# Patient Record
Sex: MALE | Race: WHITE | Employment: OTHER | URBAN - METROPOLITAN AREA
[De-identification: names, ages, dates, MRNs, and addresses within clinical notes are randomized per-mention and may not be internally consistent; named-entity substitution may affect disease eponyms.]

---

## 2019-10-13 ENCOUNTER — APPOINTMENT (OUTPATIENT)
Dept: CT IMAGING | Age: 79
DRG: 330 | End: 2019-10-13
Payer: MEDICARE

## 2019-10-13 ENCOUNTER — HOSPITAL ENCOUNTER (INPATIENT)
Age: 79
LOS: 10 days | Discharge: HOME HEALTH | DRG: 330 | End: 2019-10-23
Attending: EMERGENCY MEDICINE | Admitting: SURGERY
Payer: MEDICARE

## 2019-10-13 ENCOUNTER — ANESTHESIA EVENT (OUTPATIENT)
Dept: OPERATING ROOM | Age: 79
DRG: 330 | End: 2019-10-13
Payer: MEDICARE

## 2019-10-13 ENCOUNTER — ANESTHESIA (OUTPATIENT)
Dept: OPERATING ROOM | Age: 79
DRG: 330 | End: 2019-10-13
Payer: MEDICARE

## 2019-10-13 VITALS — DIASTOLIC BLOOD PRESSURE: 79 MMHG | TEMPERATURE: 97 F | OXYGEN SATURATION: 87 % | SYSTOLIC BLOOD PRESSURE: 158 MMHG

## 2019-10-13 DIAGNOSIS — R10.84 GENERALIZED ABDOMINAL PAIN: ICD-10-CM

## 2019-10-13 DIAGNOSIS — K56.609 INTESTINAL OBSTRUCTION, UNSPECIFIED CAUSE, UNSPECIFIED WHETHER PARTIAL OR COMPLETE (HCC): Primary | ICD-10-CM

## 2019-10-13 LAB
ALBUMIN SERPL-MCNC: 4.1 G/DL (ref 3.5–5.2)
ALP BLD-CCNC: 84 U/L (ref 40–129)
ALT SERPL-CCNC: 11 U/L (ref 0–40)
ANION GAP SERPL CALCULATED.3IONS-SCNC: 15 MMOL/L (ref 7–16)
AST SERPL-CCNC: 11 U/L (ref 0–39)
BASOPHILS ABSOLUTE: 0.05 E9/L (ref 0–0.2)
BASOPHILS RELATIVE PERCENT: 0.4 % (ref 0–2)
BILIRUB SERPL-MCNC: 0.9 MG/DL (ref 0–1.2)
BUN BLDV-MCNC: 12 MG/DL (ref 8–23)
CALCIUM SERPL-MCNC: 9.9 MG/DL (ref 8.6–10.2)
CHLORIDE BLD-SCNC: 102 MMOL/L (ref 98–107)
CO2: 25 MMOL/L (ref 22–29)
CREAT SERPL-MCNC: 0.8 MG/DL (ref 0.7–1.2)
EOSINOPHILS ABSOLUTE: 0.08 E9/L (ref 0.05–0.5)
EOSINOPHILS RELATIVE PERCENT: 0.6 % (ref 0–6)
GFR AFRICAN AMERICAN: >60
GFR AFRICAN AMERICAN: >60
GFR NON-AFRICAN AMERICAN: >60 ML/MIN/1.73
GFR NON-AFRICAN AMERICAN: >60 ML/MIN/1.73
GLUCOSE BLD-MCNC: 106 MG/DL (ref 74–99)
GLUCOSE BLD-MCNC: 107 MG/DL (ref 74–99)
HCT VFR BLD CALC: 45.8 % (ref 37–54)
HEMOGLOBIN: 15.8 G/DL (ref 12.5–16.5)
IMMATURE GRANULOCYTES #: 0.06 E9/L
IMMATURE GRANULOCYTES %: 0.4 % (ref 0–5)
LACTIC ACID: 1.6 MMOL/L (ref 0.5–2.2)
LIPASE: 33 U/L (ref 13–60)
LYMPHOCYTES ABSOLUTE: 2.65 E9/L (ref 1.5–4)
LYMPHOCYTES RELATIVE PERCENT: 18.6 % (ref 20–42)
MCH RBC QN AUTO: 29.3 PG (ref 26–35)
MCHC RBC AUTO-ENTMCNC: 34.5 % (ref 32–34.5)
MCV RBC AUTO: 84.8 FL (ref 80–99.9)
MONOCYTES ABSOLUTE: 1.1 E9/L (ref 0.1–0.95)
MONOCYTES RELATIVE PERCENT: 7.7 % (ref 2–12)
NEUTROPHILS ABSOLUTE: 10.28 E9/L (ref 1.8–7.3)
NEUTROPHILS RELATIVE PERCENT: 72.3 % (ref 43–80)
PDW BLD-RTO: 12 FL (ref 11.5–15)
PERFORMED ON: ABNORMAL
PLATELET # BLD: 368 E9/L (ref 130–450)
PMV BLD AUTO: 8.6 FL (ref 7–12)
POC CHLORIDE: 105 MMOL/L (ref 100–108)
POC CREATININE: 0.8 MG/DL (ref 0.7–1.2)
POC POTASSIUM: 3.5 MMOL/L (ref 3.5–5)
POC SODIUM: 138 MMOL/L (ref 132–146)
POTASSIUM REFLEX MAGNESIUM: 3.6 MMOL/L (ref 3.5–5)
RBC # BLD: 5.4 E12/L (ref 3.8–5.8)
SODIUM BLD-SCNC: 142 MMOL/L (ref 132–146)
TOTAL PROTEIN: 7.7 G/DL (ref 6.4–8.3)
WBC # BLD: 14.2 E9/L (ref 4.5–11.5)

## 2019-10-13 PROCEDURE — 3600000004 HC SURGERY LEVEL 4 BASE: Performed by: SURGERY

## 2019-10-13 PROCEDURE — 6360000002 HC RX W HCPCS: Performed by: EMERGENCY MEDICINE

## 2019-10-13 PROCEDURE — 6360000002 HC RX W HCPCS: Performed by: ANESTHESIOLOGY

## 2019-10-13 PROCEDURE — 84295 ASSAY OF SERUM SODIUM: CPT

## 2019-10-13 PROCEDURE — 82947 ASSAY GLUCOSE BLOOD QUANT: CPT

## 2019-10-13 PROCEDURE — 2709999900 HC NON-CHARGEABLE SUPPLY: Performed by: SURGERY

## 2019-10-13 PROCEDURE — 80053 COMPREHEN METABOLIC PANEL: CPT

## 2019-10-13 PROCEDURE — 84132 ASSAY OF SERUM POTASSIUM: CPT

## 2019-10-13 PROCEDURE — 6360000002 HC RX W HCPCS

## 2019-10-13 PROCEDURE — 88309 TISSUE EXAM BY PATHOLOGIST: CPT

## 2019-10-13 PROCEDURE — 99285 EMERGENCY DEPT VISIT HI MDM: CPT

## 2019-10-13 PROCEDURE — 83690 ASSAY OF LIPASE: CPT

## 2019-10-13 PROCEDURE — 87040 BLOOD CULTURE FOR BACTERIA: CPT

## 2019-10-13 PROCEDURE — 44141 PARTIAL REMOVAL OF COLON: CPT | Performed by: SURGERY

## 2019-10-13 PROCEDURE — 2580000003 HC RX 258: Performed by: RADIOLOGY

## 2019-10-13 PROCEDURE — 83605 ASSAY OF LACTIC ACID: CPT

## 2019-10-13 PROCEDURE — 93005 ELECTROCARDIOGRAM TRACING: CPT | Performed by: EMERGENCY MEDICINE

## 2019-10-13 PROCEDURE — 94761 N-INVAS EAR/PLS OXIMETRY MLT: CPT

## 2019-10-13 PROCEDURE — 82435 ASSAY OF BLOOD CHLORIDE: CPT

## 2019-10-13 PROCEDURE — 7100000000 HC PACU RECOVERY - FIRST 15 MIN: Performed by: SURGERY

## 2019-10-13 PROCEDURE — 85025 COMPLETE CBC W/AUTO DIFF WBC: CPT

## 2019-10-13 PROCEDURE — 0DTG0ZZ RESECTION OF LEFT LARGE INTESTINE, OPEN APPROACH: ICD-10-PCS | Performed by: SURGERY

## 2019-10-13 PROCEDURE — 3700000001 HC ADD 15 MINUTES (ANESTHESIA): Performed by: SURGERY

## 2019-10-13 PROCEDURE — 6360000004 HC RX CONTRAST MEDICATION: Performed by: RADIOLOGY

## 2019-10-13 PROCEDURE — 74177 CT ABD & PELVIS W/CONTRAST: CPT

## 2019-10-13 PROCEDURE — 3600000014 HC SURGERY LEVEL 4 ADDTL 15MIN: Performed by: SURGERY

## 2019-10-13 PROCEDURE — 2140000000 HC CCU INTERMEDIATE R&B

## 2019-10-13 PROCEDURE — 2580000003 HC RX 258

## 2019-10-13 PROCEDURE — 2720000010 HC SURG SUPPLY STERILE: Performed by: SURGERY

## 2019-10-13 PROCEDURE — 7100000001 HC PACU RECOVERY - ADDTL 15 MIN: Performed by: SURGERY

## 2019-10-13 PROCEDURE — 96374 THER/PROPH/DIAG INJ IV PUSH: CPT

## 2019-10-13 PROCEDURE — 36415 COLL VENOUS BLD VENIPUNCTURE: CPT

## 2019-10-13 PROCEDURE — 82565 ASSAY OF CREATININE: CPT

## 2019-10-13 PROCEDURE — 2580000003 HC RX 258: Performed by: EMERGENCY MEDICINE

## 2019-10-13 PROCEDURE — 3700000000 HC ANESTHESIA ATTENDED CARE: Performed by: SURGERY

## 2019-10-13 PROCEDURE — 2500000003 HC RX 250 WO HCPCS

## 2019-10-13 PROCEDURE — 44604 SUTURE LARGE INTESTINE: CPT | Performed by: SURGERY

## 2019-10-13 PROCEDURE — 0D1L0Z4 BYPASS TRANSVERSE COLON TO CUTANEOUS, OPEN APPROACH: ICD-10-PCS | Performed by: SURGERY

## 2019-10-13 PROCEDURE — 44139 MOBILIZATION OF COLON: CPT | Performed by: SURGERY

## 2019-10-13 PROCEDURE — 6360000002 HC RX W HCPCS: Performed by: NURSE ANESTHETIST, CERTIFIED REGISTERED

## 2019-10-13 PROCEDURE — 2580000003 HC RX 258: Performed by: STUDENT IN AN ORGANIZED HEALTH CARE EDUCATION/TRAINING PROGRAM

## 2019-10-13 PROCEDURE — 99223 1ST HOSP IP/OBS HIGH 75: CPT | Performed by: SURGERY

## 2019-10-13 PROCEDURE — 96375 TX/PRO/DX INJ NEW DRUG ADDON: CPT

## 2019-10-13 RX ORDER — SODIUM CHLORIDE 0.9 % (FLUSH) 0.9 %
10 SYRINGE (ML) INJECTION EVERY 12 HOURS SCHEDULED
Status: DISCONTINUED | OUTPATIENT
Start: 2019-10-13 | End: 2019-10-23 | Stop reason: HOSPADM

## 2019-10-13 RX ORDER — ACETAMINOPHEN 325 MG/1
650 TABLET ORAL EVERY 4 HOURS PRN
Status: DISCONTINUED | OUTPATIENT
Start: 2019-10-13 | End: 2019-10-23 | Stop reason: HOSPADM

## 2019-10-13 RX ORDER — FENTANYL CITRATE 50 UG/ML
25 INJECTION, SOLUTION INTRAMUSCULAR; INTRAVENOUS EVERY 5 MIN PRN
Status: DISCONTINUED | OUTPATIENT
Start: 2019-10-13 | End: 2019-10-13 | Stop reason: HOSPADM

## 2019-10-13 RX ORDER — LEVOTHYROXINE SODIUM 137 UG/1
137 TABLET ORAL DAILY
Status: DISCONTINUED | OUTPATIENT
Start: 2019-10-14 | End: 2019-10-23 | Stop reason: HOSPADM

## 2019-10-13 RX ORDER — OXYCODONE HYDROCHLORIDE AND ACETAMINOPHEN 5; 325 MG/1; MG/1
1 TABLET ORAL
Status: DISCONTINUED | OUTPATIENT
Start: 2019-10-13 | End: 2019-10-13 | Stop reason: HOSPADM

## 2019-10-13 RX ORDER — MORPHINE SULFATE 4 MG/ML
4 INJECTION, SOLUTION INTRAMUSCULAR; INTRAVENOUS ONCE
Status: COMPLETED | OUTPATIENT
Start: 2019-10-13 | End: 2019-10-13

## 2019-10-13 RX ORDER — SODIUM CHLORIDE, SODIUM LACTATE, POTASSIUM CHLORIDE, CALCIUM CHLORIDE 600; 310; 30; 20 MG/100ML; MG/100ML; MG/100ML; MG/100ML
INJECTION, SOLUTION INTRAVENOUS CONTINUOUS
Status: DISCONTINUED | OUTPATIENT
Start: 2019-10-13 | End: 2019-10-15

## 2019-10-13 RX ORDER — ONDANSETRON 2 MG/ML
INJECTION INTRAMUSCULAR; INTRAVENOUS PRN
Status: DISCONTINUED | OUTPATIENT
Start: 2019-10-13 | End: 2019-10-13 | Stop reason: SDUPTHER

## 2019-10-13 RX ORDER — NALOXONE HYDROCHLORIDE 0.4 MG/ML
0.4 INJECTION, SOLUTION INTRAMUSCULAR; INTRAVENOUS; SUBCUTANEOUS PRN
Status: DISCONTINUED | OUTPATIENT
Start: 2019-10-13 | End: 2019-10-16

## 2019-10-13 RX ORDER — FENTANYL CITRATE 50 UG/ML
50 INJECTION, SOLUTION INTRAMUSCULAR; INTRAVENOUS EVERY 5 MIN PRN
Status: DISCONTINUED | OUTPATIENT
Start: 2019-10-13 | End: 2019-10-13 | Stop reason: HOSPADM

## 2019-10-13 RX ORDER — SODIUM CHLORIDE 0.9 % (FLUSH) 0.9 %
10 SYRINGE (ML) INJECTION PRN
Status: DISCONTINUED | OUTPATIENT
Start: 2019-10-13 | End: 2019-10-23 | Stop reason: HOSPADM

## 2019-10-13 RX ORDER — MORPHINE SULFATE 2 MG/ML
2 INJECTION, SOLUTION INTRAMUSCULAR; INTRAVENOUS
Status: CANCELLED | OUTPATIENT
Start: 2019-10-13

## 2019-10-13 RX ORDER — LEVOTHYROXINE SODIUM 0.15 MG/1
150 TABLET ORAL DAILY
Status: ON HOLD | COMMUNITY
End: 2020-11-07 | Stop reason: HOSPADM

## 2019-10-13 RX ORDER — OXYCODONE HYDROCHLORIDE AND ACETAMINOPHEN 5; 325 MG/1; MG/1
1 TABLET ORAL EVERY 4 HOURS PRN
Status: DISCONTINUED | OUTPATIENT
Start: 2019-10-13 | End: 2019-10-23 | Stop reason: HOSPADM

## 2019-10-13 RX ORDER — SODIUM CHLORIDE, SODIUM LACTATE, POTASSIUM CHLORIDE, CALCIUM CHLORIDE 600; 310; 30; 20 MG/100ML; MG/100ML; MG/100ML; MG/100ML
INJECTION, SOLUTION INTRAVENOUS CONTINUOUS PRN
Status: DISCONTINUED | OUTPATIENT
Start: 2019-10-13 | End: 2019-10-13 | Stop reason: SDUPTHER

## 2019-10-13 RX ORDER — ROCURONIUM BROMIDE 10 MG/ML
INJECTION, SOLUTION INTRAVENOUS PRN
Status: DISCONTINUED | OUTPATIENT
Start: 2019-10-13 | End: 2019-10-13 | Stop reason: SDUPTHER

## 2019-10-13 RX ORDER — PHENYLEPHRINE HYDROCHLORIDE 10 MG/ML
INJECTION INTRAVENOUS PRN
Status: DISCONTINUED | OUTPATIENT
Start: 2019-10-13 | End: 2019-10-13 | Stop reason: SDUPTHER

## 2019-10-13 RX ORDER — GLYCOPYRROLATE 1 MG/5 ML
SYRINGE (ML) INTRAVENOUS PRN
Status: DISCONTINUED | OUTPATIENT
Start: 2019-10-13 | End: 2019-10-13 | Stop reason: SDUPTHER

## 2019-10-13 RX ORDER — DIPHENHYDRAMINE HYDROCHLORIDE 50 MG/ML
12.5 INJECTION INTRAMUSCULAR; INTRAVENOUS
Status: DISCONTINUED | OUTPATIENT
Start: 2019-10-13 | End: 2019-10-13 | Stop reason: HOSPADM

## 2019-10-13 RX ORDER — MEPERIDINE HYDROCHLORIDE 50 MG/ML
12.5 INJECTION INTRAMUSCULAR; INTRAVENOUS; SUBCUTANEOUS EVERY 5 MIN PRN
Status: DISCONTINUED | OUTPATIENT
Start: 2019-10-13 | End: 2019-10-13 | Stop reason: HOSPADM

## 2019-10-13 RX ORDER — PROPOFOL 10 MG/ML
INJECTION, EMULSION INTRAVENOUS PRN
Status: DISCONTINUED | OUTPATIENT
Start: 2019-10-13 | End: 2019-10-13 | Stop reason: SDUPTHER

## 2019-10-13 RX ORDER — ONDANSETRON 2 MG/ML
4 INJECTION INTRAMUSCULAR; INTRAVENOUS ONCE
Status: COMPLETED | OUTPATIENT
Start: 2019-10-13 | End: 2019-10-13

## 2019-10-13 RX ORDER — LIDOCAINE HYDROCHLORIDE 20 MG/ML
INJECTION, SOLUTION INTRAVENOUS PRN
Status: DISCONTINUED | OUTPATIENT
Start: 2019-10-13 | End: 2019-10-13 | Stop reason: SDUPTHER

## 2019-10-13 RX ORDER — ATORVASTATIN CALCIUM 40 MG/1
40 TABLET, FILM COATED ORAL NIGHTLY
COMMUNITY

## 2019-10-13 RX ORDER — SUCCINYLCHOLINE/SOD CL,ISO/PF 200MG/10ML
SYRINGE (ML) INTRAVENOUS PRN
Status: DISCONTINUED | OUTPATIENT
Start: 2019-10-13 | End: 2019-10-13 | Stop reason: SDUPTHER

## 2019-10-13 RX ORDER — M-VIT,TX,IRON,MINS/CALC/FOLIC 27MG-0.4MG
1 TABLET ORAL DAILY
Status: ON HOLD | COMMUNITY
End: 2020-11-07 | Stop reason: HOSPADM

## 2019-10-13 RX ORDER — LABETALOL HYDROCHLORIDE 5 MG/ML
INJECTION, SOLUTION INTRAVENOUS PRN
Status: DISCONTINUED | OUTPATIENT
Start: 2019-10-13 | End: 2019-10-13 | Stop reason: SDUPTHER

## 2019-10-13 RX ORDER — ONDANSETRON 2 MG/ML
4 INJECTION INTRAMUSCULAR; INTRAVENOUS EVERY 6 HOURS PRN
Status: DISCONTINUED | OUTPATIENT
Start: 2019-10-13 | End: 2019-10-23 | Stop reason: HOSPADM

## 2019-10-13 RX ORDER — MORPHINE SULFATE/0.9% NACL/PF 1 MG/ML
SYRINGE (ML) INJECTION
Status: COMPLETED
Start: 2019-10-13 | End: 2019-10-13

## 2019-10-13 RX ORDER — ACETAMINOPHEN 500 MG
500 TABLET ORAL EVERY 6 HOURS PRN
COMMUNITY

## 2019-10-13 RX ORDER — MIDAZOLAM HYDROCHLORIDE 1 MG/ML
INJECTION INTRAMUSCULAR; INTRAVENOUS PRN
Status: DISCONTINUED | OUTPATIENT
Start: 2019-10-13 | End: 2019-10-13 | Stop reason: SDUPTHER

## 2019-10-13 RX ORDER — NEOSTIGMINE METHYLSULFATE 1 MG/ML
INJECTION, SOLUTION INTRAVENOUS PRN
Status: DISCONTINUED | OUTPATIENT
Start: 2019-10-13 | End: 2019-10-13 | Stop reason: SDUPTHER

## 2019-10-13 RX ORDER — SODIUM CHLORIDE 0.9 % (FLUSH) 0.9 %
10 SYRINGE (ML) INJECTION
Status: COMPLETED | OUTPATIENT
Start: 2019-10-13 | End: 2019-10-13

## 2019-10-13 RX ORDER — FENTANYL CITRATE 50 UG/ML
INJECTION, SOLUTION INTRAMUSCULAR; INTRAVENOUS PRN
Status: DISCONTINUED | OUTPATIENT
Start: 2019-10-13 | End: 2019-10-13 | Stop reason: SDUPTHER

## 2019-10-13 RX ORDER — MORPHINE SULFATE/0.9% NACL/PF 1 MG/ML
SYRINGE (ML) INJECTION CONTINUOUS
Status: DISCONTINUED | OUTPATIENT
Start: 2019-10-13 | End: 2019-10-16

## 2019-10-13 RX ORDER — OXYCODONE HYDROCHLORIDE AND ACETAMINOPHEN 5; 325 MG/1; MG/1
2 TABLET ORAL EVERY 4 HOURS PRN
Status: DISCONTINUED | OUTPATIENT
Start: 2019-10-13 | End: 2019-10-23 | Stop reason: HOSPADM

## 2019-10-13 RX ORDER — PROMETHAZINE HYDROCHLORIDE 25 MG/ML
6.25 INJECTION, SOLUTION INTRAMUSCULAR; INTRAVENOUS
Status: DISCONTINUED | OUTPATIENT
Start: 2019-10-13 | End: 2019-10-13 | Stop reason: HOSPADM

## 2019-10-13 RX ORDER — ATORVASTATIN CALCIUM 40 MG/1
40 TABLET, FILM COATED ORAL DAILY
Status: DISCONTINUED | OUTPATIENT
Start: 2019-10-14 | End: 2019-10-23 | Stop reason: HOSPADM

## 2019-10-13 RX ORDER — DEXAMETHASONE SODIUM PHOSPHATE 10 MG/ML
INJECTION INTRAMUSCULAR; INTRAVENOUS PRN
Status: DISCONTINUED | OUTPATIENT
Start: 2019-10-13 | End: 2019-10-13 | Stop reason: SDUPTHER

## 2019-10-13 RX ADMIN — MORPHINE SULFATE 30 MG: 1 INJECTION INTRAVENOUS at 19:50

## 2019-10-13 RX ADMIN — SODIUM CHLORIDE, POTASSIUM CHLORIDE, SODIUM LACTATE AND CALCIUM CHLORIDE: 600; 310; 30; 20 INJECTION, SOLUTION INTRAVENOUS at 16:36

## 2019-10-13 RX ADMIN — PHENYLEPHRINE HYDROCHLORIDE 100 MCG: 10 INJECTION INTRAVENOUS at 16:53

## 2019-10-13 RX ADMIN — Medication 10 ML: at 12:40

## 2019-10-13 RX ADMIN — Medication 0.6 MG: at 18:39

## 2019-10-13 RX ADMIN — FENTANYL CITRATE 50 MCG: 0.05 INJECTION, SOLUTION INTRAMUSCULAR; INTRAVENOUS at 20:40

## 2019-10-13 RX ADMIN — Medication 140 MG: at 16:46

## 2019-10-13 RX ADMIN — PHENYLEPHRINE HYDROCHLORIDE 200 MCG: 10 INJECTION INTRAVENOUS at 17:02

## 2019-10-13 RX ADMIN — SODIUM CHLORIDE, POTASSIUM CHLORIDE, SODIUM LACTATE AND CALCIUM CHLORIDE: 600; 310; 30; 20 INJECTION, SOLUTION INTRAVENOUS at 17:30

## 2019-10-13 RX ADMIN — ONDANSETRON 4 MG: 2 INJECTION INTRAMUSCULAR; INTRAVENOUS at 11:51

## 2019-10-13 RX ADMIN — ONDANSETRON HYDROCHLORIDE 4 MG: 2 INJECTION, SOLUTION INTRAMUSCULAR; INTRAVENOUS at 17:13

## 2019-10-13 RX ADMIN — SODIUM CHLORIDE, POTASSIUM CHLORIDE, SODIUM LACTATE AND CALCIUM CHLORIDE: 600; 310; 30; 20 INJECTION, SOLUTION INTRAVENOUS at 19:58

## 2019-10-13 RX ADMIN — FENTANYL CITRATE 100 MCG: 50 INJECTION, SOLUTION INTRAMUSCULAR; INTRAVENOUS at 16:46

## 2019-10-13 RX ADMIN — Medication 3 MG: at 18:39

## 2019-10-13 RX ADMIN — ROCURONIUM BROMIDE 20 MG: 10 INJECTION, SOLUTION INTRAVENOUS at 17:16

## 2019-10-13 RX ADMIN — ROCURONIUM BROMIDE 30 MG: 10 INJECTION, SOLUTION INTRAVENOUS at 16:52

## 2019-10-13 RX ADMIN — MORPHINE SULFATE 4 MG: 4 INJECTION, SOLUTION INTRAMUSCULAR; INTRAVENOUS at 16:06

## 2019-10-13 RX ADMIN — FENTANYL CITRATE 50 MCG: 50 INJECTION, SOLUTION INTRAMUSCULAR; INTRAVENOUS at 17:39

## 2019-10-13 RX ADMIN — MIDAZOLAM HYDROCHLORIDE 2 MG: 1 INJECTION, SOLUTION INTRAMUSCULAR; INTRAVENOUS at 16:36

## 2019-10-13 RX ADMIN — LABETALOL HYDROCHLORIDE 5 MG: 5 INJECTION INTRAVENOUS at 17:19

## 2019-10-13 RX ADMIN — PROPOFOL 160 MG: 10 INJECTION, EMULSION INTRAVENOUS at 16:46

## 2019-10-13 RX ADMIN — DEXAMETHASONE SODIUM PHOSPHATE 10 MG: 10 INJECTION INTRAMUSCULAR; INTRAVENOUS at 16:46

## 2019-10-13 RX ADMIN — FENTANYL CITRATE 50 MCG: 0.05 INJECTION, SOLUTION INTRAMUSCULAR; INTRAVENOUS at 20:35

## 2019-10-13 RX ADMIN — PHENYLEPHRINE HYDROCHLORIDE 100 MCG: 10 INJECTION INTRAVENOUS at 16:56

## 2019-10-13 RX ADMIN — IOPAMIDOL 110 ML: 755 INJECTION, SOLUTION INTRAVENOUS at 12:40

## 2019-10-13 RX ADMIN — PHENYLEPHRINE HYDROCHLORIDE 200 MCG: 10 INJECTION INTRAVENOUS at 17:05

## 2019-10-13 RX ADMIN — MORPHINE SULFATE 4 MG: 4 INJECTION, SOLUTION INTRAMUSCULAR; INTRAVENOUS at 11:52

## 2019-10-13 RX ADMIN — FENTANYL CITRATE 50 MCG: 50 INJECTION, SOLUTION INTRAMUSCULAR; INTRAVENOUS at 17:16

## 2019-10-13 RX ADMIN — FENTANYL CITRATE 50 MCG: 50 INJECTION, SOLUTION INTRAMUSCULAR; INTRAVENOUS at 17:21

## 2019-10-13 RX ADMIN — LIDOCAINE HYDROCHLORIDE 40 MG: 20 INJECTION, SOLUTION INTRAVENOUS at 16:46

## 2019-10-13 RX ADMIN — PHENYLEPHRINE HYDROCHLORIDE 100 MCG: 10 INJECTION INTRAVENOUS at 16:58

## 2019-10-13 RX ADMIN — PIPERACILLIN AND TAZOBACTAM 3.38 G: 3; .375 INJECTION, POWDER, LYOPHILIZED, FOR SOLUTION INTRAVENOUS; PARENTERAL at 15:43

## 2019-10-13 ASSESSMENT — PULMONARY FUNCTION TESTS
PIF_VALUE: 18
PIF_VALUE: 16
PIF_VALUE: 0
PIF_VALUE: 0
PIF_VALUE: 16
PIF_VALUE: 16
PIF_VALUE: 2
PIF_VALUE: 16
PIF_VALUE: 16
PIF_VALUE: 18
PIF_VALUE: 14
PIF_VALUE: 18
PIF_VALUE: 1
PIF_VALUE: 16
PIF_VALUE: 19
PIF_VALUE: 16
PIF_VALUE: 14
PIF_VALUE: 3
PIF_VALUE: 18
PIF_VALUE: 15
PIF_VALUE: 16
PIF_VALUE: 18
PIF_VALUE: 16
PIF_VALUE: 16
PIF_VALUE: 2
PIF_VALUE: 16
PIF_VALUE: 17
PIF_VALUE: 15
PIF_VALUE: 0
PIF_VALUE: 2
PIF_VALUE: 2
PIF_VALUE: 3
PIF_VALUE: 16
PIF_VALUE: 17
PIF_VALUE: 18
PIF_VALUE: 11
PIF_VALUE: 2
PIF_VALUE: 21
PIF_VALUE: 17
PIF_VALUE: 18
PIF_VALUE: 16
PIF_VALUE: 3
PIF_VALUE: 17
PIF_VALUE: 17
PIF_VALUE: 16
PIF_VALUE: 3
PIF_VALUE: 2
PIF_VALUE: 4
PIF_VALUE: 18
PIF_VALUE: 0
PIF_VALUE: 19
PIF_VALUE: 19
PIF_VALUE: 14
PIF_VALUE: 19
PIF_VALUE: 18
PIF_VALUE: 2
PIF_VALUE: 18
PIF_VALUE: 18
PIF_VALUE: 19
PIF_VALUE: 16
PIF_VALUE: 16
PIF_VALUE: 2
PIF_VALUE: 2
PIF_VALUE: 16
PIF_VALUE: 18
PIF_VALUE: 17
PIF_VALUE: 18
PIF_VALUE: 16
PIF_VALUE: 0
PIF_VALUE: 16
PIF_VALUE: 16
PIF_VALUE: 20
PIF_VALUE: 15
PIF_VALUE: 2
PIF_VALUE: 19
PIF_VALUE: 19
PIF_VALUE: 15
PIF_VALUE: 14
PIF_VALUE: 17
PIF_VALUE: 15
PIF_VALUE: 16
PIF_VALUE: 15
PIF_VALUE: 2
PIF_VALUE: 16
PIF_VALUE: 18
PIF_VALUE: 16
PIF_VALUE: 15
PIF_VALUE: 16
PIF_VALUE: 15
PIF_VALUE: 16
PIF_VALUE: 19
PIF_VALUE: 0
PIF_VALUE: 16
PIF_VALUE: 16
PIF_VALUE: 15
PIF_VALUE: 15
PIF_VALUE: 17
PIF_VALUE: 19
PIF_VALUE: 18
PIF_VALUE: 17
PIF_VALUE: 1
PIF_VALUE: 1
PIF_VALUE: 16
PIF_VALUE: 0
PIF_VALUE: 20
PIF_VALUE: 2
PIF_VALUE: 18
PIF_VALUE: 16
PIF_VALUE: 15
PIF_VALUE: 2
PIF_VALUE: 16
PIF_VALUE: 2
PIF_VALUE: 20
PIF_VALUE: 5
PIF_VALUE: 17
PIF_VALUE: 19
PIF_VALUE: 16
PIF_VALUE: 7
PIF_VALUE: 2
PIF_VALUE: 3
PIF_VALUE: 16
PIF_VALUE: 17
PIF_VALUE: 18
PIF_VALUE: 19
PIF_VALUE: 11
PIF_VALUE: 16
PIF_VALUE: 19
PIF_VALUE: 2
PIF_VALUE: 20
PIF_VALUE: 2
PIF_VALUE: 16
PIF_VALUE: 11
PIF_VALUE: 11
PIF_VALUE: 19
PIF_VALUE: 17
PIF_VALUE: 0
PIF_VALUE: 16
PIF_VALUE: 15
PIF_VALUE: 19
PIF_VALUE: 16
PIF_VALUE: 26
PIF_VALUE: 2
PIF_VALUE: 16
PIF_VALUE: 3
PIF_VALUE: 19
PIF_VALUE: 15
PIF_VALUE: 19

## 2019-10-13 ASSESSMENT — PAIN DESCRIPTION - LOCATION
LOCATION: ABDOMEN

## 2019-10-13 ASSESSMENT — PAIN SCALES - GENERAL
PAINLEVEL_OUTOF10: 7
PAINLEVEL_OUTOF10: 8
PAINLEVEL_OUTOF10: 10
PAINLEVEL_OUTOF10: 8
PAINLEVEL_OUTOF10: 0
PAINLEVEL_OUTOF10: 8
PAINLEVEL_OUTOF10: 8
PAINLEVEL_OUTOF10: 9
PAINLEVEL_OUTOF10: 3
PAINLEVEL_OUTOF10: 8
PAINLEVEL_OUTOF10: 8
PAINLEVEL_OUTOF10: 5

## 2019-10-13 ASSESSMENT — PAIN DESCRIPTION - PROGRESSION
CLINICAL_PROGRESSION: GRADUALLY IMPROVING
CLINICAL_PROGRESSION: GRADUALLY WORSENING

## 2019-10-13 ASSESSMENT — PAIN DESCRIPTION - PAIN TYPE
TYPE: SURGICAL PAIN
TYPE: ACUTE PAIN

## 2019-10-13 ASSESSMENT — PAIN DESCRIPTION - ORIENTATION: ORIENTATION: MID

## 2019-10-13 ASSESSMENT — PAIN DESCRIPTION - DESCRIPTORS
DESCRIPTORS: ACHING

## 2019-10-13 ASSESSMENT — PAIN DESCRIPTION - FREQUENCY: FREQUENCY: CONTINUOUS

## 2019-10-14 LAB
ALBUMIN SERPL-MCNC: 3.4 G/DL (ref 3.5–5.2)
ALP BLD-CCNC: 60 U/L (ref 40–129)
ALT SERPL-CCNC: 10 U/L (ref 0–40)
ANION GAP SERPL CALCULATED.3IONS-SCNC: 18 MMOL/L (ref 7–16)
AST SERPL-CCNC: 12 U/L (ref 0–39)
BASOPHILS ABSOLUTE: 0.03 E9/L (ref 0–0.2)
BASOPHILS RELATIVE PERCENT: 0.1 % (ref 0–2)
BILIRUB SERPL-MCNC: 1 MG/DL (ref 0–1.2)
BUN BLDV-MCNC: 13 MG/DL (ref 8–23)
CALCIUM SERPL-MCNC: 8.9 MG/DL (ref 8.6–10.2)
CEA: 2.1 NG/ML (ref 0–5.2)
CHLORIDE BLD-SCNC: 102 MMOL/L (ref 98–107)
CO2: 20 MMOL/L (ref 22–29)
CREAT SERPL-MCNC: 0.9 MG/DL (ref 0.7–1.2)
EKG ATRIAL RATE: 73 BPM
EKG P AXIS: 41 DEGREES
EKG P-R INTERVAL: 154 MS
EKG Q-T INTERVAL: 390 MS
EKG QRS DURATION: 96 MS
EKG QTC CALCULATION (BAZETT): 429 MS
EKG R AXIS: -3 DEGREES
EKG T AXIS: -3 DEGREES
EKG VENTRICULAR RATE: 73 BPM
EOSINOPHILS ABSOLUTE: 0 E9/L (ref 0.05–0.5)
EOSINOPHILS RELATIVE PERCENT: 0 % (ref 0–6)
GFR AFRICAN AMERICAN: >60
GFR NON-AFRICAN AMERICAN: >60 ML/MIN/1.73
GLUCOSE BLD-MCNC: 121 MG/DL (ref 74–99)
HCT VFR BLD CALC: 41.8 % (ref 37–54)
HEMOGLOBIN: 14.5 G/DL (ref 12.5–16.5)
IMMATURE GRANULOCYTES #: 0.13 E9/L
IMMATURE GRANULOCYTES %: 0.6 % (ref 0–5)
LYMPHOCYTES ABSOLUTE: 1.47 E9/L (ref 1.5–4)
LYMPHOCYTES RELATIVE PERCENT: 6.8 % (ref 20–42)
MCH RBC QN AUTO: 29.9 PG (ref 26–35)
MCHC RBC AUTO-ENTMCNC: 34.7 % (ref 32–34.5)
MCV RBC AUTO: 86.2 FL (ref 80–99.9)
MONOCYTES ABSOLUTE: 1.46 E9/L (ref 0.1–0.95)
MONOCYTES RELATIVE PERCENT: 6.7 % (ref 2–12)
NEUTROPHILS ABSOLUTE: 18.62 E9/L (ref 1.8–7.3)
NEUTROPHILS RELATIVE PERCENT: 85.8 % (ref 43–80)
PDW BLD-RTO: 12.2 FL (ref 11.5–15)
PLATELET # BLD: 364 E9/L (ref 130–450)
PMV BLD AUTO: 9.2 FL (ref 7–12)
POTASSIUM REFLEX MAGNESIUM: 3.6 MMOL/L (ref 3.5–5)
RBC # BLD: 4.85 E12/L (ref 3.8–5.8)
SODIUM BLD-SCNC: 140 MMOL/L (ref 132–146)
TOTAL PROTEIN: 6.1 G/DL (ref 6.4–8.3)
WBC # BLD: 21.7 E9/L (ref 4.5–11.5)

## 2019-10-14 PROCEDURE — 93010 ELECTROCARDIOGRAM REPORT: CPT | Performed by: INTERNAL MEDICINE

## 2019-10-14 PROCEDURE — 2580000003 HC RX 258: Performed by: SURGERY

## 2019-10-14 PROCEDURE — 2580000003 HC RX 258: Performed by: STUDENT IN AN ORGANIZED HEALTH CARE EDUCATION/TRAINING PROGRAM

## 2019-10-14 PROCEDURE — 80053 COMPREHEN METABOLIC PANEL: CPT

## 2019-10-14 PROCEDURE — 99231 SBSQ HOSP IP/OBS SF/LOW 25: CPT | Performed by: SURGERY

## 2019-10-14 PROCEDURE — 97530 THERAPEUTIC ACTIVITIES: CPT

## 2019-10-14 PROCEDURE — 6370000000 HC RX 637 (ALT 250 FOR IP): Performed by: STUDENT IN AN ORGANIZED HEALTH CARE EDUCATION/TRAINING PROGRAM

## 2019-10-14 PROCEDURE — 97166 OT EVAL MOD COMPLEX 45 MIN: CPT

## 2019-10-14 PROCEDURE — 36415 COLL VENOUS BLD VENIPUNCTURE: CPT

## 2019-10-14 PROCEDURE — 6360000002 HC RX W HCPCS: Performed by: STUDENT IN AN ORGANIZED HEALTH CARE EDUCATION/TRAINING PROGRAM

## 2019-10-14 PROCEDURE — 82378 CARCINOEMBRYONIC ANTIGEN: CPT

## 2019-10-14 PROCEDURE — 85025 COMPLETE CBC W/AUTO DIFF WBC: CPT

## 2019-10-14 PROCEDURE — 1200000000 HC SEMI PRIVATE

## 2019-10-14 PROCEDURE — 97535 SELF CARE MNGMENT TRAINING: CPT

## 2019-10-14 RX ORDER — SODIUM CHLORIDE, SODIUM LACTATE, POTASSIUM CHLORIDE, AND CALCIUM CHLORIDE .6; .31; .03; .02 G/100ML; G/100ML; G/100ML; G/100ML
1000 INJECTION, SOLUTION INTRAVENOUS ONCE
Status: COMPLETED | OUTPATIENT
Start: 2019-10-14 | End: 2019-10-14

## 2019-10-14 RX ADMIN — Medication 10 ML: at 09:09

## 2019-10-14 RX ADMIN — OXYCODONE HYDROCHLORIDE AND ACETAMINOPHEN 1 TABLET: 5; 325 TABLET ORAL at 11:50

## 2019-10-14 RX ADMIN — LEVOTHYROXINE SODIUM 137 MCG: 137 TABLET ORAL at 06:54

## 2019-10-14 RX ADMIN — ATORVASTATIN CALCIUM 40 MG: 40 TABLET, FILM COATED ORAL at 09:08

## 2019-10-14 RX ADMIN — OXYCODONE HYDROCHLORIDE AND ACETAMINOPHEN 1 TABLET: 5; 325 TABLET ORAL at 19:03

## 2019-10-14 RX ADMIN — Medication 10 ML: at 00:43

## 2019-10-14 RX ADMIN — PIPERACILLIN SODIUM AND TAZOBACTAM SODIUM 3.38 G: 3; .375 INJECTION, POWDER, LYOPHILIZED, FOR SOLUTION INTRAVENOUS at 00:43

## 2019-10-14 RX ADMIN — SODIUM CHLORIDE, POTASSIUM CHLORIDE, SODIUM LACTATE AND CALCIUM CHLORIDE: 600; 310; 30; 20 INJECTION, SOLUTION INTRAVENOUS at 17:49

## 2019-10-14 RX ADMIN — MORPHINE SULFATE: 1 INJECTION INTRAVENOUS at 11:29

## 2019-10-14 RX ADMIN — Medication 10 ML: at 20:24

## 2019-10-14 RX ADMIN — SODIUM CHLORIDE, POTASSIUM CHLORIDE, SODIUM LACTATE AND CALCIUM CHLORIDE 1000 ML: 600; 310; 30; 20 INJECTION, SOLUTION INTRAVENOUS at 05:22

## 2019-10-14 RX ADMIN — PIPERACILLIN SODIUM AND TAZOBACTAM SODIUM 3.38 G: 3; .375 INJECTION, POWDER, LYOPHILIZED, FOR SOLUTION INTRAVENOUS at 09:09

## 2019-10-14 RX ADMIN — ENOXAPARIN SODIUM 40 MG: 40 INJECTION SUBCUTANEOUS at 09:09

## 2019-10-14 ASSESSMENT — PAIN DESCRIPTION - PAIN TYPE
TYPE: SURGICAL PAIN
TYPE: SURGICAL PAIN;ACUTE PAIN
TYPE: SURGICAL PAIN;ACUTE PAIN
TYPE: SURGICAL PAIN
TYPE: SURGICAL PAIN

## 2019-10-14 ASSESSMENT — PAIN DESCRIPTION - LOCATION
LOCATION: ABDOMEN

## 2019-10-14 ASSESSMENT — PAIN DESCRIPTION - FREQUENCY
FREQUENCY: CONTINUOUS

## 2019-10-14 ASSESSMENT — PAIN SCALES - GENERAL
PAINLEVEL_OUTOF10: 5
PAINLEVEL_OUTOF10: 6
PAINLEVEL_OUTOF10: 7
PAINLEVEL_OUTOF10: 5
PAINLEVEL_OUTOF10: 6
PAINLEVEL_OUTOF10: 6
PAINLEVEL_OUTOF10: 3
PAINLEVEL_OUTOF10: 6
PAINLEVEL_OUTOF10: 0
PAINLEVEL_OUTOF10: 5
PAINLEVEL_OUTOF10: 5

## 2019-10-14 ASSESSMENT — PAIN DESCRIPTION - ORIENTATION
ORIENTATION: MID;RIGHT
ORIENTATION: LOWER;RIGHT
ORIENTATION: RIGHT;LOWER
ORIENTATION: RIGHT;MID
ORIENTATION: RIGHT;LOWER

## 2019-10-14 ASSESSMENT — PAIN DESCRIPTION - ONSET
ONSET: ON-GOING

## 2019-10-14 ASSESSMENT — PAIN DESCRIPTION - DESCRIPTORS
DESCRIPTORS: ACHING;DULL;DISCOMFORT
DESCRIPTORS: ACHING;DISCOMFORT;DULL
DESCRIPTORS: DISCOMFORT;DULL;CONSTANT
DESCRIPTORS: DULL;ACHING;DISCOMFORT
DESCRIPTORS: DULL;DISCOMFORT;ACHING

## 2019-10-14 ASSESSMENT — PAIN - FUNCTIONAL ASSESSMENT
PAIN_FUNCTIONAL_ASSESSMENT: PREVENTS OR INTERFERES WITH MANY ACTIVE NOT PASSIVE ACTIVITIES

## 2019-10-15 LAB
ALBUMIN SERPL-MCNC: 2.8 G/DL (ref 3.5–5.2)
ALP BLD-CCNC: 58 U/L (ref 40–129)
ALT SERPL-CCNC: 8 U/L (ref 0–40)
ANION GAP SERPL CALCULATED.3IONS-SCNC: 11 MMOL/L (ref 7–16)
AST SERPL-CCNC: 12 U/L (ref 0–39)
BASOPHILS ABSOLUTE: 0.05 E9/L (ref 0–0.2)
BASOPHILS RELATIVE PERCENT: 0.3 % (ref 0–2)
BILIRUB SERPL-MCNC: 1.1 MG/DL (ref 0–1.2)
BUN BLDV-MCNC: 13 MG/DL (ref 8–23)
CALCIUM SERPL-MCNC: 8.6 MG/DL (ref 8.6–10.2)
CHLORIDE BLD-SCNC: 102 MMOL/L (ref 98–107)
CO2: 25 MMOL/L (ref 22–29)
CREAT SERPL-MCNC: 0.8 MG/DL (ref 0.7–1.2)
EOSINOPHILS ABSOLUTE: 0.16 E9/L (ref 0.05–0.5)
EOSINOPHILS RELATIVE PERCENT: 1.1 % (ref 0–6)
GFR AFRICAN AMERICAN: >60
GFR NON-AFRICAN AMERICAN: >60 ML/MIN/1.73
GLUCOSE BLD-MCNC: 89 MG/DL (ref 74–99)
HCT VFR BLD CALC: 35.6 % (ref 37–54)
HEMOGLOBIN: 12.1 G/DL (ref 12.5–16.5)
IMMATURE GRANULOCYTES #: 0.1 E9/L
IMMATURE GRANULOCYTES %: 0.7 % (ref 0–5)
LYMPHOCYTES ABSOLUTE: 2.2 E9/L (ref 1.5–4)
LYMPHOCYTES RELATIVE PERCENT: 14.9 % (ref 20–42)
MCH RBC QN AUTO: 30.1 PG (ref 26–35)
MCHC RBC AUTO-ENTMCNC: 34 % (ref 32–34.5)
MCV RBC AUTO: 88.6 FL (ref 80–99.9)
MONOCYTES ABSOLUTE: 1.07 E9/L (ref 0.1–0.95)
MONOCYTES RELATIVE PERCENT: 7.2 % (ref 2–12)
NEUTROPHILS ABSOLUTE: 11.21 E9/L (ref 1.8–7.3)
NEUTROPHILS RELATIVE PERCENT: 75.8 % (ref 43–80)
PDW BLD-RTO: 12.4 FL (ref 11.5–15)
PLATELET # BLD: 269 E9/L (ref 130–450)
PMV BLD AUTO: 9.3 FL (ref 7–12)
POTASSIUM REFLEX MAGNESIUM: 3.6 MMOL/L (ref 3.5–5)
RBC # BLD: 4.02 E12/L (ref 3.8–5.8)
SODIUM BLD-SCNC: 138 MMOL/L (ref 132–146)
TOTAL PROTEIN: 6 G/DL (ref 6.4–8.3)
WBC # BLD: 14.8 E9/L (ref 4.5–11.5)

## 2019-10-15 PROCEDURE — 6370000000 HC RX 637 (ALT 250 FOR IP): Performed by: STUDENT IN AN ORGANIZED HEALTH CARE EDUCATION/TRAINING PROGRAM

## 2019-10-15 PROCEDURE — 94770 HC ETCO2 MONITOR DAILY: CPT

## 2019-10-15 PROCEDURE — 1200000000 HC SEMI PRIVATE

## 2019-10-15 PROCEDURE — 85025 COMPLETE CBC W/AUTO DIFF WBC: CPT

## 2019-10-15 PROCEDURE — 97162 PT EVAL MOD COMPLEX 30 MIN: CPT

## 2019-10-15 PROCEDURE — 2580000003 HC RX 258: Performed by: STUDENT IN AN ORGANIZED HEALTH CARE EDUCATION/TRAINING PROGRAM

## 2019-10-15 PROCEDURE — 36415 COLL VENOUS BLD VENIPUNCTURE: CPT

## 2019-10-15 PROCEDURE — 2500000003 HC RX 250 WO HCPCS: Performed by: STUDENT IN AN ORGANIZED HEALTH CARE EDUCATION/TRAINING PROGRAM

## 2019-10-15 PROCEDURE — 80053 COMPREHEN METABOLIC PANEL: CPT

## 2019-10-15 PROCEDURE — 6360000002 HC RX W HCPCS: Performed by: STUDENT IN AN ORGANIZED HEALTH CARE EDUCATION/TRAINING PROGRAM

## 2019-10-15 PROCEDURE — 99231 SBSQ HOSP IP/OBS SF/LOW 25: CPT | Performed by: SURGERY

## 2019-10-15 PROCEDURE — 97530 THERAPEUTIC ACTIVITIES: CPT

## 2019-10-15 RX ORDER — DEXTROSE, SODIUM CHLORIDE, AND POTASSIUM CHLORIDE 5; .45; .15 G/100ML; G/100ML; G/100ML
INJECTION INTRAVENOUS CONTINUOUS
Status: DISCONTINUED | OUTPATIENT
Start: 2019-10-15 | End: 2019-10-21

## 2019-10-15 RX ADMIN — ENOXAPARIN SODIUM 40 MG: 40 INJECTION SUBCUTANEOUS at 11:10

## 2019-10-15 RX ADMIN — SODIUM CHLORIDE, POTASSIUM CHLORIDE, SODIUM LACTATE AND CALCIUM CHLORIDE: 600; 310; 30; 20 INJECTION, SOLUTION INTRAVENOUS at 04:32

## 2019-10-15 RX ADMIN — Medication 10 ML: at 19:56

## 2019-10-15 RX ADMIN — MORPHINE SULFATE 30 MG: 1 INJECTION INTRAVENOUS at 23:16

## 2019-10-15 RX ADMIN — ATORVASTATIN CALCIUM 40 MG: 40 TABLET, FILM COATED ORAL at 11:09

## 2019-10-15 RX ADMIN — POTASSIUM CHLORIDE, DEXTROSE MONOHYDRATE AND SODIUM CHLORIDE: 150; 5; 450 INJECTION, SOLUTION INTRAVENOUS at 21:32

## 2019-10-15 RX ADMIN — MORPHINE SULFATE: 1 INJECTION INTRAVENOUS at 06:20

## 2019-10-15 RX ADMIN — POTASSIUM CHLORIDE, DEXTROSE MONOHYDRATE AND SODIUM CHLORIDE: 150; 5; 450 INJECTION, SOLUTION INTRAVENOUS at 11:11

## 2019-10-15 RX ADMIN — LEVOTHYROXINE SODIUM 137 MCG: 137 TABLET ORAL at 06:55

## 2019-10-15 RX ADMIN — Medication 10 ML: at 11:10

## 2019-10-15 ASSESSMENT — PAIN DESCRIPTION - ONSET
ONSET: ON-GOING

## 2019-10-15 ASSESSMENT — PAIN DESCRIPTION - PAIN TYPE
TYPE: SURGICAL PAIN
TYPE: ACUTE PAIN
TYPE: SURGICAL PAIN
TYPE: SURGICAL PAIN
TYPE: ACUTE PAIN
TYPE: ACUTE PAIN;SURGICAL PAIN

## 2019-10-15 ASSESSMENT — PAIN SCALES - GENERAL
PAINLEVEL_OUTOF10: 4
PAINLEVEL_OUTOF10: 5
PAINLEVEL_OUTOF10: 4
PAINLEVEL_OUTOF10: 4
PAINLEVEL_OUTOF10: 5
PAINLEVEL_OUTOF10: 4
PAINLEVEL_OUTOF10: 4

## 2019-10-15 ASSESSMENT — PAIN DESCRIPTION - ORIENTATION
ORIENTATION: MID;LOWER
ORIENTATION: MID

## 2019-10-15 ASSESSMENT — PAIN DESCRIPTION - FREQUENCY
FREQUENCY: INTERMITTENT
FREQUENCY: CONTINUOUS
FREQUENCY: INTERMITTENT

## 2019-10-15 ASSESSMENT — PAIN - FUNCTIONAL ASSESSMENT: PAIN_FUNCTIONAL_ASSESSMENT: PREVENTS OR INTERFERES SOME ACTIVE ACTIVITIES AND ADLS

## 2019-10-15 ASSESSMENT — PAIN DESCRIPTION - LOCATION
LOCATION: ABDOMEN

## 2019-10-15 ASSESSMENT — PAIN DESCRIPTION - DESCRIPTORS
DESCRIPTORS: ACHING;DISCOMFORT;SORE
DESCRIPTORS: ACHING
DESCRIPTORS: ACHING;CONSTANT;DISCOMFORT

## 2019-10-15 ASSESSMENT — PAIN DESCRIPTION - PROGRESSION
CLINICAL_PROGRESSION: NOT CHANGED
CLINICAL_PROGRESSION: GRADUALLY IMPROVING
CLINICAL_PROGRESSION: GRADUALLY IMPROVING

## 2019-10-16 ENCOUNTER — APPOINTMENT (OUTPATIENT)
Dept: CT IMAGING | Age: 79
DRG: 330 | End: 2019-10-16
Payer: MEDICARE

## 2019-10-16 LAB
ALBUMIN SERPL-MCNC: 2.7 G/DL (ref 3.5–5.2)
ALP BLD-CCNC: 58 U/L (ref 40–129)
ALT SERPL-CCNC: 10 U/L (ref 0–40)
ANION GAP SERPL CALCULATED.3IONS-SCNC: 14 MMOL/L (ref 7–16)
AST SERPL-CCNC: 13 U/L (ref 0–39)
BASOPHILS ABSOLUTE: 0.03 E9/L (ref 0–0.2)
BASOPHILS RELATIVE PERCENT: 0.3 % (ref 0–2)
BILIRUB SERPL-MCNC: 0.8 MG/DL (ref 0–1.2)
BUN BLDV-MCNC: 6 MG/DL (ref 8–23)
CALCIUM SERPL-MCNC: 8.5 MG/DL (ref 8.6–10.2)
CHLORIDE BLD-SCNC: 97 MMOL/L (ref 98–107)
CO2: 23 MMOL/L (ref 22–29)
CREAT SERPL-MCNC: 0.7 MG/DL (ref 0.7–1.2)
EOSINOPHILS ABSOLUTE: 0.23 E9/L (ref 0.05–0.5)
EOSINOPHILS RELATIVE PERCENT: 2 % (ref 0–6)
GFR AFRICAN AMERICAN: >60
GFR NON-AFRICAN AMERICAN: >60 ML/MIN/1.73
GLUCOSE BLD-MCNC: 114 MG/DL (ref 74–99)
HCT VFR BLD CALC: 34 % (ref 37–54)
HEMOGLOBIN: 11.2 G/DL (ref 12.5–16.5)
IMMATURE GRANULOCYTES #: 0.09 E9/L
IMMATURE GRANULOCYTES %: 0.8 % (ref 0–5)
LYMPHOCYTES ABSOLUTE: 1.72 E9/L (ref 1.5–4)
LYMPHOCYTES RELATIVE PERCENT: 14.8 % (ref 20–42)
MAGNESIUM: 2 MG/DL (ref 1.6–2.6)
MCH RBC QN AUTO: 29.2 PG (ref 26–35)
MCHC RBC AUTO-ENTMCNC: 32.9 % (ref 32–34.5)
MCV RBC AUTO: 88.8 FL (ref 80–99.9)
MONOCYTES ABSOLUTE: 0.8 E9/L (ref 0.1–0.95)
MONOCYTES RELATIVE PERCENT: 6.9 % (ref 2–12)
NEUTROPHILS ABSOLUTE: 8.78 E9/L (ref 1.8–7.3)
NEUTROPHILS RELATIVE PERCENT: 75.2 % (ref 43–80)
PDW BLD-RTO: 12.1 FL (ref 11.5–15)
PLATELET # BLD: 259 E9/L (ref 130–450)
PMV BLD AUTO: 9.2 FL (ref 7–12)
POTASSIUM REFLEX MAGNESIUM: 3.5 MMOL/L (ref 3.5–5)
RBC # BLD: 3.83 E12/L (ref 3.8–5.8)
SODIUM BLD-SCNC: 134 MMOL/L (ref 132–146)
TOTAL PROTEIN: 5.8 G/DL (ref 6.4–8.3)
WBC # BLD: 11.7 E9/L (ref 4.5–11.5)

## 2019-10-16 PROCEDURE — 6370000000 HC RX 637 (ALT 250 FOR IP): Performed by: STUDENT IN AN ORGANIZED HEALTH CARE EDUCATION/TRAINING PROGRAM

## 2019-10-16 PROCEDURE — 71260 CT THORAX DX C+: CPT

## 2019-10-16 PROCEDURE — 6360000004 HC RX CONTRAST MEDICATION: Performed by: RADIOLOGY

## 2019-10-16 PROCEDURE — 80053 COMPREHEN METABOLIC PANEL: CPT

## 2019-10-16 PROCEDURE — 85025 COMPLETE CBC W/AUTO DIFF WBC: CPT

## 2019-10-16 PROCEDURE — 6360000002 HC RX W HCPCS: Performed by: STUDENT IN AN ORGANIZED HEALTH CARE EDUCATION/TRAINING PROGRAM

## 2019-10-16 PROCEDURE — 2500000003 HC RX 250 WO HCPCS: Performed by: STUDENT IN AN ORGANIZED HEALTH CARE EDUCATION/TRAINING PROGRAM

## 2019-10-16 PROCEDURE — 2580000003 HC RX 258: Performed by: RADIOLOGY

## 2019-10-16 PROCEDURE — 1200000000 HC SEMI PRIVATE

## 2019-10-16 PROCEDURE — 36415 COLL VENOUS BLD VENIPUNCTURE: CPT

## 2019-10-16 PROCEDURE — 99024 POSTOP FOLLOW-UP VISIT: CPT | Performed by: SURGERY

## 2019-10-16 PROCEDURE — 83735 ASSAY OF MAGNESIUM: CPT

## 2019-10-16 RX ORDER — SODIUM CHLORIDE 0.9 % (FLUSH) 0.9 %
10 SYRINGE (ML) INJECTION
Status: COMPLETED | OUTPATIENT
Start: 2019-10-16 | End: 2019-10-16

## 2019-10-16 RX ORDER — MORPHINE SULFATE 2 MG/ML
2 INJECTION, SOLUTION INTRAMUSCULAR; INTRAVENOUS
Status: DISCONTINUED | OUTPATIENT
Start: 2019-10-16 | End: 2019-10-18

## 2019-10-16 RX ORDER — MORPHINE SULFATE 4 MG/ML
4 INJECTION, SOLUTION INTRAMUSCULAR; INTRAVENOUS
Status: DISCONTINUED | OUTPATIENT
Start: 2019-10-16 | End: 2019-10-18

## 2019-10-16 RX ADMIN — OXYCODONE HYDROCHLORIDE AND ACETAMINOPHEN 1 TABLET: 5; 325 TABLET ORAL at 18:22

## 2019-10-16 RX ADMIN — LEVOTHYROXINE SODIUM 137 MCG: 137 TABLET ORAL at 05:30

## 2019-10-16 RX ADMIN — MORPHINE SULFATE 2 MG: 2 INJECTION, SOLUTION INTRAMUSCULAR; INTRAVENOUS at 10:08

## 2019-10-16 RX ADMIN — Medication 10 ML: at 18:59

## 2019-10-16 RX ADMIN — POTASSIUM CHLORIDE, DEXTROSE MONOHYDRATE AND SODIUM CHLORIDE: 150; 5; 450 INJECTION, SOLUTION INTRAVENOUS at 16:57

## 2019-10-16 RX ADMIN — IOPAMIDOL 90 ML: 755 INJECTION, SOLUTION INTRAVENOUS at 18:59

## 2019-10-16 RX ADMIN — ENOXAPARIN SODIUM 40 MG: 40 INJECTION SUBCUTANEOUS at 07:47

## 2019-10-16 RX ADMIN — MORPHINE SULFATE 2 MG: 2 INJECTION, SOLUTION INTRAMUSCULAR; INTRAVENOUS at 13:49

## 2019-10-16 RX ADMIN — ATORVASTATIN CALCIUM 40 MG: 40 TABLET, FILM COATED ORAL at 07:47

## 2019-10-16 RX ADMIN — OXYCODONE HYDROCHLORIDE AND ACETAMINOPHEN 1 TABLET: 5; 325 TABLET ORAL at 07:47

## 2019-10-16 ASSESSMENT — PAIN - FUNCTIONAL ASSESSMENT
PAIN_FUNCTIONAL_ASSESSMENT: PREVENTS OR INTERFERES SOME ACTIVE ACTIVITIES AND ADLS

## 2019-10-16 ASSESSMENT — PAIN SCALES - GENERAL
PAINLEVEL_OUTOF10: 6
PAINLEVEL_OUTOF10: 4
PAINLEVEL_OUTOF10: 3
PAINLEVEL_OUTOF10: 2
PAINLEVEL_OUTOF10: 4
PAINLEVEL_OUTOF10: 5
PAINLEVEL_OUTOF10: 1
PAINLEVEL_OUTOF10: 5
PAINLEVEL_OUTOF10: 0

## 2019-10-16 ASSESSMENT — PAIN DESCRIPTION - ONSET
ONSET: ON-GOING

## 2019-10-16 ASSESSMENT — PAIN DESCRIPTION - DESCRIPTORS
DESCRIPTORS: ACHING;DULL;DISCOMFORT
DESCRIPTORS: ACHING;DULL;DISCOMFORT
DESCRIPTORS: ACHING;CONSTANT;DISCOMFORT;SORE
DESCRIPTORS: ACHING;DULL;DISCOMFORT

## 2019-10-16 ASSESSMENT — PAIN DESCRIPTION - LOCATION
LOCATION: ABDOMEN

## 2019-10-16 ASSESSMENT — PAIN DESCRIPTION - ORIENTATION
ORIENTATION: RIGHT
ORIENTATION: MID;RIGHT
ORIENTATION: RIGHT;MID
ORIENTATION: RIGHT;MID

## 2019-10-16 ASSESSMENT — PAIN DESCRIPTION - FREQUENCY
FREQUENCY: INTERMITTENT

## 2019-10-16 ASSESSMENT — PAIN DESCRIPTION - PROGRESSION
CLINICAL_PROGRESSION: NOT CHANGED

## 2019-10-16 ASSESSMENT — PAIN DESCRIPTION - PAIN TYPE
TYPE: SURGICAL PAIN

## 2019-10-17 LAB
ALBUMIN SERPL-MCNC: 2.9 G/DL (ref 3.5–5.2)
ALP BLD-CCNC: 62 U/L (ref 40–129)
ALT SERPL-CCNC: 11 U/L (ref 0–40)
ANION GAP SERPL CALCULATED.3IONS-SCNC: 13 MMOL/L (ref 7–16)
AST SERPL-CCNC: 15 U/L (ref 0–39)
BASOPHILS ABSOLUTE: 0.03 E9/L (ref 0–0.2)
BASOPHILS RELATIVE PERCENT: 0.3 % (ref 0–2)
BILIRUB SERPL-MCNC: 0.7 MG/DL (ref 0–1.2)
BUN BLDV-MCNC: 4 MG/DL (ref 8–23)
CALCIUM SERPL-MCNC: 8.8 MG/DL (ref 8.6–10.2)
CHLORIDE BLD-SCNC: 97 MMOL/L (ref 98–107)
CO2: 23 MMOL/L (ref 22–29)
CREAT SERPL-MCNC: 0.7 MG/DL (ref 0.7–1.2)
EOSINOPHILS ABSOLUTE: 0.3 E9/L (ref 0.05–0.5)
EOSINOPHILS RELATIVE PERCENT: 3 % (ref 0–6)
GFR AFRICAN AMERICAN: >60
GFR NON-AFRICAN AMERICAN: >60 ML/MIN/1.73
GLUCOSE BLD-MCNC: 132 MG/DL (ref 74–99)
HCT VFR BLD CALC: 37.1 % (ref 37–54)
HEMOGLOBIN: 12.4 G/DL (ref 12.5–16.5)
IMMATURE GRANULOCYTES #: 0.07 E9/L
IMMATURE GRANULOCYTES %: 0.7 % (ref 0–5)
LYMPHOCYTES ABSOLUTE: 1.48 E9/L (ref 1.5–4)
LYMPHOCYTES RELATIVE PERCENT: 14.8 % (ref 20–42)
MCH RBC QN AUTO: 29.1 PG (ref 26–35)
MCHC RBC AUTO-ENTMCNC: 33.4 % (ref 32–34.5)
MCV RBC AUTO: 87.1 FL (ref 80–99.9)
MONOCYTES ABSOLUTE: 0.8 E9/L (ref 0.1–0.95)
MONOCYTES RELATIVE PERCENT: 8 % (ref 2–12)
NEUTROPHILS ABSOLUTE: 7.3 E9/L (ref 1.8–7.3)
NEUTROPHILS RELATIVE PERCENT: 73.2 % (ref 43–80)
PDW BLD-RTO: 11.9 FL (ref 11.5–15)
PLATELET # BLD: 340 E9/L (ref 130–450)
PMV BLD AUTO: 9.1 FL (ref 7–12)
POTASSIUM REFLEX MAGNESIUM: 3.7 MMOL/L (ref 3.5–5)
RBC # BLD: 4.26 E12/L (ref 3.8–5.8)
SODIUM BLD-SCNC: 133 MMOL/L (ref 132–146)
TOTAL PROTEIN: 6.3 G/DL (ref 6.4–8.3)
WBC # BLD: 10 E9/L (ref 4.5–11.5)

## 2019-10-17 PROCEDURE — 99024 POSTOP FOLLOW-UP VISIT: CPT | Performed by: SURGERY

## 2019-10-17 PROCEDURE — 6370000000 HC RX 637 (ALT 250 FOR IP): Performed by: SURGERY

## 2019-10-17 PROCEDURE — 6360000002 HC RX W HCPCS: Performed by: SURGERY

## 2019-10-17 PROCEDURE — 2500000003 HC RX 250 WO HCPCS: Performed by: STUDENT IN AN ORGANIZED HEALTH CARE EDUCATION/TRAINING PROGRAM

## 2019-10-17 PROCEDURE — 1200000000 HC SEMI PRIVATE

## 2019-10-17 PROCEDURE — 85025 COMPLETE CBC W/AUTO DIFF WBC: CPT

## 2019-10-17 PROCEDURE — 6370000000 HC RX 637 (ALT 250 FOR IP): Performed by: STUDENT IN AN ORGANIZED HEALTH CARE EDUCATION/TRAINING PROGRAM

## 2019-10-17 PROCEDURE — 2580000003 HC RX 258: Performed by: STUDENT IN AN ORGANIZED HEALTH CARE EDUCATION/TRAINING PROGRAM

## 2019-10-17 PROCEDURE — 36415 COLL VENOUS BLD VENIPUNCTURE: CPT

## 2019-10-17 PROCEDURE — 6360000002 HC RX W HCPCS: Performed by: STUDENT IN AN ORGANIZED HEALTH CARE EDUCATION/TRAINING PROGRAM

## 2019-10-17 PROCEDURE — 80053 COMPREHEN METABOLIC PANEL: CPT

## 2019-10-17 RX ORDER — LIDOCAINE 4 G/G
1 PATCH TOPICAL DAILY
Status: DISCONTINUED | OUTPATIENT
Start: 2019-10-17 | End: 2019-10-23 | Stop reason: HOSPADM

## 2019-10-17 RX ORDER — KETOROLAC TROMETHAMINE 30 MG/ML
15 INJECTION, SOLUTION INTRAMUSCULAR; INTRAVENOUS EVERY 6 HOURS
Status: DISCONTINUED | OUTPATIENT
Start: 2019-10-17 | End: 2019-10-22

## 2019-10-17 RX ADMIN — Medication 10 ML: at 20:02

## 2019-10-17 RX ADMIN — KETOROLAC TROMETHAMINE 15 MG: 30 INJECTION, SOLUTION INTRAMUSCULAR at 19:58

## 2019-10-17 RX ADMIN — ENOXAPARIN SODIUM 40 MG: 40 INJECTION SUBCUTANEOUS at 08:21

## 2019-10-17 RX ADMIN — ATORVASTATIN CALCIUM 40 MG: 40 TABLET, FILM COATED ORAL at 08:21

## 2019-10-17 RX ADMIN — OXYCODONE HYDROCHLORIDE AND ACETAMINOPHEN 1 TABLET: 5; 325 TABLET ORAL at 08:21

## 2019-10-17 RX ADMIN — POTASSIUM CHLORIDE, DEXTROSE MONOHYDRATE AND SODIUM CHLORIDE: 150; 5; 450 INJECTION, SOLUTION INTRAVENOUS at 13:02

## 2019-10-17 RX ADMIN — OXYCODONE HYDROCHLORIDE AND ACETAMINOPHEN 1 TABLET: 5; 325 TABLET ORAL at 12:58

## 2019-10-17 RX ADMIN — LEVOTHYROXINE SODIUM 137 MCG: 137 TABLET ORAL at 06:04

## 2019-10-17 ASSESSMENT — PAIN SCALES - GENERAL
PAINLEVEL_OUTOF10: 0
PAINLEVEL_OUTOF10: 4

## 2019-10-17 ASSESSMENT — PAIN DESCRIPTION - PROGRESSION: CLINICAL_PROGRESSION: NOT CHANGED

## 2019-10-17 ASSESSMENT — PAIN DESCRIPTION - LOCATION: LOCATION: BACK

## 2019-10-17 ASSESSMENT — PAIN DESCRIPTION - PAIN TYPE: TYPE: ACUTE PAIN

## 2019-10-17 ASSESSMENT — PAIN DESCRIPTION - DESCRIPTORS: DESCRIPTORS: BURNING;DISCOMFORT;NAGGING

## 2019-10-17 ASSESSMENT — PAIN DESCRIPTION - ONSET: ONSET: GRADUAL

## 2019-10-17 ASSESSMENT — PAIN - FUNCTIONAL ASSESSMENT: PAIN_FUNCTIONAL_ASSESSMENT: ACTIVITIES ARE NOT PREVENTED

## 2019-10-17 ASSESSMENT — PAIN DESCRIPTION - FREQUENCY: FREQUENCY: INTERMITTENT

## 2019-10-17 ASSESSMENT — PAIN DESCRIPTION - ORIENTATION: ORIENTATION: RIGHT

## 2019-10-18 PROBLEM — E44.0 MODERATE PROTEIN-CALORIE MALNUTRITION (HCC): Status: ACTIVE | Noted: 2019-10-18

## 2019-10-18 LAB
ALBUMIN SERPL-MCNC: 2.9 G/DL (ref 3.5–5.2)
ALP BLD-CCNC: 69 U/L (ref 40–129)
ALT SERPL-CCNC: 16 U/L (ref 0–40)
ANION GAP SERPL CALCULATED.3IONS-SCNC: 15 MMOL/L (ref 7–16)
AST SERPL-CCNC: 22 U/L (ref 0–39)
BASOPHILS ABSOLUTE: 0.04 E9/L (ref 0–0.2)
BASOPHILS RELATIVE PERCENT: 0.5 % (ref 0–2)
BILIRUB SERPL-MCNC: 0.5 MG/DL (ref 0–1.2)
BLOOD CULTURE, ROUTINE: NORMAL
BUN BLDV-MCNC: 5 MG/DL (ref 8–23)
CALCIUM SERPL-MCNC: 8.9 MG/DL (ref 8.6–10.2)
CHLORIDE BLD-SCNC: 101 MMOL/L (ref 98–107)
CO2: 22 MMOL/L (ref 22–29)
CREAT SERPL-MCNC: 0.7 MG/DL (ref 0.7–1.2)
EOSINOPHILS ABSOLUTE: 0.36 E9/L (ref 0.05–0.5)
EOSINOPHILS RELATIVE PERCENT: 4.7 % (ref 0–6)
GFR AFRICAN AMERICAN: >60
GFR NON-AFRICAN AMERICAN: >60 ML/MIN/1.73
GLUCOSE BLD-MCNC: 119 MG/DL (ref 74–99)
HCT VFR BLD CALC: 38.6 % (ref 37–54)
HEMOGLOBIN: 12.8 G/DL (ref 12.5–16.5)
IMMATURE GRANULOCYTES #: 0.05 E9/L
IMMATURE GRANULOCYTES %: 0.7 % (ref 0–5)
LYMPHOCYTES ABSOLUTE: 1.6 E9/L (ref 1.5–4)
LYMPHOCYTES RELATIVE PERCENT: 20.9 % (ref 20–42)
MCH RBC QN AUTO: 29.3 PG (ref 26–35)
MCHC RBC AUTO-ENTMCNC: 33.2 % (ref 32–34.5)
MCV RBC AUTO: 88.3 FL (ref 80–99.9)
MONOCYTES ABSOLUTE: 0.73 E9/L (ref 0.1–0.95)
MONOCYTES RELATIVE PERCENT: 9.5 % (ref 2–12)
NEUTROPHILS ABSOLUTE: 4.88 E9/L (ref 1.8–7.3)
NEUTROPHILS RELATIVE PERCENT: 63.7 % (ref 43–80)
PDW BLD-RTO: 11.8 FL (ref 11.5–15)
PLATELET # BLD: 356 E9/L (ref 130–450)
PMV BLD AUTO: 9.1 FL (ref 7–12)
POTASSIUM REFLEX MAGNESIUM: 4.2 MMOL/L (ref 3.5–5)
RBC # BLD: 4.37 E12/L (ref 3.8–5.8)
SODIUM BLD-SCNC: 138 MMOL/L (ref 132–146)
TOTAL PROTEIN: 6.5 G/DL (ref 6.4–8.3)
WBC # BLD: 7.7 E9/L (ref 4.5–11.5)

## 2019-10-18 PROCEDURE — 2580000003 HC RX 258: Performed by: STUDENT IN AN ORGANIZED HEALTH CARE EDUCATION/TRAINING PROGRAM

## 2019-10-18 PROCEDURE — 6370000000 HC RX 637 (ALT 250 FOR IP): Performed by: STUDENT IN AN ORGANIZED HEALTH CARE EDUCATION/TRAINING PROGRAM

## 2019-10-18 PROCEDURE — 1200000000 HC SEMI PRIVATE

## 2019-10-18 PROCEDURE — 6360000002 HC RX W HCPCS: Performed by: STUDENT IN AN ORGANIZED HEALTH CARE EDUCATION/TRAINING PROGRAM

## 2019-10-18 PROCEDURE — 99024 POSTOP FOLLOW-UP VISIT: CPT | Performed by: SURGERY

## 2019-10-18 PROCEDURE — 97535 SELF CARE MNGMENT TRAINING: CPT

## 2019-10-18 PROCEDURE — 6360000002 HC RX W HCPCS: Performed by: SURGERY

## 2019-10-18 PROCEDURE — 97530 THERAPEUTIC ACTIVITIES: CPT

## 2019-10-18 PROCEDURE — 85025 COMPLETE CBC W/AUTO DIFF WBC: CPT

## 2019-10-18 PROCEDURE — 6370000000 HC RX 637 (ALT 250 FOR IP): Performed by: SURGERY

## 2019-10-18 PROCEDURE — 2500000003 HC RX 250 WO HCPCS: Performed by: STUDENT IN AN ORGANIZED HEALTH CARE EDUCATION/TRAINING PROGRAM

## 2019-10-18 PROCEDURE — 80053 COMPREHEN METABOLIC PANEL: CPT

## 2019-10-18 PROCEDURE — 36415 COLL VENOUS BLD VENIPUNCTURE: CPT

## 2019-10-18 RX ORDER — MORPHINE SULFATE 2 MG/ML
2 INJECTION, SOLUTION INTRAMUSCULAR; INTRAVENOUS
Status: DISCONTINUED | OUTPATIENT
Start: 2019-10-18 | End: 2019-10-21

## 2019-10-18 RX ADMIN — LEVOTHYROXINE SODIUM 137 MCG: 137 TABLET ORAL at 05:46

## 2019-10-18 RX ADMIN — POTASSIUM CHLORIDE, DEXTROSE MONOHYDRATE AND SODIUM CHLORIDE: 150; 5; 450 INJECTION, SOLUTION INTRAVENOUS at 19:43

## 2019-10-18 RX ADMIN — ENOXAPARIN SODIUM 40 MG: 40 INJECTION SUBCUTANEOUS at 09:05

## 2019-10-18 RX ADMIN — KETOROLAC TROMETHAMINE 15 MG: 30 INJECTION, SOLUTION INTRAMUSCULAR at 09:01

## 2019-10-18 RX ADMIN — ATORVASTATIN CALCIUM 40 MG: 40 TABLET, FILM COATED ORAL at 09:04

## 2019-10-18 RX ADMIN — POTASSIUM CHLORIDE, DEXTROSE MONOHYDRATE AND SODIUM CHLORIDE: 150; 5; 450 INJECTION, SOLUTION INTRAVENOUS at 09:01

## 2019-10-18 RX ADMIN — Medication 10 ML: at 09:03

## 2019-10-18 RX ADMIN — KETOROLAC TROMETHAMINE 15 MG: 30 INJECTION, SOLUTION INTRAMUSCULAR at 19:53

## 2019-10-18 RX ADMIN — KETOROLAC TROMETHAMINE 15 MG: 30 INJECTION, SOLUTION INTRAMUSCULAR at 01:37

## 2019-10-18 ASSESSMENT — PAIN DESCRIPTION - ORIENTATION
ORIENTATION: RIGHT
ORIENTATION: RIGHT

## 2019-10-18 ASSESSMENT — PAIN DESCRIPTION - PAIN TYPE: TYPE: ACUTE PAIN

## 2019-10-18 ASSESSMENT — PAIN DESCRIPTION - ONSET: ONSET: ON-GOING

## 2019-10-18 ASSESSMENT — PAIN DESCRIPTION - FREQUENCY: FREQUENCY: INTERMITTENT

## 2019-10-18 ASSESSMENT — PAIN DESCRIPTION - DESCRIPTORS: DESCRIPTORS: ACHING;BURNING;DISCOMFORT

## 2019-10-18 ASSESSMENT — PAIN SCALES - GENERAL
PAINLEVEL_OUTOF10: 3
PAINLEVEL_OUTOF10: 1
PAINLEVEL_OUTOF10: 2

## 2019-10-18 ASSESSMENT — PAIN DESCRIPTION - LOCATION
LOCATION: ARM;FLANK
LOCATION: BACK

## 2019-10-19 LAB
ALBUMIN SERPL-MCNC: 2.7 G/DL (ref 3.5–5.2)
ALP BLD-CCNC: 77 U/L (ref 40–129)
ALT SERPL-CCNC: 23 U/L (ref 0–40)
ANION GAP SERPL CALCULATED.3IONS-SCNC: 12 MMOL/L (ref 7–16)
AST SERPL-CCNC: 35 U/L (ref 0–39)
BASOPHILS ABSOLUTE: 0.08 E9/L (ref 0–0.2)
BASOPHILS RELATIVE PERCENT: 1 % (ref 0–2)
BILIRUB SERPL-MCNC: 0.6 MG/DL (ref 0–1.2)
BUN BLDV-MCNC: 5 MG/DL (ref 8–23)
CALCIUM SERPL-MCNC: 8.8 MG/DL (ref 8.6–10.2)
CHLORIDE BLD-SCNC: 103 MMOL/L (ref 98–107)
CO2: 20 MMOL/L (ref 22–29)
CREAT SERPL-MCNC: 0.7 MG/DL (ref 0.7–1.2)
CULTURE, BLOOD 2: ABNORMAL
EOSINOPHILS ABSOLUTE: 0.48 E9/L (ref 0.05–0.5)
EOSINOPHILS RELATIVE PERCENT: 6 % (ref 0–6)
GFR AFRICAN AMERICAN: >60
GFR NON-AFRICAN AMERICAN: >60 ML/MIN/1.73
GLUCOSE BLD-MCNC: 128 MG/DL (ref 74–99)
HCT VFR BLD CALC: 40.2 % (ref 37–54)
HEMOGLOBIN: 13.6 G/DL (ref 12.5–16.5)
IMMATURE GRANULOCYTES #: 0.07 E9/L
IMMATURE GRANULOCYTES %: 0.9 % (ref 0–5)
LYMPHOCYTES ABSOLUTE: 1.73 E9/L (ref 1.5–4)
LYMPHOCYTES RELATIVE PERCENT: 21.5 % (ref 20–42)
MCH RBC QN AUTO: 30.8 PG (ref 26–35)
MCHC RBC AUTO-ENTMCNC: 33.8 % (ref 32–34.5)
MCV RBC AUTO: 91 FL (ref 80–99.9)
MONOCYTES ABSOLUTE: 0.82 E9/L (ref 0.1–0.95)
MONOCYTES RELATIVE PERCENT: 10.2 % (ref 2–12)
NEUTROPHILS ABSOLUTE: 4.86 E9/L (ref 1.8–7.3)
NEUTROPHILS RELATIVE PERCENT: 60.4 % (ref 43–80)
ORGANISM: ABNORMAL
PDW BLD-RTO: 11.9 FL (ref 11.5–15)
PLATELET # BLD: 179 E9/L (ref 130–450)
PMV BLD AUTO: 10.2 FL (ref 7–12)
POTASSIUM REFLEX MAGNESIUM: 4.4 MMOL/L (ref 3.5–5)
RBC # BLD: 4.42 E12/L (ref 3.8–5.8)
SODIUM BLD-SCNC: 135 MMOL/L (ref 132–146)
TOTAL PROTEIN: 6.6 G/DL (ref 6.4–8.3)
WBC # BLD: 8 E9/L (ref 4.5–11.5)

## 2019-10-19 PROCEDURE — 2580000003 HC RX 258: Performed by: STUDENT IN AN ORGANIZED HEALTH CARE EDUCATION/TRAINING PROGRAM

## 2019-10-19 PROCEDURE — 6370000000 HC RX 637 (ALT 250 FOR IP): Performed by: SURGERY

## 2019-10-19 PROCEDURE — 6360000002 HC RX W HCPCS: Performed by: STUDENT IN AN ORGANIZED HEALTH CARE EDUCATION/TRAINING PROGRAM

## 2019-10-19 PROCEDURE — 2500000003 HC RX 250 WO HCPCS: Performed by: STUDENT IN AN ORGANIZED HEALTH CARE EDUCATION/TRAINING PROGRAM

## 2019-10-19 PROCEDURE — 6360000002 HC RX W HCPCS: Performed by: SURGERY

## 2019-10-19 PROCEDURE — 36415 COLL VENOUS BLD VENIPUNCTURE: CPT

## 2019-10-19 PROCEDURE — 1200000000 HC SEMI PRIVATE

## 2019-10-19 PROCEDURE — 6370000000 HC RX 637 (ALT 250 FOR IP): Performed by: STUDENT IN AN ORGANIZED HEALTH CARE EDUCATION/TRAINING PROGRAM

## 2019-10-19 PROCEDURE — 85025 COMPLETE CBC W/AUTO DIFF WBC: CPT

## 2019-10-19 PROCEDURE — 99024 POSTOP FOLLOW-UP VISIT: CPT | Performed by: SURGERY

## 2019-10-19 PROCEDURE — 80053 COMPREHEN METABOLIC PANEL: CPT

## 2019-10-19 RX ADMIN — LEVOTHYROXINE SODIUM 137 MCG: 137 TABLET ORAL at 06:19

## 2019-10-19 RX ADMIN — ENOXAPARIN SODIUM 40 MG: 40 INJECTION SUBCUTANEOUS at 08:24

## 2019-10-19 RX ADMIN — Medication 10 ML: at 22:00

## 2019-10-19 RX ADMIN — KETOROLAC TROMETHAMINE 15 MG: 30 INJECTION, SOLUTION INTRAMUSCULAR at 13:34

## 2019-10-19 RX ADMIN — POTASSIUM CHLORIDE, DEXTROSE MONOHYDRATE AND SODIUM CHLORIDE: 150; 5; 450 INJECTION, SOLUTION INTRAVENOUS at 06:19

## 2019-10-19 RX ADMIN — ATORVASTATIN CALCIUM 40 MG: 40 TABLET, FILM COATED ORAL at 08:24

## 2019-10-19 RX ADMIN — Medication 10 ML: at 08:24

## 2019-10-19 RX ADMIN — KETOROLAC TROMETHAMINE 15 MG: 30 INJECTION, SOLUTION INTRAMUSCULAR at 08:25

## 2019-10-19 RX ADMIN — KETOROLAC TROMETHAMINE 15 MG: 30 INJECTION, SOLUTION INTRAMUSCULAR at 22:00

## 2019-10-19 RX ADMIN — POTASSIUM CHLORIDE, DEXTROSE MONOHYDRATE AND SODIUM CHLORIDE: 150; 5; 450 INJECTION, SOLUTION INTRAVENOUS at 23:56

## 2019-10-19 ASSESSMENT — PAIN - FUNCTIONAL ASSESSMENT: PAIN_FUNCTIONAL_ASSESSMENT: PREVENTS OR INTERFERES SOME ACTIVE ACTIVITIES AND ADLS

## 2019-10-19 ASSESSMENT — PAIN SCALES - GENERAL
PAINLEVEL_OUTOF10: 1
PAINLEVEL_OUTOF10: 2
PAINLEVEL_OUTOF10: 0
PAINLEVEL_OUTOF10: 5
PAINLEVEL_OUTOF10: 1
PAINLEVEL_OUTOF10: 0

## 2019-10-19 ASSESSMENT — PAIN DESCRIPTION - DESCRIPTORS: DESCRIPTORS: ACHING;DULL

## 2019-10-19 ASSESSMENT — PAIN DESCRIPTION - PROGRESSION: CLINICAL_PROGRESSION: GRADUALLY WORSENING

## 2019-10-19 ASSESSMENT — PAIN DESCRIPTION - PAIN TYPE: TYPE: ACUTE PAIN;SURGICAL PAIN

## 2019-10-20 LAB
ALBUMIN SERPL-MCNC: 3.1 G/DL (ref 3.5–5.2)
ALP BLD-CCNC: 82 U/L (ref 40–129)
ALT SERPL-CCNC: 34 U/L (ref 0–40)
ANION GAP SERPL CALCULATED.3IONS-SCNC: 13 MMOL/L (ref 7–16)
AST SERPL-CCNC: 44 U/L (ref 0–39)
BASOPHILS ABSOLUTE: 0.05 E9/L (ref 0–0.2)
BASOPHILS RELATIVE PERCENT: 0.5 % (ref 0–2)
BILIRUB SERPL-MCNC: 0.6 MG/DL (ref 0–1.2)
BUN BLDV-MCNC: 7 MG/DL (ref 8–23)
CALCIUM SERPL-MCNC: 8.8 MG/DL (ref 8.6–10.2)
CHLORIDE BLD-SCNC: 105 MMOL/L (ref 98–107)
CO2: 20 MMOL/L (ref 22–29)
CREAT SERPL-MCNC: 0.8 MG/DL (ref 0.7–1.2)
EOSINOPHILS ABSOLUTE: 0.57 E9/L (ref 0.05–0.5)
EOSINOPHILS RELATIVE PERCENT: 6 % (ref 0–6)
GFR AFRICAN AMERICAN: >60
GFR NON-AFRICAN AMERICAN: >60 ML/MIN/1.73
GLUCOSE BLD-MCNC: 117 MG/DL (ref 74–99)
HCT VFR BLD CALC: 38 % (ref 37–54)
HEMOGLOBIN: 12.7 G/DL (ref 12.5–16.5)
IMMATURE GRANULOCYTES #: 0.06 E9/L
IMMATURE GRANULOCYTES %: 0.6 % (ref 0–5)
LYMPHOCYTES ABSOLUTE: 2.1 E9/L (ref 1.5–4)
LYMPHOCYTES RELATIVE PERCENT: 22.2 % (ref 20–42)
MCH RBC QN AUTO: 29.4 PG (ref 26–35)
MCHC RBC AUTO-ENTMCNC: 33.4 % (ref 32–34.5)
MCV RBC AUTO: 88 FL (ref 80–99.9)
MONOCYTES ABSOLUTE: 0.83 E9/L (ref 0.1–0.95)
MONOCYTES RELATIVE PERCENT: 8.8 % (ref 2–12)
NEUTROPHILS ABSOLUTE: 5.84 E9/L (ref 1.8–7.3)
NEUTROPHILS RELATIVE PERCENT: 61.9 % (ref 43–80)
PDW BLD-RTO: 11.9 FL (ref 11.5–15)
PLATELET # BLD: 420 E9/L (ref 130–450)
PMV BLD AUTO: 8.9 FL (ref 7–12)
POTASSIUM REFLEX MAGNESIUM: 4.1 MMOL/L (ref 3.5–5)
RBC # BLD: 4.32 E12/L (ref 3.8–5.8)
SODIUM BLD-SCNC: 138 MMOL/L (ref 132–146)
TOTAL PROTEIN: 6.5 G/DL (ref 6.4–8.3)
WBC # BLD: 9.5 E9/L (ref 4.5–11.5)

## 2019-10-20 PROCEDURE — 6370000000 HC RX 637 (ALT 250 FOR IP): Performed by: STUDENT IN AN ORGANIZED HEALTH CARE EDUCATION/TRAINING PROGRAM

## 2019-10-20 PROCEDURE — 6370000000 HC RX 637 (ALT 250 FOR IP): Performed by: SURGERY

## 2019-10-20 PROCEDURE — 85025 COMPLETE CBC W/AUTO DIFF WBC: CPT

## 2019-10-20 PROCEDURE — 2500000003 HC RX 250 WO HCPCS: Performed by: STUDENT IN AN ORGANIZED HEALTH CARE EDUCATION/TRAINING PROGRAM

## 2019-10-20 PROCEDURE — 1200000000 HC SEMI PRIVATE

## 2019-10-20 PROCEDURE — 6360000002 HC RX W HCPCS: Performed by: SURGERY

## 2019-10-20 PROCEDURE — 80053 COMPREHEN METABOLIC PANEL: CPT

## 2019-10-20 PROCEDURE — 36415 COLL VENOUS BLD VENIPUNCTURE: CPT

## 2019-10-20 PROCEDURE — 6360000002 HC RX W HCPCS: Performed by: STUDENT IN AN ORGANIZED HEALTH CARE EDUCATION/TRAINING PROGRAM

## 2019-10-20 RX ADMIN — OXYCODONE HYDROCHLORIDE AND ACETAMINOPHEN 1 TABLET: 5; 325 TABLET ORAL at 16:42

## 2019-10-20 RX ADMIN — POTASSIUM CHLORIDE, DEXTROSE MONOHYDRATE AND SODIUM CHLORIDE: 150; 5; 450 INJECTION, SOLUTION INTRAVENOUS at 21:02

## 2019-10-20 RX ADMIN — ATORVASTATIN CALCIUM 40 MG: 40 TABLET, FILM COATED ORAL at 10:10

## 2019-10-20 RX ADMIN — ENOXAPARIN SODIUM 40 MG: 40 INJECTION SUBCUTANEOUS at 10:10

## 2019-10-20 RX ADMIN — LEVOTHYROXINE SODIUM 137 MCG: 137 TABLET ORAL at 06:04

## 2019-10-20 RX ADMIN — KETOROLAC TROMETHAMINE 15 MG: 30 INJECTION, SOLUTION INTRAMUSCULAR at 04:16

## 2019-10-20 RX ADMIN — POTASSIUM CHLORIDE, DEXTROSE MONOHYDRATE AND SODIUM CHLORIDE: 150; 5; 450 INJECTION, SOLUTION INTRAVENOUS at 10:10

## 2019-10-20 ASSESSMENT — PAIN SCALES - GENERAL
PAINLEVEL_OUTOF10: 1
PAINLEVEL_OUTOF10: 5
PAINLEVEL_OUTOF10: 0
PAINLEVEL_OUTOF10: 4

## 2019-10-21 PROBLEM — C18.5 MALIGNANT NEOPLASM OF SPLENIC FLEXURE (HCC): Status: ACTIVE | Noted: 2019-10-21

## 2019-10-21 LAB
ALBUMIN SERPL-MCNC: 3.1 G/DL (ref 3.5–5.2)
ALP BLD-CCNC: 85 U/L (ref 40–129)
ALT SERPL-CCNC: 35 U/L (ref 0–40)
ANION GAP SERPL CALCULATED.3IONS-SCNC: 11 MMOL/L (ref 7–16)
AST SERPL-CCNC: 34 U/L (ref 0–39)
BASOPHILS ABSOLUTE: 0.05 E9/L (ref 0–0.2)
BASOPHILS RELATIVE PERCENT: 0.5 % (ref 0–2)
BILIRUB SERPL-MCNC: 0.4 MG/DL (ref 0–1.2)
BUN BLDV-MCNC: 4 MG/DL (ref 8–23)
CALCIUM SERPL-MCNC: 8.6 MG/DL (ref 8.6–10.2)
CHLORIDE BLD-SCNC: 102 MMOL/L (ref 98–107)
CO2: 23 MMOL/L (ref 22–29)
CREAT SERPL-MCNC: 0.8 MG/DL (ref 0.7–1.2)
EOSINOPHILS ABSOLUTE: 0.52 E9/L (ref 0.05–0.5)
EOSINOPHILS RELATIVE PERCENT: 4.8 % (ref 0–6)
GFR AFRICAN AMERICAN: >60
GFR NON-AFRICAN AMERICAN: >60 ML/MIN/1.73
GLUCOSE BLD-MCNC: 104 MG/DL (ref 74–99)
HCT VFR BLD CALC: 36.6 % (ref 37–54)
HEMOGLOBIN: 12 G/DL (ref 12.5–16.5)
IMMATURE GRANULOCYTES #: 0.07 E9/L
IMMATURE GRANULOCYTES %: 0.6 % (ref 0–5)
LYMPHOCYTES ABSOLUTE: 1.88 E9/L (ref 1.5–4)
LYMPHOCYTES RELATIVE PERCENT: 17.4 % (ref 20–42)
MCH RBC QN AUTO: 29.1 PG (ref 26–35)
MCHC RBC AUTO-ENTMCNC: 32.8 % (ref 32–34.5)
MCV RBC AUTO: 88.8 FL (ref 80–99.9)
MONOCYTES ABSOLUTE: 0.92 E9/L (ref 0.1–0.95)
MONOCYTES RELATIVE PERCENT: 8.5 % (ref 2–12)
NEUTROPHILS ABSOLUTE: 7.34 E9/L (ref 1.8–7.3)
NEUTROPHILS RELATIVE PERCENT: 68.2 % (ref 43–80)
PDW BLD-RTO: 12 FL (ref 11.5–15)
PLATELET # BLD: 458 E9/L (ref 130–450)
PMV BLD AUTO: 8.9 FL (ref 7–12)
POTASSIUM REFLEX MAGNESIUM: 3.8 MMOL/L (ref 3.5–5)
RBC # BLD: 4.12 E12/L (ref 3.8–5.8)
SODIUM BLD-SCNC: 136 MMOL/L (ref 132–146)
TOTAL PROTEIN: 6.5 G/DL (ref 6.4–8.3)
WBC # BLD: 10.8 E9/L (ref 4.5–11.5)

## 2019-10-21 PROCEDURE — 36415 COLL VENOUS BLD VENIPUNCTURE: CPT

## 2019-10-21 PROCEDURE — 97535 SELF CARE MNGMENT TRAINING: CPT

## 2019-10-21 PROCEDURE — 1200000000 HC SEMI PRIVATE

## 2019-10-21 PROCEDURE — 80053 COMPREHEN METABOLIC PANEL: CPT

## 2019-10-21 PROCEDURE — 85025 COMPLETE CBC W/AUTO DIFF WBC: CPT

## 2019-10-21 PROCEDURE — 6360000002 HC RX W HCPCS: Performed by: STUDENT IN AN ORGANIZED HEALTH CARE EDUCATION/TRAINING PROGRAM

## 2019-10-21 PROCEDURE — 99024 POSTOP FOLLOW-UP VISIT: CPT | Performed by: SURGERY

## 2019-10-21 PROCEDURE — 97112 NEUROMUSCULAR REEDUCATION: CPT

## 2019-10-21 PROCEDURE — 6370000000 HC RX 637 (ALT 250 FOR IP): Performed by: STUDENT IN AN ORGANIZED HEALTH CARE EDUCATION/TRAINING PROGRAM

## 2019-10-21 RX ORDER — SODIUM CHLORIDE 9 MG/ML
INJECTION, SOLUTION INTRAVENOUS CONTINUOUS
Status: DISCONTINUED | OUTPATIENT
Start: 2019-10-22 | End: 2019-10-22

## 2019-10-21 RX ORDER — CEFAZOLIN SODIUM 2 G/50ML
2 SOLUTION INTRAVENOUS SEE ADMIN INSTRUCTIONS
Status: COMPLETED | OUTPATIENT
Start: 2019-10-21 | End: 2019-10-22

## 2019-10-21 RX ADMIN — LEVOTHYROXINE SODIUM 137 MCG: 137 TABLET ORAL at 05:52

## 2019-10-21 RX ADMIN — OXYCODONE HYDROCHLORIDE AND ACETAMINOPHEN 1 TABLET: 5; 325 TABLET ORAL at 19:48

## 2019-10-21 RX ADMIN — ATORVASTATIN CALCIUM 40 MG: 40 TABLET, FILM COATED ORAL at 07:54

## 2019-10-21 RX ADMIN — ENOXAPARIN SODIUM 40 MG: 40 INJECTION SUBCUTANEOUS at 07:54

## 2019-10-21 RX ADMIN — OXYCODONE HYDROCHLORIDE AND ACETAMINOPHEN 1 TABLET: 5; 325 TABLET ORAL at 07:53

## 2019-10-21 ASSESSMENT — PAIN SCALES - GENERAL
PAINLEVEL_OUTOF10: 4
PAINLEVEL_OUTOF10: 1
PAINLEVEL_OUTOF10: 0
PAINLEVEL_OUTOF10: 1

## 2019-10-22 ENCOUNTER — TELEPHONE (OUTPATIENT)
Dept: SURGERY | Age: 79
End: 2019-10-22

## 2019-10-22 ENCOUNTER — ANESTHESIA EVENT (OUTPATIENT)
Dept: OPERATING ROOM | Age: 79
DRG: 330 | End: 2019-10-22
Payer: MEDICARE

## 2019-10-22 ENCOUNTER — ANESTHESIA (OUTPATIENT)
Dept: OPERATING ROOM | Age: 79
DRG: 330 | End: 2019-10-22
Payer: MEDICARE

## 2019-10-22 ENCOUNTER — APPOINTMENT (OUTPATIENT)
Dept: GENERAL RADIOLOGY | Age: 79
DRG: 330 | End: 2019-10-22
Payer: MEDICARE

## 2019-10-22 VITALS — OXYGEN SATURATION: 100 % | DIASTOLIC BLOOD PRESSURE: 68 MMHG | SYSTOLIC BLOOD PRESSURE: 139 MMHG

## 2019-10-22 PROCEDURE — 2580000003 HC RX 258: Performed by: STUDENT IN AN ORGANIZED HEALTH CARE EDUCATION/TRAINING PROGRAM

## 2019-10-22 PROCEDURE — 6370000000 HC RX 637 (ALT 250 FOR IP): Performed by: STUDENT IN AN ORGANIZED HEALTH CARE EDUCATION/TRAINING PROGRAM

## 2019-10-22 PROCEDURE — 2500000003 HC RX 250 WO HCPCS: Performed by: SURGERY

## 2019-10-22 PROCEDURE — 3600000003 HC SURGERY LEVEL 3 BASE: Performed by: SURGERY

## 2019-10-22 PROCEDURE — 71045 X-RAY EXAM CHEST 1 VIEW: CPT

## 2019-10-22 PROCEDURE — 7100000000 HC PACU RECOVERY - FIRST 15 MIN: Performed by: SURGERY

## 2019-10-22 PROCEDURE — 3600000013 HC SURGERY LEVEL 3 ADDTL 15MIN: Performed by: SURGERY

## 2019-10-22 PROCEDURE — 6360000002 HC RX W HCPCS: Performed by: NURSE PRACTITIONER

## 2019-10-22 PROCEDURE — 7100000001 HC PACU RECOVERY - ADDTL 15 MIN: Performed by: SURGERY

## 2019-10-22 PROCEDURE — 77001 FLUOROGUIDE FOR VEIN DEVICE: CPT | Performed by: SURGERY

## 2019-10-22 PROCEDURE — 02HV33Z INSERTION OF INFUSION DEVICE INTO SUPERIOR VENA CAVA, PERCUTANEOUS APPROACH: ICD-10-PCS | Performed by: SURGERY

## 2019-10-22 PROCEDURE — 6370000000 HC RX 637 (ALT 250 FOR IP): Performed by: SURGERY

## 2019-10-22 PROCEDURE — 6360000002 HC RX W HCPCS

## 2019-10-22 PROCEDURE — 3700000001 HC ADD 15 MINUTES (ANESTHESIA): Performed by: SURGERY

## 2019-10-22 PROCEDURE — 3700000000 HC ANESTHESIA ATTENDED CARE: Performed by: SURGERY

## 2019-10-22 PROCEDURE — 6360000002 HC RX W HCPCS: Performed by: SURGERY

## 2019-10-22 PROCEDURE — 2580000003 HC RX 258: Performed by: SURGERY

## 2019-10-22 PROCEDURE — 3209999900 FLUORO FOR SURGICAL PROCEDURES

## 2019-10-22 PROCEDURE — 36561 INSERT TUNNELED CV CATH: CPT | Performed by: SURGERY

## 2019-10-22 PROCEDURE — 1200000000 HC SEMI PRIVATE

## 2019-10-22 DEVICE — PORT INFUS 8FR PWR INJ CT FOR VASC ACCS CATH: Type: IMPLANTABLE DEVICE | Site: SUBCLAVIAN | Status: FUNCTIONAL

## 2019-10-22 RX ORDER — LIDOCAINE HYDROCHLORIDE AND EPINEPHRINE 10; 10 MG/ML; UG/ML
INJECTION, SOLUTION INFILTRATION; PERINEURAL PRN
Status: DISCONTINUED | OUTPATIENT
Start: 2019-10-22 | End: 2019-10-22 | Stop reason: ALTCHOICE

## 2019-10-22 RX ORDER — SIMETHICONE 80 MG
80 TABLET,CHEWABLE ORAL EVERY 6 HOURS PRN
Status: DISCONTINUED | OUTPATIENT
Start: 2019-10-22 | End: 2019-10-23 | Stop reason: HOSPADM

## 2019-10-22 RX ORDER — CEFAZOLIN SODIUM 2 G/50ML
SOLUTION INTRAVENOUS
Status: COMPLETED
Start: 2019-10-22 | End: 2019-10-22

## 2019-10-22 RX ORDER — PROPOFOL 10 MG/ML
INJECTION, EMULSION INTRAVENOUS CONTINUOUS PRN
Status: DISCONTINUED | OUTPATIENT
Start: 2019-10-22 | End: 2019-10-22 | Stop reason: SDUPTHER

## 2019-10-22 RX ORDER — FENTANYL CITRATE 50 UG/ML
INJECTION, SOLUTION INTRAMUSCULAR; INTRAVENOUS PRN
Status: DISCONTINUED | OUTPATIENT
Start: 2019-10-22 | End: 2019-10-22 | Stop reason: SDUPTHER

## 2019-10-22 RX ADMIN — FENTANYL CITRATE 25 MCG: 50 INJECTION, SOLUTION INTRAMUSCULAR; INTRAVENOUS at 18:29

## 2019-10-22 RX ADMIN — FENTANYL CITRATE 25 MCG: 50 INJECTION, SOLUTION INTRAMUSCULAR; INTRAVENOUS at 18:17

## 2019-10-22 RX ADMIN — ATORVASTATIN CALCIUM 40 MG: 40 TABLET, FILM COATED ORAL at 07:52

## 2019-10-22 RX ADMIN — OXYCODONE HYDROCHLORIDE AND ACETAMINOPHEN 1 TABLET: 5; 325 TABLET ORAL at 07:52

## 2019-10-22 RX ADMIN — LEVOTHYROXINE SODIUM 137 MCG: 137 TABLET ORAL at 06:04

## 2019-10-22 RX ADMIN — SODIUM CHLORIDE: 9 INJECTION, SOLUTION INTRAVENOUS at 00:02

## 2019-10-22 RX ADMIN — PROPOFOL 75 MCG/KG/MIN: 10 INJECTION, EMULSION INTRAVENOUS at 18:11

## 2019-10-22 RX ADMIN — FENTANYL CITRATE 50 MCG: 50 INJECTION, SOLUTION INTRAMUSCULAR; INTRAVENOUS at 18:11

## 2019-10-22 RX ADMIN — Medication 10 ML: at 00:03

## 2019-10-22 RX ADMIN — CEFAZOLIN SODIUM 2 G: 2 SOLUTION INTRAVENOUS at 18:08

## 2019-10-22 RX ADMIN — OXYCODONE HYDROCHLORIDE AND ACETAMINOPHEN 1 TABLET: 5; 325 TABLET ORAL at 01:57

## 2019-10-22 RX ADMIN — Medication 10 ML: at 21:10

## 2019-10-22 RX ADMIN — OXYCODONE HYDROCHLORIDE AND ACETAMINOPHEN 1 TABLET: 5; 325 TABLET ORAL at 13:17

## 2019-10-22 RX ADMIN — SIMETHICONE CHEW TAB 80 MG 80 MG: 80 TABLET ORAL at 21:09

## 2019-10-22 ASSESSMENT — PULMONARY FUNCTION TESTS
PIF_VALUE: 0
PIF_VALUE: 1
PIF_VALUE: 0

## 2019-10-22 ASSESSMENT — PAIN DESCRIPTION - PAIN TYPE: TYPE: ACUTE PAIN

## 2019-10-22 ASSESSMENT — PAIN SCALES - GENERAL
PAINLEVEL_OUTOF10: 0
PAINLEVEL_OUTOF10: 0
PAINLEVEL_OUTOF10: 5
PAINLEVEL_OUTOF10: 0
PAINLEVEL_OUTOF10: 4
PAINLEVEL_OUTOF10: 0
PAINLEVEL_OUTOF10: 5
PAINLEVEL_OUTOF10: 0
PAINLEVEL_OUTOF10: 6
PAINLEVEL_OUTOF10: 0

## 2019-10-22 ASSESSMENT — PAIN DESCRIPTION - LOCATION: LOCATION: ABDOMEN

## 2019-10-23 VITALS
HEIGHT: 72 IN | TEMPERATURE: 99.6 F | WEIGHT: 199.3 LBS | RESPIRATION RATE: 16 BRPM | SYSTOLIC BLOOD PRESSURE: 134 MMHG | HEART RATE: 104 BPM | BODY MASS INDEX: 26.99 KG/M2 | OXYGEN SATURATION: 99 % | DIASTOLIC BLOOD PRESSURE: 64 MMHG

## 2019-10-23 PROCEDURE — 99024 POSTOP FOLLOW-UP VISIT: CPT | Performed by: SURGERY

## 2019-10-23 PROCEDURE — 2580000003 HC RX 258: Performed by: STUDENT IN AN ORGANIZED HEALTH CARE EDUCATION/TRAINING PROGRAM

## 2019-10-23 PROCEDURE — 6360000002 HC RX W HCPCS: Performed by: STUDENT IN AN ORGANIZED HEALTH CARE EDUCATION/TRAINING PROGRAM

## 2019-10-23 PROCEDURE — 6370000000 HC RX 637 (ALT 250 FOR IP): Performed by: STUDENT IN AN ORGANIZED HEALTH CARE EDUCATION/TRAINING PROGRAM

## 2019-10-23 PROCEDURE — 6370000000 HC RX 637 (ALT 250 FOR IP): Performed by: SURGERY

## 2019-10-23 RX ORDER — OXYCODONE HYDROCHLORIDE AND ACETAMINOPHEN 5; 325 MG/1; MG/1
1 TABLET ORAL EVERY 6 HOURS PRN
Qty: 12 TABLET | Refills: 0 | Status: SHIPPED | OUTPATIENT
Start: 2019-10-23 | End: 2019-10-23

## 2019-10-23 RX ORDER — OXYCODONE HYDROCHLORIDE AND ACETAMINOPHEN 5; 325 MG/1; MG/1
1 TABLET ORAL EVERY 6 HOURS PRN
Qty: 12 TABLET | Refills: 0 | Status: SHIPPED | OUTPATIENT
Start: 2019-10-23 | End: 2019-10-26

## 2019-10-23 RX ORDER — LIDOCAINE 4 G/G
1 PATCH TOPICAL DAILY
Qty: 12 PATCH | Refills: 0 | Status: SHIPPED | OUTPATIENT
Start: 2019-10-23 | End: 2020-08-26

## 2019-10-23 RX ADMIN — OXYCODONE HYDROCHLORIDE AND ACETAMINOPHEN 1 TABLET: 5; 325 TABLET ORAL at 07:16

## 2019-10-23 RX ADMIN — MAGNESIUM CITRATE 296 ML: 1.75 LIQUID ORAL at 11:50

## 2019-10-23 RX ADMIN — ATORVASTATIN CALCIUM 40 MG: 40 TABLET, FILM COATED ORAL at 09:18

## 2019-10-23 RX ADMIN — OXYCODONE HYDROCHLORIDE AND ACETAMINOPHEN 1 TABLET: 5; 325 TABLET ORAL at 11:53

## 2019-10-23 RX ADMIN — ENOXAPARIN SODIUM 40 MG: 40 INJECTION SUBCUTANEOUS at 09:18

## 2019-10-23 RX ADMIN — LEVOTHYROXINE SODIUM 137 MCG: 137 TABLET ORAL at 06:37

## 2019-10-23 RX ADMIN — Medication 10 ML: at 09:19

## 2019-10-23 ASSESSMENT — PAIN SCALES - GENERAL
PAINLEVEL_OUTOF10: 3
PAINLEVEL_OUTOF10: 4
PAINLEVEL_OUTOF10: 0

## 2019-10-24 ENCOUNTER — TELEPHONE (OUTPATIENT)
Dept: SURGERY | Age: 79
End: 2019-10-24

## 2019-11-06 ENCOUNTER — OFFICE VISIT (OUTPATIENT)
Dept: SURGERY | Age: 79
End: 2019-11-06
Payer: MEDICARE

## 2019-11-06 VITALS
HEART RATE: 85 BPM | SYSTOLIC BLOOD PRESSURE: 152 MMHG | OXYGEN SATURATION: 99 % | DIASTOLIC BLOOD PRESSURE: 80 MMHG | BODY MASS INDEX: 25.33 KG/M2 | RESPIRATION RATE: 14 BRPM | HEIGHT: 72 IN | WEIGHT: 187 LBS

## 2019-11-06 DIAGNOSIS — C18.5 MALIGNANT NEOPLASM OF SPLENIC FLEXURE (HCC): Primary | ICD-10-CM

## 2019-11-06 PROCEDURE — 99211 OFF/OP EST MAY X REQ PHY/QHP: CPT | Performed by: SURGERY

## 2019-11-06 PROCEDURE — 99024 POSTOP FOLLOW-UP VISIT: CPT | Performed by: SURGERY

## 2019-11-06 RX ORDER — SODIUM PHOSPHATE, DIBASIC AND SODIUM PHOSPHATE, MONOBASIC 7; 19 G/133ML; G/133ML
1 ENEMA RECTAL ONCE
Qty: 1 BOTTLE | Refills: 0 | COMMUNITY
Start: 2019-11-06 | End: 2019-11-06

## 2019-11-06 RX ORDER — POLYETHYLENE GLYCOL 3350, SODIUM CHLORIDE, POTASSIUM CHLORIDE, SODIUM BICARBONATE, AND SODIUM SULFATE 240; 5.84; 2.98; 6.72; 22.72 G/4L; G/4L; G/4L; G/4L; G/4L
4000 POWDER, FOR SOLUTION ORAL ONCE
Qty: 1 BOTTLE | Refills: 0 | Status: SHIPPED | OUTPATIENT
Start: 2019-11-06 | End: 2019-11-06

## 2019-12-12 ENCOUNTER — TELEPHONE (OUTPATIENT)
Dept: SURGERY | Age: 79
End: 2019-12-12

## 2020-01-20 ENCOUNTER — TELEPHONE (OUTPATIENT)
Dept: SURGERY | Age: 80
End: 2020-01-20

## 2020-05-22 ENCOUNTER — TELEPHONE (OUTPATIENT)
Dept: SURGERY | Age: 80
End: 2020-05-22

## 2020-07-08 ENCOUNTER — TELEPHONE (OUTPATIENT)
Dept: SURGERY | Age: 80
End: 2020-07-08

## 2020-07-08 NOTE — TELEPHONE ENCOUNTER
JOSE JUAN Alamo contacted patient to schedule an appointment to come and discuss colonoscopy. MA scheduled pt for 07/22/2020 @ 1:15pm with  in ' [facility]. Patient verbalized appointment date, time and location. MA verified number that was good to do reminder call was the one listed in chart. MA advised patient where to park and enter building for appointment.          Electronically signed by Ritika Sylvester MA on 7/8/20 at 8:59 AM EDT

## 2020-07-08 NOTE — TELEPHONE ENCOUNTER
----- Message from Gay Muniz MA sent at 5/22/2020  2:28 PM EDT -----  -Patient needs contacted to see if ready to schedule and office visit to come in and discuss colonoscopy. Pt had one scheduled but it was canceled.         Electronically signed by Gay Muniz MA on 5/22/20 at 2:28 PM EDT

## 2020-07-29 ENCOUNTER — OFFICE VISIT (OUTPATIENT)
Dept: SURGERY | Age: 80
End: 2020-07-29
Payer: MEDICARE

## 2020-07-29 VITALS
HEIGHT: 72 IN | HEART RATE: 93 BPM | BODY MASS INDEX: 26.28 KG/M2 | OXYGEN SATURATION: 98 % | SYSTOLIC BLOOD PRESSURE: 156 MMHG | WEIGHT: 194 LBS | DIASTOLIC BLOOD PRESSURE: 89 MMHG | TEMPERATURE: 98.7 F

## 2020-07-29 PROCEDURE — G8427 DOCREV CUR MEDS BY ELIG CLIN: HCPCS | Performed by: SURGERY

## 2020-07-29 PROCEDURE — 99212 OFFICE O/P EST SF 10 MIN: CPT | Performed by: SURGERY

## 2020-07-29 PROCEDURE — 4040F PNEUMOC VAC/ADMIN/RCVD: CPT | Performed by: SURGERY

## 2020-07-29 PROCEDURE — 1036F TOBACCO NON-USER: CPT | Performed by: SURGERY

## 2020-07-29 PROCEDURE — 99213 OFFICE O/P EST LOW 20 MIN: CPT | Performed by: SURGERY

## 2020-07-29 PROCEDURE — 1123F ACP DISCUSS/DSCN MKR DOCD: CPT | Performed by: SURGERY

## 2020-07-29 PROCEDURE — G8417 CALC BMI ABV UP PARAM F/U: HCPCS | Performed by: SURGERY

## 2020-07-29 NOTE — PROGRESS NOTES
Progress Note    Subjective:  Patient doing well. No nausea or vomiting. No fever or chills. Tolerating a diet, + Bowel function     Objective:   Physical Exam  HENT:      Head: Normocephalic. Eyes:      Extraocular Movements: Extraocular movements intact. Pupils: Pupils are equal, round, and reactive to light. Neck:      Musculoskeletal: Normal range of motion. Cardiovascular:      Rate and Rhythm: Normal rate. Pulmonary:      Effort: Pulmonary effort is normal. No respiratory distress. Abdominal:      General: Abdomen is flat. There is no distension. Comments: Incision well healed, Ostomy pink viable with stool output but with large prolapse    Musculoskeletal: Normal range of motion. Skin:     General: Skin is warm. Neurological:      General: No focal deficit present. Mental Status: He is alert. 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 He finished his chemotherapy 2 months ago     Plan:   Plan Colonosocpy through ostomy and proctoscopy     I discussed the risks, benefits, and alternatives to colonoscopy with possible biopsy/cauterization/polylpectomy with deep sedation with the patient including the risks of deep sedation (hypotension, hypoxia), bleeding, and perforation (<1%). The patient understands the above and agrees to proceed.     Physician Signature: Electronically signed by Harjit Lugo MD

## 2020-07-29 NOTE — PROGRESS NOTES
Agueda Gonsalez is scheduled for a colonoscopy and proctoscopy with Dr Tena Webb on 8/28/2020 @ 11:30am, arrival time 10:30am. Patient has been notified of the appointment and a letter has been  given to the patient in clinic. Patient has been told to make sure they have a ride and to park in the [facility] and report through the outpatient entrance of the hospital facing [place] for same day surgery.     Electronically signed by Vivian Vaughan on 7/29/20 at 9:59 AM EDT

## 2020-08-03 ENCOUNTER — TELEPHONE (OUTPATIENT)
Dept: SURGERY | Age: 80
End: 2020-08-03

## 2020-08-03 NOTE — TELEPHONE ENCOUNTER
MA received phone call from patient's wife, Natanael Womack, stating that he had been seen by his oncologist and informed he no longer needs his mediport and can have it removed whenever. Toniaanabelaabilio Shanta wondering if the  mediport removal can take place the same day as the colonoscopy, or if it needs to be scheduled separately. MA routing message to Dr Kevin Radford for advisement.      Electronically signed by Maruks Shah on 8/3/20 at 11:28 AM EDT

## 2020-08-04 NOTE — TELEPHONE ENCOUNTER
We should first make sure there are no additional findings in his colon/rectum. As long as there isnt we can take it out at a different encounter. I do not want to risk getting the wound infected and taking it out the same day as a dirty procedure.      Dr. Marline Fitzpatrick

## 2020-08-10 NOTE — TELEPHONE ENCOUNTER
MA contacted Vale Foster to inform her of Dr Ede Rae' advisements on Mediport removal at separate occasion than colonoscopy. Vale Foster verbalized understanding.      Electronically signed by Ketan Alejo on 8/10/20 at 1:32 PM EDT

## 2020-08-24 ENCOUNTER — HOSPITAL ENCOUNTER (OUTPATIENT)
Age: 80
Discharge: HOME | End: 2020-08-26
Payer: MEDICARE

## 2020-08-24 PROCEDURE — U0003 INFECTIOUS AGENT DETECTION BY NUCLEIC ACID (DNA OR RNA); SEVERE ACUTE RESPIRATORY SYNDROME CORONAVIRUS 2 (SARS-COV-2) (CORONAVIRUS DISEASE [COVID-19]), AMPLIFIED PROBE TECHNIQUE, MAKING USE OF HIGH THROUGHPUT TECHNOLOGIES AS DESCRIBED BY CMS-2020-01-R: HCPCS

## 2020-08-26 LAB
SARS-COV-2: NOT DETECTED
SOURCE: NORMAL

## 2020-08-26 NOTE — PROGRESS NOTES
[address] PRE-ADMISSION TESTING ENDOSCOPY INSTRUCTIONS- formerly [facility]-phone number:[phone]    ENDOSCOPY INSTRUCTIONS:   [x] Bowel prep instructions reviewed. [x] Nothing by mouth after midnight, including gum, candy, mints, or water. Please follow your surgeons instructions if you are required to complete a bowel prep. Colonoscopy- no solid food-only clear liquids the day prior). [x] You may brush your teeth, gargle, but do NOT swallow water. [x] Do not wear makeup, lotions, powders, deodorant. Nail polish as directed by the nurse. [x] Arrange transportation with a responsible adult  to and from the hospital. If you do not have a responsible adult  to transport you, you will need to make arrangements with a medical transportation company (i.e. [employer]. A Uber/taxi/bus is not appropriate unless you are accompanied by a responsible adult ). Arrange for someone to be with you for the remainder of the day and for 24 hours after your procedure due to having had anesthesia. Who will be your  for transportation? Son Tisha Ireland  Who will be staying with you for 24 hrs after your procedure? wife    PARKING INSTRUCTIONS:   · [x] Arrival Time: [address] main entrance, [address], wear mask  · [] Parking lot  \"I\" OR 1 is located on [facility] (the corner of [facility]). To enter, press the button and the gate will lift. A free token will be provided to exit the lot. One car per patient is allowed to park in this lot. All other cars are to park on [address] [place] [address] either in the parking garage or the handicap lot. [] To reach the [place] lobby from [address] [place] [address], upon entering the hospital, take elevator B to the 3rd floor.     EDUCATION INSTRUCTIONS:  [x] Bring a complete list of your medications, please write the last time you took the medicine, give this list to the nurse.  [] Take the following medications the morning of surgery with 1-2 ounces Rapid Mohs Report (Note: If The Tumor Is Complex, Or If Any Stage Is Divided Into More Than 5 Pieces Or If You Want A More Detailed Report, Select No And Proceed To The Individual Stages Below): no Location Indication Override (Is Already Calculated Based On Selected Body Location): Area M of water:   [x] Stop herbal supplements and vitamins 5 days before your surgery. [] DO NOT take any diabetic medicine the morning of surgery. Follow instructions for insulin the day before surgery. [] If you are diabetic and your blood sugar is low or you feel symptomatic, you may drink 1-2 ounces of apple juice or take a glucose tablet. The morning of your procedure, you may call the pre-op area if you have concerns about your blood sugar [phone]. [] Use your inhalers the morning of surgery. Bring your emergency inhaler with you day of surgery. [] Follow physician instructions regarding any blood thinners you may be taking. WHAT TO EXPECT:  [x] The day of your procedure you will be greeted and checked in by the [facility].  In addition, you will be registered in the [facility] by a Patient Access Representative. Please bring your photo ID and insurance card. A nurse will greet you in accordance to the time you are needed in the pre-op area to prepare you for surgery. Please do not be discouraged if you are not greeted in the order you arrive as there are many variables that are involved in patient preparation. Your patience is greatly appreciated as you wait for your nurse. Please bring in items such as: books, magazines, newspapers, electronics, or any other items  to occupy your time in the waiting area. [x]  Delays may occur. Staff will make a sincere effort to keep you informed of delays. If any delays occur with your procedure, we apologize ahead of time for your inconvenience as we recognize the value of your time. Consent (Near Eyelid Margin)/Introductory Paragraph: The rationale for Mohs was explained to the patient and consent was obtained. The risks, benefits and alternatives to therapy were discussed in detail. Specifically, the risks of ectropion or eyelid deformity, infection, scarring, bleeding, prolonged wound healing, incomplete removal, allergy to anesthesia, nerve injury and recurrence were addressed. Prior to the procedure, the treatment site was clearly identified and confirmed by the patient. All components of Universal Protocol/PAUSE Rule completed. Stage 14: Number Of Blocks?: 0 Closure 4 Information: This tab is for additional flaps and grafts above and beyond our usual structured repairs.  Please note if you enter information here it will not currently bill and you will need to add the billing information manually. Surgeon/Pathologist Verbiage (Will Incorporate Name Of Surgeon From Intro If Not Blank): operated in two distinct and integrated capacities as the surgeon and pathologist. Incorporate Mauc Into Note After Indications: Yes Wound Care (No Sutures): Petrolatum Bi-Rhombic Flap Text: The defect edges were debeveled with a #15 scalpel blade.  Given the location of the defect and the proximity to free margins a bi-rhombic flap was deemed most appropriate.  Using a sterile surgical marker, an appropriate rhombic flap was drawn incorporating the defect. The area thus outlined was incised deep to adipose tissue with a #15 scalpel blade.  The skin margins were undermined to an appropriate distance in all directions utilizing iris scissors. Stage 10: Additional Anesthesia Type: 1% lidocaine with epinephrine No Residual Tumor Seen Histology Text: There were no malignant cells seen in the sections examined. Graft Donor Site Bandage (Optional-Leave Blank If You Don't Want In Note): Steri-strips and a pressure bandage were applied to the donor site. Full Thickness Lip Wedge Repair (Flap) Text: Given the location of the defect and the proximity to free margins a full thickness wedge repair was deemed most appropriate.  Using a sterile surgical marker, the appropriate repair was drawn incorporating the defect and placing the expected incisions perpendicular to the vermillion border.  The vermillion border was also meticulously outlined to ensure appropriate reapproximation during the repair.  The area thus outlined was incised through and through with a #15 scalpel blade.  The muscularis and dermis were reaproximated with deep sutures following hemostasis. Care was taken to realign the vermillion border before proceeding with the superficial closure.  Once the vermillion was realigned the superfical and mucosal closure was finished. Bilobed Transposition Flap Text: The defect edges were debeveled with a #15 scalpel blade.  Given the location of the defect and the proximity to free margins a bilobed transposition flap was deemed most appropriate.  Using a sterile surgical marker, an appropriate bilobe flap drawn around the defect.    The area thus outlined was incised deep to adipose tissue with a #15 scalpel blade.  The skin margins were undermined to an appropriate distance in all directions utilizing iris scissors. Hatchet Flap Text: The defect edges were debeveled with a #15 scalpel blade.  Given the location of the defect, shape of the defect and the proximity to free margins a hatchet flap was deemed most appropriate.  Using a sterile surgical marker, an appropriate hatchet flap was drawn incorporating the defect and placing the expected incisions within the relaxed skin tension lines where possible.    The area thus outlined was incised deep to adipose tissue with a #15 scalpel blade.  The skin margins were undermined to an appropriate distance in all directions utilizing iris scissors. Complex Repair And Flap Additional Text (Will Appearing After The Standard Complex Repair Text): The complex repair was not sufficient to completely close the primary defect. The remaining additional defect was repaired with the flap mentioned below. Anesthesia Volume In Cc: 4 Unna Boot Text: An Unna boot was placed to help immobilize the limb and facilitate more rapid healing. Detail Level: Detailed Dressing (No Sutures): dry sterile dressing Anesthesia Type: 0.5% lidocaine, 0.25% Bupivacaine in a 1:1 solution with 1:200,000 epinephrine and a 1:20 solution of 8.4% sodium bicarbonate Repair Performed By Another Provider Text (Leave Blank If You Do Not Want): After obtaining clear surgical margins the defect was repaired by another provider. Epidermal Closure Graft Donor Site (Optional): simple interrupted Secondary Intention Text (Leave Blank If You Do Not Want): The defect will heal with secondary intention. Subsequent Stages Histo Method Verbiage: Using a similar technique to that described above, a thin layer of tissue was removed from all areas where tumor was visible on the previous stage.  The tissue was again oriented, mapped, dyed, and processed as above. Wound Care: Vaseline Consent Type: Consent 1 (Standard) Alternatives Discussed Intro (Do Not Add Period): I discussed alternative treatments to Mohs surgery and specifically discussed the risks and benefits of Consent 3/Introductory Paragraph: I gave the patient a chance to ask questions they had about the procedure.  Following this I explained the Mohs procedure and consent was obtained. The risks, benefits and alternatives to therapy were discussed in detail. Specifically, the risks of infection, scarring, bleeding, prolonged wound healing, incomplete removal, allergy to anesthesia, nerve injury and recurrence were addressed. Prior to the procedure, the treatment site was clearly identified and confirmed by the patient. All components of Universal Protocol/PAUSE Rule completed. Consent (Scalp)/Introductory Paragraph: The rationale for Mohs was explained to the patient and consent was obtained. The risks, benefits and alternatives to therapy were discussed in detail. Specifically, the risks of changes in hair growth pattern secondary to repair, infection, scarring, bleeding, prolonged wound healing, incomplete removal, allergy to anesthesia, nerve injury and recurrence were addressed. Prior to the procedure, the treatment site was clearly identified and confirmed by the patient. All components of Universal Protocol/PAUSE Rule completed. Area L Indication Text: Tumors in this location are included in Area L (trunk and extremities).  Mohs surgery is indicated for larger tumors, 2 cm or larger, in these anatomic locations. Manual Repair Warning Statement: We plan on removing the manually selected variable below in favor of our much easier automatic structured text blocks found in the previous tab. We decided to do this to help make the flow better and give you the full power of structured data. Manual selection is never going to be ideal in our platform and I would encourage you to avoid using manual selection from this point on, especially since I will be sunsetting this feature. It is important that you do one of two things with the customized text below. First, you can save all of the text in a word file so you can have it for future reference. Second, transfer the text to the appropriate area in the Library tab. Lastly, if there is a flap or graft type which we do not have you need to let us know right away so I can add it in before the variable is hidden. No need to panic, we plan to give you roughly 6 months to make the change. Partial Purse String (Simple) Text: Given the location of the defect and the characteristics of the surrounding skin a simple purse string closure was deemed most appropriate.  Undermining was performed circumfirentially around the surgical defect.  A purse string suture was then placed and tightened. Wound tension only allowed a partial closure of the circular defect. Spiral Flap Text: The defect edges were debeveled with a #15 scalpel blade.  Given the location of the defect, shape of the defect and the proximity to free margins a spiral flap was deemed most appropriate.  Using a sterile surgical marker, an appropriate rotation flap was drawn incorporating the defect and placing the expected incisions within the relaxed skin tension lines where possible. The area thus outlined was incised deep to adipose tissue with a #15 scalpel blade.  The skin margins were undermined to an appropriate distance in all directions utilizing iris scissors. H Plasty Text: Given the location of the defect, shape of the defect and the proximity to free margins a H-plasty was deemed most appropriate for repair.  Using a sterile surgical marker, the appropriate advancement arms of the H-plasty were drawn incorporating the defect and placing the expected incisions within the relaxed skin tension lines where possible. The area thus outlined was incised deep to adipose tissue with a #15 scalpel blade. The skin margins were undermined to an appropriate distance in all directions utilizing iris scissors.  The opposing advancement arms were then advanced into place in opposite direction and anchored with interrupted buried subcutaneous sutures. Consent (Lip)/Introductory Paragraph: The rationale for Mohs was explained to the patient and consent was obtained. The risks, benefits and alternatives to therapy were discussed in detail. Specifically, the risks of lip deformity, changes in the oral aperture, infection, scarring, bleeding, prolonged wound healing, incomplete removal, allergy to anesthesia, nerve injury and recurrence were addressed. Prior to the procedure, the treatment site was clearly identified and confirmed by the patient. All components of Universal Protocol/PAUSE Rule completed. Bcc Infiltrative Histology Text: There were numerous aggregates of basaloid cells demonstrating an infiltrative pattern. Stage 3: Additional Anesthesia Type: 0.5% lidocaine, 0.25% Marcaine, 1:200,000 epinephrine and a 1:10 solution of sodium bicarbonate X Size Of Lesion In Cm (Optional): 1.2 Home Suture Removal Text: Patient was provided instructions on removing sutures and will remove their sutures at home.  If they have any questions or difficulties they will call the office. Alar Island Pedicle Flap Text: The defect edges were debeveled with a #15 scalpel blade.  Given the location of the defect, shape of the defect and the proximity to the alar rim an island pedicle advancement flap was deemed most appropriate.  Using a sterile surgical marker, an appropriate advancement flap was drawn incorporating the defect, outlining the appropriate donor tissue and placing the expected incisions within the nasal ala running parallel to the alar rim. The area thus outlined was incised with a #15 scalpel blade.  The skin margins were undermined minimally to an appropriate distance in all directions around the primary defect and laterally outward around the island pedicle utilizing iris scissors.  There was minimal undermining beneath the pedicle flap. Area H Indication Text: Tumors in this location are included in Area H (eyelids, eyebrows, nose, lips, chin, ear, pre-auricular, post-auricular, temple, genitalia, hands, feet, ankles and areola).  Tissue conservation is critical in these anatomic locations. Repair Anesthesia Method: local infiltration Primary Defect Width In Cm (Final Defect Size - Required For Flaps/Grafts): 1.5 Consent (Marginal Mandibular)/Introductory Paragraph: The rationale for Mohs was explained to the patient and consent was obtained. The risks, benefits and alternatives to therapy were discussed in detail. Specifically, the risks of damage to the marginal mandibular branch of the facial nerve, infection, scarring, bleeding, prolonged wound healing, incomplete removal, allergy to anesthesia, and recurrence were addressed. Prior to the procedure, the treatment site was clearly identified and confirmed by the patient. All components of Universal Protocol/PAUSE Rule completed. Consent 2/Introductory Paragraph: Mohs surgery was explained to the patient and consent was obtained. The risks, benefits and alternatives to therapy were discussed in detail. Specifically, the risks of infection, scarring, bleeding, prolonged wound healing, incomplete removal, allergy to anesthesia, nerve injury and recurrence were addressed. Prior to the procedure, the treatment site was clearly identified and confirmed by the patient. All components of Universal Protocol/PAUSE Rule completed. Bcc Histology Text: There were numerous aggregates of basaloid cells. Crescentic Advancement Flap Text: The defect edges were debeveled with a #15 scalpel blade.  Given the location of the defect and the proximity to free margins a crescentic advancement flap was deemed most appropriate.  Using a sterile surgical marker, the appropriate advancement flap was drawn incorporating the defect and placing the expected incisions within the relaxed skin tension lines where possible.    The area thus outlined was incised deep to adipose tissue with a #15 scalpel blade.  The skin margins were undermined to an appropriate distance in all directions utilizing iris scissors. Wound Check: 28 days Consent (Ear)/Introductory Paragraph: The rationale for Mohs was explained to the patient and consent was obtained. The risks, benefits and alternatives to therapy were discussed in detail. Specifically, the risks of ear deformity, infection, scarring, bleeding, prolonged wound healing, incomplete removal, allergy to anesthesia, nerve injury and recurrence were addressed. Prior to the procedure, the treatment site was clearly identified and confirmed by the patient. All components of Universal Protocol/PAUSE Rule completed. Consent (Spinal Accessory)/Introductory Paragraph: The rationale for Mohs was explained to the patient and consent was obtained. The risks, benefits and alternatives to therapy were discussed in detail. Specifically, the risks of damage to the spinal accessory nerve, infection, scarring, bleeding, prolonged wound healing, incomplete removal, allergy to anesthesia, and recurrence were addressed. Prior to the procedure, the treatment site was clearly identified and confirmed by the patient. All components of Universal Protocol/PAUSE Rule completed. Postop Diagnosis: same Suture Removal: 7 days Tarsorrhaphy Text: A tarsorrhaphy was performed using Frost sutures. Hemostasis: Drysol and Portable Heat Cautery Complex Repair And Graft Additional Text (Will Appearing After The Standard Complex Repair Text): The complex repair was not sufficient to completely close the primary defect. The remaining additional defect was repaired with the graft mentioned below. Deep Sutures: 5-0 Monocryl Plus Repair Type: Intermediate Layered Repair Mohs Case Number: [id number] Skin Substitute Text: The defect edges were debeveled with a #15 scalpel blade.  Given the location of the defect, shape of the defect and the proximity to free margins a skin substitute graft was deemed most appropriate.  The graft material was trimmed to fit the size of the defect. The graft was then placed in the primary defect and oriented appropriately. Mauc Instructions: By selecting yes to the question below the MAUC number will be added into the note.  This will be calculated automatically based on the diagnosis chosen, the size entered, the body zone selected (H,M,L) and the specific indications you chose. You will also have the option to override the Mohs AUC if you disagree with the automatically calculated number and this option is found in the Case Summary tab. Donor Site Anesthesia Type: same as repair anesthesia Inflammation Suggestive Of Cancer Camouflage Histology Text: There was a dense lymphocytic infiltrate which prevented adequate histologic evaluation of adjacent structures. Post-Care Instructions: I reviewed with the patient in detail post-care instructions. Patient is not to engage in any heavy lifting, exercise, or swimming for the next 14 days. Should the patient develop any fevers, chills, bleeding, severe pain patient will contact the office immediately. Referred To Mid-Level For Closure Text (Leave Blank If You Do Not Want): After obtaining clear surgical margins the patient was sent to a mid-level provider for surgical repair.  The patient understands they will receive post-surgical care and follow-up from the mid-level provider. Estimated Blood Loss (Cc): minimal Anesthesia Volume In Cc: 7 Medical Necessity Statement: Based on my medical judgement, Mohs surgery is the most appropriate treatment for this cancer compared to other treatments. Mucosal Advancement Flap Text: Given the location of the defect, shape of the defect and the proximity to free margins a mucosal advancement flap was deemed most appropriate. Incisions were made with a 15 blade scalpel in the appropriate fashion along the cutaneous vermillion border and the mucosal lip. The remaining actinically damaged mucosal tissue was excised.  The mucosal advancement flap was then elevated to the gingival sulcus with care taken to preserve the neurovascular structures and advanced into the primary defect. Care was taken to ensure that precise realignment of the vermillion border was achieved. Mohs Method Verbiage: An incision at a 45 degree angle following the standard Mohs approach was done and the specimen was harvested as a microscopic controlled layer. Area M Indication Text: Tumors in this location are included in Area M (cheek, forehead, scalp, neck, jawline and pretibial skin).  Mohs surgery is indicated for tumors 1 cm or larger in these anatomic locations. Double Island Pedicle Flap Text: The defect edges were debeveled with a #15 scalpel blade.  Given the location of the defect, shape of the defect and the proximity to free margins a double island pedicle advancement flap was deemed most appropriate.  Using a sterile surgical marker, an appropriate advancement flap was drawn incorporating the defect, outlining the appropriate donor tissue and placing the expected incisions within the relaxed skin tension lines where possible.    The area thus outlined was incised deep to adipose tissue with a #15 scalpel blade.  The skin margins were undermined to an appropriate distance in all directions around the primary defect and laterally outward around the island pedicle utilizing iris scissors.  There was minimal undermining beneath the pedicle flap. Partial Purse String (Intermediate) Text: Given the location of the defect and the characteristics of the surrounding skin an intermediate purse string closure was deemed most appropriate.  Undermining was performed circumfirentially around the surgical defect.  A purse string suture was then placed and tightened. Wound tension only allowed a partial closure of the circular defect. Island Pedicle Flap With Canthal Suspension Text: The defect edges were debeveled with a #15 scalpel blade.  Given the location of the defect, shape of the defect and the proximity to free margins an island pedicle advancement flap was deemed most appropriate.  Using a sterile surgical marker, an appropriate advancement flap was drawn incorporating the defect, outlining the appropriate donor tissue and placing the expected incisions within the relaxed skin tension lines where possible. The area thus outlined was incised deep to adipose tissue with a #15 scalpel blade.  The skin margins were undermined to an appropriate distance in all directions around the primary defect and laterally outward around the island pedicle utilizing iris scissors.  There was minimal undermining beneath the pedicle flap. A suspension suture was placed in the canthal tendon to prevent tension and prevent ectropion. Initial Size Of Lesion: 0.7 Burow's Advancement Flap Text: The defect edges were debeveled with a #15 scalpel blade.  Given the location of the defect and the proximity to free margins a Burow's advancement flap was deemed most appropriate.  Using a sterile surgical marker, the appropriate advancement flap was drawn incorporating the defect and placing the expected incisions within the relaxed skin tension lines where possible.    The area thus outlined was incised deep to adipose tissue with a #15 scalpel blade.  The skin margins were undermined to an appropriate distance in all directions utilizing iris scissors. Muscle Hinge Flap Text: The defect edges were debeveled with a #15 scalpel blade.  Given the size, depth and location of the defect and the proximity to free margins a muscle hinge flap was deemed most appropriate.  Using a sterile surgical marker, an appropriate hinge flap was drawn incorporating the defect. The area thus outlined was incised with a #15 scalpel blade.  The skin margins were undermined to an appropriate distance in all directions utilizing iris scissors. Simple / Intermediate / Complex Repair - Final Wound Length In Cm: 3.5 Eye Protection Verbiage: Before proceeding with the stage, a plastic scleral shield was inserted. The globe was anesthetized with a few drops of 1% lidocaine with 1:100,000 epinephrine. Then, an appropriate sized scleral shield was chosen and coated with lacrilube ointment. The shield was gently inserted and left in place for the duration of each stage. After the stage was completed, the shield was gently removed. Consent 1/Introductory Paragraph: The rationale for Mohs was explained to the patient and consent was obtained. The risks, benefits and alternatives to therapy were discussed in detail. Specifically, the risks of infection, scarring, bleeding, prolonged wound healing, incomplete removal, allergy to anesthesia, nerve injury and recurrence were addressed. Prior to the procedure, the treatment site was clearly identified and confirmed by the patient. All components of Universal Protocol/PAUSE Rule completed. Epidermal Closure: running locked Tumor Debulked?: curette Primary Defect Length In Cm (Final Defect Size - Required For Flaps/Grafts): 1 Mohs Rapid Report Verbiage: The area of clinically evident tumor was marked with skin marking ink and appropriately hatched.  The initial incision was made following the Mohs approach through the skin.  The specimen was taken to the lab, divided into the necessary number of pieces, chromacoded and processed according to the Mohs protocol.  This was repeated in successive stages until a tumor free defect was achieved. Same Histology In Subsequent Stages Text: The pattern and morphology of the tumor is as described in the first stage. Closure 2 Information: This tab is for additional flaps and grafts, including complex repair and grafts and complex repair and flaps. You can also specify a different location for the additional defect, if the location is the same you do not need to select a new one. We will insert the automated text for the repair you select below just as we do for solitary flaps and grafts. Please note that at this time if you select a location with a different insurance zone you will need to override the ICD10 and CPT if appropriate. Consent (Temporal Branch)/Introductory Paragraph: The rationale for Mohs was explained to the patient and consent was obtained. The risks, benefits and alternatives to therapy were discussed in detail. Specifically, the risks of damage to the temporal branch of the facial nerve, infection, scarring, bleeding, prolonged wound healing, incomplete removal, allergy to anesthesia, and recurrence were addressed. Prior to the procedure, the treatment site was clearly identified and confirmed by the patient. All components of Universal Protocol/PAUSE Rule completed. Mohs Histo Method Verbiage: Each section was then scored, mapped, marked and processed in the Mohs lab using the Mohs protocol and submitted for frozen section. Star Wedge Flap Text: The defect edges were debeveled with a #15 scalpel blade.  Given the location of the defect, shape of the defect and the proximity to free margins a star wedge flap was deemed most appropriate.  Using a sterile surgical marker, an appropriate rotation flap was drawn incorporating the defect and placing the expected incisions within the relaxed skin tension lines where possible. The area thus outlined was incised deep to adipose tissue with a #15 scalpel blade.  The skin margins were undermined to an appropriate distance in all directions utilizing iris scissors. Bilateral Helical Rim Advancement Flap Text: The defect edges were debeveled with a #15 blade scalpel.  Given the location of the defect and the proximity to free margins (helical rim) a bilateral helical rim advancement flap was deemed most appropriate.  Using a sterile surgical marker, the appropriate advancement flaps were drawn incorporating the defect and placing the expected incisions between the helical rim and antihelix where possible.  The area thus outlined was incised through and through with a #15 scalpel blade.  With a skin hook and iris scissors, the flaps were gently and sharply undermined and freed up. Consent (Nose)/Introductory Paragraph: The rationale for Mohs was explained to the patient and consent was obtained. The risks, benefits and alternatives to therapy were discussed in detail. Specifically, the risks of nasal deformity, changes in the flow of air through the nose, infection, scarring, bleeding, prolonged wound healing, incomplete removal, allergy to anesthesia, nerve injury and recurrence were addressed. Prior to the procedure, the treatment site was clearly identified and confirmed by the patient. All components of Universal Protocol/PAUSE Rule completed. Dressing: pressure dressing Keystone Flap Text: The defect edges were debeveled with a #15 scalpel blade.  Given the location of the defect, shape of the defect a keystone flap was deemed most appropriate.  Using a sterile surgical marker, an appropriate keystone flap was drawn incorporating the defect, outlining the appropriate donor tissue and placing the expected incisions within the relaxed skin tension lines where possible. The area thus outlined was incised deep to adipose tissue with a #15 scalpel blade.  The skin margins were undermined to an appropriate distance in all directions around the primary defect and laterally outward around the flap utilizing iris scissors.

## 2020-08-27 RX ORDER — SODIUM CHLORIDE 0.9 % (FLUSH) 0.9 %
10 SYRINGE (ML) INJECTION EVERY 12 HOURS SCHEDULED
Status: CANCELLED | OUTPATIENT
Start: 2020-08-27

## 2020-08-27 RX ORDER — SODIUM CHLORIDE 0.9 % (FLUSH) 0.9 %
10 SYRINGE (ML) INJECTION PRN
Status: CANCELLED | OUTPATIENT
Start: 2020-08-27

## 2020-08-28 ENCOUNTER — ANESTHESIA (OUTPATIENT)
Dept: ENDOSCOPY | Age: 80
End: 2020-08-28
Payer: MEDICARE

## 2020-08-28 ENCOUNTER — ANESTHESIA EVENT (OUTPATIENT)
Dept: ENDOSCOPY | Age: 80
End: 2020-08-28
Payer: MEDICARE

## 2020-08-28 ENCOUNTER — HOSPITAL ENCOUNTER (OUTPATIENT)
Age: 80
Setting detail: OUTPATIENT SURGERY
Discharge: HOME | End: 2020-08-28
Attending: SURGERY | Admitting: SURGERY
Payer: MEDICARE

## 2020-08-28 VITALS
HEART RATE: 55 BPM | DIASTOLIC BLOOD PRESSURE: 89 MMHG | SYSTOLIC BLOOD PRESSURE: 162 MMHG | BODY MASS INDEX: 25.73 KG/M2 | OXYGEN SATURATION: 99 % | TEMPERATURE: 97.7 F | HEIGHT: 72 IN | RESPIRATION RATE: 16 BRPM | WEIGHT: 190 LBS

## 2020-08-28 VITALS
OXYGEN SATURATION: 99 % | RESPIRATION RATE: 18 BRPM | DIASTOLIC BLOOD PRESSURE: 59 MMHG | SYSTOLIC BLOOD PRESSURE: 105 MMHG

## 2020-08-28 PROCEDURE — 3700000000 HC ANESTHESIA ATTENDED CARE: Performed by: SURGERY

## 2020-08-28 PROCEDURE — 3609027000 HC COLONOSCOPY: Performed by: SURGERY

## 2020-08-28 PROCEDURE — 3609010700 HC COLONOSCOPY POLYPECTOMY REMOVAL SNARE/STOMA: Performed by: SURGERY

## 2020-08-28 PROCEDURE — 2580000003 HC RX 258: Performed by: SURGERY

## 2020-08-28 PROCEDURE — 88305 TISSUE EXAM BY PATHOLOGIST: CPT

## 2020-08-28 PROCEDURE — 7100000011 HC PHASE II RECOVERY - ADDTL 15 MIN: Performed by: SURGERY

## 2020-08-28 PROCEDURE — 6360000002 HC RX W HCPCS: Performed by: NURSE ANESTHETIST, CERTIFIED REGISTERED

## 2020-08-28 PROCEDURE — 2709999900 HC NON-CHARGEABLE SUPPLY: Performed by: SURGERY

## 2020-08-28 PROCEDURE — 45384 COLONOSCOPY W/LESION REMOVAL: CPT | Performed by: SURGERY

## 2020-08-28 PROCEDURE — 3700000001 HC ADD 15 MINUTES (ANESTHESIA): Performed by: SURGERY

## 2020-08-28 PROCEDURE — 7100000010 HC PHASE II RECOVERY - FIRST 15 MIN: Performed by: SURGERY

## 2020-08-28 PROCEDURE — 3609010500 HC COLONOSCOPY POLYPECTOMY REMOVAL HOT BIOPSY/STOMA: Performed by: SURGERY

## 2020-08-28 RX ORDER — SODIUM CHLORIDE 9 MG/ML
INJECTION, SOLUTION INTRAVENOUS CONTINUOUS
Status: DISCONTINUED | OUTPATIENT
Start: 2020-08-28 | End: 2020-08-28 | Stop reason: HOSPADM

## 2020-08-28 RX ORDER — PROPOFOL 10 MG/ML
INJECTION, EMULSION INTRAVENOUS PRN
Status: DISCONTINUED | OUTPATIENT
Start: 2020-08-28 | End: 2020-08-28 | Stop reason: SDUPTHER

## 2020-08-28 RX ADMIN — SODIUM CHLORIDE: 9 INJECTION, SOLUTION INTRAVENOUS at 08:15

## 2020-08-28 RX ADMIN — PROPOFOL 360 MG: 10 INJECTION, EMULSION INTRAVENOUS at 09:12

## 2020-08-28 RX ADMIN — SODIUM CHLORIDE: 9 INJECTION, SOLUTION INTRAVENOUS at 09:11

## 2020-08-28 ASSESSMENT — PAIN SCALES - GENERAL
PAINLEVEL_OUTOF10: 0

## 2020-08-28 ASSESSMENT — PAIN DESCRIPTION - PAIN TYPE: TYPE: SURGICAL PAIN

## 2020-08-28 ASSESSMENT — ENCOUNTER SYMPTOMS
GASTROINTESTINAL NEGATIVE: 1
RESPIRATORY NEGATIVE: 1

## 2020-08-28 ASSESSMENT — PAIN DESCRIPTION - LOCATION: LOCATION: ABDOMEN

## 2020-08-28 ASSESSMENT — PAIN - FUNCTIONAL ASSESSMENT: PAIN_FUNCTIONAL_ASSESSMENT: 0-10

## 2020-08-28 NOTE — H&P
possible biopsy/cauterization/polylpectomy with deep sedation with the patient including the risks of deep sedation (hypotension, hypoxia), bleeding, and perforation (<1%).   The patient understands the above and agrees to proceed.  Allie Garcia MD

## 2020-08-28 NOTE — PROGRESS NOTES
COVID testing completed on: 8/24/2020  Results of the test: negative  The patient verbally confirms that they did adhere to the self-quarantine guidelines. No signs or symptoms expressed or observed.

## 2020-08-28 NOTE — OP NOTE
Rectum:abnormal: moderate diversion proctitis      The scope was not retroflexed at the anal verge as concern to cause trauma. No abnormalities were seen at the anal verge with slowly withdrawing the scope. The scope was then straightened and removed.      THE PATIENT TOLERATED THE PROCEDURE WELL    PLAN:  Recommend follow up pending polypectomy pathology   Will Call with results  Of biopsies     Vladimir Toscano MD  Attending   General Surgery, Trauma, Critical Care  8/28/2020  12:13 PM

## 2020-08-28 NOTE — ANESTHESIA POSTPROCEDURE EVALUATION
Department of Anesthesiology  Postprocedure Note    Patient: Karen Bethea  MRN: [id number]  YOB: 1940  Date of evaluation: 8/28/2020  Time:  11:20 AM     Procedure Summary     Date:  08/28/20 Room / Location:  [facility] 01 / [facility]    Anesthesia Start:  9247 Anesthesia Stop:  7212    Procedure:  COLONOSCOPY POLYPECTOMY REMOVAL HOT BIOPSY/STOMA (N/A ) Diagnosis:  (CURRENT OSTOMY)    Surgeon:  Kevin Fernando MD Responsible Provider:  Ifrah Car MD    Anesthesia Type:  MAC ASA Status:  3          Anesthesia Type: MAC    Eloina Phase I: Eloina Score: 10    Eloina Phase II: Eloina Score: 10    Last vitals: Reviewed and per EMR flowsheets.        Anesthesia Post Evaluation    Patient location during evaluation: PACU  Patient participation: complete - patient participated  Level of consciousness: awake and alert  Airway patency: patent  Nausea & Vomiting: no nausea and no vomiting  Complications: no  Cardiovascular status: hemodynamically stable  Respiratory status: acceptable  Hydration status: euvolemic

## 2020-08-28 NOTE — ANESTHESIA PRE PROCEDURE
Department of Anesthesiology  Preprocedure Note       Name:  Jai Zaldivar   Age:  67 y.o.  :  1940                                          MRN:  [id number]         Date:  2020      Surgeon: Joselyn Camara):  Marina Worthy MD    Procedure: COLONOSCOPY DIAGNOSTIC, PROCTOSCOPY (N/A )    Medications prior to admission:   Prior to Admission medications    Medication Sig Start Date End Date Taking? Authorizing Provider   levothyroxine (SYNTHROID) 137 MCG tablet Take 137 mcg by mouth Daily    Historical Provider, MD   atorvastatin (LIPITOR) 40 MG tablet Take 40 mg by mouth daily    Historical Provider, MD   acetaminophen (TYLENOL) 500 MG tablet Take 500 mg by mouth every 6 hours as needed for Pain    Historical Provider, MD   Multiple Vitamins-Minerals (THERAPEUTIC MULTIVITAMIN-MINERALS) tablet Take 1 tablet by mouth daily    Historical Provider, MD       Current medications:    No current facility-administered medications for this visit. No current outpatient medications on file.      Facility-Administered Medications Ordered in Other Visits   Medication Dose Route Frequency Provider Last Rate Last Dose    0.9 % sodium chloride infusion   Intravenous Continuous Marina Worthy MD           Allergies:  No Known Allergies    Problem List:    Patient Active Problem List   Diagnosis Code    Intestinal obstruction ([address] K56.609    Moderate protein-calorie malnutrition ([address] E44.0    Malignant neoplasm of splenic flexure ([address] C18.5       Past Medical History:        Diagnosis Date    Cancer [facility])     prostate    Hyperlipidemia     Malignant neoplasm of splenic flexure ([facility] [address] 10/21/2019    Thyroid disease        Past Surgical History:        Procedure Laterality Date    COLOSTOMY      LAPAROTOMY EXPLORATORY N/A 10/13/2019    LAPAROTOMY EXPLORATORY, LARGE BOWEL RESECTION, CREATION OF COLOSTOMY performed by Marina Worthy MD at [facility] PORT SURGERY N/A 10/22/2019    PORT INSERTION performed by Ronald Wiggins MD at [address] [place] [address]         Social History:    Social History     Tobacco Use    Smoking status: Former Smoker    Smokeless tobacco: Never Used    Tobacco comment: 40 + years    Substance Use Topics    Alcohol use: Not Currently     Alcohol/week: 4.0 standard drinks     Types: 4 Cans of beer per week     Comment: occ                                Counseling given: Not Answered  Comment: 40 + years       Vital Signs (Current): There were no vitals filed for this visit. BP Readings from Last 3 Encounters:   08/28/20 (!) 166/88   07/29/20 (!) 156/89   11/06/19 (!) 152/80       NPO Status:  see below                                                                               BMI:   Wt Readings from Last 3 Encounters:   08/28/20 190 lb (86.2 kg)   07/29/20 194 lb (88 kg)   11/06/19 187 lb (84.8 kg)     There is no height or weight on file to calculate BMI.    CBC:   Lab Results   Component Value Date    WBC 10.8 10/21/2019    RBC 4.12 10/21/2019    HGB 12.0 10/21/2019    HCT 36.6 10/21/2019    MCV 88.8 10/21/2019    RDW 12.0 10/21/2019     10/21/2019       CMP:   Lab Results   Component Value Date     10/21/2019    K 3.8 10/21/2019     10/21/2019    CO2 23 10/21/2019    BUN 4 10/21/2019    CREATININE 0.8 10/21/2019    GFRAA >60 10/21/2019    LABGLOM >60 10/21/2019    GLUCOSE 104 10/21/2019    PROT 6.5 10/21/2019    CALCIUM 8.6 10/21/2019    BILITOT 0.4 10/21/2019    ALKPHOS 85 10/21/2019    AST 34 10/21/2019    ALT 35 10/21/2019       POC Tests:   No results for input(s): POCGLU, POCNA, POCK, POCCL, POCBUN, POCHEMO, POCHCT in the last 72 hours. Coags: No results found for: PROTIME, INR, APTT    HCG (If Applicable): No results found for: PREGTESTUR, PREGSERUM, HCG, HCGQUANT     ABGs: No results found for: PHART, PO2ART, WTV7NTH, KMI1FIO, BEART, H1GFDQIS     Type & Screen (If Applicable):   No results found for: [facility]    Anesthesia Evaluation  Patient summary reviewed and Nursing notes reviewed no history of anesthetic complications:   Airway: Mallampati: II  TM distance: >3 FB   Neck ROM: full  Mouth opening: > = 3 FB Dental:          Pulmonary: breath sounds clear to auscultation                            ROS comment: Former Smoker   Cardiovascular:  Exercise tolerance: good (>4 METS),   (+) hyperlipidemia      ECG reviewed  Rhythm: regular  Rate: normal           Beta Blocker:  Not on Beta Blocker      ROS comment: Narrative   Performed by: [facility]   Normal sinus rhythm  Normal ECG  No previous ECGs available  Confirmed by Gume Membreno ([id number]) on 10/14/2019 1:01:01 PM         Neuro/Psych:   Negative Neuro/Psych ROS              GI/Hepatic/Renal:            ROS comment: ostomy. Endo/Other:    (+) hypothyroidism::., malignancy/cancer (colon. prostate). Abdominal:           Vascular: negative vascular ROS. NPO x 48 hrs       Anesthesia Plan      MAC     ASA 3       Induction: intravenous. Anesthetic plan and risks discussed with patient. Plan discussed with CRNA.                   Harvie Hammans, MD   8/28/2020

## 2020-09-01 ENCOUNTER — TELEPHONE (OUTPATIENT)
Dept: SURGERY | Age: 80
End: 2020-09-01

## 2020-09-01 NOTE — TELEPHONE ENCOUNTER
MA attempted to contact patient to inform of results and discuss scheduling procedure. Patient did not answer so MA left message informing of benign results and that we would like to schedule a colostomy reversal with Dr Sendy Torres. MA left number to return call .     Electronically signed by Kerry Rivas on 9/1/20 at 3:37 PM EDT

## 2020-09-04 ENCOUNTER — TELEPHONE (OUTPATIENT)
Dept: SURGERY | Age: 80
End: 2020-09-04

## 2020-09-04 NOTE — TELEPHONE ENCOUNTER
----- Message from Taj Jackson MD sent at 9/1/2020  3:02 PM EDT -----  Regarding: Schedule  Patients pathology came back as tubular adenoma ( benign)  therefore we can schedule him for a laparoscopic possible open colectomy reversal     Thank you,   Taj Jackson MD  ----- Message -----  From: Kelly Ann Incoming Results From [facility]  Sent: 9/1/2020  11:46 AM EDT  To: Taj Jackson MD

## 2020-09-04 NOTE — TELEPHONE ENCOUNTER
MA contacted patient and informed of results and recommendation to proceed with surgery. Patient voiced relief and excitement to proceed. MA scheduled patient for laparoscopic colostomy reversal with Dr Bruce Leon for 10/2/2020 @ 9:00am, arrival time 7:00am in [facility]. Patient informed of date time and location. Patient's wife, Maribel Hawthorne, questioned if patient could also have his Mediport removed at the same time? MA routing message to Dr Bruce Loen for advisement on Mediport removal, as well as advisement if prep is needed prior to procedure.      Electronically signed by Dieter Hooper on 9/4/20 at 2:41 PM EDT

## 2020-09-08 ENCOUNTER — TELEPHONE (OUTPATIENT)
Dept: SURGERY | Age: 80
End: 2020-09-08

## 2020-09-10 NOTE — TELEPHONE ENCOUNTER
MA contacted patient to inform him that his port removal has been scheduled for the same day as his colostomy reversal. That he will also be prescribed a prep closer to his procedure date, and once that is ordered I will contact him with instructions. Patient verbalized understanding.     Electronically signed by Lui Hollingsworth on 9/10/20 at 9:11 AM EDT

## 2020-09-18 RX ORDER — POLYETHYLENE GLYCOL 3350, SODIUM CHLORIDE, POTASSIUM CHLORIDE, SODIUM BICARBONATE, AND SODIUM SULFATE 240; 5.84; 2.98; 6.72; 22.72 G/4L; G/4L; G/4L; G/4L; G/4L
4000 POWDER, FOR SOLUTION ORAL ONCE
Qty: 1 BOTTLE | Refills: 0 | Status: SHIPPED | OUTPATIENT
Start: 2020-09-18 | End: 2020-09-18

## 2020-09-18 RX ORDER — NEOMYCIN SULFATE 500 MG/1
TABLET ORAL
Qty: 6 TABLET | Refills: 0 | Status: ON HOLD | OUTPATIENT
Start: 2020-09-18 | End: 2020-09-25 | Stop reason: HOSPADM

## 2020-09-18 RX ORDER — METRONIDAZOLE 500 MG/1
500 TABLET ORAL 3 TIMES DAILY
Qty: 3 TABLET | Refills: 0 | Status: ON HOLD | OUTPATIENT
Start: 2020-09-18 | End: 2020-09-25 | Stop reason: HOSPADM

## 2020-09-19 ENCOUNTER — APPOINTMENT (OUTPATIENT)
Dept: CT IMAGING | Age: 80
End: 2020-09-19
Payer: MEDICARE

## 2020-09-19 ENCOUNTER — APPOINTMENT (OUTPATIENT)
Dept: GENERAL RADIOLOGY | Age: 80
End: 2020-09-19
Payer: MEDICARE

## 2020-09-19 ENCOUNTER — HOSPITAL ENCOUNTER (EMERGENCY)
Age: 80
Discharge: ANOTHER HOSPITAL | End: 2020-09-19
Attending: EMERGENCY MEDICINE
Payer: MEDICARE

## 2020-09-19 ENCOUNTER — HOSPITAL ENCOUNTER (INPATIENT)
Age: 80
LOS: 7 days | Discharge: HOME HEALTH | DRG: 247 | End: 2020-09-26
Attending: INTERNAL MEDICINE | Admitting: INTERNAL MEDICINE
Payer: MEDICARE

## 2020-09-19 VITALS
HEART RATE: 92 BPM | DIASTOLIC BLOOD PRESSURE: 79 MMHG | BODY MASS INDEX: 26.41 KG/M2 | SYSTOLIC BLOOD PRESSURE: 157 MMHG | TEMPERATURE: 99.3 F | WEIGHT: 195 LBS | RESPIRATION RATE: 16 BRPM | HEIGHT: 72 IN | OXYGEN SATURATION: 97 %

## 2020-09-19 PROBLEM — K81.0 ACUTE CHOLECYSTITIS: Status: ACTIVE | Noted: 2020-09-19

## 2020-09-19 LAB
ALBUMIN SERPL-MCNC: 4.3 G/DL (ref 3.5–5.2)
ALP BLD-CCNC: 120 U/L (ref 40–129)
ALT SERPL-CCNC: 21 U/L (ref 0–40)
ANION GAP SERPL CALCULATED.3IONS-SCNC: 12 MMOL/L (ref 7–16)
APTT: 164.6 SEC (ref 24.5–35.1)
AST SERPL-CCNC: 24 U/L (ref 0–39)
BACTERIA: ABNORMAL /HPF
BASOPHILS ABSOLUTE: 0.02 E9/L (ref 0–0.2)
BASOPHILS RELATIVE PERCENT: 0.2 % (ref 0–2)
BILIRUB SERPL-MCNC: 1.5 MG/DL (ref 0–1.2)
BILIRUBIN URINE: NEGATIVE
BLOOD, URINE: ABNORMAL
BUN BLDV-MCNC: 10 MG/DL (ref 8–23)
CALCIUM SERPL-MCNC: 9.6 MG/DL (ref 8.6–10.2)
CHLORIDE BLD-SCNC: 96 MMOL/L (ref 98–107)
CLARITY: CLEAR
CO2: 24 MMOL/L (ref 22–29)
COLOR: YELLOW
CREAT SERPL-MCNC: 0.8 MG/DL (ref 0.7–1.2)
EOSINOPHILS ABSOLUTE: 0.03 E9/L (ref 0.05–0.5)
EOSINOPHILS RELATIVE PERCENT: 0.2 % (ref 0–6)
GFR AFRICAN AMERICAN: >60
GFR NON-AFRICAN AMERICAN: >60 ML/MIN/1.73
GLUCOSE BLD-MCNC: 117 MG/DL (ref 74–99)
GLUCOSE URINE: NEGATIVE MG/DL
HCT VFR BLD CALC: 38.9 % (ref 37–54)
HCT VFR BLD CALC: 41.4 % (ref 37–54)
HEMOGLOBIN: 13.7 G/DL (ref 12.5–16.5)
HEMOGLOBIN: 14.7 G/DL (ref 12.5–16.5)
IMMATURE GRANULOCYTES #: 0.03 E9/L
IMMATURE GRANULOCYTES %: 0.2 % (ref 0–5)
INR BLD: 1
KETONES, URINE: ABNORMAL MG/DL
LACTIC ACID: 1.4 MMOL/L (ref 0.5–2.2)
LEUKOCYTE ESTERASE, URINE: NEGATIVE
LIPASE: 32 U/L (ref 13–60)
LYMPHOCYTES ABSOLUTE: 1.6 E9/L (ref 1.5–4)
LYMPHOCYTES RELATIVE PERCENT: 12.9 % (ref 20–42)
MCH RBC QN AUTO: 30.3 PG (ref 26–35)
MCH RBC QN AUTO: 30.4 PG (ref 26–35)
MCHC RBC AUTO-ENTMCNC: 35.2 % (ref 32–34.5)
MCHC RBC AUTO-ENTMCNC: 35.5 % (ref 32–34.5)
MCV RBC AUTO: 85.7 FL (ref 80–99.9)
MCV RBC AUTO: 86.1 FL (ref 80–99.9)
MONOCYTES ABSOLUTE: 0.96 E9/L (ref 0.1–0.95)
MONOCYTES RELATIVE PERCENT: 7.7 % (ref 2–12)
NEUTROPHILS ABSOLUTE: 9.75 E9/L (ref 1.8–7.3)
NEUTROPHILS RELATIVE PERCENT: 78.8 % (ref 43–80)
NITRITE, URINE: NEGATIVE
PDW BLD-RTO: 12.7 FL (ref 11.5–15)
PDW BLD-RTO: 12.8 FL (ref 11.5–15)
PH UA: 6.5 (ref 5–9)
PLATELET # BLD: 194 E9/L (ref 130–450)
PLATELET # BLD: 196 E9/L (ref 130–450)
PMV BLD AUTO: 8.6 FL (ref 7–12)
PMV BLD AUTO: 8.9 FL (ref 7–12)
POTASSIUM REFLEX MAGNESIUM: 4 MMOL/L (ref 3.5–5)
PROTEIN UA: ABNORMAL MG/DL
PROTHROMBIN TIME: 12.3 SEC (ref 9.3–12.4)
RBC # BLD: 4.52 E12/L (ref 3.8–5.8)
RBC # BLD: 4.83 E12/L (ref 3.8–5.8)
RBC UA: ABNORMAL /HPF (ref 0–2)
SODIUM BLD-SCNC: 132 MMOL/L (ref 132–146)
SPECIFIC GRAVITY UA: 1.02 (ref 1–1.03)
TOTAL PROTEIN: 8.2 G/DL (ref 6.4–8.3)
TROPONIN: <0.01 NG/ML (ref 0–0.03)
UROBILINOGEN, URINE: 0.2 E.U./DL
WBC # BLD: 12.4 E9/L (ref 4.5–11.5)
WBC # BLD: 13.2 E9/L (ref 4.5–11.5)
WBC UA: ABNORMAL /HPF (ref 0–5)

## 2020-09-19 PROCEDURE — 6370000000 HC RX 637 (ALT 250 FOR IP): Performed by: PHYSICIAN ASSISTANT

## 2020-09-19 PROCEDURE — 96365 THER/PROPH/DIAG IV INF INIT: CPT

## 2020-09-19 PROCEDURE — 2060000000 HC ICU INTERMEDIATE R&B

## 2020-09-19 PROCEDURE — 99285 EMERGENCY DEPT VISIT HI MDM: CPT

## 2020-09-19 PROCEDURE — 80053 COMPREHEN METABOLIC PANEL: CPT

## 2020-09-19 PROCEDURE — 96376 TX/PRO/DX INJ SAME DRUG ADON: CPT

## 2020-09-19 PROCEDURE — 6360000002 HC RX W HCPCS: Performed by: EMERGENCY MEDICINE

## 2020-09-19 PROCEDURE — 83605 ASSAY OF LACTIC ACID: CPT

## 2020-09-19 PROCEDURE — 85730 THROMBOPLASTIN TIME PARTIAL: CPT

## 2020-09-19 PROCEDURE — 84484 ASSAY OF TROPONIN QUANT: CPT

## 2020-09-19 PROCEDURE — 85610 PROTHROMBIN TIME: CPT

## 2020-09-19 PROCEDURE — 85025 COMPLETE CBC W/AUTO DIFF WBC: CPT

## 2020-09-19 PROCEDURE — 2580000003 HC RX 258: Performed by: EMERGENCY MEDICINE

## 2020-09-19 PROCEDURE — 85027 COMPLETE CBC AUTOMATED: CPT

## 2020-09-19 PROCEDURE — 6360000002 HC RX W HCPCS: Performed by: PHYSICIAN ASSISTANT

## 2020-09-19 PROCEDURE — 96375 TX/PRO/DX INJ NEW DRUG ADDON: CPT

## 2020-09-19 PROCEDURE — 6360000004 HC RX CONTRAST MEDICATION: Performed by: RADIOLOGY

## 2020-09-19 PROCEDURE — 83690 ASSAY OF LIPASE: CPT

## 2020-09-19 PROCEDURE — 71045 X-RAY EXAM CHEST 1 VIEW: CPT

## 2020-09-19 PROCEDURE — 2500000003 HC RX 250 WO HCPCS: Performed by: EMERGENCY MEDICINE

## 2020-09-19 PROCEDURE — 93005 ELECTROCARDIOGRAM TRACING: CPT | Performed by: EMERGENCY MEDICINE

## 2020-09-19 PROCEDURE — 81001 URINALYSIS AUTO W/SCOPE: CPT

## 2020-09-19 PROCEDURE — 74177 CT ABD & PELVIS W/CONTRAST: CPT

## 2020-09-19 PROCEDURE — 6370000000 HC RX 637 (ALT 250 FOR IP): Performed by: EMERGENCY MEDICINE

## 2020-09-19 PROCEDURE — 2500000003 HC RX 250 WO HCPCS: Performed by: PHYSICIAN ASSISTANT

## 2020-09-19 RX ORDER — HEPARIN SODIUM 1000 [USP'U]/ML
4000 INJECTION, SOLUTION INTRAVENOUS; SUBCUTANEOUS PRN
Status: DISCONTINUED | OUTPATIENT
Start: 2020-09-19 | End: 2020-09-19 | Stop reason: HOSPADM

## 2020-09-19 RX ORDER — MORPHINE SULFATE 4 MG/ML
4 INJECTION, SOLUTION INTRAMUSCULAR; INTRAVENOUS ONCE
Status: COMPLETED | OUTPATIENT
Start: 2020-09-19 | End: 2020-09-19

## 2020-09-19 RX ORDER — ASPIRIN 81 MG/1
324 TABLET, CHEWABLE ORAL ONCE
Status: COMPLETED | OUTPATIENT
Start: 2020-09-19 | End: 2020-09-19

## 2020-09-19 RX ORDER — FENTANYL CITRATE 50 UG/ML
75 INJECTION, SOLUTION INTRAMUSCULAR; INTRAVENOUS ONCE
Status: COMPLETED | OUTPATIENT
Start: 2020-09-19 | End: 2020-09-19

## 2020-09-19 RX ORDER — 0.9 % SODIUM CHLORIDE 0.9 %
1000 INTRAVENOUS SOLUTION INTRAVENOUS ONCE
Status: COMPLETED | OUTPATIENT
Start: 2020-09-19 | End: 2020-09-19

## 2020-09-19 RX ORDER — METOPROLOL TARTRATE 5 MG/5ML
5 INJECTION INTRAVENOUS ONCE
Status: COMPLETED | OUTPATIENT
Start: 2020-09-19 | End: 2020-09-19

## 2020-09-19 RX ORDER — HEPARIN SODIUM 10000 [USP'U]/100ML
1000 INJECTION, SOLUTION INTRAVENOUS CONTINUOUS
Status: DISCONTINUED | OUTPATIENT
Start: 2020-09-19 | End: 2020-09-19 | Stop reason: HOSPADM

## 2020-09-19 RX ORDER — HEPARIN SODIUM 1000 [USP'U]/ML
2000 INJECTION, SOLUTION INTRAVENOUS; SUBCUTANEOUS PRN
Status: DISCONTINUED | OUTPATIENT
Start: 2020-09-19 | End: 2020-09-19 | Stop reason: HOSPADM

## 2020-09-19 RX ORDER — ONDANSETRON 2 MG/ML
4 INJECTION INTRAMUSCULAR; INTRAVENOUS ONCE
Status: COMPLETED | OUTPATIENT
Start: 2020-09-19 | End: 2020-09-19

## 2020-09-19 RX ORDER — HEPARIN SODIUM 1000 [USP'U]/ML
4000 INJECTION, SOLUTION INTRAVENOUS; SUBCUTANEOUS ONCE
Status: COMPLETED | OUTPATIENT
Start: 2020-09-19 | End: 2020-09-19

## 2020-09-19 RX ADMIN — ONDANSETRON 4 MG: 2 INJECTION INTRAMUSCULAR; INTRAVENOUS at 20:11

## 2020-09-19 RX ADMIN — MORPHINE SULFATE 4 MG: 4 INJECTION, SOLUTION INTRAMUSCULAR; INTRAVENOUS at 21:45

## 2020-09-19 RX ADMIN — HEPARIN SODIUM 4000 UNITS: 1000 INJECTION INTRAVENOUS; SUBCUTANEOUS at 21:58

## 2020-09-19 RX ADMIN — HEPARIN SODIUM 1000 UNITS/HR: 10000 INJECTION, SOLUTION INTRAVENOUS at 22:21

## 2020-09-19 RX ADMIN — NITROGLYCERIN 0.5 INCH: 20 OINTMENT TOPICAL at 21:49

## 2020-09-19 RX ADMIN — SODIUM CHLORIDE 1000 ML: 9 INJECTION, SOLUTION INTRAVENOUS at 20:11

## 2020-09-19 RX ADMIN — METRONIDAZOLE 500 MG: 500 INJECTION, SOLUTION INTRAVENOUS at 22:00

## 2020-09-19 RX ADMIN — METOROPROLOL TARTRATE 5 MG: 5 INJECTION, SOLUTION INTRAVENOUS at 21:48

## 2020-09-19 RX ADMIN — FENTANYL CITRATE 75 MCG: 50 INJECTION, SOLUTION INTRAMUSCULAR; INTRAVENOUS at 20:12

## 2020-09-19 RX ADMIN — ASPIRIN 324 MG: 81 TABLET, CHEWABLE ORAL at 22:03

## 2020-09-19 RX ADMIN — CEFTRIAXONE 1 G: 1 INJECTION, POWDER, FOR SOLUTION INTRAMUSCULAR; INTRAVENOUS at 21:50

## 2020-09-19 RX ADMIN — IOPAMIDOL 110 ML: 755 INJECTION, SOLUTION INTRAVENOUS at 20:53

## 2020-09-19 ASSESSMENT — PAIN DESCRIPTION - LOCATION
LOCATION: ABDOMEN
LOCATION: ABDOMEN

## 2020-09-19 ASSESSMENT — PAIN SCALES - GENERAL
PAINLEVEL_OUTOF10: 10
PAINLEVEL_OUTOF10: 9
PAINLEVEL_OUTOF10: 4
PAINLEVEL_OUTOF10: 10
PAINLEVEL_OUTOF10: 4

## 2020-09-19 ASSESSMENT — PAIN DESCRIPTION - DESCRIPTORS: DESCRIPTORS: ACHING

## 2020-09-19 ASSESSMENT — PAIN DESCRIPTION - FREQUENCY: FREQUENCY: CONTINUOUS

## 2020-09-19 ASSESSMENT — PAIN DESCRIPTION - PAIN TYPE
TYPE: ACUTE PAIN
TYPE: ACUTE PAIN

## 2020-09-19 ASSESSMENT — PAIN DESCRIPTION - ORIENTATION: ORIENTATION: UPPER

## 2020-09-19 ASSESSMENT — PAIN DESCRIPTION - PROGRESSION: CLINICAL_PROGRESSION: GRADUALLY IMPROVING

## 2020-09-20 ENCOUNTER — APPOINTMENT (OUTPATIENT)
Dept: ULTRASOUND IMAGING | Age: 80
DRG: 247 | End: 2020-09-20
Attending: INTERNAL MEDICINE
Payer: MEDICARE

## 2020-09-20 PROBLEM — Z85.46 HISTORY OF PROSTATE CANCER: Status: ACTIVE | Noted: 2020-09-20

## 2020-09-20 PROBLEM — I21.4 NSTEMI (NON-ST ELEVATED MYOCARDIAL INFARCTION) (HCC): Status: ACTIVE | Noted: 2020-09-20

## 2020-09-20 PROBLEM — E03.9 HYPOTHYROIDISM: Status: ACTIVE | Noted: 2020-09-20

## 2020-09-20 PROBLEM — Z87.891 FORMER SMOKER: Status: ACTIVE | Noted: 2020-09-20

## 2020-09-20 PROBLEM — E78.5 HLD (HYPERLIPIDEMIA): Status: ACTIVE | Noted: 2020-09-20

## 2020-09-20 PROBLEM — D72.829 LEUKOCYTOSIS: Status: ACTIVE | Noted: 2020-09-20

## 2020-09-20 PROBLEM — I10 HTN (HYPERTENSION): Status: ACTIVE | Noted: 2020-09-20

## 2020-09-20 LAB
ALBUMIN SERPL-MCNC: 4 G/DL (ref 3.5–5.2)
ALP BLD-CCNC: 99 U/L (ref 40–129)
ALT SERPL-CCNC: 16 U/L (ref 0–40)
ANION GAP SERPL CALCULATED.3IONS-SCNC: 14 MMOL/L (ref 7–16)
APTT: 36 SEC (ref 24.5–35.1)
APTT: 52.6 SEC (ref 24.5–35.1)
AST SERPL-CCNC: 20 U/L (ref 0–39)
BILIRUB SERPL-MCNC: 1.4 MG/DL (ref 0–1.2)
BILIRUBIN DIRECT: 0.3 MG/DL (ref 0–0.3)
BILIRUBIN, INDIRECT: 1.1 MG/DL (ref 0–1)
BUN BLDV-MCNC: 8 MG/DL (ref 8–23)
CALCIUM SERPL-MCNC: 9.2 MG/DL (ref 8.6–10.2)
CHLORIDE BLD-SCNC: 97 MMOL/L (ref 98–107)
CHOLESTEROL, TOTAL: 170 MG/DL (ref 0–199)
CK MB: 1.1 NG/ML (ref 0–7.7)
CK MB: 1.8 NG/ML (ref 0–7.7)
CK MB: 2.2 NG/ML (ref 0–7.7)
CK MB: 2.5 NG/ML (ref 0–7.7)
CO2: 25 MMOL/L (ref 22–29)
CREAT SERPL-MCNC: 0.8 MG/DL (ref 0.7–1.2)
EKG ATRIAL RATE: 106 BPM
EKG P AXIS: 62 DEGREES
EKG P-R INTERVAL: 150 MS
EKG Q-T INTERVAL: 348 MS
EKG QRS DURATION: 84 MS
EKG QTC CALCULATION (BAZETT): 462 MS
EKG R AXIS: 28 DEGREES
EKG T AXIS: 57 DEGREES
EKG VENTRICULAR RATE: 106 BPM
GFR AFRICAN AMERICAN: >60
GFR NON-AFRICAN AMERICAN: >60 ML/MIN/1.73
GLUCOSE BLD-MCNC: 128 MG/DL (ref 74–99)
HBA1C MFR BLD: 5.4 % (ref 4–5.6)
HCT VFR BLD CALC: 39.5 % (ref 37–54)
HDLC SERPL-MCNC: 77 MG/DL
HEMOGLOBIN: 13.8 G/DL (ref 12.5–16.5)
INR BLD: 1
LDL CHOLESTEROL CALCULATED: 75 MG/DL (ref 0–99)
MCH RBC QN AUTO: 29.9 PG (ref 26–35)
MCHC RBC AUTO-ENTMCNC: 34.9 % (ref 32–34.5)
MCV RBC AUTO: 85.7 FL (ref 80–99.9)
PDW BLD-RTO: 12.7 FL (ref 11.5–15)
PLATELET # BLD: 185 E9/L (ref 130–450)
PMV BLD AUTO: 8.8 FL (ref 7–12)
POTASSIUM REFLEX MAGNESIUM: 3.8 MMOL/L (ref 3.5–5)
PROTHROMBIN TIME: 11.8 SEC (ref 9.3–12.4)
RBC # BLD: 4.61 E12/L (ref 3.8–5.8)
SODIUM BLD-SCNC: 136 MMOL/L (ref 132–146)
TOTAL CK: 66 U/L (ref 20–200)
TOTAL CK: 70 U/L (ref 20–200)
TOTAL PROTEIN: 7.2 G/DL (ref 6.4–8.3)
TRIGL SERPL-MCNC: 92 MG/DL (ref 0–149)
TROPONIN: 0.01 NG/ML (ref 0–0.03)
TROPONIN: <0.01 NG/ML (ref 0–0.03)
VLDLC SERPL CALC-MCNC: 18 MG/DL
WBC # BLD: 10.3 E9/L (ref 4.5–11.5)

## 2020-09-20 PROCEDURE — 6370000000 HC RX 637 (ALT 250 FOR IP): Performed by: NURSE PRACTITIONER

## 2020-09-20 PROCEDURE — 36415 COLL VENOUS BLD VENIPUNCTURE: CPT

## 2020-09-20 PROCEDURE — APPSS60 APP SPLIT SHARED TIME 46-60 MINUTES: Performed by: PHYSICIAN ASSISTANT

## 2020-09-20 PROCEDURE — 76705 ECHO EXAM OF ABDOMEN: CPT

## 2020-09-20 PROCEDURE — 80076 HEPATIC FUNCTION PANEL: CPT

## 2020-09-20 PROCEDURE — 80061 LIPID PANEL: CPT

## 2020-09-20 PROCEDURE — 99222 1ST HOSP IP/OBS MODERATE 55: CPT | Performed by: SURGERY

## 2020-09-20 PROCEDURE — 85730 THROMBOPLASTIN TIME PARTIAL: CPT

## 2020-09-20 PROCEDURE — 93010 ELECTROCARDIOGRAM REPORT: CPT | Performed by: INTERNAL MEDICINE

## 2020-09-20 PROCEDURE — 82553 CREATINE MB FRACTION: CPT

## 2020-09-20 PROCEDURE — 85610 PROTHROMBIN TIME: CPT

## 2020-09-20 PROCEDURE — 2060000000 HC ICU INTERMEDIATE R&B

## 2020-09-20 PROCEDURE — 85027 COMPLETE CBC AUTOMATED: CPT

## 2020-09-20 PROCEDURE — 80048 BASIC METABOLIC PNL TOTAL CA: CPT

## 2020-09-20 PROCEDURE — 84484 ASSAY OF TROPONIN QUANT: CPT

## 2020-09-20 PROCEDURE — 6360000002 HC RX W HCPCS: Performed by: NURSE PRACTITIONER

## 2020-09-20 PROCEDURE — 83036 HEMOGLOBIN GLYCOSYLATED A1C: CPT

## 2020-09-20 PROCEDURE — 99223 1ST HOSP IP/OBS HIGH 75: CPT | Performed by: INTERNAL MEDICINE

## 2020-09-20 PROCEDURE — 82550 ASSAY OF CK (CPK): CPT

## 2020-09-20 PROCEDURE — 6360000002 HC RX W HCPCS: Performed by: INTERNAL MEDICINE

## 2020-09-20 PROCEDURE — 2580000003 HC RX 258: Performed by: NURSE PRACTITIONER

## 2020-09-20 PROCEDURE — 6370000000 HC RX 637 (ALT 250 FOR IP): Performed by: INTERNAL MEDICINE

## 2020-09-20 PROCEDURE — 2500000003 HC RX 250 WO HCPCS: Performed by: NURSE PRACTITIONER

## 2020-09-20 RX ORDER — ACETAMINOPHEN 325 MG/1
650 TABLET ORAL EVERY 6 HOURS PRN
Status: DISCONTINUED | OUTPATIENT
Start: 2020-09-20 | End: 2020-09-22 | Stop reason: SDUPTHER

## 2020-09-20 RX ORDER — AMLODIPINE BESYLATE 5 MG/1
5 TABLET ORAL DAILY
Status: DISCONTINUED | OUTPATIENT
Start: 2020-09-20 | End: 2020-09-26 | Stop reason: HOSPADM

## 2020-09-20 RX ORDER — M-VIT,TX,IRON,MINS/CALC/FOLIC 27MG-0.4MG
1 TABLET ORAL DAILY
Status: DISCONTINUED | OUTPATIENT
Start: 2020-09-20 | End: 2020-09-26 | Stop reason: HOSPADM

## 2020-09-20 RX ORDER — HEPARIN SODIUM 1000 [USP'U]/ML
60 INJECTION, SOLUTION INTRAVENOUS; SUBCUTANEOUS PRN
Status: DISCONTINUED | OUTPATIENT
Start: 2020-09-20 | End: 2020-09-20 | Stop reason: ALTCHOICE

## 2020-09-20 RX ORDER — HYDRALAZINE HYDROCHLORIDE 20 MG/ML
10 INJECTION INTRAMUSCULAR; INTRAVENOUS EVERY 6 HOURS PRN
Status: DISCONTINUED | OUTPATIENT
Start: 2020-09-20 | End: 2020-09-26 | Stop reason: HOSPADM

## 2020-09-20 RX ORDER — HEPARIN SODIUM 10000 [USP'U]/ML
5000 INJECTION, SOLUTION INTRAVENOUS; SUBCUTANEOUS EVERY 8 HOURS SCHEDULED
Status: DISCONTINUED | OUTPATIENT
Start: 2020-09-20 | End: 2020-09-26 | Stop reason: HOSPADM

## 2020-09-20 RX ORDER — LEVOTHYROXINE SODIUM 137 UG/1
137 TABLET ORAL DAILY
Status: DISCONTINUED | OUTPATIENT
Start: 2020-09-20 | End: 2020-09-26 | Stop reason: HOSPADM

## 2020-09-20 RX ORDER — SODIUM CHLORIDE 0.9 % (FLUSH) 0.9 %
10 SYRINGE (ML) INJECTION PRN
Status: DISCONTINUED | OUTPATIENT
Start: 2020-09-20 | End: 2020-09-22 | Stop reason: SDUPTHER

## 2020-09-20 RX ORDER — ASPIRIN 81 MG/1
81 TABLET, CHEWABLE ORAL DAILY
Status: DISCONTINUED | OUTPATIENT
Start: 2020-09-20 | End: 2020-09-26 | Stop reason: HOSPADM

## 2020-09-20 RX ORDER — SODIUM CHLORIDE 0.9 % (FLUSH) 0.9 %
10 SYRINGE (ML) INJECTION EVERY 12 HOURS SCHEDULED
Status: DISCONTINUED | OUTPATIENT
Start: 2020-09-20 | End: 2020-09-22 | Stop reason: SDUPTHER

## 2020-09-20 RX ORDER — ACETAMINOPHEN 650 MG/1
650 SUPPOSITORY RECTAL EVERY 6 HOURS PRN
Status: DISCONTINUED | OUTPATIENT
Start: 2020-09-20 | End: 2020-09-26 | Stop reason: HOSPADM

## 2020-09-20 RX ORDER — POLYETHYLENE GLYCOL 3350 17 G/17G
17 POWDER, FOR SOLUTION ORAL DAILY PRN
Status: DISCONTINUED | OUTPATIENT
Start: 2020-09-20 | End: 2020-09-26 | Stop reason: HOSPADM

## 2020-09-20 RX ORDER — HEPARIN SODIUM 1000 [USP'U]/ML
30 INJECTION, SOLUTION INTRAVENOUS; SUBCUTANEOUS PRN
Status: DISCONTINUED | OUTPATIENT
Start: 2020-09-20 | End: 2020-09-20 | Stop reason: ALTCHOICE

## 2020-09-20 RX ORDER — METOPROLOL SUCCINATE 50 MG/1
50 TABLET, EXTENDED RELEASE ORAL DAILY
Status: DISCONTINUED | OUTPATIENT
Start: 2020-09-20 | End: 2020-09-26 | Stop reason: HOSPADM

## 2020-09-20 RX ORDER — METOPROLOL TARTRATE 5 MG/5ML
5 INJECTION INTRAVENOUS EVERY 6 HOURS
Status: DISCONTINUED | OUTPATIENT
Start: 2020-09-20 | End: 2020-09-20

## 2020-09-20 RX ORDER — ATORVASTATIN CALCIUM 40 MG/1
40 TABLET, FILM COATED ORAL NIGHTLY
Status: DISCONTINUED | OUTPATIENT
Start: 2020-09-20 | End: 2020-09-26 | Stop reason: HOSPADM

## 2020-09-20 RX ORDER — HEPARIN SODIUM 10000 [USP'U]/100ML
12 INJECTION, SOLUTION INTRAVENOUS CONTINUOUS
Status: DISCONTINUED | OUTPATIENT
Start: 2020-09-20 | End: 2020-09-20

## 2020-09-20 RX ADMIN — METOPROLOL TARTRATE 5 MG: 5 INJECTION INTRAVENOUS at 04:10

## 2020-09-20 RX ADMIN — ACETAMINOPHEN 650 MG: 325 TABLET, FILM COATED ORAL at 10:58

## 2020-09-20 RX ADMIN — NITROGLYCERIN 0.5 INCH: 20 OINTMENT TOPICAL at 01:34

## 2020-09-20 RX ADMIN — METRONIDAZOLE 500 MG: 500 INJECTION, SOLUTION INTRAVENOUS at 14:08

## 2020-09-20 RX ADMIN — SODIUM CHLORIDE, PRESERVATIVE FREE 10 ML: 5 INJECTION INTRAVENOUS at 20:14

## 2020-09-20 RX ADMIN — NITROGLYCERIN 1 INCH: 20 OINTMENT TOPICAL at 12:17

## 2020-09-20 RX ADMIN — ATORVASTATIN CALCIUM 40 MG: 40 TABLET, FILM COATED ORAL at 20:13

## 2020-09-20 RX ADMIN — METOPROLOL SUCCINATE 50 MG: 50 TABLET, EXTENDED RELEASE ORAL at 10:44

## 2020-09-20 RX ADMIN — HEPARIN SODIUM 5000 UNITS: 10000 INJECTION INTRAVENOUS; SUBCUTANEOUS at 20:14

## 2020-09-20 RX ADMIN — NITROGLYCERIN 0.5 INCH: 20 OINTMENT TOPICAL at 05:43

## 2020-09-20 RX ADMIN — ACETAMINOPHEN 650 MG: 325 TABLET, FILM COATED ORAL at 20:13

## 2020-09-20 RX ADMIN — METRONIDAZOLE 500 MG: 500 INJECTION, SOLUTION INTRAVENOUS at 05:43

## 2020-09-20 RX ADMIN — METRONIDAZOLE 500 MG: 500 INJECTION, SOLUTION INTRAVENOUS at 20:20

## 2020-09-20 RX ADMIN — NITROGLYCERIN 1 INCH: 20 OINTMENT TOPICAL at 18:17

## 2020-09-20 RX ADMIN — SODIUM CHLORIDE, PRESERVATIVE FREE 10 ML: 5 INJECTION INTRAVENOUS at 09:07

## 2020-09-20 RX ADMIN — LEVOTHYROXINE SODIUM 137 MCG: 0.14 TABLET ORAL at 05:43

## 2020-09-20 RX ADMIN — CEFTRIAXONE SODIUM 1 G: 1 INJECTION, POWDER, FOR SOLUTION INTRAMUSCULAR; INTRAVENOUS at 20:14

## 2020-09-20 RX ADMIN — ASPIRIN 81 MG CHEWABLE TABLET 81 MG: 81 TABLET CHEWABLE at 10:36

## 2020-09-20 RX ADMIN — AMLODIPINE BESYLATE 5 MG: 5 TABLET ORAL at 10:44

## 2020-09-20 RX ADMIN — ACETAMINOPHEN 650 MG: 325 TABLET, FILM COATED ORAL at 00:39

## 2020-09-20 RX ADMIN — HEPARIN SODIUM 5000 UNITS: 10000 INJECTION INTRAVENOUS; SUBCUTANEOUS at 14:08

## 2020-09-20 RX ADMIN — HEPARIN SODIUM 10 UNITS/KG/HR: 10000 INJECTION, SOLUTION INTRAVENOUS at 00:51

## 2020-09-20 RX ADMIN — MULTIPLE VITAMINS W/ MINERALS TAB 1 TABLET: TAB at 10:36

## 2020-09-20 ASSESSMENT — PAIN SCALES - GENERAL
PAINLEVEL_OUTOF10: 7
PAINLEVEL_OUTOF10: 0
PAINLEVEL_OUTOF10: 5
PAINLEVEL_OUTOF10: 8
PAINLEVEL_OUTOF10: 8
PAINLEVEL_OUTOF10: 0
PAINLEVEL_OUTOF10: 5
PAINLEVEL_OUTOF10: 4

## 2020-09-20 ASSESSMENT — PAIN DESCRIPTION - PROGRESSION
CLINICAL_PROGRESSION: GRADUALLY IMPROVING

## 2020-09-20 ASSESSMENT — PAIN DESCRIPTION - LOCATION: LOCATION: ABDOMEN

## 2020-09-20 ASSESSMENT — PAIN DESCRIPTION - FREQUENCY: FREQUENCY: CONTINUOUS

## 2020-09-20 ASSESSMENT — PAIN DESCRIPTION - DESCRIPTORS: DESCRIPTORS: ACHING

## 2020-09-20 ASSESSMENT — PAIN DESCRIPTION - PAIN TYPE: TYPE: ACUTE PAIN

## 2020-09-20 ASSESSMENT — PAIN DESCRIPTION - ORIENTATION: ORIENTATION: UPPER

## 2020-09-20 NOTE — H&P
2020    COLONOSCOPY DIAGNOSTIC performed by Miranda Hensley MD at [address] N/A 10/13/2019    LAPAROTOMY EXPLORATORY, LARGE BOWEL RESECTION, CREATION OF COLOSTOMY performed by Miranda Hensley MD at [facility] N/A 10/22/2019    PORT INSERTION performed by Isabella Ramon MD at [address] [place] [address]           Medications Prior to Admission:    Medications Prior to Admission: neomycin (MYCIFRADIN) 500 MG tablet, Take 2 tablets orally at 1 pm, 2 pm, and 10 pm the day before surgery  [] metroNIDAZOLE (FLAGYL) 500 MG tablet, Take 1 tablet by mouth 3 times daily for 1 day Take at 1pm 2pm and 10pm the day before surgery  levothyroxine (SYNTHROID) 137 MCG tablet, Take 137 mcg by mouth Daily  atorvastatin (LIPITOR) 40 MG tablet, Take 40 mg by mouth daily  acetaminophen (TYLENOL) 500 MG tablet, Take 500 mg by mouth every 6 hours as needed for Pain  Multiple Vitamins-Minerals (THERAPEUTIC MULTIVITAMIN-MINERALS) tablet, Take 1 tablet by mouth daily    Allergies:  Patient has no known allergies. Social History:   TOBACCO:   reports that he has quit smoking. He has never used smokeless tobacco.  ETOH:   reports previous alcohol use of about 4.0 standard drinks of alcohol per week.   MARITAL STATUS:    OCCUPATION:      Family History:       Problem Relation Age of Onset    Cancer Mother     Breast Cancer Mother     Diabetes Father     Macular Degen Father        REVIEW OF SYSTEMS:    General ROS: Abdominal pain, nausea  Hematological and Lymphatic ROS: negative  Endocrine ROS: negative  Respiratory ROS: no cough, shortness of breath, or wheezing  Cardiovascular ROS: no chest pain or dyspnea on exertion  Gastrointestinal ROS: no abdominal pain, change in bowel habits, or black or bloody stools  Genito-Urinary ROS: no dysuria, trouble voiding, or hematuria  Neurological ROS: no TIA or stroke symptoms  negative    Vitals:  BP (!) 175/82   Pulse 88   Temp 98.3 °F (36.8 °C) (Temporal)   Resp 18   Ht 6' (1.829 m)   Wt 195 lb 3.2 oz (88.5 kg)   SpO2 97%   BMI 26.47 kg/m²     PHYSICAL EXAM:  General:  Awake, alert, oriented X 3. Well developed, well nourished, well groomed. No apparent distress. HEENT:  Normocephalic, atraumatic. Pupils equal, round, reactive to light. No scleral icterus. No conjunctival injection. Normal lips, teeth, and gums. No nasal discharge. Neck:  Supple  Heart:  RRR, no murmurs, gallops, rubs, carotid upstroke normal, no carotid bruits  Lungs:  CTA bilaterally, bilat symmetrical expansion, no wheeze, rales, or rhonchi  Abdomen: Positive bowel sounds, tenderness to palpation right upper quadrant  Extremities:  No clubbing, cyanosis, or edema  Skin:  Warm and dry, no open lesions or rash  Neuro:  Cranial nerves 2-12 intact, no focal deficits  Breast: deferred  Rectal: deferred  Genitalia:  deferred      DATA:     Recent Labs     09/19/20 2001 09/19/20  2207 09/20/20  0644   WBC 12.4* 13.2* 10.3   HGB 14.7 13.7 13.8    194 185     Recent Labs     09/19/20 2001 09/20/20  0644    136   K 4.0 3.8   BUN 10 8   CREATININE 0.8 0.8     Recent Labs     09/19/20 2001 09/20/20  0644 09/20/20  0645   PROT 8.2 7.2  --    INR 1.0  --  1.0     Recent Labs     09/19/20 2001 09/20/20  0644   AST 24 20   ALT 21 16   ALKPHOS 120 99   BILIDIR  --  0.3   BILITOT 1.5* 1.4*     No results for input(s): BNP in the last 72 hours. Recent Labs     09/19/20 2001 09/20/20  0159 09/20/20  0644   CKTOTAL  --  70 66   CKMB  --  2.2 2.5   TROPONINI <0.01 0.01 <0.01       ASSESSMENT:      Principal Problem:    NSTEMI (non-ST elevated myocardial infarction) ([facility] [address]  Active Problems:    Acute cholecystitis    HLD (hyperlipidemia)    HTN (hypertension)    Hypothyroidism    Former smoker    History of prostate cancer    Leukocytosis  Resolved Problems:    * No resolved hospital problems.  *        PLAN:    Admit to telemetry for evaluation of acute cholecystitis  Conservative management being undertaken for now  Ultrasound gallbladder reveals gallbladder sludge and gallstones with a stone lodged in the gallbladder neck  Plan is for ongoing IV antibiotic therapy for now without surgical intervention-Rocephin  Pain control, I supportive care, n.p.o.  General surgery following    Abnormal EKG  Negative troponins, no chest pain specifically  Await ischemic eval next week  Cardiology following    Accelerated hypertension  Partly secondary to abdominal discomfort  PRN IV meds  Okay for home meds with sips    DVT prophylaxis  PT OT  Discharge plan      Amanuel Johnston MD  9/20/2020  11:53 AM

## 2020-09-20 NOTE — CONSULTS
Daily   Yes Historical Provider, MD   atorvastatin (LIPITOR) 40 MG tablet Take 40 mg by mouth daily   Yes Historical Provider, MD   acetaminophen (TYLENOL) 500 MG tablet Take 500 mg by mouth every 6 hours as needed for Pain   Yes Historical Provider, MD   Multiple Vitamins-Minerals (THERAPEUTIC MULTIVITAMIN-MINERALS) tablet Take 1 tablet by mouth daily   Yes Historical Provider, MD       No Known Allergies    Family History   Problem Relation Age of Onset    Cancer Mother     Breast Cancer Mother     Diabetes Father     Macular Degen Father        Social History     Tobacco Use    Smoking status: Former Smoker    Smokeless tobacco: Never Used    Tobacco comment: 36 + years    Substance Use Topics    Alcohol use: Not Currently     Alcohol/week: 4.0 standard drinks     Types: 4 Cans of beer per week     Comment: occ    Drug use: Never         Review of Systems   General ROS: negative  Hematological and Lymphatic ROS: negative  Respiratory ROS: negative  Cardiovascular ROS: ST depressions and tachy at [facility], currently RR. On Heparin   Gastrointestinal ROS: colon CA s/p chemo and L kennedy 10/14/19 with colostomy   Genito-Urinary ROS: negative  Musculoskeletal ROS: negative      PHYSICAL EXAM:    Vitals:    09/19/20 2345   BP: (!) 174/88   Pulse: 89   Resp: 16   Temp: 99.5 °F (37.5 °C)   SpO2: 97%       General Appearance:  awake, alert, oriented, in no acute distress  Skin:  Skin color, texture, turgor normal. No rashes or lesions. Head/face:  NCAT  Eyes:  No gross abnormalities. Lungs:  No chest wall tenderness. Heart:  RR  Abdomen:  RUQ tenderness, minor epigastric tenderness.  Otherwise soft, non-distended   Extremities: pulses present in all extremities    LABS:    CBC  Recent Labs     09/19/20  2207   WBC 13.2*   HGB 13.7   HCT 38.9        BMP  Recent Labs     09/19/20 2001      K 4.0   CL 96*   CO2 24   BUN 10   CREATININE 0.8   CALCIUM 9.6     Liver Function  Recent Labs 20   LIPASE 32   BILITOT 1.5*   AST 24   ALT 21   ALKPHOS 120   PROT 8.2   LABALBU 4.3     No results for input(s): LACTATE in the last 72 hours. Recent Labs     20   INR 1.0       RADIOLOGY    Ct Abdomen Pelvis W Iv Contrast Additional Contrast? None    Result Date: 2020  Patient MRN:  [id number] : 1940 Age: 67 years Gender: Male Order Date:  2020 8:51 PM TECHNIQUE/NUMBER OF IMAGES/COMPARISON/CLINICAL HISTORY: CT abdomen pelvis Axial images were obtained with sagittal and coronal reconstructions. IV contrast 110 mL of Isovue-370. 418 images Comparison October 15 219 81-year-old male patient with small bowel obstruction. FINDINGS: The gallbladder is distended measuring about 10 x 4 cm. There are minimal thickening of the gallbladder wall, and there are stranding of the fat planes around the gallbladder. There is a impacted calculus of 4 mm in the neck of the gallbladder. There is another calculus in the dependent area of the upper body of the gallbladder measuring about 3 mm. Findings are compatible with acute cholecystitis. There is no dilatation biliary tree. There is no dilatation of the pancreatic duct system. The some stranding of the fat planes or surrounding the second segment duodenal secondary to the findings of acute cholecystitis. The pancreas has normal size and enhancement. There is no dilatation of the pancreatic ductal system. There are normal size and enhancement for the liver, and spleen. The no focal lesions are seen. Patient had the left side the colectomy with a resection of the left-sided transverse colon, and descending colon to the rectosigmoid area. Rectosigmoid stump appears unremarkable. There is a right-sided colostomy. The colostomy appears patent. Fair amount of fecal contents are seen however in the right-sided colon. There is some fluid distention of small bowel loops but they are not dilated.  There is no stranding of the omental mesenteric fat planes, except adjacent to the region of the gallbladder fossa. Calcified atheroma changes are seen in the abdominal aorta with atherosclerotic changes. There is mild dilatation of the distal abdominal aorta. Adrenals are not enlarged. Kidneys have preserved size and cortical thickness and excretion of the contrast. Ureters are patent. Bladder has unremarkable appearance. Patient had previous prostatectomy. Lower lung bases demonstrate no significant findings. Degenerative changes are seen in the lumbar spine particularly facet joints. 1. Findings compatible with acute cholecystitis with impacted 4 mm calculus in the region of the neck of the gallbladder. ALERT:  ABNORMAL REPORT. Xr Chest Portable    Result Date: 9/19/2020  Clinical indications: Chest pain. TECHNIQUE: Single frontal projection of the chest (1 view). COMPARISON: October 22, 2019. FINDINGS: The heart is upper limits normal in size. There is calcification within thoracic aorta. Acromioclavicular arthropathy. Left subclavian Mediport with distal tip overlying atriocaval junction. The heart, lungs, mediastinum and regional skeleton are otherwise unremarkable. No evidence for acute cardiopulmonary process.          ASSESSMENT:  67 y.o. male with RUQ pain, acute ofelia vs choledocholithiases     PLAN:  NPO  IVF  Continue abx  RUQ U/S  Defer other medical issue to primary team  Cardiac issues- ST depression- per primary   Will discuss with Dr. Reyes Leader      Electronically signed by Dee Collier DO on 9/20/20 at 3:31 AM EDT

## 2020-09-20 NOTE — PROGRESS NOTES
General Surgery Progress Note:  Patient seen and examined, agree with resident note, for remaining HP/Consult details please see resident HP/Consult note. CC: RUQ pain     S: admitted initially with ? Of MI, cardiology following trop neg, and no real hx of chest pain, mostly abd pain. They plan stress test early next week. Objective:  @BP (!) 175/82   Pulse 88   Temp 98.3 °F (36.8 °C) (Temporal)   Resp 18   Ht 6' (1.829 m)   Wt 195 lb 3.2 oz (88.5 kg)   SpO2 97%   BMI 26.47 kg/m² @    Physical -     Gen: NAD  Resp: Breathing Comfortably, no resp distress  CV: Reg Rate  Abd: soft moderate RUQ tenderness R upper quadrant ostomy functioning   EXT nvi    Assessment/Plan: Acute cholecystitis in setting of abnormal ekg, probably not an MI, he has a RUQ ostomy and hx of L colon ca sp chemo due to for colostomy reversal in early October. - It would be very difficult to take out his gallbladder without opening. I think at this point lets treat him with antibiotics, if he fails to improve a discussion can be had about doing an IR drain in lieu of a bigger operation and potentially removing the gallbladder when he has is Ostomy reversed. ...      - There is the concern for a stone in the CBD. I think an MRCP is reasonable to determine that and then potentially ERCP if indicated.      - Will follow closely      VTE Prophylaxis: SCDs heparin TID       Christianne Beltrán MD FACS     11:36 AM

## 2020-09-20 NOTE — CONSULTS
Inpatient Cardiology Consultation      Reason for Consult: Abnormal EKG    Consulting Physician: Dr. Kelsey Quinn    Requesting Physician: Michelle Kruse, SEBASTIEN - JAKE     Date of Consultation: 9/20/2020    HISTORY OF PRESENT ILLNESS:   Patient is a 67year old WM not previously known to [facility] Cardiology. Patient is being seen in consultation this hospital admission by Dr. Kelsey Quinn for evaluation and recommendations regarding abnormal EKG. Patient has a known past medical history of former tobacco abuse, alcohol use, hyperlipidemia, hypothyroidism on replacement therapy, history of prostate cancer status-post prostatectomy and radiation therapy in the early 2000s, and history of colon cancer status-post hemicolectomy with colostomy with completed chemotherapy treatment April 2020 and plans for reversal of colostomy October 2020. Patient originally presented to [address] [place] [address] on September 19, 2020 due to complaints of abdominal pain, nausea and emesis. Patient states that beginning this past Thursday, he began to notice right upper quadrant discomfort that was dull and sharp in nature at times. It was intermittent initially, and was 4-5/10 in severity. He noted of nausea throughout the day Thursday, as well as a couple episodes of emesis. He denies hematemesis. He states throughout Friday he continued to have intermittent right upper quadrant discomfort with associated nausea, vomiting and chills. However, Saturday, patient also began to notice left upper quadrant discomfort additionally. He states that abdominal discomfort was becoming more severe, so he decided late Saturday to go to the ED for evaluation. While in the ED, patient was noted to have abnormal EKG with ST segment changes, and was transferred to [facility] for further evaluation and management. Patient denies ever having complaints of chest discomfort, shortness of breath, diaphoresis, dizziness, palpitations, near-syncope or syncope.   He denies complaints of subjective fever, cough or any recent sick contacts. He denies paroxysmal nocturnal dyspnea, orthopnea or peripheral edema. He states today the nausea and vomiting has improved, but he still has severe right upper quadrant abdominal pain. He is being evaluated by general surgery for acute cholecystitis versus choledocholithiasis and possible need for surgical intervention. Patient denies any personal history of coronary artery disease, myocardial infarction, heart failure, cardiac arrhythmia or valvular heart disease. He denies ever seeing a cardiologist in the past.  He denies any prior cardiac evaluation, including stress testing, left heart catheterization or echocardiogram.  He is a former tobacco smoker, quit 40 to 50 years ago. States he smoked for 20+ years, one pack/day. He does admit to drinking a couple of beers minimum per day. Denies former or current illicit drug use. Denies any pertinent cardiac family medical history at this time. Labs and diagnostic testing as noted below. Please note: past medical records were reviewed per electronic medical record (EMR) - see detailed reports under Past Medical/ Surgical History. PAST MEDICAL HISTORY:    1. 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 Completed chemotherapy in April 2020. Plans for colostomy reversal next month per the patient. 2. History of prostate cancer in the early 2000s s/p prostatectomy and radiation therapy. 3. Hypothyroidism, on replacement therapy. 4. Hyperlipidemia, on statin therapy. 5. Former tobacco abuse. 6. Daily alcohol use.       PAST SURGICAL HISTORY:    Past Surgical History:   Procedure Laterality Date    COLONOSCOPY  8/28/2020    COLONOSCOPY DIAGNOSTIC performed by Miranda Hensley MD at [address]  10/13/2019    LAPAROTOMY EXPLORATORY, LARGE BOWEL RESECTION, CREATION OF COLOSTOMY performed by Yeny Haywood MD at [facility] N/A 10/22/2019    PORT INSERTION performed by Jaskaran Avila MD at [address]:  Prior to Admission medications    Medication Sig Start Date End Date Taking?  Authorizing Provider   neomycin (MYCIFRADIN) 500 MG tablet Take 2 tablets orally at 1 pm, 2 pm, and 10 pm the day before surgery 9/18/20 9/28/20 Yes Yeny Haywood MD   levothyroxine (SYNTHROID) 137 MCG tablet Take 137 mcg by mouth Daily   Yes Historical Provider, MD   atorvastatin (LIPITOR) 40 MG tablet Take 40 mg by mouth daily   Yes Historical Provider, MD   acetaminophen (TYLENOL) 500 MG tablet Take 500 mg by mouth every 6 hours as needed for Pain   Yes Historical Provider, MD   Multiple Vitamins-Minerals (THERAPEUTIC MULTIVITAMIN-MINERALS) tablet Take 1 tablet by mouth daily   Yes Historical Provider, MD       CURRENT MEDICATIONS:      Current Facility-Administered Medications:     sodium chloride flush 0.9 % injection 10 mL, 10 mL, Intravenous, 2 times per day, April SEBASTIEN Kruse - CNP    sodium chloride flush 0.9 % injection 10 mL, 10 mL, Intravenous, PRN, April SEBASTIEN Kruse - CNP    acetaminophen (TYLENOL) tablet 650 mg, 650 mg, Oral, Q6H PRN, 650 mg at 09/20/20 0039 **OR** acetaminophen (TYLENOL) suppository 650 mg, 650 mg, Rectal, Q6H PRN, April SEBASTIEN Kruse - CNP    polyethylene glycol (GLYCOLAX) packet 17 g, 17 g, Oral, Daily PRN, April SEBASTIEN Kruse - CNP    trimethobenzamide Harry Leaver) injection 200 mg, 200 mg, Intramuscular, Q6H PRN, April SEBASTIEN Kruse - CNP    heparin (porcine) injection 5,350 Units, 60 Units/kg, Intravenous, PRN, April SEBASTIEN Kruse - CNP    heparin (porcine) injection 2,670 Units, 30 Units/kg, Intravenous, PRN, April SEBASTIEN Kruse - CNP    heparin 25,000 units in dextrose 5% 250 mL infusion, 12 Units/kg/hr, Intravenous, neurological deficits. · Cardiovascular: Denies chest pain, palpitations, diaphoresis. Denies syncope. Denies PND, orthopnea, peripheral edema. · Respiratory: Denies shortness of breath at rest or with exertion. Denies cough, hemoptysis. · Gastrointestinal: +abdominal pain, +nausea, +emesis. Denies diarrhea and constipation, black/bloody, and tarry stools. · Genitourinary: Denies dysuria and hematuria. · Hematologic: Denies excessive bruising or bleeding. · Endocrine: Denies excessive thirst. Denies intolerance to hot and cold. PHYSICAL EXAM:   BP (!) 169/89   Pulse 88   Temp 99.7 °F (37.6 °C) (Temporal)   Resp 16   Ht 6' (1.829 m)   Wt 195 lb 3.2 oz (88.5 kg)   SpO2 96%   BMI 26.47 kg/m²   CONST:  Well developed, well nourished WM who appears stated age. Awake, alert, cooperative, no apparent distress. HEENT:   Head- Normocephalic, atraumatic. Eyes- Conjunctivae pink, anicteric. Throat- Oral mucosa pink and moist.  Neck-  No stridor, trachea midline, no apparent jugular venous distention. CHEST: Chest symmetrical and non-tender to palpation. No accessory muscle use or intercostal retractions. RESPIRATORY: Lung sounds - clear throughout fields. No wheezing, rales or rhonchi. Diminished. CARDIOVASCULAR:     No carotid bruit. Heart Inspection- shows no noted pulsations. Heart Palpation- no heaves or thrills. Heart Ausculation- Regular rate and rhythm, no apparent murmur. No s3, s4 or rub. PV: No lower extremity edema. No varicosities. Pedal pulses palpable, no clubbing or cyanosis. ABDOMEN: Soft, tenderness to palpation of the RUQ. Bowel sounds present. MS: Good muscle strength and tone. No atrophy or abnormal movements. SKIN: Warm and dry. NEURO / PSYCH: Oriented to person, place and time. Speech clear and appropriate. Follows all commands. Pleasant affect. DATA:    Telemetry: Normal sinus rhythm with HR in the 90s. PACs. Artifact.      Diagnostic:  All diagnostic testing and lab work thus far this admission reviewed in detail. CT Abdomen/Pelvis 09/19/2020: Impression:  1. Findings compatible with acute cholecystitis with impacted 4 mm   calculus in the region of the neck of the gallbladder. CXR 09/19/2020: Impression:  No evidence for acute cardiopulmonary process. Intake/Output Summary (Last 24 hours) at 9/20/2020 0724  Last data filed at 9/20/2020 7596  Gross per 24 hour   Intake --   Output 400 ml   Net -400 ml       Labs:   CBC:   Recent Labs     09/19/20  2207 09/20/20  0644   WBC 13.2* 10.3   HGB 13.7 13.8   HCT 38.9 39.5    185     BMP:   Recent Labs     09/19/20 2001      K 4.0   CO2 24   BUN 10   CREATININE 0.8   LABGLOM >60   CALCIUM 9.6     HgA1c:   Lab Results   Component Value Date    LABA1C 5.4 09/20/2020     PT/INR:   Recent Labs     09/19/20 2001 09/20/20  0645   PROTIME 12.3 11.8   INR 1.0 1.0     APTT:  Recent Labs     09/20/20  0007 09/20/20  0645   APTT 52.6* 36.0*     CARDIAC ENZYMES:  Recent Labs     09/19/20 2001 09/20/20  0159   CKTOTAL  --  70   CKMB  --  2.2   TROPONINI <0.01 0.01     LIVER PROFILE:  Recent Labs     09/19/20 2001   AST 24   ALT 21   LABALBU 4.3           Assessment and plan to follow as per Dr. Osman Ahumada. Kelly Duarte, [address] [place] [address] Cardiology    Electronically signed by Marii Aponte PA-C on 9/20/2020 at 7:24 AM       I personally and independently saw and examined patient and reviewed all done pertinent laboratory data, imaging studies, ECGs and rhythm strips. I have reviewed and agree with the APN history and physical exam as documented in the above note. Electronically signed by Sun Obrien MD on 9/20/2020 at 1:12 PM     We were asked to see the patient for abnormal EKG.    IMPRESSION:  1.  Abnormal EKG (ST segment depressions in the inferior and anterolateral wall in the context of severe hypertension, questionable strain pattern versus subendocardial ischemia again secondary to uncontrolled hypertension). Patient denies chest pain and troponin levels negative x 2.   2. Uncontrolled hypertension, not previously diagnosed. 3. Acute cholecystitis versus choledocholithiasis (per general surgery). 4. History of hyperlipidemia. 5. Hypothyroidism on replacement therapy. 6. Prostate cancer s/p prostatectomy and radiation therapy. 7. Colon cancer s/p hemicolectomy with colostomy and s/p completed chemotherapy. Scheduled for colostomy reversal 10/2/2020.     PLAN:   1. Repeat EKG. 2. Change IV heparin to DVT prophylaxis dose. 3. Change IV Lopressor to Toprol-XL. 4. Add amlodipine. 5. Check echocardiogram.   6. Eventual Lexiscan stress test (early next week).   7. Will follow.      Electronically signed by Trae Palomares MD on 9/20/2020 at 9:59 AM

## 2020-09-20 NOTE — ED PROVIDER NOTES
Mayuri Jenkins MD at [address] N/A 10/13/2019    LAPAROTOMY EXPLORATORY, LARGE BOWEL RESECTION, CREATION OF COLOSTOMY performed by Herb Crenshaw MD at [facility] N/A 10/22/2019    PORT INSERTION performed by Beth Teresa MD at [address] [place] [address]     Social History:  reports that he has quit smoking. He has never used smokeless tobacco. He reports previous alcohol use of about 4.0 standard drinks of alcohol per week. He reports that he does not use drugs. Family History: family history includes Breast Cancer in his mother; Cancer in his mother; Diabetes in his father; Willene Economy in his father. Allergies: Patient has no known allergies. Physical Exam           ED Triage Vitals   BP Temp Temp Source Pulse Resp SpO2 Height Weight   09/19/20 1916 09/19/20 1914 09/19/20 1914 09/19/20 1914 09/19/20 1916 09/19/20 1914 09/19/20 1915 09/19/20 1915   (!) 205/102 96.9 °F (36.1 °C) Infrared 110 18 100 % 6' (1.829 m) 195 lb (88.5 kg)      Oxygen Saturation Interpretation: Normal.    · General Appearance/Constitutional:  Alert, development consistent with age. · HEENT:  NC/NT. PERRLA. Airway patent. Oral mucosa moist  · Neck:  Supple. No lymphadenopathy. · Respiratory: Lungs Clear to auscultation and breath sounds equal.  · CV:  Tachycardic rate and rhythm. · GI:  General Appearance: normal and ostomy present- Colostomy- RLQ. Bowel sounds: normal bowel sounds. Distension:  None. Tenderness: moderate tenderness is present in the RUQ and in the LUQ, no rebound tenderness, no guarding, abdominal rigidity is absent. Liver: non-tender. Spleen:  non-tender. Pulsatile Mass: absent. Hernia:  ostomy is prolapsed about 8 inches into bag. Pt reports it has been like this for awhile, surgeon has seen it. .  · Back: CVA Tenderness: No.  · Integument:  Normal turgor. Warm, dry, without visible rash, unless noted elsewhere. · Neurological:  Orientation age-appropriate. Motor functions intact.     Lab / Imaging Results   (All laboratory and radiology results have been personally reviewed by myself)  Labs:  Results for orders placed or performed during the hospital encounter of 09/19/20   Comprehensive Metabolic Panel w/ Reflex to MG   Result Value Ref Range    Sodium 132 132 - 146 mmol/L    Potassium reflex Magnesium 4.0 3.5 - 5.0 mmol/L    Chloride 96 (L) 98 - 107 mmol/L    CO2 24 22 - 29 mmol/L    Anion Gap 12 7 - 16 mmol/L    Glucose 117 (H) 74 - 99 mg/dL    BUN 10 8 - 23 mg/dL    CREATININE 0.8 0.7 - 1.2 mg/dL    GFR Non-African American >60 >=60 mL/min/1.73    GFR African American >60     Calcium 9.6 8.6 - 10.2 mg/dL    Total Protein 8.2 6.4 - 8.3 g/dL    Alb 4.3 3.5 - 5.2 g/dL    Total Bilirubin 1.5 (H) 0.0 - 1.2 mg/dL    Alkaline Phosphatase 120 40 - 129 U/L    ALT 21 0 - 40 U/L    AST 24 0 - 39 U/L   CBC Auto Differential   Result Value Ref Range    WBC 12.4 (H) 4.5 - 11.5 E9/L    RBC 4.83 3.80 - 5.80 E12/L    Hemoglobin 14.7 12.5 - 16.5 g/dL    Hematocrit 41.4 37.0 - 54.0 %    MCV 85.7 80.0 - 99.9 fL    MCH 30.4 26.0 - 35.0 pg    MCHC 35.5 (H) 32.0 - 34.5 %    RDW 12.8 11.5 - 15.0 fL    Platelets 828 158 - 313 E9/L    MPV 8.6 7.0 - 12.0 fL    Neutrophils % 78.8 43.0 - 80.0 %    Immature Granulocytes % 0.2 0.0 - 5.0 %    Lymphocytes % 12.9 (L) 20.0 - 42.0 %    Monocytes % 7.7 2.0 - 12.0 %    Eosinophils % 0.2 0.0 - 6.0 %    Basophils % 0.2 0.0 - 2.0 %    Neutrophils Absolute 9.75 (H) 1.80 - 7.30 E9/L    Immature Granulocytes # 0.03 E9/L    Lymphocytes Absolute 1.60 1.50 - 4.00 E9/L    Monocytes Absolute 0.96 (H) 0.10 - 0.95 E9/L    Eosinophils Absolute 0.03 (L) 0.05 - 0.50 E9/L    Basophils Absolute 0.02 0.00 - 0.20 E9/L   Troponin   Result Value Ref Range    Troponin <0.01 0.00 - 0.03 ng/mL   Lipase   Result Value Ref Range    Lipase 32 13 - 60 U/L   Lactic Acid, Plasma pain relief with fentanyl but it is worn off. Will order morphine. 2145:  Advised pt of plan for transfer, etc. Pain is much better. Consultations:             IP CONSULT TO GENERAL SURGERY  IP CONSULT TO INTERNAL MEDICINE  IP CONSULT TO GENERAL SURGERY   IP Consult to Cardiology-Dr. Nela Gastelum, fine for heparin or lovenox, control pressure with nitropaste and lopressor. Procedures:   none    MDM:  PT presents with Upper abdominal pain x 3 days, on and off, worsening today with nausea and vomiting, in setting of colostomy from Colon CA last year. Surgery was planning a reattachment next week and had colonoscopy 3 weeks ago for planning and surveillance. Pt findings included marked EKG changes consistent with early ischemia, negative troponin, and very high blood pressure, which patient does not normally have. Also CT ab indicated impacted gall stone in the neck of gall bladder with acute changes consistent with cholecystitis. Pt pain controlled with morphine, pressure managed with lopressor and nitropaste per recommendation of cardiology, asa and antibiotics admin in ED. Pt transferring to [facility] is regular surgeon practices as well as for proximity to cath lab, should this be needed. Counseling:   I have spoken with the patient and his wife and discussed todays results, in addition to providing specific details for the plan of care and counseling regarding the diagnosis and prognosis and are agreeable with the plan. Assessment      1. Acute cholecystitis due to biliary calculus    2. ST segment changes on electrocardiogram    3. Hypertensive urgency      This patient's ED course included: a personal history and physicial examination, re-evaluation prior to disposition, multiple bedside re-evaluations, IV medications, cardiac monitoring and continuous pulse oximetry  This patient has remained hemodynamically stable and improved during their ED course.    Plan   Transfer to [facility] to Intermediate care floor. Patient condition is serious. New Medications     Discharge Medication List as of 9/19/2020 11:18 PM        Electronically signed by Ilsa Todd PA-C   DD: 9/19/20  **This report was transcribed using voice recognition software. Every effort was made to ensure accuracy; however, inadvertent computerized transcription errors may be present.   END OF PROVIDER NOTE       Ilsa Todd PA-C  09/19/20 [id number]

## 2020-09-20 NOTE — ED NOTES
Nurse to nurse report to [phone]  At [address] [facility], [facility]  09/19/20 9533

## 2020-09-20 NOTE — PLAN OF CARE
Problem: Falls - Risk of:  Goal: Will remain free from falls  Description: Will remain free from falls  Outcome: Met This Shift  Goal: Absence of physical injury  Description: Absence of physical injury  Outcome: Met This Shift     Problem: Pain:  Goal: Pain level will decrease  Description: Pain level will decrease  Outcome: Ongoing  Goal: Control of chronic pain  Description: Control of chronic pain  Outcome: Ongoing

## 2020-09-21 PROBLEM — Z85.46 HISTORY OF PROSTATE CANCER: Chronic | Status: ACTIVE | Noted: 2020-09-20

## 2020-09-21 PROBLEM — Z85.038 HISTORY OF COLON CANCER: Chronic | Status: ACTIVE | Noted: 2020-09-21

## 2020-09-21 PROBLEM — E78.5 HLD (HYPERLIPIDEMIA): Chronic | Status: ACTIVE | Noted: 2020-09-20

## 2020-09-21 PROBLEM — E03.9 HYPOTHYROIDISM: Chronic | Status: ACTIVE | Noted: 2020-09-20

## 2020-09-21 PROBLEM — Z93.3 S/P COLOSTOMY (HCC): Chronic | Status: ACTIVE | Noted: 2020-09-21

## 2020-09-21 PROBLEM — K56.609 INTESTINAL OBSTRUCTION (HCC): Status: RESOLVED | Noted: 2019-10-13 | Resolved: 2020-09-21

## 2020-09-21 PROBLEM — I10 HTN (HYPERTENSION): Chronic | Status: ACTIVE | Noted: 2020-09-20

## 2020-09-21 LAB
ALBUMIN SERPL-MCNC: 3.5 G/DL (ref 3.5–5.2)
ALBUMIN SERPL-MCNC: 3.5 G/DL (ref 3.5–5.2)
ALP BLD-CCNC: 100 U/L (ref 40–129)
ALP BLD-CCNC: 101 U/L (ref 40–129)
ALT SERPL-CCNC: 15 U/L (ref 0–40)
ALT SERPL-CCNC: 16 U/L (ref 0–40)
ANION GAP SERPL CALCULATED.3IONS-SCNC: 14 MMOL/L (ref 7–16)
ANION GAP SERPL CALCULATED.3IONS-SCNC: 15 MMOL/L (ref 7–16)
APTT: 29.5 SEC (ref 24.5–35.1)
AST SERPL-CCNC: 18 U/L (ref 0–39)
AST SERPL-CCNC: 19 U/L (ref 0–39)
BACTERIA: NORMAL /HPF
BILIRUB SERPL-MCNC: 1.2 MG/DL (ref 0–1.2)
BILIRUB SERPL-MCNC: 1.4 MG/DL (ref 0–1.2)
BILIRUBIN DIRECT: 0.4 MG/DL (ref 0–0.3)
BILIRUBIN URINE: NEGATIVE
BILIRUBIN, INDIRECT: 0.8 MG/DL (ref 0–1)
BLOOD, URINE: NEGATIVE
BUN BLDV-MCNC: 11 MG/DL (ref 8–23)
BUN BLDV-MCNC: 12 MG/DL (ref 8–23)
CALCIUM SERPL-MCNC: 8.9 MG/DL (ref 8.6–10.2)
CALCIUM SERPL-MCNC: 9.1 MG/DL (ref 8.6–10.2)
CHLORIDE BLD-SCNC: 95 MMOL/L (ref 98–107)
CHLORIDE BLD-SCNC: 97 MMOL/L (ref 98–107)
CK MB: <1 NG/ML (ref 0–7.7)
CLARITY: CLEAR
CO2: 23 MMOL/L (ref 22–29)
CO2: 23 MMOL/L (ref 22–29)
COLOR: YELLOW
CREAT SERPL-MCNC: 0.8 MG/DL (ref 0.7–1.2)
CREAT SERPL-MCNC: 0.9 MG/DL (ref 0.7–1.2)
EKG ATRIAL RATE: 81 BPM
EKG P AXIS: 52 DEGREES
EKG P-R INTERVAL: 150 MS
EKG Q-T INTERVAL: 380 MS
EKG QRS DURATION: 92 MS
EKG QTC CALCULATION (BAZETT): 441 MS
EKG R AXIS: 16 DEGREES
EKG T AXIS: 47 DEGREES
EKG VENTRICULAR RATE: 81 BPM
GFR AFRICAN AMERICAN: >60
GFR AFRICAN AMERICAN: >60
GFR NON-AFRICAN AMERICAN: >60 ML/MIN/1.73
GFR NON-AFRICAN AMERICAN: >60 ML/MIN/1.73
GLUCOSE BLD-MCNC: 102 MG/DL (ref 74–99)
GLUCOSE BLD-MCNC: 149 MG/DL (ref 74–99)
GLUCOSE URINE: NEGATIVE MG/DL
HCT VFR BLD CALC: 35.8 % (ref 37–54)
HEMOGLOBIN: 12.5 G/DL (ref 12.5–16.5)
KETONES, URINE: NEGATIVE MG/DL
LEUKOCYTE ESTERASE, URINE: NEGATIVE
MCH RBC QN AUTO: 29.9 PG (ref 26–35)
MCHC RBC AUTO-ENTMCNC: 34.9 % (ref 32–34.5)
MCV RBC AUTO: 85.6 FL (ref 80–99.9)
NITRITE, URINE: NEGATIVE
PDW BLD-RTO: 12.7 FL (ref 11.5–15)
PH UA: 7 (ref 5–9)
PLATELET # BLD: 200 E9/L (ref 130–450)
PMV BLD AUTO: 8.8 FL (ref 7–12)
POTASSIUM REFLEX MAGNESIUM: 3.6 MMOL/L (ref 3.5–5)
POTASSIUM SERPL-SCNC: 3.5 MMOL/L (ref 3.5–5)
PROTEIN UA: NEGATIVE MG/DL
RBC # BLD: 4.18 E12/L (ref 3.8–5.8)
RBC UA: NORMAL /HPF (ref 0–2)
SODIUM BLD-SCNC: 133 MMOL/L (ref 132–146)
SODIUM BLD-SCNC: 134 MMOL/L (ref 132–146)
SPECIFIC GRAVITY UA: <=1.005 (ref 1–1.03)
TOTAL PROTEIN: 6.8 G/DL (ref 6.4–8.3)
TOTAL PROTEIN: 7 G/DL (ref 6.4–8.3)
UROBILINOGEN, URINE: 0.2 E.U./DL
WBC # BLD: 9.5 E9/L (ref 4.5–11.5)
WBC UA: NORMAL /HPF (ref 0–5)

## 2020-09-21 PROCEDURE — 99233 SBSQ HOSP IP/OBS HIGH 50: CPT | Performed by: INTERNAL MEDICINE

## 2020-09-21 PROCEDURE — 82553 CREATINE MB FRACTION: CPT

## 2020-09-21 PROCEDURE — 4A023N7 MEASUREMENT OF CARDIAC SAMPLING AND PRESSURE, LEFT HEART, PERCUTANEOUS APPROACH: ICD-10-PCS | Performed by: INTERNAL MEDICINE

## 2020-09-21 PROCEDURE — 2580000003 HC RX 258: Performed by: INTERNAL MEDICINE

## 2020-09-21 PROCEDURE — 85730 THROMBOPLASTIN TIME PARTIAL: CPT

## 2020-09-21 PROCEDURE — B2111ZZ FLUOROSCOPY OF MULTIPLE CORONARY ARTERIES USING LOW OSMOLAR CONTRAST: ICD-10-PCS | Performed by: INTERNAL MEDICINE

## 2020-09-21 PROCEDURE — 85027 COMPLETE CBC AUTOMATED: CPT

## 2020-09-21 PROCEDURE — 6370000000 HC RX 637 (ALT 250 FOR IP): Performed by: NURSE PRACTITIONER

## 2020-09-21 PROCEDURE — 93458 L HRT ARTERY/VENTRICLE ANGIO: CPT

## 2020-09-21 PROCEDURE — 97530 THERAPEUTIC ACTIVITIES: CPT

## 2020-09-21 PROCEDURE — C1769 GUIDE WIRE: HCPCS

## 2020-09-21 PROCEDURE — 93005 ELECTROCARDIOGRAM TRACING: CPT | Performed by: PHYSICIAN ASSISTANT

## 2020-09-21 PROCEDURE — 6360000002 HC RX W HCPCS: Performed by: STUDENT IN AN ORGANIZED HEALTH CARE EDUCATION/TRAINING PROGRAM

## 2020-09-21 PROCEDURE — 6360000002 HC RX W HCPCS

## 2020-09-21 PROCEDURE — 2060000000 HC ICU INTERMEDIATE R&B

## 2020-09-21 PROCEDURE — C1887 CATHETER, GUIDING: HCPCS

## 2020-09-21 PROCEDURE — 81001 URINALYSIS AUTO W/SCOPE: CPT

## 2020-09-21 PROCEDURE — 2580000003 HC RX 258: Performed by: NURSE PRACTITIONER

## 2020-09-21 PROCEDURE — 2580000003 HC RX 258: Performed by: STUDENT IN AN ORGANIZED HEALTH CARE EDUCATION/TRAINING PROGRAM

## 2020-09-21 PROCEDURE — 2709999900 HC NON-CHARGEABLE SUPPLY

## 2020-09-21 PROCEDURE — 6370000000 HC RX 637 (ALT 250 FOR IP): Performed by: INTERNAL MEDICINE

## 2020-09-21 PROCEDURE — 97161 PT EVAL LOW COMPLEX 20 MIN: CPT

## 2020-09-21 PROCEDURE — 93458 L HRT ARTERY/VENTRICLE ANGIO: CPT | Performed by: INTERNAL MEDICINE

## 2020-09-21 PROCEDURE — C1894 INTRO/SHEATH, NON-LASER: HCPCS

## 2020-09-21 PROCEDURE — 6370000000 HC RX 637 (ALT 250 FOR IP)

## 2020-09-21 PROCEDURE — 2500000003 HC RX 250 WO HCPCS: Performed by: STUDENT IN AN ORGANIZED HEALTH CARE EDUCATION/TRAINING PROGRAM

## 2020-09-21 PROCEDURE — 36415 COLL VENOUS BLD VENIPUNCTURE: CPT

## 2020-09-21 PROCEDURE — 80053 COMPREHEN METABOLIC PANEL: CPT

## 2020-09-21 PROCEDURE — 97166 OT EVAL MOD COMPLEX 45 MIN: CPT

## 2020-09-21 PROCEDURE — 6360000002 HC RX W HCPCS: Performed by: INTERNAL MEDICINE

## 2020-09-21 PROCEDURE — B2151ZZ FLUOROSCOPY OF LEFT HEART USING LOW OSMOLAR CONTRAST: ICD-10-PCS | Performed by: INTERNAL MEDICINE

## 2020-09-21 PROCEDURE — 2500000003 HC RX 250 WO HCPCS

## 2020-09-21 PROCEDURE — 2500000003 HC RX 250 WO HCPCS: Performed by: NURSE PRACTITIONER

## 2020-09-21 PROCEDURE — 99232 SBSQ HOSP IP/OBS MODERATE 35: CPT | Performed by: SURGERY

## 2020-09-21 PROCEDURE — 80048 BASIC METABOLIC PNL TOTAL CA: CPT

## 2020-09-21 PROCEDURE — 80076 HEPATIC FUNCTION PANEL: CPT

## 2020-09-21 RX ORDER — SODIUM CHLORIDE 9 MG/ML
INJECTION, SOLUTION INTRAVENOUS ONCE
Status: COMPLETED | OUTPATIENT
Start: 2020-09-21 | End: 2020-09-21

## 2020-09-21 RX ORDER — SODIUM CHLORIDE, SODIUM LACTATE, POTASSIUM CHLORIDE, AND CALCIUM CHLORIDE .6; .31; .03; .02 G/100ML; G/100ML; G/100ML; G/100ML
1000 INJECTION, SOLUTION INTRAVENOUS ONCE
Status: COMPLETED | OUTPATIENT
Start: 2020-09-21 | End: 2020-09-21

## 2020-09-21 RX ORDER — DEXTROSE, SODIUM CHLORIDE, AND POTASSIUM CHLORIDE 5; .45; .15 G/100ML; G/100ML; G/100ML
INJECTION INTRAVENOUS CONTINUOUS
Status: DISCONTINUED | OUTPATIENT
Start: 2020-09-21 | End: 2020-09-23

## 2020-09-21 RX ADMIN — SODIUM CHLORIDE, PRESERVATIVE FREE 10 ML: 5 INJECTION INTRAVENOUS at 08:07

## 2020-09-21 RX ADMIN — SODIUM CHLORIDE 3 G: 900 INJECTION INTRAVENOUS at 13:42

## 2020-09-21 RX ADMIN — NITROGLYCERIN 1 INCH: 20 OINTMENT TOPICAL at 18:42

## 2020-09-21 RX ADMIN — MULTIPLE VITAMINS W/ MINERALS TAB 1 TABLET: TAB at 08:07

## 2020-09-21 RX ADMIN — NITROGLYCERIN 1 INCH: 20 OINTMENT TOPICAL at 00:30

## 2020-09-21 RX ADMIN — POTASSIUM CHLORIDE, DEXTROSE MONOHYDRATE AND SODIUM CHLORIDE: 150; 5; 450 INJECTION, SOLUTION INTRAVENOUS at 15:55

## 2020-09-21 RX ADMIN — NITROGLYCERIN 1 INCH: 20 OINTMENT TOPICAL at 12:52

## 2020-09-21 RX ADMIN — ASPIRIN 81 MG CHEWABLE TABLET 81 MG: 81 TABLET CHEWABLE at 08:06

## 2020-09-21 RX ADMIN — HEPARIN SODIUM 5000 UNITS: 10000 INJECTION INTRAVENOUS; SUBCUTANEOUS at 20:35

## 2020-09-21 RX ADMIN — ACETAMINOPHEN 650 MG: 325 TABLET, FILM COATED ORAL at 05:21

## 2020-09-21 RX ADMIN — POTASSIUM CHLORIDE, DEXTROSE MONOHYDRATE AND SODIUM CHLORIDE: 150; 5; 450 INJECTION, SOLUTION INTRAVENOUS at 08:10

## 2020-09-21 RX ADMIN — NITROGLYCERIN 1 INCH: 20 OINTMENT TOPICAL at 05:15

## 2020-09-21 RX ADMIN — METOPROLOL SUCCINATE 50 MG: 50 TABLET, EXTENDED RELEASE ORAL at 08:06

## 2020-09-21 RX ADMIN — ATORVASTATIN CALCIUM 40 MG: 40 TABLET, FILM COATED ORAL at 20:34

## 2020-09-21 RX ADMIN — METRONIDAZOLE 500 MG: 500 INJECTION, SOLUTION INTRAVENOUS at 05:15

## 2020-09-21 RX ADMIN — ACETAMINOPHEN 650 MG: 325 TABLET, FILM COATED ORAL at 17:49

## 2020-09-21 RX ADMIN — SODIUM CHLORIDE, PRESERVATIVE FREE 10 ML: 5 INJECTION INTRAVENOUS at 20:35

## 2020-09-21 RX ADMIN — SODIUM CHLORIDE 3 G: 900 INJECTION INTRAVENOUS at 08:44

## 2020-09-21 RX ADMIN — SODIUM CHLORIDE: 9 INJECTION, SOLUTION INTRAVENOUS at 17:35

## 2020-09-21 RX ADMIN — SODIUM CHLORIDE, POTASSIUM CHLORIDE, SODIUM LACTATE AND CALCIUM CHLORIDE 1000 ML: 600; 310; 30; 20 INJECTION, SOLUTION INTRAVENOUS at 08:11

## 2020-09-21 RX ADMIN — AMLODIPINE BESYLATE 5 MG: 5 TABLET ORAL at 08:06

## 2020-09-21 RX ADMIN — LEVOTHYROXINE SODIUM 137 MCG: 0.14 TABLET ORAL at 05:15

## 2020-09-21 RX ADMIN — SODIUM CHLORIDE 3 G: 900 INJECTION INTRAVENOUS at 20:34

## 2020-09-21 RX ADMIN — HEPARIN SODIUM 5000 UNITS: 10000 INJECTION INTRAVENOUS; SUBCUTANEOUS at 05:15

## 2020-09-21 RX ADMIN — HEPARIN SODIUM 5000 UNITS: 10000 INJECTION INTRAVENOUS; SUBCUTANEOUS at 13:50

## 2020-09-21 RX ADMIN — NITROGLYCERIN 1 INCH: 20 OINTMENT TOPICAL at 23:53

## 2020-09-21 ASSESSMENT — PAIN DESCRIPTION - PAIN TYPE: TYPE: ACUTE PAIN

## 2020-09-21 ASSESSMENT — PAIN DESCRIPTION - DESCRIPTORS: DESCRIPTORS: ACHING

## 2020-09-21 ASSESSMENT — PAIN DESCRIPTION - FREQUENCY: FREQUENCY: CONTINUOUS

## 2020-09-21 ASSESSMENT — PAIN DESCRIPTION - LOCATION: LOCATION: ABDOMEN

## 2020-09-21 ASSESSMENT — PAIN SCALES - GENERAL
PAINLEVEL_OUTOF10: 8
PAINLEVEL_OUTOF10: 8
PAINLEVEL_OUTOF10: 7
PAINLEVEL_OUTOF10: 4
PAINLEVEL_OUTOF10: 4
PAINLEVEL_OUTOF10: 8

## 2020-09-21 ASSESSMENT — PAIN DESCRIPTION - PROGRESSION
CLINICAL_PROGRESSION: GRADUALLY IMPROVING
CLINICAL_PROGRESSION: GRADUALLY IMPROVING

## 2020-09-21 ASSESSMENT — PAIN DESCRIPTION - ORIENTATION: ORIENTATION: RIGHT;UPPER

## 2020-09-21 NOTE — HOME CARE
[facility] CARE IV PRICING:    PRICE QUOTE GIVEN TO: INTAKE  ORDERED THERAPY AT TIME OF QUOTE: UNASYN 3GM IV EVERY 6 HOURS  COVERAGE: MPD  COPAY FOR MED AND PER [employer] MEDICARE GUIDELINES  TOTAL PT RESPONSIBILITY: $698.25 PER WEEK. Allyson Acosta LPN, [address] [facility] 9.

## 2020-09-21 NOTE — PROGRESS NOTES
Hospital Medicine    Subjective:  Pt seen this am c/o ruq abd pain      Current Facility-Administered Medications:     ampicillin-sulbactam (UNASYN) 3 g ivpb minibag, 3 g, Intravenous, Q6H, Osorio Anthony MD, Stopped at 09/21/20 1004    dextrose 5 % and 0.45 % NaCl with KCl 20 mEq infusion, , Intravenous, Continuous, Osorio Anthony MD, Last Rate: 130 mL/hr at 09/21/20 0810    sodium chloride flush 0.9 % injection 10 mL, 10 mL, Intravenous, 2 times per day, April Rob-Pearland, APRN - CNP, 10 mL at 09/21/20 3773    sodium chloride flush 0.9 % injection 10 mL, 10 mL, Intravenous, PRN, April Uriah, APRN - CNP    acetaminophen (TYLENOL) tablet 650 mg, 650 mg, Oral, Q6H PRN, 650 mg at 09/21/20 0521 **OR** acetaminophen (TYLENOL) suppository 650 mg, 650 mg, Rectal, Q6H PRN, April Rob-Jhonatan, APRN - CNP    polyethylene glycol (GLYCOLAX) packet 17 g, 17 g, Oral, Daily PRN, April Rob-Jhonatan, APRN - CNP    trimethobenzamide Niecy Kiersten) injection 200 mg, 200 mg, Intramuscular, Q6H PRN, April Storey-Jhonatan, APRN - CNP    aspirin chewable tablet 81 mg, 81 mg, Oral, Daily, April Storey-Pearland, APRN - CNP, 81 mg at 09/21/20 6678    atorvastatin (LIPITOR) tablet 40 mg, 40 mg, Oral, Nightly, April Storey-Pearland, APRN - CNP, 40 mg at 09/20/20 2013    levothyroxine (SYNTHROID) tablet 137 mcg, 137 mcg, Oral, Daily, April Storey-Jhonatan, APRN - CNP, 137 mcg at 09/21/20 9091    therapeutic multivitamin-minerals 1 tablet, 1 tablet, Oral, Daily, April Storey-Pearland, APRN - CNP, 1 tablet at 09/21/20 0807    perflutren lipid microspheres (DEFINITY) injection 1.65 mg, 1.5 mL, Intravenous, ONCE PRN, Satish Umana PA-C    metoprolol succinate (TOPROL XL) extended release tablet 50 mg, 50 mg, Oral, Daily, Lizzie Mcelroy MD, 50 mg at 09/21/20 0806    nitroglycerin (NITRO-BID) 2 % ointment 1 inch, 1 inch, Topical, 4 times per day, Lizzie Mcelroy MD, 1 inch at 09/21/20 0515    heparin (porcine) injection 5,000 Units, 5,000 Units, Subcutaneous, 3 times per day, Kurtis Diaz MD, 5,000 Units at 09/21/20 0515    amLODIPine (NORVASC) tablet 5 mg, 5 mg, Oral, Daily, Kurtis Diaz MD, 5 mg at 09/21/20 0806    hydrALAZINE (APRESOLINE) injection 10 mg, 10 mg, Intravenous, Q6H PRN, Otis Castillo MD    Objective:    /66   Pulse 86   Temp 97.1 °F (36.2 °C) (Temporal)   Resp 18   Ht 6' (1.829 m)   Wt 199 lb 3.2 oz (90.4 kg)   SpO2 97%   BMI 27.02 kg/m²     Heart:  Reg  Lungs:  CTA bilaterally, no wheeze, rales or rhonchi  Abd: bowel sounds present, trnder to palp ruq / ostomy intact  Extrem:  No clubbing, cyanosis, or edema    CBC with Differential:    Lab Results   Component Value Date    WBC 9.5 09/21/2020    RBC 4.18 09/21/2020    HGB 12.5 09/21/2020    HCT 35.8 09/21/2020     09/21/2020    MCV 85.6 09/21/2020    MCH 29.9 09/21/2020    MCHC 34.9 09/21/2020    RDW 12.7 09/21/2020    LYMPHOPCT 12.9 09/19/2020    MONOPCT 7.7 09/19/2020    BASOPCT 0.2 09/19/2020    MONOSABS 0.96 09/19/2020    LYMPHSABS 1.60 09/19/2020    EOSABS 0.03 09/19/2020    BASOSABS 0.02 09/19/2020     CMP:    Lab Results   Component Value Date     09/21/2020    K 3.6 09/21/2020    CL 97 09/21/2020    CO2 23 09/21/2020    BUN 12 09/21/2020    CREATININE 0.9 09/21/2020    GFRAA >60 09/21/2020    LABGLOM >60 09/21/2020    GLUCOSE 149 09/21/2020    PROT 7.0 09/21/2020    LABALBU 3.5 09/21/2020    CALCIUM 9.1 09/21/2020    BILITOT 1.2 09/21/2020    ALKPHOS 100 09/21/2020    AST 18 09/21/2020    ALT 15 09/21/2020     Warfarin PT/INR:    Lab Results   Component Value Date    INR 1.0 09/20/2020    INR 1.0 09/19/2020    PROTIME 11.8 09/20/2020    PROTIME 12.3 09/19/2020       Assessment:    Acute cholecystitis    Plan:  Cont as per surgery  Cont ivf Shira Liu  12:03 PM  9/21/2020

## 2020-09-21 NOTE — PROCEDURES
CARDIAC CATHETERIZATION REPORT    : Kalpana Mancilla MD     Date of procedure: 09/21/20     Indication:  1. Dynamic EKG changes in the anterolateral leads  2. Preop colon Surgery    Procedures:  Left heart catheterization and Coronary angiogram     Sedation/analgesia:  Midazolam IV and Fentanyl IV     Time sedation was administered: 1650. I was present in the room when sedation was administered. Procedure end time: 1700  Time spent with face to face monitoring of moderate sedation: 10 minutes    Brief history:  73-year-old  male with history of hypertension, colon cancer status post hemicolectomy with colostomy and chemotherapy who is scheduled for colostomy reversal.  He was admitted with acute calculus cholecystitis and was noted to have profound downsloping ST depressions in the anterolateral leads in the setting of high blood pressure. Description of procedure: The patient presented to the Cath Lab in a(n) elective fashion. The patient was prepped and draped in a sterile manner. Timeout, airway and ASA assessment were completed. Local anesthesia with 1% lidocaine was administered and sedation/analgesia were administered as above. Access was obtained using the modified Seldinger technique and a micropuncture needle in the right radial artery. A 6Fshort sheath was inserted over a wire. Diagnostic angiography was performed using 5F Chintan diagnostic catheters. Coronary anatomy:  Dominance: Right  1. Left main: Angiographically normal  2. LAD: Large vessel. There is a long segment of stenosis in the proximal to mid LAD with heavy calcification and no significant tortuosity approaching 95% stenosis in 2 different foci. After the second diagonal which is a small to medium size vessel there is a focal lesion with about 50% in a short tortuous segment of the distal LAD  3. Left circumflex: Large vessel with a large bifurcating OM. Mild diffuse disease  4.  RCA: Mild diffuse

## 2020-09-21 NOTE — PROGRESS NOTES
Inpatient Cardiology Follow-up      Reason for Consult:  Abnormal EKG    Interval HPI:    I saw him this morning. He was doing okay. No significant events overnight. No chest pain or shortness of breath. Labs are stable. PHYSICAL EXAM:  /66   Pulse 86   Temp 97.1 °F (36.2 °C) (Temporal)   Resp 18   Ht 6' (1.829 m)   Wt 199 lb 3.2 oz (90.4 kg)   SpO2 97%   BMI 27.02 kg/m²     General Appearance:    Alert, cooperative, no distress, appears stated age    Head:    Normocephalic, without obvious abnormality, atraumatic    Eyes:    PERRL, conjunctiva/corneas clear, EOM's intact    Neck:   Supple, symmetrical, trachea midline; no carotid    bruit or JVP    Lungs:     Clear to auscultation bilaterally, respirations unlabored, no wheezing, rales or rhonchi    Heart:    Regular rate and rhythm, no murmur, rub   or gallop    Abdomen:     Soft, non-tender, non-distended    Extremities:   Extremities normal, atraumatic, no cyanosis or edema    Skin:   Skin color, texture, turgor normal for age    Neurologic:   Oriented x3, CNII-XII intact. Normal gross strength and sensation       DATA:      Intake/Output Summary (Last 24 hours) at 9/21/2020 0759  Last data filed at 9/21/2020 0559  Gross per 24 hour   Intake 480 ml   Output 400 ml   Net 80 ml       Labs:   CBC:   Recent Labs     09/19/20  2207 09/20/20  0644   WBC 13.2* 10.3   HGB 13.7 13.8   HCT 38.9 39.5    185     BMP:   Recent Labs     09/20/20  0644 09/21/20  0157    133   K 3.8 3.5   CO2 25 23   BUN 8 11   CREATININE 0.8 0.8   LABGLOM >60 >60   CALCIUM 9.2 8.9     Mag: No results for input(s): MG in the last 72 hours. Phos: No results for input(s): PHOS in the last 72 hours. TSH: No results for input(s): TSH in the last 72 hours. HgA1c:   Lab Results   Component Value Date    LABA1C 5.4 09/20/2020     No results found for: EAG  proBNP: No results for input(s): PROBNP in the last 72 hours.   PT/INR:   Recent Labs 09/19/20 2001 09/20/20  0645   PROTIME 12.3 11.8   INR 1.0 1.0     APTT:  Recent Labs     09/20/20  0645 09/21/20  0157   APTT 36.0* 29.5     CARDIAC ENZYMES:  Recent Labs     09/19/20 2001 09/20/20  0159 09/20/20  0644 09/20/20  1426 09/20/20  1947 09/21/20  0157   CKTOTAL  --   --  70 66  --   --   --    CKMB  --    < > 2.2 2.5 1.8 1.1 <1.0   TROPONINI <0.01  --  0.01 <0.01  --   --   --     < > = values in this interval not displayed. FASTING LIPID PANEL:  Lab Results   Component Value Date    CHOL 170 09/20/2020    HDL 77 09/20/2020    LDLCALC 75 09/20/2020    TRIG 92 09/20/2020     LIVER PROFILE:  Recent Labs     09/20/20  0644 09/21/20  0157   AST 20 19   ALT 16 16   LABALBU 4.0 3.5     I personally reviewed his EKG dated 9/19/2020 which showed sinus tachycardia at 106 bpm with marked downsloping ST depressions in the anterolateral leads. I personally reviewed his EKG dated 9/21/2020 which showed resolution of the ST changes    I personally reviewed his CT chest dated 10/16/2019 which showed severe left main into the LAD calcific plaque. ASSESSMENT:  80 atrial  male on whom we were consulted for abnormal EKG in the setting of severe hypertension to 014 systolic without chest pain or elevated biomarkers. .  He additionally has history of colon cancer status post hemicolectomy with colostomy and chemotherapy. He is scheduled for colostomy reversal on October 2. He is also now diagnosed with acute calculous cholecystitis and is pending MRCP to rule out CBD gallstone. 1. Dynamic anterolateral ST depressions in the setting of hypertension, ruled out by biomarkers  2. Significant calcific CAD in the left main into LAD on chest CT  3. Acute cholecystitis  4.  Preoperative evaluation for colostomy reversal planned for October 2    PLAN:   Coronary angiogram and revascularization as needed before planned surgery   Continue aspirin 81 mg daily   Continue high intensity statin with atorvastatin

## 2020-09-21 NOTE — PROGRESS NOTES
Occupational Therapy  OCCUPATIONAL THERAPY INITIAL EVALUATION      Date:2020  Patient Name: Warden Marroquin  MRN: [id number]  : 1940  Room: [address] [place], [place] [address]    Evaluating OT: Lilo Bautista OTR/MIREILLE #[id number]    Referring physician: SEBASTIEN Bustillos - CNP   AM-PAC Daily Activity Raw Score: 1824  Recommended Adaptive Equipment: TBD     Diagnosis: Acute cholecystitis   Pertinent Medical History/Surgery: colonoscopy, laparotomy exploratory large bowl resection with creation of colostomy, prostate cancer, HLD, Malignant neoplasm of splenic flexure, Thyroid disease    Precautions:  Falls, tele, abdominal protection     Home Living: Pt lives with wife in a 2 story home with 4-5 steps to enter with B railing. Patient main bedroom and full bathroom are located on second level with full flight of stairs with railing. Patient has additional bathroom in basement. Bathroom setup: Walk-in shower with bench   Equipment owned: cane, reacher, sock-aid  Prior Level of Function:   I/mod I with ADLs, including management of colostomy bag,  I with IADLs; using no for functional mobility with patient reporting occasional use of cane.  Patient's grandchildren are available to assist. Patient is caregiver of wife who patient reports has multiple limitations due to health/  Driving: Active                             Occupation: Retired  [employer], [employer]    Pain Level: 10/10 abdominal pain with movement  Cognition: A&O: self:yes, place:yes, time: yes, situation:yes  Follows 3 step directions   Memory:  good    Sequencing:  good    Problem solving:  fair +   Judgement/safety:  fair +     Functional Assessment:   Initial Eval Status  Date: 20 Treatment Status  Date: Short Term Goals=LTG  Treatment frequency: PRN 1-3 x/week   Feeding Set-up   I   Grooming Set-up/ SBA  Mod I    UB Dressing SBA  Mod I   LB Dressing Supervision  Completed simulated LB dressing to doff/d on socks with verbal cues for technique  Mod I    Bathing Min A  Mod I    Toileting Min A    Mod I   Bed Mobility  Supine to sit: Supervision   Sit to supine: Supervision  Verbal cues for technique for modified log roll for pain management  Supine to sit: Mod I   Sit to supine: Mod I   Functional Transfers Sit to stand: Supervision  Stand to sit:  Supervision    Mod I using LRD   Functional Mobility Supervision  Completed functional mobility around room to simulate functional household distance using fww with supervision. Patient required ~2 standing rest breaks due to pain  Mod I using LRD   Balance Sitting:     Static:  SBA    Dynamic:Supervision  Standing: Supervision     Activity Tolerance Fair-  Good   Visual/  Perceptual Glasses: yes, donned during session         Safety       Fair+        Good     Upper Extremities:    Treatment Status  Date: Short Term Goals=LTG   B UE UE ROM:   RUE:  WFL  LUE:  WFL, slight B shoulder active ROM limitation due to pain    Strength: NT for pain management     Strength: B 4-/5           Coordination Fine Motor Coordination:  WFL      Gross Motor Coordination:   WFL                        Hand dominance: R handed    Hearing: WFL  Sensation:  No c/o numbness or tingling  Tone:  WFL  Edema: none observed B UE                            Comments: Nursing approved OT session. Upon arrival, patient semi-supine in bed and agreeable to session. OT evaluation performed with education provided regarding the purpose and benefits of OT session, along with mobility and I/ADL completion. Patient limited at this time due to R abdominal pain At end of session, patient semi-supine in bed with call light and phone within reach, along with all lines and tubes intact.    Treatment: OT treatment provided this date includes:      Mobility training-  Instruction/training on safety and improved independence with bed mobility with supervision utilizing modified log roll with verbal cues for technique /functional transfers with supervision and functional mobility with supervision using fww. Patient completed functional mobility around room to simulate household distance requiring 2 standing rest breaks secondary to pain.   Activity tolerance- Instruction/training on energy conservation/work simplification for completion of ADLs: Patient and therapist discussed modified techniques for I/ADLs and functional mobility for pain management and abdominal protection. Patient verbalized good understanding. Will continue to reinforce. Patient would  benefit from continued skilled OT during hospitalization to maximize functional outcomes as they relate to I/ADLs and functional mobility. Eval Complexity: Mod  · Evaluation Complexity: Mod Complexity  ? History: Expanded review of medical records and additional review of physical, cognitive, or psychosocial history related to current functional performance  ? Exam: 5+ performance deficits  ? Assistance/Modification: mod/max assistance or modifications required to perform tasks. May have comorbidities that affect occupational performance.       Assessment of current deficits   Functional mobility [x]  ADLs [x] Strength [x]  Cognition []  Functional transfers  [x] IADLs [x] Safety Awareness [x]  Endurance/Activity tolerance [x]  Fine Motor Coordination [] Balance [x] Vision/perception [] Sensation []   Gross Motor Coordination [] ROM [] Delirium []                  Motor Control []    Plan of Care:  OT 1-3x/week for 5-7 days PRN   [x] ADL retraining/AE recommendations to maximize patient safety and performance with self-care   [x] Energy Conservation Techniques/Strategies      [] Neuromuscular Re-Education     [x] Functional Transfer Training to maximize safety utilizing LRD          [x] Functional Mobility Training to maximize safety utilizing LRD       [] Cognitive Re-Training         [] Splinting/Positioning Needs           [x] Therapeutic Activity to improve activity tolerance for functional activities and ADLs    [x]Therapeutic Exercise to improve UE strength for functional transfers and ADLs   [] Visual/Perceptual   [x] Delirium Prevention/Treatment to maximize functional outcomes  [x] Positioning to Improve Functional [place], Safety, and Skin Integrity   [x] Patient and/or Family Education to Increase Safety and Functional [place]   [] Other:     Rehab Potential: Good for established goals    Patient / Family Goal:  To return home    Evaluation time includes thorough review of current medical information, gathering information on past medical & social history & PLOF, completion of standardized testing, informal observation of tasks, consultation with other medical professions/disciplines, assessment of data & development of POC/goals. Patient and/or family were instructed diagnosis, prognosis/goals and plan of care. yes Demonstrated  understanding. Time In: 09:52  Time Out: 10:16  Total Treatment Time: 24 minutes   Min Units   OT Eval Low 91913     OT Eval Medium 51915  1   OT Eval High 65270     OT Re-Eval [id number]     Therapeutic Ex 79351     Therapeutic Activities 53324 10 1   ADL/Self Care 54034     Orthotic Management 13449     Neuro Re-Ed 44396     Non-Billable Time     TOTAL TIMED TREATMENT 10      [] Malnutrition indicators have been identified and nursing has been notified to ensure a dietitian consult is ordered.       Mod OT Evaluation + 10  treatment minutes    Rosemarie Infante OTR/MIREILLE #[id number]

## 2020-09-21 NOTE — PLAN OF CARE
Problem: Falls - Risk of:  Goal: Will remain free from falls  Description: Will remain free from falls  9/21/2020 0241 by Georgia Samayoa RN  Outcome: Met This Shift  9/20/2020 1858 by Rafita Parham RN  Outcome: Met This Shift  Goal: Absence of physical injury  Description: Absence of physical injury  9/21/2020 0241 by Georgia Samayoa RN  Outcome: Met This Shift  9/20/2020 1858 by Rafita Parham RN  Outcome: Met This Shift     Problem: Pain:  Goal: Pain level will decrease  Description: Pain level will decrease  9/21/2020 0241 by Georgia Samayoa RN  Outcome: Ongoing  9/20/2020 1858 by Rafita Parham RN  Outcome: Met This Shift  Goal: Control of acute pain  Description: Control of acute pain  9/21/2020 0241 by Georgia Samayoa RN  Outcome: Ongoing  9/20/2020 1858 by Rafita Parham RN  Outcome: Met This Shift  Goal: Control of chronic pain  Description: Control of chronic pain  9/21/2020 0241 by Georgia Samayoa RN  Outcome: Ongoing  9/20/2020 1858 by Rafita Parham RN  Outcome: Met This Shift

## 2020-09-21 NOTE — PROGRESS NOTES
goals. Patient and family education will also be administered as needed. Frequency of treatments: 2-5x/week x 1-2 weeks. Time in  0932  Time out  0952    Total Treatment Time  10 minutes     Evaluation Time includes thorough review of current medical information, gathering information on past medical history/social history and prior level of function, completion of standardized testing/informal observation of tasks, assessment of data and education on plan of care and goals.     CPT codes:  [x] Low Complexity PT evaluation 63357  [] Moderate Complexity PT evaluation 05989  [] High Complexity PT evaluation 97180  [] PT Re-evaluation 47115  [] Gait training 58889 - minutes  [] Manual therapy 64237 - minutes  [x] Therapeutic activities 90197 10 minutes  [] Therapeutic exercises 99207 - minutes  [] Neuromuscular reeducation 35679 - minutes     Mary Bellamy, PT, DPT  License [id number]

## 2020-09-21 NOTE — PROGRESS NOTES
[facility]  ATTENDING PHYSICIAN PROGRESS NOTE     I have examined the patient and  reviewed the record. I have reviewed all relevant labs and imaging data. The following summarizes my clinical findings and independent assessment. CC: Right upper quadrant pain    S. Patient still complains of right upper quadrant tenderness. O.  Vitals:    09/21/20 0745   BP: 125/66   Pulse: 86   Resp: 18   Temp: 97.1 °F (36.2 °C)   SpO2: 97%   PHYSICAL EXAM   PSYCH: mood and affect normal, alert and oriented x 3  CONSTITUTIONAL: No apparent distress, comfortable  EYES: Sclera white, pupils equal round and reactive to light  ENMT:  Hearing normal, trachea midline, ears externally intact  RESP: Breath sounds were clear and equal with no rales, wheezes, or rhonchi. Respiratory effort was normal with no retractions or use of accessory muscles. CV: Heart sounds were normal with a regular rate and rhythm. No pedal edema  GI/ Abdomen: The abdomen was soft and non distended. There was right upper quadrant tenderness. Colostomy in right upper quadrant is pink and functioning        ASSESSMENT:  Principal Problem:    NSTEMI (non-ST elevated myocardial infarction) ([facility] [address]  Active Problems:    Acute cholecystitis    HLD (hyperlipidemia)    HTN (hypertension)    Hypothyroidism    Former smoker    History of prostate cancer    Leukocytosis  Resolved Problems:    * No resolved hospital problems. *       PLAN:  Right upper quadrant pain--large stone is seen in gallbladder neck. This is likely causing his pain. Colostomy reversal--scheduled for October 2 with Dr. Uma Mejia    My recommendation would be to try and treat his gallbladder nonoperatively. With the prior colostomy site in the right upper quadrant, there is a high probability that his gallbladder removal would not be able to be done laparoscopically.     My plan is to try and treat his biliary symptoms nonoperatively and plan on having his gallbladder removed at the same time as his colostomy reversal.    Await MRCP  Continue antibiotics             Radha Ba MD, FACS  9/21/2020  11:48 AM    NOTE: This report was transcribed using voice recognition software. Every effort was made to ensure accuracy; however, inadvertent computerized transcription errors may be present.

## 2020-09-21 NOTE — CARE COORDINATION
Met with patient at bedside  to discuss transition of care. He lives in a 2 story home with no bath on frst floor. He lives with his spouse an has supportive children. His son will transport at discharge. He is aware that he is on IV antibiotics and there is no duration listed and  He will need PT/OT  Patient would like [place] [address] and has had [facility] in the past and would like to use again. Referral given to DOMINGA TRINIDAD at [facility] and she will investigate IV coverage/pricing for home. If patient has no home iv coverage, he would need to go to [facility] if IV antibiotics continue as same, every 6 hrs. Patient chose [facility] as it is close to home. Referral called to facility and are unable to accept due to bed lockdown. [facility] informed CM  that they are unable to service until Friday. List of [place] [address] agencies and [facility] facilities given to patient and he will review with his spouse tony. Await duration of iv antibiotics to determine appropriate discharge plan. CM/SW will continue to follow. The Plan for Transition of Care is related to the following treatment goals: medical stability    The Patient and/or patient representative  was provided with a choice of provider and agrees   with the discharge plan. [x] Yes [] No    Freedom of choice list was provided with basic dialogue that supports the patient's individualized plan of care/goals, treatment preferences and shares the quality data associated with the providers.  [x] Yes [] No

## 2020-09-22 ENCOUNTER — APPOINTMENT (OUTPATIENT)
Dept: CT IMAGING | Age: 80
DRG: 247 | End: 2020-09-22
Attending: INTERNAL MEDICINE
Payer: MEDICARE

## 2020-09-22 ENCOUNTER — APPOINTMENT (OUTPATIENT)
Dept: NUCLEAR MEDICINE | Age: 80
DRG: 247 | End: 2020-09-22
Attending: INTERNAL MEDICINE
Payer: MEDICARE

## 2020-09-22 ENCOUNTER — APPOINTMENT (OUTPATIENT)
Dept: MRI IMAGING | Age: 80
DRG: 247 | End: 2020-09-22
Attending: INTERNAL MEDICINE
Payer: MEDICARE

## 2020-09-22 LAB
ALBUMIN SERPL-MCNC: 3.4 G/DL (ref 3.5–5.2)
ALP BLD-CCNC: 95 U/L (ref 40–129)
ALT SERPL-CCNC: 12 U/L (ref 0–40)
ANION GAP SERPL CALCULATED.3IONS-SCNC: 11 MMOL/L (ref 7–16)
APTT: 31.9 SEC (ref 24.5–35.1)
AST SERPL-CCNC: 17 U/L (ref 0–39)
BILIRUB SERPL-MCNC: 0.7 MG/DL (ref 0–1.2)
BILIRUBIN DIRECT: <0.2 MG/DL (ref 0–0.3)
BILIRUBIN, INDIRECT: ABNORMAL MG/DL (ref 0–1)
BUN BLDV-MCNC: 7 MG/DL (ref 8–23)
CALCIUM SERPL-MCNC: 9 MG/DL (ref 8.6–10.2)
CHLORIDE BLD-SCNC: 104 MMOL/L (ref 98–107)
CO2: 25 MMOL/L (ref 22–29)
CREAT SERPL-MCNC: 0.8 MG/DL (ref 0.7–1.2)
EKG ATRIAL RATE: 73 BPM
EKG P AXIS: 49 DEGREES
EKG P-R INTERVAL: 158 MS
EKG Q-T INTERVAL: 382 MS
EKG QRS DURATION: 86 MS
EKG QTC CALCULATION (BAZETT): 420 MS
EKG R AXIS: 22 DEGREES
EKG T AXIS: 51 DEGREES
EKG VENTRICULAR RATE: 73 BPM
GFR AFRICAN AMERICAN: >60
GFR NON-AFRICAN AMERICAN: >60 ML/MIN/1.73
GLUCOSE BLD-MCNC: 114 MG/DL (ref 74–99)
HCT VFR BLD CALC: 34.4 % (ref 37–54)
HEMOGLOBIN: 12 G/DL (ref 12.5–16.5)
INR BLD: 1.2
MCH RBC QN AUTO: 30.4 PG (ref 26–35)
MCHC RBC AUTO-ENTMCNC: 34.9 % (ref 32–34.5)
MCV RBC AUTO: 87.1 FL (ref 80–99.9)
PDW BLD-RTO: 12.8 FL (ref 11.5–15)
PLATELET # BLD: 189 E9/L (ref 130–450)
PMV BLD AUTO: 9.1 FL (ref 7–12)
POTASSIUM REFLEX MAGNESIUM: 4 MMOL/L (ref 3.5–5)
PROTHROMBIN TIME: 13.3 SEC (ref 9.3–12.4)
RBC # BLD: 3.95 E12/L (ref 3.8–5.8)
SODIUM BLD-SCNC: 140 MMOL/L (ref 132–146)
TOTAL PROTEIN: 6.6 G/DL (ref 6.4–8.3)
WBC # BLD: 7.1 E9/L (ref 4.5–11.5)

## 2020-09-22 PROCEDURE — 87186 SC STD MICRODIL/AGAR DIL: CPT

## 2020-09-22 PROCEDURE — 80048 BASIC METABOLIC PNL TOTAL CA: CPT

## 2020-09-22 PROCEDURE — 80076 HEPATIC FUNCTION PANEL: CPT

## 2020-09-22 PROCEDURE — 2580000003 HC RX 258: Performed by: STUDENT IN AN ORGANIZED HEALTH CARE EDUCATION/TRAINING PROGRAM

## 2020-09-22 PROCEDURE — 0F9430Z DRAINAGE OF GALLBLADDER WITH DRAINAGE DEVICE, PERCUTANEOUS APPROACH: ICD-10-PCS | Performed by: STUDENT IN AN ORGANIZED HEALTH CARE EDUCATION/TRAINING PROGRAM

## 2020-09-22 PROCEDURE — 2500000003 HC RX 250 WO HCPCS: Performed by: STUDENT IN AN ORGANIZED HEALTH CARE EDUCATION/TRAINING PROGRAM

## 2020-09-22 PROCEDURE — 6370000000 HC RX 637 (ALT 250 FOR IP): Performed by: NURSE PRACTITIONER

## 2020-09-22 PROCEDURE — 2709999900 CT PTC NEW ACCESS

## 2020-09-22 PROCEDURE — 87205 SMEAR GRAM STAIN: CPT

## 2020-09-22 PROCEDURE — 6360000004 HC RX CONTRAST MEDICATION: Performed by: RADIOLOGY

## 2020-09-22 PROCEDURE — 93005 ELECTROCARDIOGRAM TRACING: CPT | Performed by: PHYSICIAN ASSISTANT

## 2020-09-22 PROCEDURE — A9579 GAD-BASE MR CONTRAST NOS,1ML: HCPCS | Performed by: RADIOLOGY

## 2020-09-22 PROCEDURE — 6370000000 HC RX 637 (ALT 250 FOR IP): Performed by: INTERNAL MEDICINE

## 2020-09-22 PROCEDURE — 87070 CULTURE OTHR SPECIMN AEROBIC: CPT

## 2020-09-22 PROCEDURE — 99232 SBSQ HOSP IP/OBS MODERATE 35: CPT | Performed by: SURGERY

## 2020-09-22 PROCEDURE — 2500000003 HC RX 250 WO HCPCS: Performed by: RADIOLOGY

## 2020-09-22 PROCEDURE — 87075 CULTR BACTERIA EXCEPT BLOOD: CPT

## 2020-09-22 PROCEDURE — 78226 HEPATOBILIARY SYSTEM IMAGING: CPT

## 2020-09-22 PROCEDURE — 6360000002 HC RX W HCPCS: Performed by: INTERNAL MEDICINE

## 2020-09-22 PROCEDURE — 85610 PROTHROMBIN TIME: CPT

## 2020-09-22 PROCEDURE — 87077 CULTURE AEROBIC IDENTIFY: CPT

## 2020-09-22 PROCEDURE — 85027 COMPLETE CBC AUTOMATED: CPT

## 2020-09-22 PROCEDURE — 6360000002 HC RX W HCPCS: Performed by: STUDENT IN AN ORGANIZED HEALTH CARE EDUCATION/TRAINING PROGRAM

## 2020-09-22 PROCEDURE — A9537 TC99M MEBROFENIN: HCPCS | Performed by: RADIOLOGY

## 2020-09-22 PROCEDURE — 6360000002 HC RX W HCPCS: Performed by: RADIOLOGY

## 2020-09-22 PROCEDURE — 85730 THROMBOPLASTIN TIME PARTIAL: CPT

## 2020-09-22 PROCEDURE — 2580000003 HC RX 258: Performed by: INTERNAL MEDICINE

## 2020-09-22 PROCEDURE — 2580000003 HC RX 258: Performed by: NURSE PRACTITIONER

## 2020-09-22 PROCEDURE — 36415 COLL VENOUS BLD VENIPUNCTURE: CPT

## 2020-09-22 PROCEDURE — 3430000000 HC RX DIAGNOSTIC RADIOPHARMACEUTICAL: Performed by: RADIOLOGY

## 2020-09-22 PROCEDURE — 74183 MRI ABD W/O CNTR FLWD CNTR: CPT

## 2020-09-22 PROCEDURE — 2060000000 HC ICU INTERMEDIATE R&B

## 2020-09-22 RX ORDER — ACETAMINOPHEN 325 MG/1
650 TABLET ORAL EVERY 4 HOURS PRN
Status: DISCONTINUED | OUTPATIENT
Start: 2020-09-22 | End: 2020-09-26 | Stop reason: HOSPADM

## 2020-09-22 RX ORDER — SODIUM CHLORIDE 0.9 % (FLUSH) 0.9 %
10 SYRINGE (ML) INJECTION EVERY 12 HOURS SCHEDULED
Status: DISCONTINUED | OUTPATIENT
Start: 2020-09-22 | End: 2020-09-25 | Stop reason: SDUPTHER

## 2020-09-22 RX ORDER — LIDOCAINE HYDROCHLORIDE 20 MG/ML
INJECTION, SOLUTION INFILTRATION; PERINEURAL
Status: COMPLETED | OUTPATIENT
Start: 2020-09-22 | End: 2020-09-22

## 2020-09-22 RX ORDER — FENTANYL CITRATE 50 UG/ML
INJECTION, SOLUTION INTRAMUSCULAR; INTRAVENOUS
Status: COMPLETED | OUTPATIENT
Start: 2020-09-22 | End: 2020-09-22

## 2020-09-22 RX ORDER — SODIUM CHLORIDE 0.9 % (FLUSH) 0.9 %
10 SYRINGE (ML) INJECTION PRN
Status: DISCONTINUED | OUTPATIENT
Start: 2020-09-22 | End: 2020-09-25 | Stop reason: SDUPTHER

## 2020-09-22 RX ORDER — MIDAZOLAM HYDROCHLORIDE 1 MG/ML
INJECTION INTRAMUSCULAR; INTRAVENOUS
Status: COMPLETED | OUTPATIENT
Start: 2020-09-22 | End: 2020-09-22

## 2020-09-22 RX ADMIN — SODIUM CHLORIDE 3 G: 900 INJECTION INTRAVENOUS at 08:59

## 2020-09-22 RX ADMIN — SODIUM CHLORIDE, PRESERVATIVE FREE 10 ML: 5 INJECTION INTRAVENOUS at 09:00

## 2020-09-22 RX ADMIN — Medication 10 MILLICURIE: at 12:51

## 2020-09-22 RX ADMIN — GADOTERIDOL 19 ML: 279.3 INJECTION, SOLUTION INTRAVENOUS at 12:35

## 2020-09-22 RX ADMIN — NITROGLYCERIN 1 INCH: 20 OINTMENT TOPICAL at 17:32

## 2020-09-22 RX ADMIN — POTASSIUM CHLORIDE, DEXTROSE MONOHYDRATE AND SODIUM CHLORIDE: 150; 5; 450 INJECTION, SOLUTION INTRAVENOUS at 01:15

## 2020-09-22 RX ADMIN — SODIUM CHLORIDE 3 G: 900 INJECTION INTRAVENOUS at 21:20

## 2020-09-22 RX ADMIN — HEPARIN SODIUM 5000 UNITS: 10000 INJECTION INTRAVENOUS; SUBCUTANEOUS at 21:17

## 2020-09-22 RX ADMIN — LIDOCAINE HYDROCHLORIDE 10 ML: 20 INJECTION, SOLUTION INFILTRATION; PERINEURAL at 16:05

## 2020-09-22 RX ADMIN — MULTIPLE VITAMINS W/ MINERALS TAB 1 TABLET: TAB at 09:00

## 2020-09-22 RX ADMIN — MIDAZOLAM 1 MG: 1 INJECTION INTRAMUSCULAR; INTRAVENOUS at 15:43

## 2020-09-22 RX ADMIN — ACETAMINOPHEN 650 MG: 325 TABLET, FILM COATED ORAL at 21:16

## 2020-09-22 RX ADMIN — AMLODIPINE BESYLATE 5 MG: 5 TABLET ORAL at 09:00

## 2020-09-22 RX ADMIN — FENTANYL CITRATE 50 MCG: 50 INJECTION, SOLUTION INTRAMUSCULAR; INTRAVENOUS at 15:43

## 2020-09-22 RX ADMIN — SODIUM CHLORIDE 3 G: 900 INJECTION INTRAVENOUS at 01:45

## 2020-09-22 RX ADMIN — HEPARIN SODIUM 5000 UNITS: 10000 INJECTION INTRAVENOUS; SUBCUTANEOUS at 05:33

## 2020-09-22 RX ADMIN — NITROGLYCERIN 1 INCH: 20 OINTMENT TOPICAL at 05:33

## 2020-09-22 RX ADMIN — ATORVASTATIN CALCIUM 40 MG: 40 TABLET, FILM COATED ORAL at 21:17

## 2020-09-22 RX ADMIN — SODIUM CHLORIDE, PRESERVATIVE FREE 10 ML: 5 INJECTION INTRAVENOUS at 21:17

## 2020-09-22 RX ADMIN — METOPROLOL SUCCINATE 50 MG: 50 TABLET, EXTENDED RELEASE ORAL at 09:00

## 2020-09-22 RX ADMIN — LEVOTHYROXINE SODIUM 137 MCG: 0.14 TABLET ORAL at 05:33

## 2020-09-22 RX ADMIN — POTASSIUM CHLORIDE, DEXTROSE MONOHYDRATE AND SODIUM CHLORIDE: 150; 5; 450 INJECTION, SOLUTION INTRAVENOUS at 09:08

## 2020-09-22 RX ADMIN — SODIUM CHLORIDE 3 G: 900 INJECTION INTRAVENOUS at 14:54

## 2020-09-22 ASSESSMENT — PAIN DESCRIPTION - PAIN TYPE
TYPE: ACUTE PAIN

## 2020-09-22 ASSESSMENT — PAIN SCALES - GENERAL
PAINLEVEL_OUTOF10: 4
PAINLEVEL_OUTOF10: 8
PAINLEVEL_OUTOF10: 4

## 2020-09-22 ASSESSMENT — PAIN DESCRIPTION - LOCATION
LOCATION: GENERALIZED
LOCATION: GENERALIZED

## 2020-09-22 ASSESSMENT — PAIN DESCRIPTION - PROGRESSION
CLINICAL_PROGRESSION: GRADUALLY IMPROVING
CLINICAL_PROGRESSION: GRADUALLY IMPROVING

## 2020-09-22 ASSESSMENT — PAIN - FUNCTIONAL ASSESSMENT: PAIN_FUNCTIONAL_ASSESSMENT: 0-10

## 2020-09-22 NOTE — PROGRESS NOTES
0533    heparin (porcine) injection 5,000 Units, 5,000 Units, Subcutaneous, 3 times per day, Matthieu Davila MD, 5,000 Units at 09/22/20 0533    amLODIPine (NORVASC) tablet 5 mg, 5 mg, Oral, Daily, Matthieu Davila MD, 5 mg at 09/21/20 0806    hydrALAZINE (APRESOLINE) injection 10 mg, 10 mg, Intravenous, Q6H PRN, Claudia Seaman MD    Objective:    /60   Pulse 70   Temp 98.6 °F (37 °C) (Temporal)   Resp 16   Ht 6' (1.829 m)   Wt 194 lb (88 kg)   SpO2 97%   BMI 26.31 kg/m²     Heart:  Reg  Lungs:  CTA bilaterally, no wheeze, rales or rhonchi  Abd: bowel sounds present, tender to palp ruq  Extrem:  No clubbing, cyanosis, or edema    CBC with Differential:    Lab Results   Component Value Date    WBC 7.1 09/22/2020    RBC 3.95 09/22/2020    HGB 12.0 09/22/2020    HCT 34.4 09/22/2020     09/22/2020    MCV 87.1 09/22/2020    MCH 30.4 09/22/2020    MCHC 34.9 09/22/2020    RDW 12.8 09/22/2020    LYMPHOPCT 12.9 09/19/2020    MONOPCT 7.7 09/19/2020    BASOPCT 0.2 09/19/2020    MONOSABS 0.96 09/19/2020    LYMPHSABS 1.60 09/19/2020    EOSABS 0.03 09/19/2020    BASOSABS 0.02 09/19/2020     CMP:    Lab Results   Component Value Date     09/22/2020    K 4.0 09/22/2020     09/22/2020    CO2 23 09/21/2020    BUN 7 09/22/2020    CREATININE 0.8 09/22/2020    GFRAA >60 09/22/2020    LABGLOM >60 09/22/2020    GLUCOSE 114 09/22/2020    PROT 6.6 09/22/2020    LABALBU 3.4 09/22/2020    CALCIUM 9.0 09/22/2020    BILITOT 0.7 09/22/2020    ALKPHOS 95 09/22/2020    AST 17 09/22/2020    ALT 12 09/22/2020     Warfarin PT/INR:    Lab Results   Component Value Date    INR 1.2 09/22/2020    INR 1.0 09/20/2020    INR 1.0 09/19/2020    PROTIME 13.3 (H) 09/22/2020    PROTIME 11.8 09/20/2020    PROTIME 12.3 09/19/2020       Assessment:    Principal Problem:    Acute cholecystitis  Active Problems:    HLD (hyperlipidemia)    HTN (hypertension)    Hypothyroidism    History of prostate cancer    Leukocytosis    History of colon cancer    S/P colostomy ([facility] [address]  Resolved Problems:    * No resolved hospital problems.  *  cad    Plan:  Discussed plan with surgery dr Jelani Samuel for ir drainage of gb then pci of shagufta Moreno  8:16 AM  9/22/2020

## 2020-09-22 NOTE — PLAN OF CARE
Problem: Falls - Risk of:  Goal: Will remain free from falls  Description: Will remain free from falls  9/22/2020 1745 by Sharita Mcmullen RN  Outcome: Met This Shift  9/22/2020 0432 by Daylin Fernandes RN  Outcome: Met This Shift  Goal: Absence of physical injury  Description: Absence of physical injury  9/22/2020 1745 by Sharita Mcmullen RN  Outcome: Met This Shift  9/22/2020 0432 by Daylin Fernandes RN  Outcome: Met This Shift     Problem: Pain:  Goal: Pain level will decrease  Description: Pain level will decrease  9/22/2020 1745 by Sharita Mcmullen RN  Outcome: Met This Shift  9/22/2020 0432 by Daylin Fernandes RN  Outcome: Ongoing  Goal: Control of acute pain  Description: Control of acute pain  9/22/2020 1745 by Sharita Mcmullen RN  Outcome: Met This Shift  9/22/2020 0432 by Daylin Fernandes RN  Outcome: Ongoing  Goal: Control of chronic pain  Description: Control of chronic pain  9/22/2020 1745 by Sharita Mcmullen RN  Outcome: Met This Shift  9/22/2020 0432 by Daylin Fernandes RN  Outcome: Ongoing     Problem: Skin Integrity:  Goal: Will show no infection signs and symptoms  Description: Will show no infection signs and symptoms  9/22/2020 1745 by Sharita Mcmullen RN  Outcome: Met This Shift  9/22/2020 0432 by Daylin Fernandes RN  Outcome: Met This Shift  Goal: Absence of new skin breakdown  Description: Absence of new skin breakdown  9/22/2020 1745 by Sharita Mcmullen RN  Outcome: Met This Shift  9/22/2020 0432 by Daylin Fernandes RN  Outcome: Met This Shift

## 2020-09-22 NOTE — PROGRESS NOTES
This is a 78-year-old male patient coming down to IR for ofelia tube related to the diagnosis of cholecystitis as ordered by Dr Ivon Cook. The patient's code status is full. Medical history, labs, and medications have been reviewed. Allergies were reviewed, including contrast, latex, and iodine. None are listed. The patient has been on aspirin 81mg with the last dose on 9/21 at 0806 and heparin SQ with the last dose on 9/22 at 0533. This will be reviewed and discussed with the physician and . In the meantime, I asked the floor to hold both the aspirin and the heparin doses today and we will let them know what Dr. Litzy Armando decides to do. The patient is not on oxygen. The patient has unasyn ordered with the last dose given on 9/22 at 0145 and the next dose due at 0800. The floor nurse was called for report but was unavailable at the moment. I spoke to another RN who states that the patient has been NPO since midnight, can provide their own consent, and can lay flat in the supine position for the procedure. RN to hold the next doses of blood thinners and keep the patient NPO until we speak with Dr Litzy Armando II. ID Band Check: Yes    ALLERGIES: Patient has no known allergies. LABS:   Lab Results   Component Value Date    INR 1.2 09/22/2020    PROTIME 13.3 (H) 09/22/2020           Lab Results   Component Value Date    CREATININE 0.8 09/22/2020    BUN 7 (L) 09/22/2020          Lab Results   Component Value Date    HGB 12.0 (L) 09/22/2020    HCT 34.4 (L) 09/22/2020     09/22/2020       The patient has 20 gauge IV placed for access in the left forearm and the left AC. Orders were placed as necessary.

## 2020-09-22 NOTE — PROGRESS NOTES
Patient arrived via patient cart to Radiology department for image guided . Allergies, home medications, H&P and fasting instructions reviewed with patient. Vital signs taken. IV flushed and prn adapter attached. Procedural instructions given, questions answered, understanding expressed and consent signed. Patient given fluoroscopy education, no questions at this time.

## 2020-09-22 NOTE — PROGRESS NOTES
[facility]  ATTENDING PHYSICIAN PROGRESS NOTE     I have examined the patient and  reviewed the record. I have reviewed all relevant labs and imaging data. The following summarizes my clinical findings and independent assessment. CC: Right upper quadrant pain    S. Patient still complains of right upper quadrant tenderness. Underwent heart cath that shows stenosis of LAD    O. Vitals:    09/22/20 1427   BP: (!) 166/79   Pulse: 67   Resp: 18   Temp: 99.3 °F (37.4 °C)   SpO2: 98%      PHYSICAL EXAM   PSYCH: mood and affect normal, alert and oriented x 3  CONSTITUTIONAL: No apparent distress, comfortable  EYES: Sclera white, pupils equal round and reactive to light  ENMT:  Hearing normal, trachea midline, ears externally intact  RESP: Breath sounds were clear and equal with no rales, wheezes, or rhonchi. Respiratory effort was normal with no retractions or use of accessory muscles. CV: Heart sounds were normal with a regular rate and rhythm. No pedal edema  GI/ Abdomen: The abdomen was soft and non distended. There wasRUQ  Tenderness; no guarding, rebound, or rigidity. Colostomy functional          ASSESSMENT:  Principal Problem:    Acute cholecystitis  Active Problems:    HLD (hyperlipidemia)    HTN (hypertension)    Hypothyroidism    History of prostate cancer    Leukocytosis    History of colon cancer    S/P colostomy ([facility] [address]  Resolved Problems:    * No resolved hospital problems. *       PLAN:  Acute Cholecystitis--Continue unasyn 3 g IV q6  Pt is currently not a candidate for surgery to reverse his colostomy and to have his gallbladder removal due to LAD blockage of 95%   We will consult IR for cholecystostomy tube to decompress billiary tree. Ok to start plavix tonight after having the IR drain placed. 95% occlusion of LAD--pt to undergo PCI with Dr Justin Holden on Friday.      Discussed with Dr Kendra Marroquin MD, FACS  9/22/2020  2:39 PM    NOTE: This report was transcribed using voice recognition software. Every effort was made to ensure accuracy; however, inadvertent computerized transcription errors may be present.

## 2020-09-22 NOTE — PROCEDURES
9/22/20 6:44 am - We are still waiting for the MRI checklist to be completed so we can schedule pt for MRI, thank you.

## 2020-09-23 LAB
ALBUMIN SERPL-MCNC: 3.2 G/DL (ref 3.5–5.2)
ALP BLD-CCNC: 107 U/L (ref 40–129)
ALT SERPL-CCNC: 13 U/L (ref 0–40)
ANION GAP SERPL CALCULATED.3IONS-SCNC: 12 MMOL/L (ref 7–16)
APTT: 34.4 SEC (ref 24.5–35.1)
AST SERPL-CCNC: 19 U/L (ref 0–39)
BILIRUB SERPL-MCNC: 0.6 MG/DL (ref 0–1.2)
BILIRUBIN DIRECT: <0.2 MG/DL (ref 0–0.3)
BILIRUBIN, INDIRECT: ABNORMAL MG/DL (ref 0–1)
BUN BLDV-MCNC: 6 MG/DL (ref 8–23)
CALCIUM SERPL-MCNC: 8.6 MG/DL (ref 8.6–10.2)
CHLORIDE BLD-SCNC: 99 MMOL/L (ref 98–107)
CO2: 24 MMOL/L (ref 22–29)
CREAT SERPL-MCNC: 0.8 MG/DL (ref 0.7–1.2)
EKG ATRIAL RATE: 56 BPM
EKG P AXIS: 52 DEGREES
EKG P-R INTERVAL: 154 MS
EKG Q-T INTERVAL: 424 MS
EKG QRS DURATION: 86 MS
EKG QTC CALCULATION (BAZETT): 409 MS
EKG R AXIS: 22 DEGREES
EKG T AXIS: 42 DEGREES
EKG VENTRICULAR RATE: 56 BPM
GFR AFRICAN AMERICAN: >60
GFR NON-AFRICAN AMERICAN: >60 ML/MIN/1.73
GLUCOSE BLD-MCNC: 152 MG/DL (ref 74–99)
GRAM STAIN ORDERABLE: NORMAL
HCT VFR BLD CALC: 34 % (ref 37–54)
HEMOGLOBIN: 11.6 G/DL (ref 12.5–16.5)
LV EF: 60 %
LVEF MODALITY: NORMAL
MCH RBC QN AUTO: 29.8 PG (ref 26–35)
MCHC RBC AUTO-ENTMCNC: 34.1 % (ref 32–34.5)
MCV RBC AUTO: 87.4 FL (ref 80–99.9)
PDW BLD-RTO: 12.7 FL (ref 11.5–15)
PLATELET # BLD: 190 E9/L (ref 130–450)
PMV BLD AUTO: 8.8 FL (ref 7–12)
POTASSIUM REFLEX MAGNESIUM: 3.8 MMOL/L (ref 3.5–5)
RBC # BLD: 3.89 E12/L (ref 3.8–5.8)
SODIUM BLD-SCNC: 135 MMOL/L (ref 132–146)
TOTAL PROTEIN: 6.4 G/DL (ref 6.4–8.3)
WBC # BLD: 5.4 E9/L (ref 4.5–11.5)

## 2020-09-23 PROCEDURE — 2580000003 HC RX 258: Performed by: STUDENT IN AN ORGANIZED HEALTH CARE EDUCATION/TRAINING PROGRAM

## 2020-09-23 PROCEDURE — 85027 COMPLETE CBC AUTOMATED: CPT

## 2020-09-23 PROCEDURE — 6370000000 HC RX 637 (ALT 250 FOR IP): Performed by: SURGERY

## 2020-09-23 PROCEDURE — 80076 HEPATIC FUNCTION PANEL: CPT

## 2020-09-23 PROCEDURE — 36415 COLL VENOUS BLD VENIPUNCTURE: CPT

## 2020-09-23 PROCEDURE — 93306 TTE W/DOPPLER COMPLETE: CPT

## 2020-09-23 PROCEDURE — 97530 THERAPEUTIC ACTIVITIES: CPT

## 2020-09-23 PROCEDURE — 99233 SBSQ HOSP IP/OBS HIGH 50: CPT | Performed by: INTERNAL MEDICINE

## 2020-09-23 PROCEDURE — 85730 THROMBOPLASTIN TIME PARTIAL: CPT

## 2020-09-23 PROCEDURE — 6370000000 HC RX 637 (ALT 250 FOR IP): Performed by: INTERNAL MEDICINE

## 2020-09-23 PROCEDURE — 2500000003 HC RX 250 WO HCPCS: Performed by: STUDENT IN AN ORGANIZED HEALTH CARE EDUCATION/TRAINING PROGRAM

## 2020-09-23 PROCEDURE — 2580000003 HC RX 258: Performed by: INTERNAL MEDICINE

## 2020-09-23 PROCEDURE — 80048 BASIC METABOLIC PNL TOTAL CA: CPT

## 2020-09-23 PROCEDURE — 2060000000 HC ICU INTERMEDIATE R&B

## 2020-09-23 PROCEDURE — 6360000002 HC RX W HCPCS: Performed by: INTERNAL MEDICINE

## 2020-09-23 PROCEDURE — 99232 SBSQ HOSP IP/OBS MODERATE 35: CPT | Performed by: SURGERY

## 2020-09-23 PROCEDURE — 6370000000 HC RX 637 (ALT 250 FOR IP): Performed by: NURSE PRACTITIONER

## 2020-09-23 PROCEDURE — 93005 ELECTROCARDIOGRAM TRACING: CPT | Performed by: PHYSICIAN ASSISTANT

## 2020-09-23 PROCEDURE — 6360000002 HC RX W HCPCS: Performed by: STUDENT IN AN ORGANIZED HEALTH CARE EDUCATION/TRAINING PROGRAM

## 2020-09-23 RX ORDER — CLOPIDOGREL BISULFATE 75 MG/1
75 TABLET ORAL DAILY
Status: DISCONTINUED | OUTPATIENT
Start: 2020-09-24 | End: 2020-09-26 | Stop reason: HOSPADM

## 2020-09-23 RX ORDER — AMOXICILLIN AND CLAVULANATE POTASSIUM 875; 125 MG/1; MG/1
1 TABLET, FILM COATED ORAL EVERY 12 HOURS SCHEDULED
Status: DISCONTINUED | OUTPATIENT
Start: 2020-09-23 | End: 2020-09-26 | Stop reason: HOSPADM

## 2020-09-23 RX ORDER — CLOPIDOGREL BISULFATE 75 MG/1
300 TABLET ORAL ONCE
Status: COMPLETED | OUTPATIENT
Start: 2020-09-23 | End: 2020-09-23

## 2020-09-23 RX ADMIN — NITROGLYCERIN 1 INCH: 20 OINTMENT TOPICAL at 05:05

## 2020-09-23 RX ADMIN — SODIUM CHLORIDE 3 G: 900 INJECTION INTRAVENOUS at 09:05

## 2020-09-23 RX ADMIN — SODIUM CHLORIDE, PRESERVATIVE FREE 10 ML: 5 INJECTION INTRAVENOUS at 21:30

## 2020-09-23 RX ADMIN — AMLODIPINE BESYLATE 5 MG: 5 TABLET ORAL at 09:07

## 2020-09-23 RX ADMIN — NITROGLYCERIN 1 INCH: 20 OINTMENT TOPICAL at 00:06

## 2020-09-23 RX ADMIN — AMOXICILLIN AND CLAVULANATE POTASSIUM 1 TABLET: 875; 125 TABLET, FILM COATED ORAL at 21:30

## 2020-09-23 RX ADMIN — ACETAMINOPHEN 650 MG: 325 TABLET, FILM COATED ORAL at 05:05

## 2020-09-23 RX ADMIN — NITROGLYCERIN 1 INCH: 20 OINTMENT TOPICAL at 13:06

## 2020-09-23 RX ADMIN — POTASSIUM CHLORIDE, DEXTROSE MONOHYDRATE AND SODIUM CHLORIDE: 150; 5; 450 INJECTION, SOLUTION INTRAVENOUS at 05:07

## 2020-09-23 RX ADMIN — SODIUM CHLORIDE 3 G: 900 INJECTION INTRAVENOUS at 13:06

## 2020-09-23 RX ADMIN — HEPARIN SODIUM 5000 UNITS: 10000 INJECTION INTRAVENOUS; SUBCUTANEOUS at 05:05

## 2020-09-23 RX ADMIN — SODIUM CHLORIDE, PRESERVATIVE FREE 10 ML: 5 INJECTION INTRAVENOUS at 09:08

## 2020-09-23 RX ADMIN — MULTIPLE VITAMINS W/ MINERALS TAB 1 TABLET: TAB at 09:07

## 2020-09-23 RX ADMIN — HEPARIN SODIUM 5000 UNITS: 10000 INJECTION INTRAVENOUS; SUBCUTANEOUS at 21:32

## 2020-09-23 RX ADMIN — HEPARIN SODIUM 5000 UNITS: 10000 INJECTION INTRAVENOUS; SUBCUTANEOUS at 13:06

## 2020-09-23 RX ADMIN — ASPIRIN 81 MG CHEWABLE TABLET 81 MG: 81 TABLET CHEWABLE at 09:07

## 2020-09-23 RX ADMIN — CLOPIDOGREL BISULFATE 300 MG: 75 TABLET ORAL at 09:06

## 2020-09-23 RX ADMIN — NITROGLYCERIN 1 INCH: 20 OINTMENT TOPICAL at 18:12

## 2020-09-23 RX ADMIN — ATORVASTATIN CALCIUM 40 MG: 40 TABLET, FILM COATED ORAL at 21:30

## 2020-09-23 RX ADMIN — SODIUM CHLORIDE 3 G: 900 INJECTION INTRAVENOUS at 02:05

## 2020-09-23 RX ADMIN — METOPROLOL SUCCINATE 50 MG: 50 TABLET, EXTENDED RELEASE ORAL at 09:05

## 2020-09-23 RX ADMIN — LEVOTHYROXINE SODIUM 137 MCG: 0.14 TABLET ORAL at 05:05

## 2020-09-23 ASSESSMENT — PAIN SCALES - GENERAL
PAINLEVEL_OUTOF10: 8
PAINLEVEL_OUTOF10: 8

## 2020-09-23 ASSESSMENT — PAIN DESCRIPTION - PAIN TYPE: TYPE: ACUTE PAIN

## 2020-09-23 NOTE — CARE COORDINATION
Met with pt to update, [facility] accepted if IV antibiotics are needed. If no IV antibiotics are needed then home with [facility] for skilled nursing and therapy. Called and updated spouse with this information. Spoke with [facility]) to touch base, they are following for c, will need [facility] orders(cpa, med compliance,and PT/OT). Kalyan KHALIL

## 2020-09-23 NOTE — PROGRESS NOTES
Occupational Therapy  OT consult received. Chart reviewed. Will hold evaluation secondary to patient declined due to fatigue. Will re-attempt OT evaluation as schedule permits when patient is appropriate.  Lion Pac, OTR/L #[id number]

## 2020-09-23 NOTE — PROGRESS NOTES
Inpatient Cardiology Follow-up      Reason for Consult:  Abnormal EKG    Interval HPI:    Patient seen and examined this morning. He is doing well. Cholecystostomy drain placed yesterday. No chest pain or shortness of breath. Labs are stable. PHYSICAL EXAM:  /62   Pulse 72   Temp 98 °F (36.7 °C) (Temporal)   Resp 15   Ht 6' (1.829 m)   Wt 196 lb 3.2 oz (89 kg)   SpO2 98%   BMI 26.61 kg/m²     General Appearance:    Alert, cooperative, no distress, appears stated age    Head:    Normocephalic, without obvious abnormality, atraumatic    Eyes:    PERRL, conjunctiva/corneas clear, EOM's intact    Neck:   Supple, symmetrical, trachea midline; no carotid    bruit or JVP    Lungs:     Clear to auscultation bilaterally, respirations unlabored, no wheezing, rales or rhonchi    Heart:    Regular rate and rhythm, no murmur, rub   or gallop    Abdomen:     Soft, non-tender, non-distended    Extremities:   Extremities normal, atraumatic, no cyanosis or edema    Skin:   Skin color, texture, turgor normal for age    Neurologic:   Oriented x3, CNII-XII intact. Normal gross strength and sensation       DATA:      Intake/Output Summary (Last 24 hours) at 9/23/2020 1010  Last data filed at 9/23/2020 0601  Gross per 24 hour   Intake 1820 ml   Output 2150 ml   Net -330 ml       Labs:   CBC:   Recent Labs     09/22/20  0608 09/23/20  0815   WBC 7.1 5.4   HGB 12.0* 11.6*   HCT 34.4* 34.0*    190     BMP:   Recent Labs     09/22/20  0608 09/23/20  0815    135   K 4.0 3.8   CO2 25 24   BUN 7* 6*   CREATININE 0.8 0.8   LABGLOM >60 >60   CALCIUM 9.0 8.6     Mag: No results for input(s): MG in the last 72 hours. Phos: No results for input(s): PHOS in the last 72 hours. TSH: No results for input(s): TSH in the last 72 hours. HgA1c:   Lab Results   Component Value Date    LABA1C 5.4 09/20/2020     No results found for: EAG  proBNP: No results for input(s): PROBNP in the last 72 hours.   PT/INR:   Recent Labs     09/22/20  0608   PROTIME 13.3*   INR 1.2     APTT:  Recent Labs     09/22/20  0608 09/23/20  0815   APTT 31.9 34.4     CARDIAC ENZYMES:  Recent Labs     09/20/20  1426 09/20/20  1947 09/21/20  0157   CKMB 1.8 1.1 <1.0     FASTING LIPID PANEL:  Lab Results   Component Value Date    CHOL 170 09/20/2020    HDL 77 09/20/2020    LDLCALC 75 09/20/2020    TRIG 92 09/20/2020     LIVER PROFILE:  Recent Labs     09/22/20  0608 09/23/20  0815   AST 17 19   ALT 12 13   LABALBU 3.4* 3.2*     I personally reviewed his EKG dated 9/19/2020 which showed sinus tachycardia at 106 bpm with marked downsloping ST depressions in the anterolateral leads. I personally reviewed his EKG dated 9/21/2020 which showed resolution of the ST changes    I personally reviewed his CT chest dated 10/16/2019 which showed severe left main into the LAD calcific plaque. I personally interpreted his TTE performed today which showed normal biventricular size and systolic function and no significant valve disease. ASSESSMENT:  80 atrial  male on whom we were consulted for abnormal EKG in the setting of severe hypertension to 086 systolic without chest pain or elevated biomarkers. .  He additionally has history of colon cancer status post hemicolectomy with colostomy and chemotherapy. He is scheduled for colostomy reversal on October 2. He is also now diagnosed with acute calculous cholecystitis and is pending MRCP to rule out CBD gallstone. 1. Dynamic anterolateral ST depressions in the setting of hypertension, ruled out by biomarkers  2. LHC 9/21/20 showed 95% prox-mid LAD with significant calcification, 50% distal LAD, otherwise mild disease  3. Acute cholecystitis  4.  Preoperative evaluation for colostomy reversal planned for October 2    PLAN:   Coronary revascularization with PCI to LAD planned for Friday 9/25/20   Continue aspirin 81 mg daily   Continue Plavix 75 mg daily   Continue high intensity statin with atorvastatin 40 mg daily   Continue Toprol-XL to 50 mg p.o. twice daily   Continue amlodipine 10 mg daily.  TTE reviewed as above      Discussed with Dr. Evaristo Yun    Electronically signed by Mumtaz Mackenzie PA-C on 9/23/2020 at 10:10 AM     PHYSICIAN ADDENDUM:  I independently interviewed and examined the patient. I have reviewed the above documentation completed by the NORIS. Please see my additional contributions to the HPI, physical exam, and assessment / medical decision making. I independently reviewed the HPI, ROS, PMH, PSH, [facility], , and medications independently and agree with above documentation.     Jaime Peck MD  Interventional Cardiology/Structural Heart Disease  Cell: [phone]

## 2020-09-23 NOTE — PROGRESS NOTES
[facility]  ATTENDING PHYSICIAN PROGRESS NOTE     I have examined the patient and  reviewed the record. I have reviewed all relevant labs and imaging data. The following summarizes my clinical findings and independent assessment. CC: Right upper quadrant pain    S. Patient underwent IR drain--perc cholecystostomy tube yesterday. He feels better. O.  Vitals:    09/23/20 0730   BP: 126/62   Pulse: 72   Resp: 15   Temp: 98 °F (36.7 °C)   SpO2: 98%      PHYSICAL EXAM   PSYCH: mood and affect normal, alert and oriented x 3  CONSTITUTIONAL: No apparent distress, comfortable  EYES: Sclera white, pupils equal round and reactive to light  ENMT:  Hearing normal, trachea midline, ears externally intact  RESP: Breath sounds were clear and equal with no rales, wheezes, or rhonchi. Respiratory effort was normal with no retractions or use of accessory muscles. CV: Heart sounds were normal with a regular rate and rhythm. No pedal edema  GI/ Abdomen: The abdomen was soft and non distended. There was less RUQ  Tenderness; no guarding, rebound, or rigidity. Colostomy functional          ASSESSMENT:  Principal Problem:    Acute cholecystitis  Active Problems:    HLD (hyperlipidemia)    HTN (hypertension)    Hypothyroidism    History of prostate cancer    Leukocytosis    History of colon cancer    S/P colostomy ([facility] [address]  Resolved Problems:    * No resolved hospital problems. *       PLAN:  Acute Cholecystitis--   S/p IR cholecystostomy tube on 9/22  Cx sent  Ok for antiplatelet and anticoagulation per general surgery  Transition to augmentin x 7 days  95% occlusion of LAD--pt to undergo PCI with Dr Stanley Ashford on Friday. Discussed with pt that his colostomy reversal will have to be postponed         Ronnie Bonilla MD, FACS  9/23/2020  3:30 PM    NOTE: This report was transcribed using voice recognition software.  Every effort was made to ensure accuracy; however, inadvertent

## 2020-09-23 NOTE — PROGRESS NOTES
Hospital Medicine    Subjective:  Pt seen this am / pain in ruq improved post cholecystostomy drain placement      Current Facility-Administered Medications:     [START ON 9/24/2020] clopidogrel (PLAVIX) tablet 75 mg, 75 mg, Oral, Daily, Jaime Peck MD    sodium chloride flush 0.9 % injection 10 mL, 10 mL, Intravenous, 2 times per day, Jaime Peck MD, 10 mL at 09/23/20 0908    sodium chloride flush 0.9 % injection 10 mL, 10 mL, Intravenous, PRN, Jaime Peck MD    acetaminophen (TYLENOL) tablet 650 mg, 650 mg, Oral, Q4H PRN, Jaime Peck MD, 650 mg at 09/23/20 0505    ampicillin-sulbactam (UNASYN) 3 g ivpb minibag, 3 g, Intravenous, Q6H, Lucy Lauren MD, Last Rate: 200 mL/hr at 09/23/20 0905, 3 g at 09/23/20 0905    [DISCONTINUED] acetaminophen (TYLENOL) tablet 650 mg, 650 mg, Oral, Q6H PRN, 650 mg at 09/21/20 1749 **OR** acetaminophen (TYLENOL) suppository 650 mg, 650 mg, Rectal, Q6H PRN, April RALF KruseN - CNP    polyethylene glycol (GLYCOLAX) packet 17 g, 17 g, Oral, Daily PRN, April Uriah, APRN - CNP    trimethobenzamide Clearence Herter) injection 200 mg, 200 mg, Intramuscular, Q6H PRN, April Uriah APRN - CNP    aspirin chewable tablet 81 mg, 81 mg, Oral, Daily, April Rob[place], APRN - CNP, 81 mg at 09/23/20 8714    atorvastatin (LIPITOR) tablet 40 mg, 40 mg, Oral, Nightly, April Rob-[place], APRN - CNP, 40 mg at 09/22/20 2117    levothyroxine (SYNTHROID) tablet 137 mcg, 137 mcg, Oral, Daily, April Uriah, APRN - CNP, 137 mcg at 09/23/20 0505    therapeutic multivitamin-minerals 1 tablet, 1 tablet, Oral, Daily, April SEBASTIEN Kruse - CNP, 1 tablet at 09/23/20 0907    perflutren lipid microspheres (DEFINITY) injection 1.65 mg, 1.5 mL, Intravenous, ONCE PRN, Satish Umana PA-C    metoprolol succinate (TOPROL XL) extended release tablet 50 mg, 50 mg, Oral, Daily, Dinah Looney MD, 50 mg at 09/23/20 0905    nitroglycerin (NITRO-BID) 2 % ointment 1 inch, 1 inch, Topical, 4 times per day, Lizzie Mcelroy MD, 1 inch at 09/23/20 0505    heparin (porcine) injection 5,000 Units, 5,000 Units, Subcutaneous, 3 times per day, Lizzie Mcelroy MD, 5,000 Units at 09/23/20 0505    amLODIPine (NORVASC) tablet 5 mg, 5 mg, Oral, Daily, Lizzie Mcelroy MD, 5 mg at 09/23/20 0907    hydrALAZINE (APRESOLINE) injection 10 mg, 10 mg, Intravenous, Q6H PRN, Sharmin Bosch MD    Objective:    /62   Pulse 72   Temp 98 °F (36.7 °C) (Temporal)   Resp 15   Ht 6' (1.829 m)   Wt 196 lb 3.2 oz (89 kg)   SpO2 98%   BMI 26.61 kg/m²     Heart:  Reg  Lungs:  CTA bilaterally, no wheeze, rales or rhonchi  Abd: bowel sounds present, nondistended, ostomy intact  Extrem:  No clubbing, cyanosis, or edema    CBC with Differential:    Lab Results   Component Value Date    WBC 5.4 09/23/2020    RBC 3.89 09/23/2020    HGB 11.6 09/23/2020    HCT 34.0 09/23/2020     09/23/2020    MCV 87.4 09/23/2020    MCH 29.8 09/23/2020    MCHC 34.1 09/23/2020    RDW 12.7 09/23/2020    LYMPHOPCT 12.9 09/19/2020    MONOPCT 7.7 09/19/2020    BASOPCT 0.2 09/19/2020    MONOSABS 0.96 09/19/2020    LYMPHSABS 1.60 09/19/2020    EOSABS 0.03 09/19/2020    BASOSABS 0.02 09/19/2020     CMP:    Lab Results   Component Value Date     09/23/2020    K 3.8 09/23/2020    CL 99 09/23/2020    CO2 24 09/23/2020    BUN 6 09/23/2020    CREATININE 0.8 09/23/2020    GFRAA >60 09/23/2020    LABGLOM >60 09/23/2020    GLUCOSE 152 09/23/2020    PROT 6.4 09/23/2020    LABALBU 3.2 09/23/2020    CALCIUM 8.6 09/23/2020    BILITOT 0.6 09/23/2020    ALKPHOS 107 09/23/2020    AST 19 09/23/2020    ALT 13 09/23/2020     Warfarin PT/INR:    Lab Results   Component Value Date    INR 1.2 09/22/2020    INR 1.0 09/20/2020    INR 1.0 09/19/2020    PROTIME 13.3 (H) 09/22/2020    PROTIME 11.8 09/20/2020    PROTIME 12.3 09/19/2020       Assessment:    Principal Problem:    Acute cholecystitis  Active Problems:    HLD (hyperlipidemia)    HTN (hypertension)    Hypothyroidism    History of prostate cancer    Leukocytosis    History of colon cancer    S/P colostomy ([facility] [address]  Resolved Problems:    * No resolved hospital problems. *  s/p cholecystostomy drain placement    Plan:   For pci/stent as per cardiology on Fransisco Liu  12:31 PM  9/23/2020

## 2020-09-23 NOTE — PROGRESS NOTES
Physical Therapy Treatment Note     Name: Geovanna Schuster  : 1940  MRN: [id number]    Referring Provider:  SEBASTIEN Bustillos CNP    Date of Service: 2020    Evaluating PT:  Anurag Wilcox PT, DPT    Room #:  0872/2160-W  Diagnosis:  Acute choleycystitis  PMHx/PSHx:  Cancer  Procedure/Surgery:  Possible heart catheterization, cholcystectomy  Precautions:  Falls, Colostomy, R cholecystostomy drain  Equipment Needs:  Has SPC    SUBJECTIVE:    Pt lives with his wife in a 2 story home with 2 stairs to enter and 1 rail(s). Bed is on 2nd floor and bath is on 2nd floor with flight and 1 rail(s). Basement laundry with flight and 1 rail. Pt ambulated with SPC PTA. Pt reported independent with ADLs. Pt is actively driving. Pt reported being primary caregiver for wife. OBJECTIVE:   Initial Evaluation  Date: 2020 Treatment Date: 2020 Short Term/ Long Term   Goals   AM-PAC 6 Clicks  70/17    Was pt agreeable to Eval/treatment? Yes  Yes     Does pt have pain? Reported 8-9/10 R side pain. Referred to it as gallbladder pain. RN aware. Reported 6/10 R side pain. RN aware. Bed Mobility  Rolling: SBA  Supine to sit: SBA  Sit to supine: SBA  Scooting: SBA Rolling: SBA  Supine to sit: SBA  Sit to supine: SBA  Scooting: SBA Independent   Transfers Sit to stand: Min A  Stand to sit: Min A  Stand pivot: Min A Sit to stand: SBA  Stand to sit: SBA  Stand pivot: SBA Mod I   Ambulation    20 feet x 2 with [employer] with Min/ feet with [employer] with CGA >150 feet with AAD with Mod I   Stair negotiation: ascended and descended  NT NT 10 steps with 1 rail with Mod I   ROM BUE:  WFL  BLE:  WFL     Strength BUE:  4+/5  BLE:  4+/5  Increase 1/3 grade MMT   Balance Sitting EOB:  Supervision  Dynamic Standing:  Min/CGA with [employer] Sitting EOB:  Supervision  Dynamic Standing:  CGA with [employer] Sitting EOB:  Independent  Dynamic Standing:   Mod I     Pt is A & O x 3  Sensation:  Pt denied numbness and tingling to extremities  Edema:  None noted    Therapeutic Exercises:  NT    Patient education  Pt educated on importance of mobility, safety with mobility, transfers, gait    Patient response to education:   Pt verbalized understanding Pt demonstrated skill Pt requires further education in this area   Yes  Yes with verbal cues and assist Yes      ASSESSMENT:    Comments:  Patient cleared by RN and agreeable to treatment. Patient found in semi Holt's and reported feeling better this date. Patient with new cholecystostomy drain and secured to hospital gown with a safety pin prior to mobility. Patient able to get self to seated EOB without hands on assist and denied dizziness with positional change. Patient assisted to standing and demonstrated good posture and balance, but reported slight increase in pain at drain site. Patient demonstrated slower gait speed with equal stride length and steady on his feet. Patient did report fatigue and turned around to return to the room. Patient not willing to sit in chair at this time and assisted back to high Holt's with call light and tray table in reach. Treatment:  Patient practiced and was instructed in the following treatment:     Bed mobility: Verbal/tactile cues for sequencing BUEs/BLEs for safe technique with rolling/supine<>sit task.  Transfer training: Verbal/tactile cues to facilitate proper hand placement, technique and safety during sit to stand task.  Gait training: Verbal and tactile cues to facilitate upright posture and safety as well as provided with physical assistance to complete task.  Therapeutic exercises: As noted above    PLAN OF CARE:    Patient is making good progress toward set goals. Continue with current PT plan of care.     Time in  0925  Time out  0948    Total Treatment Time 23 minutes     CPT codes:  [] Gait training 78925 - minutes  [] Manual therapy 12381 - minutes  [x] Therapeutic activities 86758 23 minutes  [] Therapeutic exercises 90593 - minutes  [] Neuromuscular reeducation 82833 - minutes     Heather Grossman, PT, DPT  License [id number]

## 2020-09-24 PROBLEM — I25.10 CAD (CORONARY ARTERY DISEASE): Status: ACTIVE | Noted: 2020-09-24

## 2020-09-24 LAB
ALBUMIN SERPL-MCNC: 3.4 G/DL (ref 3.5–5.2)
ALP BLD-CCNC: 113 U/L (ref 40–129)
ALT SERPL-CCNC: 16 U/L (ref 0–40)
ANION GAP SERPL CALCULATED.3IONS-SCNC: 12 MMOL/L (ref 7–16)
APTT: 34.5 SEC (ref 24.5–35.1)
AST SERPL-CCNC: 24 U/L (ref 0–39)
BILIRUB SERPL-MCNC: 0.4 MG/DL (ref 0–1.2)
BILIRUBIN DIRECT: <0.2 MG/DL (ref 0–0.3)
BILIRUBIN, INDIRECT: ABNORMAL MG/DL (ref 0–1)
BUN BLDV-MCNC: 9 MG/DL (ref 8–23)
CALCIUM SERPL-MCNC: 9 MG/DL (ref 8.6–10.2)
CHLORIDE BLD-SCNC: 104 MMOL/L (ref 98–107)
CO2: 25 MMOL/L (ref 22–29)
CREAT SERPL-MCNC: 0.9 MG/DL (ref 0.7–1.2)
EKG ATRIAL RATE: 66 BPM
EKG P AXIS: 58 DEGREES
EKG P-R INTERVAL: 154 MS
EKG Q-T INTERVAL: 406 MS
EKG QRS DURATION: 90 MS
EKG QTC CALCULATION (BAZETT): 425 MS
EKG R AXIS: 28 DEGREES
EKG T AXIS: 49 DEGREES
EKG VENTRICULAR RATE: 66 BPM
GFR AFRICAN AMERICAN: >60
GFR NON-AFRICAN AMERICAN: >60 ML/MIN/1.73
GLUCOSE BLD-MCNC: 95 MG/DL (ref 74–99)
HCT VFR BLD CALC: 36.4 % (ref 37–54)
HEMOGLOBIN: 12.4 G/DL (ref 12.5–16.5)
MCH RBC QN AUTO: 30 PG (ref 26–35)
MCHC RBC AUTO-ENTMCNC: 34.1 % (ref 32–34.5)
MCV RBC AUTO: 87.9 FL (ref 80–99.9)
PDW BLD-RTO: 12.9 FL (ref 11.5–15)
PLATELET # BLD: 225 E9/L (ref 130–450)
PMV BLD AUTO: 8.8 FL (ref 7–12)
POTASSIUM REFLEX MAGNESIUM: 4 MMOL/L (ref 3.5–5)
RBC # BLD: 4.14 E12/L (ref 3.8–5.8)
SODIUM BLD-SCNC: 141 MMOL/L (ref 132–146)
TOTAL PROTEIN: 6.8 G/DL (ref 6.4–8.3)
WBC # BLD: 5.3 E9/L (ref 4.5–11.5)

## 2020-09-24 PROCEDURE — 6370000000 HC RX 637 (ALT 250 FOR IP): Performed by: SURGERY

## 2020-09-24 PROCEDURE — 80048 BASIC METABOLIC PNL TOTAL CA: CPT

## 2020-09-24 PROCEDURE — 80076 HEPATIC FUNCTION PANEL: CPT

## 2020-09-24 PROCEDURE — 6370000000 HC RX 637 (ALT 250 FOR IP): Performed by: INTERNAL MEDICINE

## 2020-09-24 PROCEDURE — 36415 COLL VENOUS BLD VENIPUNCTURE: CPT

## 2020-09-24 PROCEDURE — 6360000002 HC RX W HCPCS: Performed by: INTERNAL MEDICINE

## 2020-09-24 PROCEDURE — 85730 THROMBOPLASTIN TIME PARTIAL: CPT

## 2020-09-24 PROCEDURE — 6370000000 HC RX 637 (ALT 250 FOR IP): Performed by: NURSE PRACTITIONER

## 2020-09-24 PROCEDURE — 85027 COMPLETE CBC AUTOMATED: CPT

## 2020-09-24 PROCEDURE — 2580000003 HC RX 258: Performed by: INTERNAL MEDICINE

## 2020-09-24 PROCEDURE — 93005 ELECTROCARDIOGRAM TRACING: CPT | Performed by: PHYSICIAN ASSISTANT

## 2020-09-24 PROCEDURE — 2060000000 HC ICU INTERMEDIATE R&B

## 2020-09-24 PROCEDURE — 99233 SBSQ HOSP IP/OBS HIGH 50: CPT | Performed by: INTERNAL MEDICINE

## 2020-09-24 RX ADMIN — ATORVASTATIN CALCIUM 40 MG: 40 TABLET, FILM COATED ORAL at 21:27

## 2020-09-24 RX ADMIN — HEPARIN SODIUM 5000 UNITS: 10000 INJECTION INTRAVENOUS; SUBCUTANEOUS at 06:35

## 2020-09-24 RX ADMIN — LEVOTHYROXINE SODIUM 137 MCG: 0.14 TABLET ORAL at 06:35

## 2020-09-24 RX ADMIN — NITROGLYCERIN 1 INCH: 20 OINTMENT TOPICAL at 06:36

## 2020-09-24 RX ADMIN — SODIUM CHLORIDE, PRESERVATIVE FREE 10 ML: 5 INJECTION INTRAVENOUS at 08:56

## 2020-09-24 RX ADMIN — AMLODIPINE BESYLATE 5 MG: 5 TABLET ORAL at 08:53

## 2020-09-24 RX ADMIN — ASPIRIN 81 MG CHEWABLE TABLET 81 MG: 81 TABLET CHEWABLE at 08:53

## 2020-09-24 RX ADMIN — HEPARIN SODIUM 5000 UNITS: 10000 INJECTION INTRAVENOUS; SUBCUTANEOUS at 14:19

## 2020-09-24 RX ADMIN — HEPARIN SODIUM 5000 UNITS: 10000 INJECTION INTRAVENOUS; SUBCUTANEOUS at 21:27

## 2020-09-24 RX ADMIN — MULTIPLE VITAMINS W/ MINERALS TAB 1 TABLET: TAB at 08:54

## 2020-09-24 RX ADMIN — AMOXICILLIN AND CLAVULANATE POTASSIUM 1 TABLET: 875; 125 TABLET, FILM COATED ORAL at 08:54

## 2020-09-24 RX ADMIN — METOPROLOL SUCCINATE 50 MG: 50 TABLET, EXTENDED RELEASE ORAL at 08:53

## 2020-09-24 RX ADMIN — AMOXICILLIN AND CLAVULANATE POTASSIUM 1 TABLET: 875; 125 TABLET, FILM COATED ORAL at 21:27

## 2020-09-24 RX ADMIN — SODIUM CHLORIDE, PRESERVATIVE FREE 10 ML: 5 INJECTION INTRAVENOUS at 21:37

## 2020-09-24 RX ADMIN — CLOPIDOGREL 75 MG: 75 TABLET, FILM COATED ORAL at 08:53

## 2020-09-24 ASSESSMENT — PAIN DESCRIPTION - LOCATION: LOCATION: ABDOMEN

## 2020-09-24 ASSESSMENT — PAIN DESCRIPTION - FREQUENCY: FREQUENCY: INTERMITTENT

## 2020-09-24 ASSESSMENT — PAIN DESCRIPTION - PAIN TYPE: TYPE: ACUTE PAIN

## 2020-09-24 ASSESSMENT — PAIN SCALES - GENERAL
PAINLEVEL_OUTOF10: 0
PAINLEVEL_OUTOF10: 3

## 2020-09-24 ASSESSMENT — PAIN DESCRIPTION - ORIENTATION: ORIENTATION: RIGHT

## 2020-09-24 ASSESSMENT — PAIN DESCRIPTION - DESCRIPTORS: DESCRIPTORS: ACHING

## 2020-09-24 NOTE — PROGRESS NOTES
for input(s): PROBNP in the last 72 hours. PT/INR:   Recent Labs     09/22/20  0608   PROTIME 13.3*   INR 1.2     APTT:  Recent Labs     09/23/20  0815 09/24/20  0640   APTT 34.4 34.5     CARDIAC ENZYMES:  No results for input(s): CKTOTAL, CKMB, CKMBINDEX, TROPONINI in the last 72 hours. FASTING LIPID PANEL:  Lab Results   Component Value Date    CHOL 170 09/20/2020    HDL 77 09/20/2020    LDLCALC 75 09/20/2020    TRIG 92 09/20/2020     LIVER PROFILE:  Recent Labs     09/23/20  0815 09/24/20  0640   AST 19 24   ALT 13 16   LABALBU 3.2* 3.4*     I personally reviewed his EKG dated 9/19/2020 which showed sinus tachycardia at 106 bpm with marked downsloping ST depressions in the anterolateral leads. I personally reviewed his EKG dated 9/21/2020 which showed resolution of the ST changes    I personally reviewed his CT chest dated 10/16/2019 which showed severe left main into the LAD calcific plaque. I personally interpreted his TTE performed which showed normal biventricular size and systolic function and no significant valve disease. ASSESSMENT:  80 atrial  male on whom we were consulted for abnormal EKG in the setting of severe hypertension to 604 systolic without chest pain or elevated biomarkers. .  He additionally has history of colon cancer status post hemicolectomy with colostomy and chemotherapy. He is scheduled for colostomy reversal on October 2. He is also now diagnosed with acute calculous cholecystitis and is pending MRCP to rule out CBD gallstone. 1. Dynamic anterolateral ST depressions in the setting of hypertension, ruled out by biomarkers  2. LHC 9/21/20 showed 95% prox-mid LAD with significant calcification, 50% distal LAD, otherwise mild disease  3. Acute cholecystitis  4.  Preoperative evaluation for colostomy reversal planned tentatively for October 2    PLAN:   Complex PCI to LAD planned for Friday 9/25/20   Continue aspirin 81 mg daily   Continue Plavix 75 mg

## 2020-09-24 NOTE — PROGRESS NOTES
Hospital Medicine    Subjective:  Pt alert conversive no cp or sob / abd pain better      Current Facility-Administered Medications:     clopidogrel (PLAVIX) tablet 75 mg, 75 mg, Oral, Daily, Kevin Daigle MD    amoxicillin-clavulanate (AUGMENTIN) 875-125 MG per tablet 1 tablet, 1 tablet, Oral, 2 times per day, Loi Oreilly MD, 1 tablet at 09/23/20 2130    sodium chloride flush 0.9 % injection 10 mL, 10 mL, Intravenous, 2 times per day, Kevin Daigle MD, 10 mL at 09/23/20 2130    sodium chloride flush 0.9 % injection 10 mL, 10 mL, Intravenous, PRN, Kevin Daigle MD    acetaminophen (TYLENOL) tablet 650 mg, 650 mg, Oral, Q4H PRN, Kevin Daigle MD, 650 mg at 09/23/20 0505    [DISCONTINUED] acetaminophen (TYLENOL) tablet 650 mg, 650 mg, Oral, Q6H PRN, 650 mg at 09/21/20 1749 **OR** acetaminophen (TYLENOL) suppository 650 mg, 650 mg, Rectal, Q6H PRN, April Uriah, APRN - CNP    polyethylene glycol (GLYCOLAX) packet 17 g, 17 g, Oral, Daily PRN, April Uriah, APRN - CNP    trimethobenzamide Sarahstephany Moore) injection 200 mg, 200 mg, Intramuscular, Q6H PRN, April Uriah, APRN - CNP    aspirin chewable tablet 81 mg, 81 mg, Oral, Daily, April Uriah, APRN - CNP, 81 mg at 09/23/20 0644    atorvastatin (LIPITOR) tablet 40 mg, 40 mg, Oral, Nightly, April Lesterus, APRN - CNP, 40 mg at 09/23/20 2130    levothyroxine (SYNTHROID) tablet 137 mcg, 137 mcg, Oral, Daily, April Uriah, APRN - CNP, 137 mcg at 09/24/20 1417    therapeutic multivitamin-minerals 1 tablet, 1 tablet, Oral, Daily, April Uriah, APRN - CNP, 1 tablet at 09/23/20 0907    perflutren lipid microspheres (DEFINITY) injection 1.65 mg, 1.5 mL, Intravenous, ONCE PRN, Fernando Calero PA-C    metoprolol succinate (TOPROL XL) extended release tablet 50 mg, 50 mg, Oral, Daily, Vikas Hollingsworth MD, 50 mg at 09/23/20 0905    nitroglycerin (NITRO-BID) 2 % ointment 1 inch, 1 inch, Topical, 4 times per day, prostate cancer    Leukocytosis    History of colon cancer    S/P colostomy ([facility] [address]  Resolved Problems:    * No resolved hospital problems. *  cad    Plan:   For heart cath /stent Friday / cont po atb per surgery        Juliana Flowers  8:25 AM  9/24/2020

## 2020-09-24 NOTE — PLAN OF CARE
Problem: Falls - Risk of:  Goal: Will remain free from falls  Description: Will remain free from falls  9/24/2020 1229 by Prateek Borges RN  Outcome: Met This Shift  9/24/2020 0414 by Litzy Ellis RN  Outcome: Met This Shift  Goal: Absence of physical injury  Description: Absence of physical injury  9/24/2020 1229 by Prateek Borges RN  Outcome: Met This Shift  9/24/2020 0414 by Litzy Ellis RN  Outcome: Met This Shift     Problem: Pain:  Goal: Pain level will decrease  Description: Pain level will decrease  9/24/2020 1229 by Prateek Borges RN  Outcome: Met This Shift  9/24/2020 0414 by Litzy Ellis RN  Outcome: Met This Shift  Goal: Control of acute pain  Description: Control of acute pain  9/24/2020 1229 by Prateek Borges RN  Outcome: Met This Shift  9/24/2020 0414 by Litzy Ellis RN  Outcome: Met This Shift  Goal: Control of chronic pain  Description: Control of chronic pain  9/24/2020 1229 by Prateek Borges RN  Outcome: Met This Shift  9/24/2020 0414 by Litzy Ellis RN  Outcome: Met This Shift     Problem: Skin Integrity:  Goal: Will show no infection signs and symptoms  Description: Will show no infection signs and symptoms  9/24/2020 1229 by Prateek Borges RN  Outcome: Met This Shift  9/24/2020 0414 by Litzy Ellis RN  Outcome: Met This Shift  Goal: Absence of new skin breakdown  Description: Absence of new skin breakdown  9/24/2020 1229 by Prateek Borges RN  Outcome: Met This Shift  9/24/2020 0414 by Litzy Ellis RN  Outcome: Met This Shift

## 2020-09-24 NOTE — PLAN OF CARE
Problem: Falls - Risk of:  Goal: Will remain free from falls  Description: Will remain free from falls  9/24/2020 0414 by Montserrat Pastor RN  Outcome: Met This Shift     Problem: Falls - Risk of:  Goal: Absence of physical injury  Description: Absence of physical injury  Outcome: Met This Shift     Problem: Pain:  Goal: Pain level will decrease  Description: Pain level will decrease  Outcome: Met This Shift     Problem: Pain:  Goal: Control of acute pain  Description: Control of acute pain  9/24/2020 0414 by Montserrat Pastor RN  Outcome: Met This Shift     Problem: Pain:  Goal: Control of chronic pain  Description: Control of chronic pain  Outcome: Met This Shift     Problem: Skin Integrity:  Goal: Will show no infection signs and symptoms  Description: Will show no infection signs and symptoms  Outcome: Met This Shift     Problem: Skin Integrity:  Goal: Absence of new skin breakdown  Description: Absence of new skin breakdown  Outcome: Met This Shift

## 2020-09-25 ENCOUNTER — APPOINTMENT (OUTPATIENT)
Dept: CARDIAC CATH/INVASIVE PROCEDURES | Age: 80
DRG: 247 | End: 2020-09-25
Attending: INTERNAL MEDICINE
Payer: MEDICARE

## 2020-09-25 LAB
ALBUMIN SERPL-MCNC: 3.7 G/DL (ref 3.5–5.2)
ALP BLD-CCNC: 112 U/L (ref 40–129)
ALT SERPL-CCNC: 17 U/L (ref 0–40)
ANION GAP SERPL CALCULATED.3IONS-SCNC: 14 MMOL/L (ref 7–16)
APTT: 33.6 SEC (ref 24.5–35.1)
AST SERPL-CCNC: 24 U/L (ref 0–39)
BILIRUB SERPL-MCNC: 0.4 MG/DL (ref 0–1.2)
BILIRUBIN DIRECT: <0.2 MG/DL (ref 0–0.3)
BILIRUBIN, INDIRECT: NORMAL MG/DL (ref 0–1)
BODY FLUID CULTURE, STERILE: ABNORMAL
BUN BLDV-MCNC: 11 MG/DL (ref 8–23)
CALCIUM SERPL-MCNC: 9.3 MG/DL (ref 8.6–10.2)
CHLORIDE BLD-SCNC: 101 MMOL/L (ref 98–107)
CO2: 24 MMOL/L (ref 22–29)
CREAT SERPL-MCNC: 0.8 MG/DL (ref 0.7–1.2)
EKG ATRIAL RATE: 54 BPM
EKG P AXIS: 53 DEGREES
EKG P-R INTERVAL: 162 MS
EKG Q-T INTERVAL: 434 MS
EKG QRS DURATION: 92 MS
EKG QTC CALCULATION (BAZETT): 411 MS
EKG R AXIS: 32 DEGREES
EKG T AXIS: 52 DEGREES
EKG VENTRICULAR RATE: 54 BPM
GFR AFRICAN AMERICAN: >60
GFR NON-AFRICAN AMERICAN: >60 ML/MIN/1.73
GLUCOSE BLD-MCNC: 87 MG/DL (ref 74–99)
GRAM STAIN RESULT: ABNORMAL
HCT VFR BLD CALC: 37.1 % (ref 37–54)
HEMOGLOBIN: 12.6 G/DL (ref 12.5–16.5)
MCH RBC QN AUTO: 29.9 PG (ref 26–35)
MCHC RBC AUTO-ENTMCNC: 34 % (ref 32–34.5)
MCV RBC AUTO: 88.1 FL (ref 80–99.9)
ORGANISM: ABNORMAL
PDW BLD-RTO: 12.7 FL (ref 11.5–15)
PLATELET # BLD: 251 E9/L (ref 130–450)
PMV BLD AUTO: 8.8 FL (ref 7–12)
POC ACT LR: 242 SECONDS
POC ACT LR: 299 SECONDS
POC ACT LR: 341 SECONDS
POC ACT LR: 399 SECONDS
POTASSIUM REFLEX MAGNESIUM: 4.1 MMOL/L (ref 3.5–5)
RBC # BLD: 4.21 E12/L (ref 3.8–5.8)
SODIUM BLD-SCNC: 139 MMOL/L (ref 132–146)
TOTAL PROTEIN: 6.9 G/DL (ref 6.4–8.3)
WBC # BLD: 5.8 E9/L (ref 4.5–11.5)

## 2020-09-25 PROCEDURE — C1724 CATH, TRANS ATHEREC,ROTATION: HCPCS

## 2020-09-25 PROCEDURE — C9602 PERC D-E COR STENT ATHER S: HCPCS

## 2020-09-25 PROCEDURE — 2709999900 HC NON-CHARGEABLE SUPPLY

## 2020-09-25 PROCEDURE — C1753 CATH, INTRAVAS ULTRASOUND: HCPCS

## 2020-09-25 PROCEDURE — C1894 INTRO/SHEATH, NON-LASER: HCPCS

## 2020-09-25 PROCEDURE — 92978 ENDOLUMINL IVUS OCT C 1ST: CPT | Performed by: INTERNAL MEDICINE

## 2020-09-25 PROCEDURE — 80048 BASIC METABOLIC PNL TOTAL CA: CPT

## 2020-09-25 PROCEDURE — 6360000002 HC RX W HCPCS

## 2020-09-25 PROCEDURE — C1887 CATHETER, GUIDING: HCPCS

## 2020-09-25 PROCEDURE — 6370000000 HC RX 637 (ALT 250 FOR IP): Performed by: NURSE PRACTITIONER

## 2020-09-25 PROCEDURE — C1769 GUIDE WIRE: HCPCS

## 2020-09-25 PROCEDURE — C1725 CATH, TRANSLUMIN NON-LASER: HCPCS

## 2020-09-25 PROCEDURE — 6360000002 HC RX W HCPCS: Performed by: INTERNAL MEDICINE

## 2020-09-25 PROCEDURE — 2060000000 HC ICU INTERMEDIATE R&B

## 2020-09-25 PROCEDURE — 6370000000 HC RX 637 (ALT 250 FOR IP): Performed by: INTERNAL MEDICINE

## 2020-09-25 PROCEDURE — 85730 THROMBOPLASTIN TIME PARTIAL: CPT

## 2020-09-25 PROCEDURE — 2580000003 HC RX 258: Performed by: INTERNAL MEDICINE

## 2020-09-25 PROCEDURE — 85027 COMPLETE CBC AUTOMATED: CPT

## 2020-09-25 PROCEDURE — 92933 PRQ TRLML C ATHRC ST ANGIOP1: CPT | Performed by: INTERNAL MEDICINE

## 2020-09-25 PROCEDURE — 6370000000 HC RX 637 (ALT 250 FOR IP): Performed by: SURGERY

## 2020-09-25 PROCEDURE — 80076 HEPATIC FUNCTION PANEL: CPT

## 2020-09-25 PROCEDURE — C1874 STENT, COATED/COV W/DEL SYS: HCPCS

## 2020-09-25 PROCEDURE — 2500000003 HC RX 250 WO HCPCS

## 2020-09-25 PROCEDURE — 85347 COAGULATION TIME ACTIVATED: CPT

## 2020-09-25 PROCEDURE — 36415 COLL VENOUS BLD VENIPUNCTURE: CPT

## 2020-09-25 PROCEDURE — 93005 ELECTROCARDIOGRAM TRACING: CPT | Performed by: PHYSICIAN ASSISTANT

## 2020-09-25 PROCEDURE — 027034Z DILATION OF CORONARY ARTERY, ONE ARTERY WITH DRUG-ELUTING INTRALUMINAL DEVICE, PERCUTANEOUS APPROACH: ICD-10-PCS | Performed by: INTERNAL MEDICINE

## 2020-09-25 RX ORDER — 0.9 % SODIUM CHLORIDE 0.9 %
500 INTRAVENOUS SOLUTION INTRAVENOUS PRN
Status: DISCONTINUED | OUTPATIENT
Start: 2020-09-25 | End: 2020-09-26 | Stop reason: HOSPADM

## 2020-09-25 RX ORDER — SODIUM CHLORIDE 0.9 % (FLUSH) 0.9 %
10 SYRINGE (ML) INJECTION PRN
Status: DISCONTINUED | OUTPATIENT
Start: 2020-09-25 | End: 2020-09-26 | Stop reason: HOSPADM

## 2020-09-25 RX ORDER — ASPIRIN 81 MG/1
81 TABLET, CHEWABLE ORAL DAILY
Qty: 30 TABLET | Refills: 3 | COMMUNITY
Start: 2020-09-26

## 2020-09-25 RX ORDER — ACETAMINOPHEN 325 MG/1
650 TABLET ORAL EVERY 4 HOURS PRN
Status: DISCONTINUED | OUTPATIENT
Start: 2020-09-25 | End: 2020-09-25 | Stop reason: ALTCHOICE

## 2020-09-25 RX ORDER — SODIUM CHLORIDE 0.9 % (FLUSH) 0.9 %
10 SYRINGE (ML) INJECTION EVERY 12 HOURS SCHEDULED
Status: DISCONTINUED | OUTPATIENT
Start: 2020-09-25 | End: 2020-09-26 | Stop reason: HOSPADM

## 2020-09-25 RX ORDER — ONDANSETRON 2 MG/ML
4 INJECTION INTRAMUSCULAR; INTRAVENOUS EVERY 6 HOURS PRN
Status: DISCONTINUED | OUTPATIENT
Start: 2020-09-25 | End: 2020-09-26 | Stop reason: HOSPADM

## 2020-09-25 RX ORDER — ATROPINE SULFATE 0.4 MG/ML
0.5 AMPUL (ML) INJECTION
Status: ACTIVE | OUTPATIENT
Start: 2020-09-25 | End: 2020-09-25

## 2020-09-25 RX ORDER — AMOXICILLIN AND CLAVULANATE POTASSIUM 875; 125 MG/1; MG/1
1 TABLET, FILM COATED ORAL EVERY 12 HOURS SCHEDULED
Qty: 16 TABLET | Refills: 0 | Status: SHIPPED | OUTPATIENT
Start: 2020-09-25 | End: 2020-10-03

## 2020-09-25 RX ORDER — AMLODIPINE BESYLATE 5 MG/1
5 TABLET ORAL DAILY
Qty: 30 TABLET | Refills: 11 | Status: SHIPPED | OUTPATIENT
Start: 2020-09-26 | End: 2020-11-16

## 2020-09-25 RX ORDER — METOPROLOL SUCCINATE 50 MG/1
50 TABLET, EXTENDED RELEASE ORAL DAILY
Qty: 30 TABLET | Refills: 11 | Status: SHIPPED | OUTPATIENT
Start: 2020-09-26 | End: 2020-11-16 | Stop reason: ALTCHOICE

## 2020-09-25 RX ORDER — CLOPIDOGREL BISULFATE 75 MG/1
75 TABLET ORAL DAILY
Qty: 30 TABLET | Refills: 11 | Status: SHIPPED | OUTPATIENT
Start: 2020-09-26

## 2020-09-25 RX ADMIN — ASPIRIN 81 MG CHEWABLE TABLET 81 MG: 81 TABLET CHEWABLE at 07:36

## 2020-09-25 RX ADMIN — AMLODIPINE BESYLATE 5 MG: 5 TABLET ORAL at 11:12

## 2020-09-25 RX ADMIN — LEVOTHYROXINE SODIUM 137 MCG: 0.14 TABLET ORAL at 05:59

## 2020-09-25 RX ADMIN — AMOXICILLIN AND CLAVULANATE POTASSIUM 1 TABLET: 875; 125 TABLET, FILM COATED ORAL at 11:12

## 2020-09-25 RX ADMIN — MULTIPLE VITAMINS W/ MINERALS TAB 1 TABLET: TAB at 11:12

## 2020-09-25 RX ADMIN — METOPROLOL SUCCINATE 50 MG: 50 TABLET, EXTENDED RELEASE ORAL at 11:11

## 2020-09-25 RX ADMIN — SODIUM CHLORIDE, PRESERVATIVE FREE 10 ML: 5 INJECTION INTRAVENOUS at 20:11

## 2020-09-25 RX ADMIN — AMOXICILLIN AND CLAVULANATE POTASSIUM 1 TABLET: 875; 125 TABLET, FILM COATED ORAL at 20:12

## 2020-09-25 RX ADMIN — HEPARIN SODIUM 5000 UNITS: 10000 INJECTION INTRAVENOUS; SUBCUTANEOUS at 14:17

## 2020-09-25 RX ADMIN — HEPARIN SODIUM 5000 UNITS: 10000 INJECTION INTRAVENOUS; SUBCUTANEOUS at 22:48

## 2020-09-25 RX ADMIN — CLOPIDOGREL 75 MG: 75 TABLET, FILM COATED ORAL at 07:37

## 2020-09-25 RX ADMIN — ATORVASTATIN CALCIUM 40 MG: 40 TABLET, FILM COATED ORAL at 20:12

## 2020-09-25 ASSESSMENT — PAIN DESCRIPTION - LOCATION: LOCATION: ABDOMEN

## 2020-09-25 ASSESSMENT — PAIN SCALES - GENERAL
PAINLEVEL_OUTOF10: 0
PAINLEVEL_OUTOF10: 2
PAINLEVEL_OUTOF10: 2

## 2020-09-25 ASSESSMENT — PAIN DESCRIPTION - PAIN TYPE: TYPE: ACUTE PAIN

## 2020-09-25 NOTE — PROCEDURES
CARDIAC CATHETERIZATION REPORT    : Angelic Newell MD     Date of procedure: 09/25/20       Indication:  1. Pre-op colon surgery    Procedures:  Percutaneous transluminal coronary angioplasty and stenting  Rotational atherectomy  IVUS     Sedation/analgesia:  Midazolam IV     Time sedation was administered: 4648. I was present in the room when sedation was administered. Procedure end time: 4699  Time spent with face to face monitoring of moderate sedation: 92 minutes    Brief history:  81-year-old  male with history of hypertension, colon cancer status post hemicolectomy with colostomy and chemotherapy who was admitted with acute calculus cholecystitis status post percutaneous cholecystostomy. Due to severe CAD noted on his chest CT in the preop setting he underwent a coronary angiogram on September 21 which revealed severe proximal to mid LAD stenosis. He presents today for PCI of the LAD    Description of procedure: The patient presented to the Cath Lab in a(n) elective fashion. The patient was prepped and draped in a sterile manner. Timeout, airway and ASA assessment were completed. Local anesthesia with 1% lidocaine was administered and sedation/analgesia were administered as above. Access was obtained using the modified Seldinger technique and a micropuncture needle in the right radial artery. A 6Fshort sheath was inserted over a wire. Coronary anatomy:  Limited angiogram of the left coronary system only. See procedure note from September 21 for more details  1. LAD: Large vessel that wraps around the apex.   There is a long segment of severe stenosis in the proximal to mid LAD with heavy calcification and no significant tortuosity approaching 95% stenosis at worst.  After the second diagonal which is a small to medium sized vessel there is a focal lesion that is probably 70% stenosis in the turn of the vessel (this appeared angiographically worse today than on the diagnostic angiograms). Percutaneous coronary intervention:    Anticoagulation: Unfractionated heparin with ACT>250 seconds  Aspirin administered Yes  P2Y12 inhibitor: clopidogrel 75  mg PO (chronic therapy) was administered before the case. IV tirofiban was not administered. Guiding catheter: 6 [place] EBU 3.5 with 5.5 [place] Guideliner    1. Lesion location: Proximal to mid LAD; stenosis 95%; MOSES flow 3. The lesion was crossed with a Rotafloppy wire. Primary rotational atherectomy was performed using 1.5 mm giorgi at 160 K RPM for 3 passes. This was followed by balloon angioplasty using a 3.0 mm balloon which expanded adequately. Stenting was performed using 2.5 x 28 and 3.5 x 38 overlapping Xience Ibanca MIRELA from distal to proximal. Post-dilation was performed with 3.0 NC and 4.0 NC from distal to proximal at high pressure. IVUS was performed and revealed minimum CSA 7.5 mm² inside the stent and 7.7 mm² proximal to the stent. There was loss of flow into a small diagonal that was severely diseased at baseline; this was not amenable to PCI; the patient had no symptoms related to that. Residual angiographic stenosis 0%; post-PCI MOSES flow 3. Hemodynamics: Ao: 148/62 with a mean of 95    Hemostasis:  Transradial band was applied for patent arterial hemostasis. Complications: None  Estimated blood loss: 20 mL  Contrast used: 200 mL  Air kerma: 1373 mGy    Conclusions:  1. Successful PCI of the proximal to mid LAD with 2 overlapping MIRELA facilitated by rotational atherectomy and guided by IVUS    PLAN:  1. Aspirin 81 mg p.o. daily indefinitely  2. Clopidogrel 75 mg p.o. daily for at least 6 months and preferably long-term  3. Continue atorvastatin 40 mg daily  4. Continue Toprol-XL 50 mg p.o. daily  5. Inpatient then outpatient cardiac rehab  6. Okay for discharge from cardiology standpoint tomorrow morning provided no acute events overnight  7.  Follow-up with Dr. Shelly Gordon in the office in 4 weeks    David Justine Villanueva MD  Interventional Cardiology/Structural Heart Disease  Cell: [phone]

## 2020-09-25 NOTE — CARE COORDINATION
Pt to be discharged on oral antibiotics, no IV antibiotics. Called wife to update, plan is for pt to go home. Reviewing therapy, they have pt using wheeled walker, per wife they have walkers at home. Called [facility]) to update discharge plan for probable discharge tomorrow 9/26. [facility] will have staff go out on 9/27. Plan is home with [facility] and [facility] to transport. Will need [facility] orders cpa, med compliance, PT/OT, and any other needs. Lauren KHALIL

## 2020-09-25 NOTE — PROGRESS NOTES
Hospital Medicine    Subjective:  Pt seen post heart cath pci stent pt alert conversive feels well      Current Facility-Administered Medications:     sodium chloride flush 0.9 % injection 10 mL, 10 mL, Intravenous, 2 times per day, Kathe Morin MD    sodium chloride flush 0.9 % injection 10 mL, 10 mL, Intravenous, PRN, Kathe Morin MD    atropine injection 0.5 mg, 0.5 mg, Intravenous, Once PRN, Kathe Morin MD    0.9 % sodium chloride bolus, 500 mL, Intravenous, PRN, Kathe Morin MD    ondansetron (ZOFRAN) injection 4 mg, 4 mg, Intravenous, Q6H PRN, Kathe Morin MD    clopidogrel (PLAVIX) tablet 75 mg, 75 mg, Oral, Daily, Kathe Morin MD, 75 mg at 09/25/20 0737    amoxicillin-clavulanate (AUGMENTIN) 875-125 MG per tablet 1 tablet, 1 tablet, Oral, 2 times per day, Terrance Deleon MD, 1 tablet at 09/25/20 1112    acetaminophen (TYLENOL) tablet 650 mg, 650 mg, Oral, Q4H PRN, Kathe Morin MD, 650 mg at 09/23/20 0505    [DISCONTINUED] acetaminophen (TYLENOL) tablet 650 mg, 650 mg, Oral, Q6H PRN, 650 mg at 09/21/20 1749 **OR** acetaminophen (TYLENOL) suppository 650 mg, 650 mg, Rectal, Q6H PRN, April SEBASTIEN Kruse - CNP    polyethylene glycol (GLYCOLAX) packet 17 g, 17 g, Oral, Daily PRN, April RALF KruseN - CNP    trimethobenzamide Joellyn Armor) injection 200 mg, 200 mg, Intramuscular, Q6H PRN, April SEBASTIEN Kruse - CNP    aspirin chewable tablet 81 mg, 81 mg, Oral, Daily, April SEBASTIEN Kruse - CNP, 81 mg at 09/25/20 0736    atorvastatin (LIPITOR) tablet 40 mg, 40 mg, Oral, Nightly, April SEBASTIEN Kruse - CNP, 40 mg at 09/24/20 5107    levothyroxine (SYNTHROID) tablet 137 mcg, 137 mcg, Oral, Daily, April SEBASTIEN Kruse CNP, 137 mcg at 09/25/20 0559    therapeutic multivitamin-minerals 1 tablet, 1 tablet, Oral, Daily, April SEBASTIEN Kruse CNP, 1 tablet at 09/25/20 1112    perflutren lipid microspheres (DEFINITY) injection 1.65 mg, 1.5 mL, Intravenous, ONCE PRN, Satish Umana PA-C    metoprolol succinate (TOPROL XL) extended release tablet 50 mg, 50 mg, Oral, Daily, Che Castro MD, 50 mg at 09/25/20 1111    heparin (porcine) injection 5,000 Units, 5,000 Units, Subcutaneous, 3 times per day, Che Castro MD, 5,000 Units at 09/24/20 2127    amLODIPine (NORVASC) tablet 5 mg, 5 mg, Oral, Daily, Che Castro MD, 5 mg at 09/25/20 1112    hydrALAZINE (APRESOLINE) injection 10 mg, 10 mg, Intravenous, Q6H PRN, Jean-Claude Wagoner MD    Objective:    BP (!) 142/60   Pulse 59   Temp 97.2 °F (36.2 °C) (Temporal)   Resp 16   Ht 6' (1.829 m)   Wt 193 lb 3.2 oz (87.6 kg)   SpO2 99%   BMI 26.20 kg/m²     Heart:  Reg  Lungs:  CTA bilaterally, no wheeze, rales or rhonchi  Abd: bowel sounds present, nondistended, colostomy intact  Extrem:  No clubbing, cyanosis, or edema    CBC with Differential:    Lab Results   Component Value Date    WBC 5.8 09/25/2020    RBC 4.21 09/25/2020    HGB 12.6 09/25/2020    HCT 37.1 09/25/2020     09/25/2020    MCV 88.1 09/25/2020    MCH 29.9 09/25/2020    MCHC 34.0 09/25/2020    RDW 12.7 09/25/2020    LYMPHOPCT 12.9 09/19/2020    MONOPCT 7.7 09/19/2020    BASOPCT 0.2 09/19/2020    MONOSABS 0.96 09/19/2020    LYMPHSABS 1.60 09/19/2020    EOSABS 0.03 09/19/2020    BASOSABS 0.02 09/19/2020     CMP:    Lab Results   Component Value Date     09/25/2020    K 4.1 09/25/2020     09/25/2020    CO2 24 09/25/2020    BUN 11 09/25/2020    CREATININE 0.8 09/25/2020    GFRAA >60 09/25/2020    LABGLOM >60 09/25/2020    GLUCOSE 87 09/25/2020    PROT 6.9 09/25/2020    LABALBU 3.7 09/25/2020    CALCIUM 9.3 09/25/2020    BILITOT 0.4 09/25/2020    ALKPHOS 112 09/25/2020    AST 24 09/25/2020    ALT 17 09/25/2020     Warfarin PT/INR:    Lab Results   Component Value Date    INR 1.2 09/22/2020    INR 1.0 09/20/2020    INR 1.0 09/19/2020    PROTIME 13.3 (H) 09/22/2020    PROTIME 11.8 09/20/2020    PROTIME 12.3 09/19/2020 Assessment:    Principal Problem:    Acute cholecystitis  Active Problems:    HLD (hyperlipidemia)    HTN (hypertension)    Hypothyroidism    History of prostate cancer    Leukocytosis    History of colon cancer    S/P colostomy (HCC)    CAD (coronary artery disease)  Resolved Problems:    * No resolved hospital problems.  *      Plan:  Cont post cath/stent care home when ok with all / cont atb /cholecystostomy drain per surgery        Coby Lighter  1:46 PM  9/25/2020

## 2020-09-25 NOTE — PROGRESS NOTES
Comprehensive Nutrition Assessment    Type and Reason for Visit:  Initial(LOS)    Nutrition Recommendations/Plan: Recommend and start Ensure High Protein supplement once daily to help meet nutritional needs. Nutrition Assessment:  Patients po intake seems to be adequate, averaging ~75% of most meals served since admission ; hx of colon CA w/ colostomy ; hx of prostate CA ; s/p cardiac cath and angioplasty w/ stent ; will start ONS    Malnutrition Assessment:  Malnutrition Status:  Insufficient data    Context:  Acute Illness     Findings of the 6 clinical characteristics of malnutrition:  Energy Intake:  No significant decrease in energy intake  Weight Loss:  No significant weight loss     Body Fat Loss:  Unable to assess(data not available to assess at this time)     Muscle Mass Loss:  Unable to assess(data not available to assess at this time)    Fluid Accumulation:  No significant fluid accumulation     Strength:  Not Performed    Estimated Daily Nutrient Needs:  Energy (kcal):  9102-3365 (REE 1635 x 1.1 SF); Weight Used for Energy Requirements:  Current     Protein (g):   (1.2-1.4g/kg IBW); Weight Used for Protein Requirements:  Ideal        Fluid (ml/day):  5245-8457; Weight Used for Fluid Requirements:  [place]      Nutrition Related Findings:  -I&Os (-3.7 L), no edema, active BS, A&O x 4, colostomy RLQ, puncture site, redness to buttocks      Wounds:  None       Current Nutrition Therapies:    DIET LOW SODIUM 2 GM; Anthropometric Measures:  · Height: 6' (182.9 cm)  · Current Body Weight: 193 lb (87.5 kg)(9/25/20, no method)   · Admission Body Weight: 196 lb (88.9 kg)(9/19/20, no method)    · Usual Body Weight: 199 lb (90.3 kg)(10/13/19, bedscale)     · Ideal Body Weight: 178 lbs; % Ideal Body Weight 108.4 %   · BMI: 26.2  · BMI Categories: Overweight (BMI 25.0-29. 9)       Nutrition Diagnosis:   · No nutrition diagnosis at this time related to   as evidenced by        Nutrition

## 2020-09-25 NOTE — CONSULTS
Met with patient and discussed that their physician has ordered a referral to our outpatient Phase II Cardiac Rehabilitation program. Reviewed the benefits of cardiac rehabilitation based on their diagnosis and personal risk factors. Patient demonstrates strong interest in Cardiac Rehabilitation at this time. Cardiac Rehabilitation brochure provided to patient/family. The Cardiac Rehabilitation Program has been provided the patient's referral information and pertinent patient details and history. The patient may call [facility] at [phone] for additional information or questions. Contact information for [facility] [employer] and other choices close to the patient's residence have been provided in the discharge instructions so that the patient may call and schedule an appointment when cleared by their physician.  Thank you for the referral.

## 2020-09-26 VITALS
HEIGHT: 72 IN | DIASTOLIC BLOOD PRESSURE: 63 MMHG | SYSTOLIC BLOOD PRESSURE: 132 MMHG | RESPIRATION RATE: 18 BRPM | WEIGHT: 195.5 LBS | BODY MASS INDEX: 26.48 KG/M2 | TEMPERATURE: 98 F | OXYGEN SATURATION: 95 % | HEART RATE: 72 BPM

## 2020-09-26 PROBLEM — I24.9 ACUTE CORONARY SYNDROME (HCC): Status: ACTIVE | Noted: 2020-09-26

## 2020-09-26 LAB
ALBUMIN SERPL-MCNC: 3.7 G/DL (ref 3.5–5.2)
ALP BLD-CCNC: 122 U/L (ref 40–129)
ALT SERPL-CCNC: 19 U/L (ref 0–40)
ANION GAP SERPL CALCULATED.3IONS-SCNC: 14 MMOL/L (ref 7–16)
APTT: 40.7 SEC (ref 24.5–35.1)
AST SERPL-CCNC: 31 U/L (ref 0–39)
BILIRUB SERPL-MCNC: 0.6 MG/DL (ref 0–1.2)
BILIRUBIN DIRECT: <0.2 MG/DL (ref 0–0.3)
BILIRUBIN, INDIRECT: NORMAL MG/DL (ref 0–1)
BUN BLDV-MCNC: 13 MG/DL (ref 8–23)
CALCIUM SERPL-MCNC: 9.2 MG/DL (ref 8.6–10.2)
CHLORIDE BLD-SCNC: 101 MMOL/L (ref 98–107)
CO2: 24 MMOL/L (ref 22–29)
CREAT SERPL-MCNC: 0.9 MG/DL (ref 0.7–1.2)
GFR AFRICAN AMERICAN: >60
GFR NON-AFRICAN AMERICAN: >60 ML/MIN/1.73
GLUCOSE BLD-MCNC: 102 MG/DL (ref 74–99)
HCT VFR BLD CALC: 38.6 % (ref 37–54)
HEMOGLOBIN: 13.3 G/DL (ref 12.5–16.5)
MCH RBC QN AUTO: 29.8 PG (ref 26–35)
MCHC RBC AUTO-ENTMCNC: 34.5 % (ref 32–34.5)
MCV RBC AUTO: 86.5 FL (ref 80–99.9)
PDW BLD-RTO: 12.8 FL (ref 11.5–15)
PLATELET # BLD: 265 E9/L (ref 130–450)
PMV BLD AUTO: 8.5 FL (ref 7–12)
POTASSIUM REFLEX MAGNESIUM: 4.3 MMOL/L (ref 3.5–5)
RBC # BLD: 4.46 E12/L (ref 3.8–5.8)
SODIUM BLD-SCNC: 139 MMOL/L (ref 132–146)
TOTAL PROTEIN: 7.3 G/DL (ref 6.4–8.3)
TROPONIN: 0.05 NG/ML (ref 0–0.03)
WBC # BLD: 7.3 E9/L (ref 4.5–11.5)

## 2020-09-26 PROCEDURE — 6370000000 HC RX 637 (ALT 250 FOR IP): Performed by: SURGERY

## 2020-09-26 PROCEDURE — 99231 SBSQ HOSP IP/OBS SF/LOW 25: CPT | Performed by: INTERNAL MEDICINE

## 2020-09-26 PROCEDURE — 6370000000 HC RX 637 (ALT 250 FOR IP): Performed by: INTERNAL MEDICINE

## 2020-09-26 PROCEDURE — 80048 BASIC METABOLIC PNL TOTAL CA: CPT

## 2020-09-26 PROCEDURE — 2580000003 HC RX 258: Performed by: INTERNAL MEDICINE

## 2020-09-26 PROCEDURE — 80076 HEPATIC FUNCTION PANEL: CPT

## 2020-09-26 PROCEDURE — 6360000002 HC RX W HCPCS: Performed by: INTERNAL MEDICINE

## 2020-09-26 PROCEDURE — 6370000000 HC RX 637 (ALT 250 FOR IP): Performed by: NURSE PRACTITIONER

## 2020-09-26 PROCEDURE — 84484 ASSAY OF TROPONIN QUANT: CPT

## 2020-09-26 PROCEDURE — 36415 COLL VENOUS BLD VENIPUNCTURE: CPT

## 2020-09-26 PROCEDURE — 85027 COMPLETE CBC AUTOMATED: CPT

## 2020-09-26 PROCEDURE — 85730 THROMBOPLASTIN TIME PARTIAL: CPT

## 2020-09-26 RX ADMIN — MULTIPLE VITAMINS W/ MINERALS TAB 1 TABLET: TAB at 08:40

## 2020-09-26 RX ADMIN — AMOXICILLIN AND CLAVULANATE POTASSIUM 1 TABLET: 875; 125 TABLET, FILM COATED ORAL at 08:40

## 2020-09-26 RX ADMIN — HEPARIN SODIUM 5000 UNITS: 10000 INJECTION INTRAVENOUS; SUBCUTANEOUS at 05:46

## 2020-09-26 RX ADMIN — ASPIRIN 81 MG CHEWABLE TABLET 81 MG: 81 TABLET CHEWABLE at 08:39

## 2020-09-26 RX ADMIN — SODIUM CHLORIDE, PRESERVATIVE FREE 10 ML: 5 INJECTION INTRAVENOUS at 08:41

## 2020-09-26 RX ADMIN — CLOPIDOGREL 75 MG: 75 TABLET, FILM COATED ORAL at 08:39

## 2020-09-26 RX ADMIN — LEVOTHYROXINE SODIUM 137 MCG: 0.14 TABLET ORAL at 05:46

## 2020-09-26 RX ADMIN — AMLODIPINE BESYLATE 5 MG: 5 TABLET ORAL at 08:39

## 2020-09-26 RX ADMIN — METOPROLOL SUCCINATE 50 MG: 50 TABLET, EXTENDED RELEASE ORAL at 08:40

## 2020-09-26 ASSESSMENT — PAIN SCALES - GENERAL: PAINLEVEL_OUTOF10: 0

## 2020-09-26 NOTE — PROGRESS NOTES
neck which is not mobile. 2. Borderline gallbladder wall thickening. CT ABSCESS CATHETER FOLLOW UP    (Results Pending)       Assessment    Principal Problem:    Acute cholecystitis  Active Problems:    HLD (hyperlipidemia)    HTN (hypertension)    Hypothyroidism    History of prostate cancer    Leukocytosis    History of colon cancer    S/P colostomy (HCC)    CAD (coronary artery disease)    Acute coronary syndrome (HCC)  Resolved Problems:    * No resolved hospital problems.  *      Plan:  71-year-old male history hypertension hyperlipidemia admitted with acute cholecystitis and ACS status post cholecystostomy tube 9/22 and left heart cath with PCI and MIRELA to mid LAD 9/25    Continue antibiotics Augmentin  DAPT  Statin  Cardiology consult appreciated  General surgery consult appreciated  Medications for other co morbidities cont as appropriate w dosage adjustments as necessary   PT/OT  DVT PPx  DC planning home with home health care        electronically signed by Helen Cotter MD on 9/26/2020 at 8:37 AM

## 2020-09-26 NOTE — PROGRESS NOTES
Normal apical impulse, regular rate and rhythm, normal S1 and S2, no S3 or S4, and no murmur noted, no edema, no JVD, no carotid bruit. ABDOMEN:  Soft, nontender, no masses, no hepatomegaly, no splenomegaly, BS+  MUSCULOSKELETAL:  No clubbing no cyanosis. there is no redness, warmth, or swelling of the joints  full range of motion noted  NEUROLOGIC:  Alert, awake,oriented x3  SKIN:  no bruising or bleeding, normal skin color, texture, turgor and no redness, warmth, or swelling      Cardiographics  I personally reviewed the telemetry monitor strips with the following interpretation:  Echocardiogram: not done    Imaging  CT PTC NEW ACCESS   Final Result   Successful uncomplicated  percutaneous cholecystostomy tube placement               NM HEPATOBILIARY   Final Result   1. Findings consistent with acute cholecystitis               MRI ABDOMEN W WO CONTRAST MRCP   Final Result      1. Cholelithiasis with findings of acute cholecystitis. Dilated,   likely distally obstructed cystic duct. 2. No evidence of choledocholithiasis. US GALLBLADDER RUQ   Final Result   1. Gallbladder sludge and gallstones with one stone lodged in the   gallbladder neck which is not mobile. 2. Borderline gallbladder wall thickening. CT ABSCESS CATHETER FOLLOW UP    (Results Pending)       Lab Review   Lab Results   Component Value Date     09/26/2020    K 4.3 09/26/2020     09/26/2020    CO2 24 09/26/2020    BUN 13 09/26/2020    CREATININE 0.9 09/26/2020    GLUCOSE 102 09/26/2020    CALCIUM 9.2 09/26/2020     Lab Results   Component Value Date    WBC 7.3 09/26/2020    HGB 13.3 09/26/2020    HCT 38.6 09/26/2020    MCV 86.5 09/26/2020     09/26/2020     I have personally reviewed the laboratory, cardiac diagnostic and radiographic testing as outlined above:    Assessment:   1. Non-ST elevation MI: Status post PCI to LAD  2. Hypertension: Controlled  3.   Hyperlipidemia: will resume

## 2020-09-27 LAB — ANAEROBIC CULTURE: NORMAL

## 2020-10-06 ENCOUNTER — HOSPITAL ENCOUNTER (OUTPATIENT)
Dept: INTERVENTIONAL RADIOLOGY/VASCULAR | Age: 80
Discharge: HOME | End: 2020-10-08
Payer: MEDICARE

## 2020-10-06 PROCEDURE — 2709999900 IR INJ PERC CHOLA EXIST ACCESS W IMAGING

## 2020-10-06 PROCEDURE — 6360000004 HC RX CONTRAST MEDICATION: Performed by: RADIOLOGY

## 2020-10-06 PROCEDURE — 47531 INJECTION FOR CHOLANGIOGRAM: CPT | Performed by: RADIOLOGY

## 2020-10-06 PROCEDURE — 47531 INJECTION FOR CHOLANGIOGRAM: CPT

## 2020-10-06 RX ADMIN — IOPAMIDOL 10 ML: 612 INJECTION, SOLUTION INTRAVENOUS at 11:00

## 2020-10-06 NOTE — PROGRESS NOTES
Patient arrive to Angio for ofelia tube check. Patient denies fever, chills, redness matt insertion site. Insertion site checked and revolution intact. Patient states for the last week he has 50-75 cc bloody red drainage draining from tube daily. Tube flushed with 10ml nss, no leaking around insertion site. Reviewed procedure and patient verbalized understanding, questions answered. Allergies noted and reviewed.   Follow up in 1 month per Dr Judene Bosworth

## 2020-10-14 ENCOUNTER — OFFICE VISIT (OUTPATIENT)
Dept: SURGERY | Age: 80
End: 2020-10-14
Payer: MEDICARE

## 2020-10-14 VITALS
OXYGEN SATURATION: 99 % | HEIGHT: 72 IN | SYSTOLIC BLOOD PRESSURE: 131 MMHG | BODY MASS INDEX: 26.82 KG/M2 | WEIGHT: 198 LBS | DIASTOLIC BLOOD PRESSURE: 79 MMHG | TEMPERATURE: 98 F | HEART RATE: 65 BPM

## 2020-10-14 PROCEDURE — 1111F DSCHRG MED/CURRENT MED MERGE: CPT | Performed by: SURGERY

## 2020-10-14 PROCEDURE — 99212 OFFICE O/P EST SF 10 MIN: CPT | Performed by: SURGERY

## 2020-10-14 PROCEDURE — 1036F TOBACCO NON-USER: CPT | Performed by: SURGERY

## 2020-10-14 PROCEDURE — 1123F ACP DISCUSS/DSCN MKR DOCD: CPT | Performed by: SURGERY

## 2020-10-14 PROCEDURE — 4040F PNEUMOC VAC/ADMIN/RCVD: CPT | Performed by: SURGERY

## 2020-10-14 PROCEDURE — G8427 DOCREV CUR MEDS BY ELIG CLIN: HCPCS | Performed by: SURGERY

## 2020-10-14 PROCEDURE — G8417 CALC BMI ABV UP PARAM F/U: HCPCS | Performed by: SURGERY

## 2020-10-14 PROCEDURE — G8484 FLU IMMUNIZE NO ADMIN: HCPCS | Performed by: SURGERY

## 2020-10-14 NOTE — PATIENT INSTRUCTIONS
We will call you in about 5 months, check in and see how you are doing. We will then schedule a procedure once we obtain cardiac clearance. If you have any questions at all before then, please call the office! Please contact Sienna Traore MA at [phone] with any questions or concerns.

## 2020-10-14 NOTE — PROGRESS NOTES
Progress Note    Subjective:  Patient was scheduled for a colostomy reversal but was admitted to the hospital for cholecystitis and NSTEMI. He had an IR drain placed and a drug eluting stent . Denies pain except left shoulder. No nausea or vomiting. + Bowel function and no abdominal pain     Objective:   Physical Exam  HENT:      Head: Normocephalic. Eyes:      Extraocular Movements: Extraocular movements intact. Pupils: Pupils are equal, round, and reactive to light. Abdominal:      General: Abdomen is flat. There is no distension. Tenderness: There is no abdominal tenderness. Comments: Ostomy pink- large prolapse- stool output, IR drain with bilious output    Musculoskeletal: Normal range of motion. Skin:     General: Skin is warm. Neurological:      Mental Status: He is alert. Assessment:  Visit Diagnoses and Associated Orders     Acute cholecystitis                 Hx colostomy for obstructing colon cancer     Plan:   Keep follow up appointment with IR to check for cystic duct patency     Discussed obstructive signs in case his ostomy become obstructed secondary to his prolapse - Nausea vomiting, abdominal pain and no output- He understands and knows to come to the ED if that were to happen.      Secondary to cardiac risk will postponed colostomy reversal and mediport removal 6 months at the same time will remove gallbladder and discuss with cardiology prior scheduling     Physician Signature: Electronically signed by Juvenal Calderon MD

## 2020-11-03 ENCOUNTER — HOSPITAL ENCOUNTER (INPATIENT)
Age: 80
LOS: 3 days | Discharge: HOME HEALTH | DRG: 445 | End: 2020-11-07
Attending: EMERGENCY MEDICINE | Admitting: INTERNAL MEDICINE
Payer: MEDICARE

## 2020-11-03 ENCOUNTER — HOSPITAL ENCOUNTER (OUTPATIENT)
Dept: INTERVENTIONAL RADIOLOGY/VASCULAR | Age: 80
Discharge: HOME | DRG: 445 | End: 2020-11-05
Payer: MEDICARE

## 2020-11-03 ENCOUNTER — APPOINTMENT (OUTPATIENT)
Dept: GENERAL RADIOLOGY | Age: 80
DRG: 445 | End: 2020-11-03
Payer: MEDICARE

## 2020-11-03 LAB
ALBUMIN SERPL-MCNC: 4.3 G/DL (ref 3.5–5.2)
ALP BLD-CCNC: 315 U/L (ref 40–129)
ALT SERPL-CCNC: 474 U/L (ref 0–40)
ANION GAP SERPL CALCULATED.3IONS-SCNC: 10 MMOL/L (ref 7–16)
AST SERPL-CCNC: 465 U/L (ref 0–39)
BACTERIA: ABNORMAL /HPF
BASOPHILS ABSOLUTE: 0.02 E9/L (ref 0–0.2)
BASOPHILS RELATIVE PERCENT: 0.2 % (ref 0–2)
BILIRUB SERPL-MCNC: 4.1 MG/DL (ref 0–1.2)
BILIRUBIN DIRECT: 2.9 MG/DL (ref 0–0.3)
BILIRUBIN URINE: ABNORMAL
BILIRUBIN, INDIRECT: 1.2 MG/DL (ref 0–1)
BLOOD, URINE: NEGATIVE
BUN BLDV-MCNC: 11 MG/DL (ref 8–23)
CALCIUM SERPL-MCNC: 9.5 MG/DL (ref 8.6–10.2)
CHLORIDE BLD-SCNC: 104 MMOL/L (ref 98–107)
CLARITY: CLEAR
CO2: 26 MMOL/L (ref 22–29)
COLOR: ABNORMAL
CREAT SERPL-MCNC: 1 MG/DL (ref 0.7–1.2)
EOSINOPHILS ABSOLUTE: 0.03 E9/L (ref 0.05–0.5)
EOSINOPHILS RELATIVE PERCENT: 0.3 % (ref 0–6)
GFR AFRICAN AMERICAN: >60
GFR NON-AFRICAN AMERICAN: >60 ML/MIN/1.73
GLUCOSE BLD-MCNC: 122 MG/DL (ref 74–99)
GLUCOSE URINE: NEGATIVE MG/DL
HCT VFR BLD CALC: 38.1 % (ref 37–54)
HEMOGLOBIN: 12.8 G/DL (ref 12.5–16.5)
IMMATURE GRANULOCYTES #: 0.02 E9/L
IMMATURE GRANULOCYTES %: 0.2 % (ref 0–5)
KETONES, URINE: ABNORMAL MG/DL
LACTIC ACID: 1 MMOL/L (ref 0.5–2.2)
LEUKOCYTE ESTERASE, URINE: ABNORMAL
LIPASE: 43 U/L (ref 13–60)
LYMPHOCYTES ABSOLUTE: 1.88 E9/L (ref 1.5–4)
LYMPHOCYTES RELATIVE PERCENT: 18.2 % (ref 20–42)
MCH RBC QN AUTO: 30 PG (ref 26–35)
MCHC RBC AUTO-ENTMCNC: 33.6 % (ref 32–34.5)
MCV RBC AUTO: 89.4 FL (ref 80–99.9)
MONOCYTES ABSOLUTE: 0.88 E9/L (ref 0.1–0.95)
MONOCYTES RELATIVE PERCENT: 8.5 % (ref 2–12)
NEUTROPHILS ABSOLUTE: 7.48 E9/L (ref 1.8–7.3)
NEUTROPHILS RELATIVE PERCENT: 72.6 % (ref 43–80)
NITRITE, URINE: NEGATIVE
PDW BLD-RTO: 13.3 FL (ref 11.5–15)
PH UA: 6 (ref 5–9)
PLATELET # BLD: 251 E9/L (ref 130–450)
PMV BLD AUTO: 8.9 FL (ref 7–12)
POTASSIUM REFLEX MAGNESIUM: 4 MMOL/L (ref 3.5–5)
PROTEIN UA: ABNORMAL MG/DL
RBC # BLD: 4.26 E12/L (ref 3.8–5.8)
RBC UA: ABNORMAL /HPF (ref 0–2)
SARS-COV-2, NAAT: NOT DETECTED
SODIUM BLD-SCNC: 140 MMOL/L (ref 132–146)
SPECIFIC GRAVITY UA: 1.02 (ref 1–1.03)
TOTAL PROTEIN: 8 G/DL (ref 6.4–8.3)
UROBILINOGEN, URINE: 1 E.U./DL
WBC # BLD: 10.3 E9/L (ref 4.5–11.5)
WBC UA: ABNORMAL /HPF (ref 0–5)

## 2020-11-03 PROCEDURE — 47531 INJECTION FOR CHOLANGIOGRAM: CPT

## 2020-11-03 PROCEDURE — 47531 INJECTION FOR CHOLANGIOGRAM: CPT | Performed by: RADIOLOGY

## 2020-11-03 PROCEDURE — 80048 BASIC METABOLIC PNL TOTAL CA: CPT

## 2020-11-03 PROCEDURE — 2709999900 IR INJ PERC CHOLA EXIST ACCESS W IMAGING

## 2020-11-03 PROCEDURE — 71045 X-RAY EXAM CHEST 1 VIEW: CPT

## 2020-11-03 PROCEDURE — U0002 COVID-19 LAB TEST NON-CDC: HCPCS

## 2020-11-03 PROCEDURE — 83690 ASSAY OF LIPASE: CPT

## 2020-11-03 PROCEDURE — 83605 ASSAY OF LACTIC ACID: CPT

## 2020-11-03 PROCEDURE — 6360000004 HC RX CONTRAST MEDICATION: Performed by: RADIOLOGY

## 2020-11-03 PROCEDURE — 80076 HEPATIC FUNCTION PANEL: CPT

## 2020-11-03 PROCEDURE — BF131ZZ FLUOROSCOPY OF GALLBLADDER AND BILE DUCTS USING LOW OSMOLAR CONTRAST: ICD-10-PCS | Performed by: INTERNAL MEDICINE

## 2020-11-03 PROCEDURE — 81001 URINALYSIS AUTO W/SCOPE: CPT

## 2020-11-03 PROCEDURE — 99283 EMERGENCY DEPT VISIT LOW MDM: CPT

## 2020-11-03 PROCEDURE — 85025 COMPLETE CBC W/AUTO DIFF WBC: CPT

## 2020-11-03 RX ADMIN — IOVERSOL 25 ML: 678 INJECTION INTRA-ARTERIAL; INTRAVENOUS at 09:40

## 2020-11-03 ASSESSMENT — ENCOUNTER SYMPTOMS
DIARRHEA: 0
COLOR CHANGE: 0
RHINORRHEA: 0
ABDOMINAL PAIN: 1
NAUSEA: 0
TROUBLE SWALLOWING: 0
VOMITING: 0
BLOOD IN STOOL: 0
COUGH: 0
SHORTNESS OF BREATH: 0

## 2020-11-03 NOTE — PROGRESS NOTES
Patient arrive to [facility] for ofelia tube check. Patient denies fever, chills, redness matt insertion site. Insertion site checked revolution intact. Patient states 50-100mL draining daily from tube. Pt states yesterday he did have a little oozing around site when he flushed it. Tube flushed with 10ml nss, no leaking around insertion site. Reviewed procedure and patient verbalized understanding, questions answered. Allergies noted and reviewed.   Follow up 4 weeks tube exchange per Jacquelyn Billingsley

## 2020-11-04 PROBLEM — I25.2 HISTORY OF MI (MYOCARDIAL INFARCTION): Chronic | Status: ACTIVE | Noted: 2020-11-04

## 2020-11-04 PROBLEM — I25.10 CAD (CORONARY ARTERY DISEASE): Chronic | Status: ACTIVE | Noted: 2020-09-24

## 2020-11-04 PROBLEM — K83.09 CHOLANGITIS: Status: ACTIVE | Noted: 2020-11-04

## 2020-11-04 PROBLEM — I24.9 ACUTE CORONARY SYNDROME (HCC): Status: RESOLVED | Noted: 2020-09-26 | Resolved: 2020-11-04

## 2020-11-04 PROBLEM — Z87.891 FORMER SMOKER: Chronic | Status: ACTIVE | Noted: 2020-09-20

## 2020-11-04 LAB
ALBUMIN SERPL-MCNC: 3.9 G/DL (ref 3.5–5.2)
ALP BLD-CCNC: 304 U/L (ref 40–129)
ALT SERPL-CCNC: 380 U/L (ref 0–40)
ANION GAP SERPL CALCULATED.3IONS-SCNC: 13 MMOL/L (ref 7–16)
AST SERPL-CCNC: 292 U/L (ref 0–39)
BASOPHILS ABSOLUTE: 0.03 E9/L (ref 0–0.2)
BASOPHILS RELATIVE PERCENT: 0.5 % (ref 0–2)
BILIRUB SERPL-MCNC: 5.4 MG/DL (ref 0–1.2)
BILIRUBIN DIRECT: 4 MG/DL (ref 0–0.3)
BILIRUBIN, INDIRECT: 1.4 MG/DL (ref 0–1)
BUN BLDV-MCNC: 11 MG/DL (ref 8–23)
CALCIUM SERPL-MCNC: 9.4 MG/DL (ref 8.6–10.2)
CHLORIDE BLD-SCNC: 104 MMOL/L (ref 98–107)
CO2: 23 MMOL/L (ref 22–29)
CREAT SERPL-MCNC: 0.8 MG/DL (ref 0.7–1.2)
EOSINOPHILS ABSOLUTE: 0.11 E9/L (ref 0.05–0.5)
EOSINOPHILS RELATIVE PERCENT: 1.9 % (ref 0–6)
GFR AFRICAN AMERICAN: >60
GFR NON-AFRICAN AMERICAN: >60 ML/MIN/1.73
GLUCOSE BLD-MCNC: 156 MG/DL (ref 74–99)
HCT VFR BLD CALC: 35.1 % (ref 37–54)
HEMOGLOBIN: 11.9 G/DL (ref 12.5–16.5)
IMMATURE GRANULOCYTES #: 0.02 E9/L
IMMATURE GRANULOCYTES %: 0.3 % (ref 0–5)
INR BLD: 1.1
LYMPHOCYTES ABSOLUTE: 0.99 E9/L (ref 1.5–4)
LYMPHOCYTES RELATIVE PERCENT: 17.2 % (ref 20–42)
MCH RBC QN AUTO: 30.4 PG (ref 26–35)
MCHC RBC AUTO-ENTMCNC: 33.9 % (ref 32–34.5)
MCV RBC AUTO: 89.8 FL (ref 80–99.9)
MONOCYTES ABSOLUTE: 0.59 E9/L (ref 0.1–0.95)
MONOCYTES RELATIVE PERCENT: 10.3 % (ref 2–12)
NEUTROPHILS ABSOLUTE: 4.01 E9/L (ref 1.8–7.3)
NEUTROPHILS RELATIVE PERCENT: 69.8 % (ref 43–80)
PDW BLD-RTO: 13.4 FL (ref 11.5–15)
PLATELET # BLD: 198 E9/L (ref 130–450)
PMV BLD AUTO: 9.3 FL (ref 7–12)
POTASSIUM SERPL-SCNC: 3.5 MMOL/L (ref 3.5–5)
PROTHROMBIN TIME: 12.4 SEC (ref 9.3–12.4)
RBC # BLD: 3.91 E12/L (ref 3.8–5.8)
SODIUM BLD-SCNC: 140 MMOL/L (ref 132–146)
TOTAL PROTEIN: 7 G/DL (ref 6.4–8.3)
WBC # BLD: 5.8 E9/L (ref 4.5–11.5)

## 2020-11-04 PROCEDURE — 2580000003 HC RX 258: Performed by: STUDENT IN AN ORGANIZED HEALTH CARE EDUCATION/TRAINING PROGRAM

## 2020-11-04 PROCEDURE — 87040 BLOOD CULTURE FOR BACTERIA: CPT

## 2020-11-04 PROCEDURE — 80076 HEPATIC FUNCTION PANEL: CPT

## 2020-11-04 PROCEDURE — 6370000000 HC RX 637 (ALT 250 FOR IP): Performed by: INTERNAL MEDICINE

## 2020-11-04 PROCEDURE — 1200000000 HC SEMI PRIVATE

## 2020-11-04 PROCEDURE — 99232 SBSQ HOSP IP/OBS MODERATE 35: CPT | Performed by: SURGERY

## 2020-11-04 PROCEDURE — 99222 1ST HOSP IP/OBS MODERATE 55: CPT | Performed by: SURGERY

## 2020-11-04 PROCEDURE — 85025 COMPLETE CBC W/AUTO DIFF WBC: CPT

## 2020-11-04 PROCEDURE — 2580000003 HC RX 258: Performed by: SURGERY

## 2020-11-04 PROCEDURE — 85610 PROTHROMBIN TIME: CPT

## 2020-11-04 PROCEDURE — 6360000002 HC RX W HCPCS: Performed by: STUDENT IN AN ORGANIZED HEALTH CARE EDUCATION/TRAINING PROGRAM

## 2020-11-04 PROCEDURE — 36415 COLL VENOUS BLD VENIPUNCTURE: CPT

## 2020-11-04 PROCEDURE — 2580000003 HC RX 258: Performed by: INTERNAL MEDICINE

## 2020-11-04 PROCEDURE — 80048 BASIC METABOLIC PNL TOTAL CA: CPT

## 2020-11-04 RX ORDER — PROMETHAZINE HYDROCHLORIDE 25 MG/1
12.5 TABLET ORAL EVERY 6 HOURS PRN
Status: DISCONTINUED | OUTPATIENT
Start: 2020-11-04 | End: 2020-11-07 | Stop reason: HOSPADM

## 2020-11-04 RX ORDER — DEXTROSE, SODIUM CHLORIDE, SODIUM LACTATE, POTASSIUM CHLORIDE, AND CALCIUM CHLORIDE 5; .6; .31; .03; .02 G/100ML; G/100ML; G/100ML; G/100ML; G/100ML
INJECTION, SOLUTION INTRAVENOUS CONTINUOUS
Status: DISCONTINUED | OUTPATIENT
Start: 2020-11-04 | End: 2020-11-07

## 2020-11-04 RX ORDER — SODIUM CHLORIDE 0.9 % (FLUSH) 0.9 %
10 SYRINGE (ML) INJECTION PRN
Status: DISCONTINUED | OUTPATIENT
Start: 2020-11-04 | End: 2020-11-04 | Stop reason: SDUPTHER

## 2020-11-04 RX ORDER — CLOPIDOGREL BISULFATE 75 MG/1
75 TABLET ORAL DAILY
Status: DISCONTINUED | OUTPATIENT
Start: 2020-11-04 | End: 2020-11-07 | Stop reason: HOSPADM

## 2020-11-04 RX ORDER — ACETAMINOPHEN 325 MG/1
650 TABLET ORAL EVERY 6 HOURS PRN
Status: DISCONTINUED | OUTPATIENT
Start: 2020-11-04 | End: 2020-11-07 | Stop reason: HOSPADM

## 2020-11-04 RX ORDER — SODIUM CHLORIDE 0.9 % (FLUSH) 0.9 %
10 SYRINGE (ML) INJECTION EVERY 12 HOURS SCHEDULED
Status: DISCONTINUED | OUTPATIENT
Start: 2020-11-04 | End: 2020-11-07 | Stop reason: HOSPADM

## 2020-11-04 RX ORDER — ACETAMINOPHEN 650 MG/1
650 SUPPOSITORY RECTAL EVERY 6 HOURS PRN
Status: DISCONTINUED | OUTPATIENT
Start: 2020-11-04 | End: 2020-11-07 | Stop reason: HOSPADM

## 2020-11-04 RX ORDER — ATORVASTATIN CALCIUM 40 MG/1
40 TABLET, FILM COATED ORAL DAILY
Status: DISCONTINUED | OUTPATIENT
Start: 2020-11-04 | End: 2020-11-07 | Stop reason: HOSPADM

## 2020-11-04 RX ORDER — METOPROLOL SUCCINATE 50 MG/1
50 TABLET, EXTENDED RELEASE ORAL DAILY
Status: DISCONTINUED | OUTPATIENT
Start: 2020-11-04 | End: 2020-11-07 | Stop reason: HOSPADM

## 2020-11-04 RX ORDER — POLYETHYLENE GLYCOL 3350 17 G/17G
17 POWDER, FOR SOLUTION ORAL DAILY PRN
Status: DISCONTINUED | OUTPATIENT
Start: 2020-11-04 | End: 2020-11-07 | Stop reason: HOSPADM

## 2020-11-04 RX ORDER — SODIUM CHLORIDE 0.9 % (FLUSH) 0.9 %
10 SYRINGE (ML) INJECTION PRN
Status: DISCONTINUED | OUTPATIENT
Start: 2020-11-04 | End: 2020-11-07 | Stop reason: HOSPADM

## 2020-11-04 RX ORDER — ONDANSETRON 2 MG/ML
4 INJECTION INTRAMUSCULAR; INTRAVENOUS EVERY 6 HOURS PRN
Status: DISCONTINUED | OUTPATIENT
Start: 2020-11-04 | End: 2020-11-07 | Stop reason: HOSPADM

## 2020-11-04 RX ORDER — LEVOTHYROXINE SODIUM 137 UG/1
137 TABLET ORAL DAILY
Status: DISCONTINUED | OUTPATIENT
Start: 2020-11-04 | End: 2020-11-07 | Stop reason: HOSPADM

## 2020-11-04 RX ORDER — ASPIRIN 81 MG/1
81 TABLET, CHEWABLE ORAL DAILY
Status: DISCONTINUED | OUTPATIENT
Start: 2020-11-04 | End: 2020-11-07 | Stop reason: HOSPADM

## 2020-11-04 RX ORDER — SODIUM CHLORIDE 0.9 % (FLUSH) 0.9 %
10 SYRINGE (ML) INJECTION EVERY 12 HOURS SCHEDULED
Status: DISCONTINUED | OUTPATIENT
Start: 2020-11-04 | End: 2020-11-04 | Stop reason: SDUPTHER

## 2020-11-04 RX ORDER — SODIUM CHLORIDE 9 MG/ML
INJECTION, SOLUTION INTRAVENOUS CONTINUOUS
Status: DISCONTINUED | OUTPATIENT
Start: 2020-11-04 | End: 2020-11-04

## 2020-11-04 RX ORDER — ACETAMINOPHEN 325 MG/1
650 TABLET ORAL EVERY 4 HOURS PRN
Status: DISCONTINUED | OUTPATIENT
Start: 2020-11-04 | End: 2020-11-04 | Stop reason: SDUPTHER

## 2020-11-04 RX ORDER — AMLODIPINE BESYLATE 5 MG/1
5 TABLET ORAL DAILY
Status: DISCONTINUED | OUTPATIENT
Start: 2020-11-04 | End: 2020-11-07 | Stop reason: HOSPADM

## 2020-11-04 RX ADMIN — SODIUM CHLORIDE, PRESERVATIVE FREE 10 ML: 5 INJECTION INTRAVENOUS at 10:16

## 2020-11-04 RX ADMIN — LEVOTHYROXINE SODIUM 137 MCG: 0.14 TABLET ORAL at 10:11

## 2020-11-04 RX ADMIN — SODIUM CHLORIDE, SODIUM LACTATE, POTASSIUM CHLORIDE, CALCIUM CHLORIDE AND DEXTROSE MONOHYDRATE: 5; 600; 310; 30; 20 INJECTION, SOLUTION INTRAVENOUS at 22:01

## 2020-11-04 RX ADMIN — ATORVASTATIN CALCIUM 40 MG: 40 TABLET, FILM COATED ORAL at 10:11

## 2020-11-04 RX ADMIN — SODIUM CHLORIDE, PRESERVATIVE FREE 10 ML: 5 INJECTION INTRAVENOUS at 21:45

## 2020-11-04 RX ADMIN — METOPROLOL SUCCINATE 50 MG: 50 TABLET, EXTENDED RELEASE ORAL at 10:11

## 2020-11-04 RX ADMIN — AMLODIPINE BESYLATE 5 MG: 5 TABLET ORAL at 10:11

## 2020-11-04 RX ADMIN — PIPERACILLIN AND TAZOBACTAM 3.38 G: 3; .375 INJECTION, POWDER, LYOPHILIZED, FOR SOLUTION INTRAVENOUS at 10:12

## 2020-11-04 RX ADMIN — SODIUM CHLORIDE: 9 INJECTION, SOLUTION INTRAVENOUS at 10:12

## 2020-11-04 RX ADMIN — PIPERACILLIN AND TAZOBACTAM 3.38 G: 3; .375 INJECTION, POWDER, LYOPHILIZED, FOR SOLUTION INTRAVENOUS at 15:26

## 2020-11-04 RX ADMIN — ATORVASTATIN CALCIUM 40 MG: 40 TABLET, FILM COATED ORAL at 21:45

## 2020-11-04 RX ADMIN — PIPERACILLIN AND TAZOBACTAM 3.38 G: 3; .375 INJECTION, POWDER, LYOPHILIZED, FOR SOLUTION INTRAVENOUS at 00:16

## 2020-11-04 ASSESSMENT — ENCOUNTER SYMPTOMS
SORE THROAT: 0
VOMITING: 0
SHORTNESS OF BREATH: 0
EYE DISCHARGE: 0
RHINORRHEA: 1
EYE REDNESS: 0
EYE PAIN: 0
NAUSEA: 0
ABDOMINAL PAIN: 0
SINUS PRESSURE: 0
DIARRHEA: 0
BACK PAIN: 0
COUGH: 0
WHEEZING: 0

## 2020-11-04 ASSESSMENT — PAIN SCALES - GENERAL: PAINLEVEL_OUTOF10: 0

## 2020-11-04 NOTE — H&P (VIEW-ONLY)
[address]                  [address] [facility] [facility]                              HISTORY AND PHYSICAL    PATIENT NAME: Gabriel Ivey                    :        1940  MED REC NO:   [id number]                            ROOM:       8416  ACCOUNT NO:   [de-identified]                           ADMIT DATE: 2020  PROVIDER:     Yemi Villar DO    CHIEF COMPLAINT:  Fever. HISTORY OF PRESENT ILLNESS:  The patient is a 78-year-old  male  who presented to the emergency room complaining of fever. He has a  cholecystostomy tube. It was checked by IR the day of presentation to  the emergency room. He then developed a fever. He was seen in the  emergency room. Diagnostic evaluation revealed elevated liver function  test including elevated bilirubin. He was admitted for further  evaluation and treatment. The patient with recent hospitalization for  acute cholecystitis, MI, underwent heart cath with coronary artery stent  placement. PAST MEDICAL HISTORY:  Cholecystitis, status post cholecystostomy tube;  common bile duct stone; hypothyroidism; coronary artery disease; MI;  colon cancer, status post colostomy; hyperlipidemia; prostate cancer. PAST SURGICAL HISTORY:  Colostomy, exploratory laparotomy, large bowel  resection, MediPort insertion, prostatectomy. MEDICATIONS PRIOR TO ADMISSION:  Aspirin, Toprol-XL, Norvasc, Plavix,  Synthroid, Lipitor, Tylenol, multivitamin. SOCIAL HISTORY:  The patient quit tobacco.  Admits to regular alcohol  use. REVIEW OF SYSTEMS:  Remarkable for above-stated chief complaint plus  allergies none known. PHYSICAL EXAMINATION:  GENERAL APPEARANCE:  Reveals a 78-year-old  male who is alert  and oriented x3, cooperative and a good historian. VITAL SIGNS:  On admission, temperature 98.2, pulse 73, respirations 16,  blood pressure 129/71. HEENT:  Head:  Normocephalic, atraumatic.

## 2020-11-04 NOTE — ED PROVIDER NOTES
76-year-old male presents today with complaint of having a fever. Patient states he has a biliary drain and is concerned that it could possibly infected. Patient says he was here earlier today for it to be checked up in looked at, and states that that went well and there was no concerns for infection at that time. Patient states when he went home he was a little fatigued and tired and felt a little feverish. He did take his temperature states it was around 102 did take some aspirin and decided to come in because he was worried developed possible infection. Patient dates his biliary drain was placed back around September 19 20th and has not had any infection with a drain since then and has been draining appropriately. Patient does also have a colostomy due to having colon cancer and was last treated with chemotherapy back in April of this year. Does have a history of heart attack multiple stents placed. Fever   Max temp prior to arrival:  102  Temp source:  Oral  Severity:  Mild  Onset quality:  Sudden  Duration:  1 day  Timing:  Constant  Progression:  Unchanged  Chronicity:  New  Relieved by:  Nothing  Worsened by:  Nothing  Ineffective treatments:  Aspirin  Associated symptoms: rhinorrhea    Associated symptoms: no chest pain, no chills, no confusion, no congestion, no cough, no diarrhea, no dysuria, no ear pain, no headaches, no nausea, no rash, no sore throat and no vomiting         Review of Systems   Constitutional: Positive for fever. Negative for chills. HENT: Positive for rhinorrhea. Negative for congestion, ear pain, sinus pressure and sore throat. Eyes: Negative for pain, discharge and redness. Respiratory: Negative for cough, shortness of breath and wheezing. Cardiovascular: Negative for chest pain. Gastrointestinal: Negative for abdominal pain, diarrhea, nausea and vomiting. Genitourinary: Negative for dysuria and frequency.    Musculoskeletal: Negative for arthralgias and back pain. Skin: Negative for rash and wound. Neurological: Negative for weakness and headaches. Hematological: Negative for adenopathy. Psychiatric/Behavioral: Negative for confusion. All other systems reviewed and are negative. Physical Exam  Vitals signs and nursing note reviewed. Constitutional:       Appearance: He is well-developed. HENT:      Head: Normocephalic and atraumatic. Eyes:      Conjunctiva/sclera: Conjunctivae normal.   Neck:      Musculoskeletal: Normal range of motion and neck supple. Cardiovascular:      Rate and Rhythm: Normal rate and regular rhythm. Heart sounds: Normal heart sounds. No murmur. Pulmonary:      Effort: Pulmonary effort is normal. No respiratory distress. Breath sounds: Normal breath sounds. No wheezing or rales. Abdominal:      General: Bowel sounds are normal. There is no distension. Palpations: Abdomen is soft. Tenderness: There is no abdominal tenderness. There is no guarding or rebound. Negative signs include Rovsing's sign and McBurney's sign. Musculoskeletal:         General: No tenderness or deformity. Skin:     General: Skin is warm and dry. Neurological:      Mental Status: He is alert and oriented to person, place, and time. Cranial Nerves: No cranial nerve deficit. Coordination: Coordination normal.          Procedures     MDM  Number of Diagnoses or Management Options  Cholangitis:   Diagnosis management comments: 71-year male presents ED with complaint of feeling like he has a infection involving his biliary drain. Patient is a was checked out today and came home and felt fatigued and feverish took his temperature and it was 102 and came into the hospital.  Patient did take Tylenol at home and has not had any fever while here in the department. However his lab work showed elevation in his LFTs and his biliary drain is draining clear serous fluid.   Concern at this time is for possible cholangitis did consult surgery and they recommend start him on Zosyn have been admitted to medicine. Talked with patient about this and patient was agreeable to this decision. Did also talk with  and he was agreeable to meeting the patient. Amount and/or Complexity of Data Reviewed  Clinical lab tests: reviewed  Tests in the radiology section of CPT®: reviewed  Decide to obtain previous medical records or to obtain history from someone other than the patient: yes         ED Course as of Nov 03 2355 Tue Nov 03, 2020   2251 White count not elevated    [CB]   2347 Covid negative    [CB]      ED Course User Index  [CB] Kristina Garnett MD        ED Course as of Nov 04 0001   Tue Nov 03, 2020   2251 White count not elevated    [CB]   2347 Covid negative    [CB]      ED Course User Index  [CB] Kristina Garnett MD       --------------------------------------------- PAST HISTORY ---------------------------------------------  Past Medical History:  has a past medical history of Cancer ([facility] [address] Hyperlipidemia, Malignant neoplasm of splenic flexure ([facility] [address] and Thyroid disease. Past Surgical History:  has a past surgical history that includes Prostate biopsy; LAPAROTOMY EXPLORATORY (N/A, 10/13/2019); Port Surgery (N/A, 10/22/2019); colostomy; Colonoscopy (8/28/2020); Cardiac catheterization (09/21/2020); CT PTC NEW ACCESS (9/22/2020); and Cardiac catheterization (09/25/2020). Social History:  reports that he has quit smoking. He has never used smokeless tobacco. He reports previous alcohol use of about 4.0 standard drinks of alcohol per week. He reports that he does not use drugs. Family History: family history includes Breast Cancer in his mother; Cancer in his mother; Diabetes in his father; Merle Shave in his father. The patients home medications have been reviewed. Allergies: Patient has no known allergies.     -------------------------------------------------- RESULTS -------------------------------------------------    LABS:  Results for orders placed or performed during the hospital encounter of 11/03/20   CBC Auto Differential   Result Value Ref Range    WBC 10.3 4.5 - 11.5 E9/L    RBC 4.26 3.80 - 5.80 E12/L    Hemoglobin 12.8 12.5 - 16.5 g/dL    Hematocrit 38.1 37.0 - 54.0 %    MCV 89.4 80.0 - 99.9 fL    MCH 30.0 26.0 - 35.0 pg    MCHC 33.6 32.0 - 34.5 %    RDW 13.3 11.5 - 15.0 fL    Platelets 939 673 - 162 E9/L    MPV 8.9 7.0 - 12.0 fL    Neutrophils % 72.6 43.0 - 80.0 %    Immature Granulocytes % 0.2 0.0 - 5.0 %    Lymphocytes % 18.2 (L) 20.0 - 42.0 %    Monocytes % 8.5 2.0 - 12.0 %    Eosinophils % 0.3 0.0 - 6.0 %    Basophils % 0.2 0.0 - 2.0 %    Neutrophils Absolute 7.48 (H) 1.80 - 7.30 E9/L    Immature Granulocytes # 0.02 E9/L    Lymphocytes Absolute 1.88 1.50 - 4.00 E9/L    Monocytes Absolute 0.88 0.10 - 0.95 E9/L    Eosinophils Absolute 0.03 (L) 0.05 - 0.50 E9/L    Basophils Absolute 0.02 0.00 - 0.20 T0/D   Basic Metabolic Panel w/ Reflex to MG   Result Value Ref Range    Sodium 140 132 - 146 mmol/L    Potassium reflex Magnesium 4.0 3.5 - 5.0 mmol/L    Chloride 104 98 - 107 mmol/L    CO2 26 22 - 29 mmol/L    Anion Gap 10 7 - 16 mmol/L    Glucose 122 (H) 74 - 99 mg/dL    BUN 11 8 - 23 mg/dL    CREATININE 1.0 0.7 - 1.2 mg/dL    GFR Non-African American >60 >=60 mL/min/1.73    GFR African American >60     Calcium 9.5 8.6 - 10.2 mg/dL   Hepatic function panel   Result Value Ref Range    Total Protein 8.0 6.4 - 8.3 g/dL    Alb 4.3 3.5 - 5.2 g/dL    Alkaline Phosphatase 315 (H) 40 - 129 U/L     (H) 0 - 40 U/L     (H) 0 - 39 U/L    Total Bilirubin 4.1 (H) 0.0 - 1.2 mg/dL    Bilirubin, Direct 2.9 (H) 0.0 - 0.3 mg/dL    Bilirubin, Indirect 1.2 (H) 0.0 - 1.0 mg/dL   Lipase   Result Value Ref Range    Lipase 43 13 - 60 U/L   Lactic Acid, Plasma   Result Value Ref Range    Lactic Acid 1.0 0.5 - 2.2 mmol/L   COVID-19   Result Value Ref Range    SARS-CoV-2, NAAT Not Detected Not Detected   Urinalysis with Microscopic   Result Value Ref Range    Color, UA DARK YELLOW (A) Straw/Yellow    Clarity, UA Clear Clear    Glucose, Ur Negative Negative mg/dL    Bilirubin Urine LARGE (A) Negative    Ketones, Urine TRACE (A) Negative mg/dL    Specific Gravity, UA 1.025 1.005 - 1.030    Blood, Urine Negative Negative    pH, UA 6.0 5.0 - 9.0    Protein, UA TRACE Negative mg/dL    Urobilinogen, Urine 1.0 <2.0 E.U./dL    Nitrite, Urine Negative Negative    Leukocyte Esterase, Urine TRACE (A) Negative    WBC, UA NONE 0 - 5 /HPF    RBC, UA 1-3 0 - 2 /HPF    Bacteria, UA RARE (A) None Seen /HPF       RADIOLOGY:  XR CHEST PORTABLE   Final Result   No acute process. ------------------------- NURSING NOTES AND VITALS REVIEWED ---------------------------  Date / Time Roomed:  11/3/2020 10:00 PM  ED Bed Assignment:  11/11    The nursing notes within the ED encounter and vital signs as below have been reviewed. Patient Vitals for the past 24 hrs:   BP Temp Temp src Pulse SpO2 Height Weight   11/03/20 2157 129/71 -- -- -- -- 6' (1.829 m) 195 lb (88.5 kg)   11/03/20 2152 -- 98.2 °F (36.8 °C) Oral 73 96 % -- --       Oxygen Saturation Interpretation: Normal    ------------------------------------------ PROGRESS NOTES ------------------------------------------  Re-evaluation(s):  Time: 2300  Patients symptoms show no change  Repeat physical examination is not changed    Counseling:  I have spoken with the patient and discussed todays results, in addition to providing specific details for the plan of care and counseling regarding the diagnosis and prognosis. Their questions are answered at this time and they are agreeable with the plan of admission.    --------------------------------- ADDITIONAL PROVIDER NOTES ---------------------------------  Consultations:  Time: 0005. Spoke with Dr. Haley Chand. Discussed case. They will admit the patient.   This patient's ED course included: a personal history and physicial examination, re-evaluation prior to disposition, multiple bedside re-evaluations, IV medications, cardiac monitoring and continuous pulse oximetry    This patient has remained hemodynamically stable during their ED course. Diagnosis:  1. Cholangitis        Disposition:  Patient's disposition: Admit to med/surg floor  Patient's condition is stable.          Surekha Fuchs MD  Resident  11/04/20 Jairo Tejada MD  Resident  11/04/20 9808

## 2020-11-04 NOTE — CONSULTS
(SYNTHROID) tablet 137 mcg  137 mcg Oral Daily Sam Plush Malmer, DO   137 mcg at 11/04/20 1011    metoprolol succinate (TOPROL XL) extended release tablet 50 mg  50 mg Oral Daily Thornton Plush Malmer, DO   50 mg at 11/04/20 1011    sodium chloride flush 0.9 % injection 10 mL  10 mL Intravenous 2 times per day Niya Nae, DO   10 mL at 11/04/20 1016    sodium chloride flush 0.9 % injection 10 mL  10 mL Intravenous PRN Sam Plush Malmer, DO        acetaminophen (TYLENOL) tablet 650 mg  650 mg Oral Q6H PRN Thornton [place] Malmer, DO        Or    acetaminophen (TYLENOL) suppository 650 mg  650 mg Rectal Q6H PRN Thornton [place] Malmer, DO        polyethylene glycol (GLYCOLAX) packet 17 g  17 g Oral Daily PRN Sam [place] Malmer, DO        promethazine (PHENERGAN) tablet 12.5 mg  12.5 mg Oral Q6H PRN Thornton [place] Malmer, DO        Or    ondansetron [facility]) injection 4 mg  4 mg Intravenous Q6H PRN Thornton Plush Malmer, DO        0.9 % sodium chloride infusion   Intravenous Continuous Sam Plush Malmer, DO 75 mL/hr at 11/04/20 1012         No Known Allergies    Surgical History  Past Surgical History:   Procedure Laterality Date    CARDIAC CATHETERIZATION  09/21/2020    Dr. Amanda Manzo, No LV.   Staged PCI to LAD    CARDIAC CATHETERIZATION  09/25/2020    LAD PCI- Dr Choe Minus  8/28/2020    COLONOSCOPY DIAGNOSTIC performed by Sherron Radford MD at [place] [address] CT PTC NEW ACCESS  9/22/2020    CT PTC NEW ACCESS 9/22/2020 Ondina Cleveland MD [facility] CT    LAPAROTOMY EXPLORATORY N/A 10/13/2019    LAPAROTOMY EXPLORATORY, LARGE BOWEL RESECTION, CREATION OF COLOSTOMY performed by Sherron Radford MD at [address] [facility] N/A 10/22/2019    PORT INSERTION performed by Dion Espinoza MD at [address] History  Social History     Socioeconomic History    Marital status:    Tobacco Use    Smoking status: Former Smoker    Smokeless tobacco: Never Used    Tobacco comment: 40 + years    Substance and Sexual Activity    Alcohol use: Not Currently     Alcohol/week: 4.0 standard drinks     Types: 4 Cans of beer per week     Comment: occ    Drug use: Never     Family Medical History  Family History   Problem Relation Age of Onset    Cancer Mother     Breast Cancer Mother     Diabetes Father     Macular Degen Father        Review of Systems:  Constitutional: Had fever, had chills  Eyes: No vision changes, no retroorbital pain  ENT: No hearing changes, no ear pain  Respiratory: No cough, no dyspnea  Cardiovascular: No chest pain, no palpitations  Gastrointestinal: Had abdominal pain, no diarrhea  Genitourinary: No dysuria, no hematuria  Integumentary: No rash, has itching  Musculoskeletal: No muscle pain, no joint pain  Neurologic: No headache, has numbness in extremities    Physical Examination:  Vitals:    11/03/20 2157 11/04/20 0045 11/04/20 0115 11/04/20 1000   BP: 129/71 128/72 (!) 147/70 125/68   Pulse:  75 65 69   Resp:  16 18 16   Temp:  98 °F (36.7 °C) 98.8 °F (37.1 °C) 97.3 °F (36.3 °C)   TempSrc:  Oral Temporal Temporal   SpO2:  96% 100% 98%   Weight: 195 lb (88.5 kg)      Height: 6' (1.829 m)        Constitutional: Alert, not in distress  Eyes: Sclerae anicteric, no conjunctival erythema  ENT: No buccal lesion, no pharyngeal exudates  Neck: No nuchal rigidity, no cervical adenopathy  Lungs: Clear breath sounds, no crackles, no wheezes  Heart: Regular rate and rhythm, no murmurs  Abdomen: Bowel sounds present, soft, nontender. Cholecystostomy tube in place with biliary fluid output. Skin: Warm and dry, no active dermatoses  Musculoskeletal: No joint erythema, no joint swelling    Labs, imaging, and medical records/notes were personally reviewed. Assessment:  Cholangitis with percutaneous cholecystostomy tube in place    Plan:  Continue piperacillin-tazobactam 3.375g q8h for now. Follow up blood cultures. Plan for ERCP is noted.     Thank you for

## 2020-11-04 NOTE — CONSULTS
years    Substance Use Topics    Alcohol use: Not Currently     Alcohol/week: 4.0 standard drinks     Types: 4 Cans of beer per week     Comment: occ    Drug use: Never         Review of Systems   General ROS: positive for  - chills, fatigue and fever  Hematological and Lymphatic ROS: Patient takes aspirin 81 mg and Plavix for cardiac stents  Respiratory ROS: no cough, shortness of breath, or wheezing  Cardiovascular ROS: no chest pain or dyspnea on exertion  Gastrointestinal ROS: no abdominal pain, change in bowel habits, or black or bloody stools  Genito-Urinary ROS: no dysuria, trouble voiding, or hematuria  Musculoskeletal ROS: negative for - joint swelling or muscle pain      PHYSICAL EXAM:    Vitals:    11/03/20 2157   BP: 129/71   Pulse:    Temp:    SpO2:        General Appearance:  awake, alert, oriented, in no acute distress  Skin:  Skin color, texture, turgor normal. No rashes or lesions. Head/face:  NCAT  Eyes: Scleral icterus  Lungs:  Normal expansion. Clear to auscultation. No rales, rhonchi, or wheezing. Heart:  Heart regular rate and rhythm  Abdomen: Prolapsed and pink colostomy with stool output in bag. Cholecystostomy tube without overlying erythematous change or induration. Abdomen is soft, nontender to palpation, no distention, guarding. No rigidity  Extremities: Extremities warm to touch, pink, with no edema. LABS:    CBC  Recent Labs     11/03/20  2216   WBC 10.3   HGB 12.8   HCT 38.1        BMP  Recent Labs     11/03/20  2216      K 4.0      CO2 26   BUN 11   CREATININE 1.0   CALCIUM 9.5     Liver Function  Recent Labs     11/03/20  2216   LIPASE 43   BILITOT 4.1*   BILIDIR 2.9*   *   *   ALKPHOS 315*   PROT 8.0   LABALBU 4.3     No results for input(s): LACTATE in the last 72 hours. No results for input(s): INR, PTT in the last 72 hours.     Invalid input(s): PT    RADIOLOGY    Xr Chest Portable    Result Date: 11/3/2020  EXAMINATION: ONE XRAY VIEW OF THE CHEST 11/3/2020 10:51 pm COMPARISON: Chest x-ray from 2020 HISTORY: ORDERING SYSTEM PROVIDED HISTORY: fever TECHNOLOGIST PROVIDED HISTORY: Reason for exam:->fever What reading provider will be dictating this exam?->CRC FINDINGS: A left subclavian Port-A-Cath is noted with the distal tip in the region of the SVC. The lungs are without acute focal process. There is no effusion or pneumothorax. The cardiomediastinal silhouette is without acute process. The osseous structures are without acute process. No acute process. Ir Jassi Juarez Exist Access W Imaging    Result Date: 11/3/2020  Patient MRN:  [id number] : 1940 Age: 79 years Gender: Male Order Date:  11/3/2020 9:09 AM EXAM: IR INJ PERC CHOLA EXIST ACCESS W IMAGING NUMBER OF IMAGES:  3 views INDICATION: K81.9 Cholecystitis What reading provider will be dictating this exam?->[facility] COMPARISON: None Cholecystostomy Tube Check Procedure: Following the routine sterile preparation and drape, under fluoroscopic control contrast was injected through the indwelling tube. The cholecystostomy tube appeared to be in good position. Time out occurred at 0907 hrs. 0.27 minutes of fluoroscopy was utilized. Cholangiogram images demonstrate multiple filling defects in the common duct as well as multiple filling defects in the cystic duct. Contrast was visualized into the distal common duct however not into the duodenum     Tube check demonstrating the catheter to be in good position. Cholelithiasis Choledocholithiasis          ASSESSMENT:  79 y.o. male with cholangitis, choledocholithiasis, cholelithiasis, status post IR cholecystostomy tube placement .      PLAN:    -Patient needs admitted to the hospital and started on antibiotics, recommend Zosyn  -Patient will further need to undergo ERCP with stone retrieval and possible stent placement versus percutaneous transhepatic cholangiography with drain placement in the biliary tree  -Patient is okay for a low-fat diet tonight  -N.p.o.   -IVF    Plan to be discussed with Dr. Margaret Arnold    Electronically signed by Sara Andrade DO on 11/4/20 at 12:10 AM EST    [facility] [address] [place] [address], MD    Patient's Name/Date of Birth: Brigida Gloria / 1940    Date: November 4, 2020     Consulting Surgeon: Daryle Krabbe, MD    PCP: Nikki Traore DO     Chief Complaint: Elevated LFTs, fever    HPI:   Brigida Gloria is a 79 y.o. male who presents for evaluation of fever. Patient has a history of obstructive colon cancer, which he had to undergo emergent colectomy with colostomy in 2019. He has long prolapsed colostomy but it functions normally. In September he was to undergo reversal with Dr. Gagan Landon. At that time he suffered an NSTEMI and underwent cardiac catheterization with stent placement. He was subsequently put on aspirin 81 mg and Plavix. He also was found to have acute cholecystitis and cholecystectomy was not able to be performed due to blood thinning medications. He underwent IR guided biliary drain placement on September 22. Today he was seen at follow-up by IR. The cholecystostomy tube was determined to be in good position, however the patient was newly discovered to have choledocholithiasis. After his follow-up appointment the patient began experiencing fevers and chills. He took an otic temperature at home which measured 102 °F.  He presented to the emergency department. In the emergency department the patient has scleral icterus. He does not have any abdominal pain. He denies nausea or vomiting. Denies diarrhea or constipation. Does endorse more fatigue. His liver enzymes were found to be elevated. At baseline does not have any elevation in liver enzymes.     Albumin is 4.3  Alk phos is 315  ALT is 474  AST is 465  Bilirubin is 4.1 / direct bili is 2.9/ indirect bili is 1.2  Lipase is within normal limits    White blood cell count is 10.3, elevated from baseline of between 5-6. He is afebrile in the ED. Patient Active Problem List   Diagnosis    Moderate protein-calorie malnutrition ([facility]    Malignant neoplasm of splenic flexure (HCC)    Acute cholecystitis    HLD (hyperlipidemia)    HTN (hypertension)    Hypothyroidism    Former smoker    History of prostate cancer    Leukocytosis    History of colon cancer    S/P colostomy ([facility]    CAD (coronary artery disease)    Cholangitis    History of MI (myocardial infarction)       No Known Allergies    Past Medical History:   Diagnosis Date    [facility] ([facility]     prostate    Hyperlipidemia     Mal[facility] splenic flexure ([facility] 10/21/2019    Thyroid disease        Past Surgical History:   Procedure Laterality Date    CARDIAC CATHETERIZATION  09/21/2020    Dr. Freddy Hall, No LV.   Staged PCI to LAD    CARDIAC CATHETERIZATION  09/25/2020    LAD PCI- Dr Annia Falk  8/28/2020    COLONOSCOPY DIAGNOSTIC performed by Fouzia Chan MD at [place] [address] CT PTC NEW ACCESS  9/22/2020    CT PTC NEW ACCESS 9/22/2020 Skye Escobar MD [facility]    LAPAROTOMY EXPLORATORY N/A 10/13/2019    LAPAROTOMY EXPLORATORY, LARGE BOWEL RESECTION, CREATION OF COLOSTOMY performed by Fouzia Chan MD at [facility] N/A 10/22/2019    PORT INSERTION performed by Lilian Villalpando MD at [address] [place] [address] History     Socioeconomic History    Marital status:      Spouse name: Not on file    Number of children: Not on file    Years of education: Not on file    Highest education level: Not on file   Occupational History    Not on file   Social Needs    Financial resource strain: Not on file    Food insecurity     Worry: Not on file     Inability: Not on file    Transportation needs     Medical: Not on file     Non-medical: Not on file   Tobacco Use    Smoking status: Former Smoker    Smokeless tobacco: Never Used    Tobacco comment: 40 + years    Substance and Sexual Activity    Alcohol use: Not Currently     Alcohol/week: 4.0 standard drinks     Types: 4 Cans of beer per week     Comment: occ    Drug use: Never    Sexual activity: Not on file   Lifestyle    Physical activity     Days per week: Not on file     Minutes per session: Not on file    Stress: Not on file   Relationships    Social connections     Talks on phone: Not on file     Gets together: Not on file     Attends Sabianist service: Not on file     Active member of club or organization: Not on file     Attends meetings of clubs or organizations: Not on file     Relationship status: Not on file    Intimate partner violence     Fear of current or ex partner: Not on file     Emotionally abused: Not on file     Physically abused: Not on file     Forced sexual activity: Not on file   Other Topics Concern    Not on file   Social History Narrative    Not on file       The patient has a family history that is negative for severe cardiovascular or respiratory issues, negative for reaction to anesthesia. Recent Labs     11/03/20 2216 11/04/20  1021   WBC 10.3 5.8   HGB 12.8 11.9*   HCT 38.1 35.1*   MCV 89.4 89.8    198       Recent Labs     11/03/20 2216 11/04/20  1021    140   K 4.0 3.5   CO2 26 23   BUN 11 11   CREATININE 1.0 0.8       Recent Labs     11/03/20 2216 11/04/20  1021   PROT 8.0 7.0   INR  --  1.1   LIPASE 43  --        No intake or output data in the 24 hours ending 11/04/20 2010    I have reviewed relevant labs from this admission and interpretation is included in my assessment and plan      Review of Systems  A complete 10 system review was performed and are otherwise negative unless mentioned in the above HPI. Specific negatives are listed below but may not include all those reviewed.     General ROS: negative obtundation, AMS  ENT ROS: negative rhinorrhea, epistaxis  Allergy and Immunology ROS: negative itchy/watery eyes or nasal congestion  Hematological and Lymphatic ROS: negative spontaneous bleeding or bruising  Endocrine ROS: negative  lethargy, mood swings, palpitations or polydipsia/polyuria  Respiratory ROS: negative sputum changes, stridor, tachypnea or wheezing  Cardiovascular ROS: negative for - loss of consciousness, murmur or orthopnea  Gastrointestinal ROS: negative for - hematochezia or hematemesis  Genito-Urinary ROS: negative for -  genital discharge or hematuria  Musculoskeletal ROS: negative for - focal weakness, gangrene  Psych/Neuro ROS: negative for - visual or auditory hallucinations, suicidal ideation      Physical exam:   /68   Pulse 69   Temp 97.3 °F (36.3 °C) (Temporal)   Resp 16   Ht 6' (1.829 m)   Wt 195 lb (88.5 kg)   SpO2 98%   BMI 26.45 kg/m²   General appearance:  NAD, appears stated age  Head: NCAT, PERRLA, EOMI, red conjunctiva  Neck: supple, no masses, trachea midline  Lungs: Equal chest rise bilateral, no retractions, no wheezing  Heart: Reg rate  Abdomen: soft, mild ruq tenderness. Non distended. Skin; warm and dry, no cyanosis  Gu: no cva tenderness  Extremities: atraumatic, no focal motor deficits, no open wounds  Psych: No tremor, visual hallucinations    Pathology: n/a    Radiology: I reviewed relevant abdominal imaging from this admission and that available in the EMR including IR tube check from 11/3/20. My assessment is choledocholithiasis. Cystic duct patent    Assessment:  Rea Interiano is a 79 y.o. male with choledocholithiasis.  hyperbilirubinemia    Patient Active Problem List   Diagnosis    Moderate protein-calorie malnutrition ([address]    Malignant neoplasm of splenic flexure (HCC)    Acute cholecystitis    HLD (hyperlipidemia)    HTN (hypertension)    Hypothyroidism    Former smoker    History of prostate cancer    Leukocytosis    History of colon cancer    S/P colostomy ([address]    CAD (coronary artery disease)    Cholangitis    History of MI (myocardial infarction)       Plan:  Recent history of MI s/p PTCA and placement drug eluting stents. Currently on DAPT. Is however at risk for cholangitis with recent fever, elevated bilirubin and IR tube check showed choledocholithiasis and would therefore benefit from ERCP with stone removal and stent placement until able to have cholecystectomy. Will proceed with ERCP and attempt stone extraction without sphincterotomy. The patient understands the risk and agrees to proceed. Time spent reviewing past medical, surgical, social and family history, vitals, nursing assessment and images. Time spent face to face with patient and family counciling and discussing care exceeded 50% of the time of the consult. Additional time spent reviewing images and labs, discussing case with nursing, support staff and other physicians; as well as coordinating care.         Physician Signature: Electronically signed by Dr. Boni Fernando

## 2020-11-04 NOTE — CONSULTS
GENERAL SURGERY  CONSULT NOTE  11/4/2020    Physician Consulted: Dr. Jerzy Sheikh  Reason for Consult: Elevated LFTs, Fever  Referring Physician: Dr. Azucena ADAMES  Svitlana Humphrey is a 79 y.o. male who presents for evaluation of fever. Patient has a history of obstructive colon cancer, which he had to undergo emergent colectomy with colostomy in 2019. He has long prolapsed colostomy but it functions normally. In September he was to undergo reversal with Dr. Wlof Santiago. At that time he suffered an NSTEMI and underwent cardiac catheterization with stent placement. He was subsequently put on aspirin 81 mg and Plavix. He also was found to have acute cholecystitis and cholecystectomy was not able to be performed due to blood thinning medications. He underwent IR guided biliary drain placement on September 22. Today he was seen at follow-up by IR. The cholecystostomy tube was determined to be in good position, however the patient was newly discovered to have choledocholithiasis. After his follow-up appointment the patient began experiencing fevers and chills. He took an otic temperature at home which measured 102 °F.  He presented to the emergency department. In the emergency department the patient has scleral icterus. He does not have any abdominal pain. He denies nausea or vomiting. Denies diarrhea or constipation. Does endorse more fatigue. His liver enzymes were found to be elevated. At baseline does not have any elevation in liver enzymes. Albumin is 4.3  Alk phos is 315  ALT is 474  AST is 465  Bilirubin is 4.1 / direct bili is 2.9/ indirect bili is 1.2  Lipase is within normal limits    White blood cell count is 10.3, elevated from baseline of between 5-6. He is afebrile in the ED.       Past Medical History:   Diagnosis Date    Cancer [facility])     prostate    Hyperlipidemia     Malignant neoplasm of splenic flexure ([facility] 10/21/2019    Thyroid disease        Past Surgical History: Procedure Laterality Date    CARDIAC CATHETERIZATION  09/21/2020    Dr. Rodrigo Chen, No LV. Staged PCI to LAD    CARDIAC CATHETERIZATION  09/25/2020    LAD PCI- Dr Mauricio Benito  8/28/2020    COLONOSCOPY DIAGNOSTIC performed by Nellie Snellen, MD at [facility] CT PTC NEW ACCESS  9/22/2020    CT PTC NEW ACCESS 9/22/2020 Helen Mcghee MD [facility] CT    LAPAROTOMY EXPLORATORY N/A 10/13/2019    LAPAROTOMY EXPLORATORY, LARGE BOWEL RESECTION, CREATION OF COLOSTOMY performed by Nellie Snellen, MD at [facility] N/A 10/22/2019    PORT INSERTION performed by John García MD at [address] [place] [address]         Medications Prior to Admission:    Prior to Admission medications    Medication Sig Start Date End Date Taking?  Authorizing Provider   aspirin 81 MG chewable tablet Take 1 tablet by mouth daily 9/26/20   Patric Liu, DO   metoprolol succinate (TOPROL XL) 50 MG extended release tablet Take 1 tablet by mouth daily 9/26/20   katherine Liu, DO   amLODIPine (NORVASC) 5 MG tablet Take 1 tablet by mouth daily 9/26/20   [facility], DO   clopidogrel (PLAVIX) 75 MG tablet Take 1 tablet by mouth daily 9/26/20   [facility], DO   levothyroxine (SYNTHROID) 137 MCG tablet Take 137 mcg by mouth Daily    Historical Provider, MD   atorvastatin (LIPITOR) 40 MG tablet Take 40 mg by mouth daily    Historical Provider, MD   acetaminophen (TYLENOL) 500 MG tablet Take 500 mg by mouth every 6 hours as needed for Pain    Historical Provider, MD   Multiple Vitamins-Minerals (THERAPEUTIC MULTIVITAMIN-MINERALS) tablet Take 1 tablet by mouth daily    Historical Provider, MD       No Known Allergies    Family History   Problem Relation Age of Onset    Cancer Mother     Breast Cancer Mother     Diabetes Father     Macular Degen Father        Social History     Tobacco Use    Smoking status: Former Smoker    Smokeless tobacco: Never Used    Tobacco comment: 36 + years    Substance Use Topics    Alcohol use: Not Currently     Alcohol/week: 4.0 standard drinks     Types: 4 Cans of beer per week     Comment: occ    Drug use: Never         Review of Systems   General ROS: positive for  - chills, fatigue and fever  Hematological and Lymphatic ROS: Patient takes aspirin 81 mg and Plavix for cardiac stents  Respiratory ROS: no cough, shortness of breath, or wheezing  Cardiovascular ROS: no chest pain or dyspnea on exertion  Gastrointestinal ROS: no abdominal pain, change in bowel habits, or black or bloody stools  Genito-Urinary ROS: no dysuria, trouble voiding, or hematuria  Musculoskeletal ROS: negative for - joint swelling or muscle pain      PHYSICAL EXAM:    Vitals:    11/03/20 2157   BP: 129/71   Pulse:    Temp:    SpO2:        General Appearance:  awake, alert, oriented, in no acute distress  Skin:  Skin color, texture, turgor normal. No rashes or lesions. Head/face:  NCAT  Eyes: Scleral icterus  Lungs:  Normal expansion. Clear to auscultation. No rales, rhonchi, or wheezing. Heart:  Heart regular rate and rhythm  Abdomen: Prolapsed and pink colostomy with stool output in bag. Cholecystostomy tube without overlying erythematous change or induration. Abdomen is soft, nontender to palpation, no distention, guarding. No rigidity  Extremities: Extremities warm to touch, pink, with no edema. LABS:    CBC  Recent Labs     11/03/20  2216   WBC 10.3   HGB 12.8   HCT 38.1        BMP  Recent Labs     11/03/20  2216      K 4.0      CO2 26   BUN 11   CREATININE 1.0   CALCIUM 9.5     Liver Function  Recent Labs     11/03/20  2216   LIPASE 43   BILITOT 4.1*   BILIDIR 2.9*   *   *   ALKPHOS 315*   PROT 8.0   LABALBU 4.3     No results for input(s): LACTATE in the last 72 hours. No results for input(s): INR, PTT in the last 72 hours.     Invalid input(s): PT    RADIOLOGY    Xr Chest Portable    Result Date: 11/3/2020  EXAMINATION: ONE XRAY VIEW OF THE CHEST 11/3/2020 10:51 pm COMPARISON: Chest x-ray from 2020 HISTORY: ORDERING SYSTEM PROVIDED HISTORY: fever TECHNOLOGIST PROVIDED HISTORY: Reason for exam:->fever What reading provider will be dictating this exam?->[facility] FINDINGS: A left subclavian Port-A-Cath is noted with the distal tip in the region of the SVC. The lungs are without acute focal process. There is no effusion or pneumothorax. The cardiomediastinal silhouette is without acute process. The osseous structures are without acute process. No acute process. Ir Lauren Meza Exist Access W Imaging    Result Date: 11/3/2020  Patient MRN:  [id number] : 1940 Age: 79 years Gender: Male Order Date:  11/3/2020 9:09 AM EXAM: IR INJ PERC CHOLA EXIST ACCESS W IMAGING NUMBER OF IMAGES:  3 views INDICATION: K81.9 Cholecystitis What reading provider will be dictating this exam?->[facility] COMPARISON: None Cholecystostomy Tube Check Procedure: Following the routine sterile preparation and drape, under fluoroscopic control contrast was injected through the indwelling tube. The cholecystostomy tube appeared to be in good position. Time out occurred at 0907 hrs. 0.27 minutes of fluoroscopy was utilized. Cholangiogram images demonstrate multiple filling defects in the common duct as well as multiple filling defects in the cystic duct. Contrast was visualized into the distal common duct however not into the duodenum     Tube check demonstrating the catheter to be in good position. Cholelithiasis Choledocholithiasis          ASSESSMENT:  79 y.o. male with cholangitis, choledocholithiasis, cholelithiasis, status post IR cholecystostomy tube placement .      PLAN:    -Patient needs admitted to the hospital and started on antibiotics, recommend Zosyn  -Patient will further need to undergo ERCP with stone retrieval and possible stent placement versus percutaneous transhepatic cholangiography with drain placement in the biliary tree  -N.p.o. -IVF    We will discuss with Dr. Britt Rivera discussed with Dr. Shanae Cruz    Electronically signed by April Donaldson DO on 11/4/20 at 12:10 AM EST

## 2020-11-04 NOTE — ED PROVIDER NOTES
ED PROVIDER NOTE    Chief Complaint   Patient presents with    Fever     States he had a fever this afternoon of 102 and is afraid his gallbladder drain is infected. HPI:  11/3/20,   Time: 9:56 PM BENEDICT Valenzuela is a 79 y.o. male presenting to the ED for fever. Onset few hours prior to arrival. Acute onset, moderate in severity, no aggravating/alleviating factors. This morning had IR study to assess perc ofelia tube, after going home was very fatigued, slept for awhile, and after waking up had chills. He took his temp and it was 102F. RUQ abdominal pain unchanged from baseline. No nausea, vomiting, cough, shortness of breath, chest pain, urinary sx, flank pain, neck pain/stiffnes. Has had about 50-100ml output from perc ofelia daily, which he says varies in appearance from \"dirty\" to clear to bloody. Normal PO intake and urine output. Chart review: hx of HTN, HLD, CAD s/p MIRELA to mid LAD 9/25/20, colon ca s/p colostomy. Cholecystitis s/p cholecystostomy 9/22/20. Had IR cholecystostomy tube check today which showed good tube position. Persistent cholelithiasis and choledocholithiasis    Review of Systems:     Review of Systems   Constitutional: Positive for chills, fatigue and fever. Negative for appetite change. HENT: Negative for congestion, rhinorrhea and trouble swallowing. Eyes: Negative for visual disturbance. Respiratory: Negative for cough and shortness of breath. Cardiovascular: Negative for chest pain and leg swelling. Gastrointestinal: Positive for abdominal pain. Negative for blood in stool, diarrhea, nausea and vomiting. Genitourinary: Negative for decreased urine volume, difficulty urinating, dysuria, frequency, hematuria and urgency. Musculoskeletal: Negative for myalgias, neck pain and neck stiffness. Skin: Negative for color change. Neurological: Negative for dizziness, syncope, weakness, light-headedness, numbness and headaches. brown stool. Musculoskeletal: Normal range of motion. General: No swelling or tenderness. Skin:     General: Skin is warm and dry. Coloration: Skin is not jaundiced. Neurological:      Mental Status: He is alert and oriented to person, place, and time. Comments: Strength 5/5 and sensation grossly intact to light touch and equal bilaterally throughout all extremities            -------------------------------------------------- RESULTS -------------------------------------------------  I have personally reviewed all laboratory and imaging results for this patient. Results are listed below. LABS:  Labs Reviewed   CBC WITH AUTO DIFFERENTIAL - Abnormal; Notable for the following components:       Result Value    Lymphocytes % 18.2 (*)     Neutrophils Absolute 7.48 (*)     Eosinophils Absolute 0.03 (*)     All other components within normal limits   BASIC METABOLIC PANEL W/ REFLEX TO MG FOR LOW K - Abnormal; Notable for the following components:    Glucose 122 (*)     All other components within normal limits   HEPATIC FUNCTION PANEL - Abnormal; Notable for the following components:    Alkaline Phosphatase 315 (*)      (*)      (*)     Total Bilirubin 4.1 (*)     Bilirubin, Direct 2.9 (*)     Bilirubin, Indirect 1.2 (*)     All other components within normal limits   URINALYSIS WITH MICROSCOPIC - Abnormal; Notable for the following components:    Color, UA DARK YELLOW (*)     Bilirubin Urine LARGE (*)     Ketones, Urine TRACE (*)     Leukocyte Esterase, Urine TRACE (*)     Bacteria, UA RARE (*)     All other components within normal limits   CULTURE, BLOOD 1   CULTURE, BLOOD 2   LIPASE   LACTIC ACID, PLASMA   COVID-19       RADIOLOGY:  Interpreted personally and by Radiologist.  XR CHEST PORTABLE   Final Result   No acute process.                ------------------------- NURSING NOTES AND VITALS REVIEWED ---------------------------   The nursing notes within the ED encounter and vital signs as below have been reviewed by myself. /71   Pulse 73   Temp 98.2 °F (36.8 °C) (Oral)   Ht 6' (1.829 m)   Wt 195 lb (88.5 kg)   SpO2 96%   BMI 26.45 kg/m²   Oxygen Saturation Interpretation: Normal    The patients available past medical records and past encounters were reviewed. ------------------------------ ED COURSE/MEDICAL DECISION MAKING----------------------  Medications   piperacillin-tazobactam (ZOSYN) 3.375 g in dextrose 5 % 100 mL IVPB (mini-bag) (3.375 g Intravenous New Bag 20 0016)   sodium chloride flush 0.9 % injection 10 mL (has no administration in time range)   sodium chloride flush 0.9 % injection 10 mL (has no administration in time range)   acetaminophen (TYLENOL) tablet 650 mg (has no administration in time range)       Consultations:             General surgery    Counseling: The emergency provider has spoken with the patient and discussed todays results, in addition to providing specific details for the plan of care and counseling regarding the diagnosis and prognosis. Questions are answered at this time and they are agreeable with the plan. ED Course/Medical Decision Makin y.o. male here with fever in setting of recent percutaneous cholecystostomy. Non-toxic appearing, afebrile, hemodynamically stable, and in no acute distress on arrival. No abdominal tenderness on exam. Labs notable for obstructive LFT pattern which is new from 1 month prior. General surgery consulted and recommending admission to medicine service for cholangitis. Sent blood cxs and started IV zosyn. Discussed findings and expected course of care and patient/surrogate agreed with plan for admission for further evaluation and management.       --------------------------------- IMPRESSION AND DISPOSITION ---------------------------------    IMPRESSION  1.  Cholangitis        DISPOSITION  Disposition: Admit to med/surg floor  Patient condition is stable    NOTE: This report was transcribed using voice recognition software.  Every effort was made to ensure accuracy; however, inadvertent computerized transcription errors may be present    Mae Thomason MD  Attending Emergency Physician              Mae Thomason MD  11/04/20 7685

## 2020-11-04 NOTE — CARE COORDINATION
Transition of care: Patient is a 79 yr old male diagnosed with cholangitis receiving IV Zosyn with planned ERCP for Friday per general surgery. He lives in a two story home with 4 steps going into the home and bathroom and bedroom on the second story. He lives with his wife who would be able to help him with meals. He says he uses a cane and does not need a walker. He has a cholecystotomy tube that was recently placed and used [facility] for nursing assistance. He would like to continue to use [facility] care, referral placed. Plan is for home upon discharge with [facility]. Son Sanjuana Wiggins able to pick him up upon discharge ([phone]).

## 2020-11-04 NOTE — PROGRESS NOTES
Unable to add to ERCP schedule today, and PTHC is not safe on ASA/Plavix. Will schedule for Friday 11/6 ERCP as long as he doesn't become septic. Continue antibiotics.     Electronically signed by Alycia Mcintosh MD on 11/4/2020 at 10:52 AM

## 2020-11-04 NOTE — PROGRESS NOTES
[facility]   ATTENDING PHYSICIAN PROGRESS NOTE     I have examined the patient, reviewed the record, and discussed the case with the APN/ Resident. I have reviewed all relevant labs and imaging data. The following summarizes my clinical findings and independent assessment. CC: choledocholithiasis    Pt without complaints. States he has minimal soreness in right side of abdomen. States drainage from cholecystostomy tube is a darker green color than prior.     Awake and alert  Follows commands  Hrt:  Regular  Lungs:  Fairly clear bilaterally  Abd:  Soft; BS active; ostomy viable/functional; bilious drainage in drain bag; non-tender; no rebound; no guarding  Skin:  Warm/dry    Patient Active Problem List    Diagnosis Date Noted    Cholangitis 11/04/2020    History of MI (myocardial infarction) 11/04/2020    CAD (coronary artery disease) 09/24/2020    History of colon cancer 09/21/2020    S/P colostomy ([address] 09/21/2020    HLD (hyperlipidemia) 09/20/2020    HTN (hypertension) 09/20/2020    Hypothyroidism 09/20/2020    Former smoker 09/20/2020    History of prostate cancer 09/20/2020    Leukocytosis 09/20/2020    Acute cholecystitis 09/19/2020    Malignant neoplasm of splenic flexure ([address] 10/21/2019    Moderate protein-calorie malnutrition ([address] 10/18/2019       Choledocholithiasis--consult Dr. Tyesha Hood for possible ERCP  Monitor abd exam  Monitor bowel function    Otis Winchester MD, FACS  11/4/2020  11:33 AM

## 2020-11-04 NOTE — H&P
[address]                  [address] [facility]                              HISTORY AND PHYSICAL    PATIENT NAME: Raya Pearce                    :        1940  MED REC NO:   [id number]                            ROOM:       8416  ACCOUNT NO:   [de-identified]                           ADMIT DATE: 2020  PROVIDER:     Teagan Foley DO    CHIEF COMPLAINT:  Fever. HISTORY OF PRESENT ILLNESS:  The patient is a 78-year-old  male  who presented to the emergency room complaining of fever. He has a  cholecystostomy tube. It was checked by IR the day of presentation to  the emergency room. He then developed a fever. He was seen in the  emergency room. Diagnostic evaluation revealed elevated liver function  test including elevated bilirubin. He was admitted for further  evaluation and treatment. The patient with recent hospitalization for  acute cholecystitis, MI, underwent heart cath with coronary artery stent  placement. PAST MEDICAL HISTORY:  Cholecystitis, status post cholecystostomy tube;  common bile duct stone; hypothyroidism; coronary artery disease; MI;  colon cancer, status post colostomy; hyperlipidemia; prostate cancer. PAST SURGICAL HISTORY:  Colostomy, exploratory laparotomy, large bowel  resection, MediPort insertion, prostatectomy. MEDICATIONS PRIOR TO ADMISSION:  Aspirin, Toprol-XL, Norvasc, Plavix,  Synthroid, Lipitor, Tylenol, multivitamin. SOCIAL HISTORY:  The patient quit tobacco.  Admits to regular alcohol  use. REVIEW OF SYSTEMS:  Remarkable for above-stated chief complaint plus  allergies none known. PHYSICAL EXAMINATION:  GENERAL APPEARANCE:  Reveals a 78-year-old  male who is alert  and oriented x3, cooperative and a good historian. VITAL SIGNS:  On admission, temperature 98.2, pulse 73, respirations 16,  blood pressure 129/71. HEENT:  Head:  Normocephalic, atraumatic. Eyes:  Pupils are equal and  reactive to light. Extraocular muscles intact. There is mild scleral  icterus. Fundi not well visualized. Nose:  No obstruction, polyp or  discharge noted. Mouth:  Mucosa without lesion. Teeth:  Upper  edentulous, lower fair repair. Pharynx:  Noninjected without exudate. NECK:  Supple. No JVD. No thyromegaly. No carotid bruits. HEART:  Regular rate and rhythm without murmur. LUNGS:  Clear to auscultation bilaterally. ABDOMEN:  Positive bowel sounds. Soft, nontender. No rebound or  guarding. No hepatosplenomegaly. No masses. There is a colostomy  noted and a cholecystostomy drainage tube noted. BACK:  With minimal increased thoracic kyphosis. EXTREMITIES:  Without edema. LYMPH NODES:  No adenopathy noted. SKIN:  Without rash or lesion. IMPRESSION:  Biliary obstruction, acute febrile illness, status post  cholecystostomy tube, history of common bile duct stone, history of  colon CA, status post colostomy and colon resection, history of prostate  CA, status post prostatectomy, hyperlipidemia, hypothyroidism, history  of MI, coronary artery disease, status post coronary artery stent. PLAN:  Admit, IV fluids, empiric antibiotics. Surgery to see. Discharge plan home when stable.         Ivett Morton DO    D: 11/04/2020 8:25:57       T: 11/04/2020 8:33:01     [email or web address]  Job#: [id number]     Doc#: [id number]    CC:

## 2020-11-05 ENCOUNTER — ANESTHESIA EVENT (OUTPATIENT)
Dept: ENDOSCOPY | Age: 80
DRG: 445 | End: 2020-11-05
Payer: MEDICARE

## 2020-11-05 LAB
ALBUMIN SERPL-MCNC: 3.6 G/DL (ref 3.5–5.2)
ALP BLD-CCNC: 287 U/L (ref 40–129)
ALT SERPL-CCNC: 286 U/L (ref 0–40)
ANION GAP SERPL CALCULATED.3IONS-SCNC: 13 MMOL/L (ref 7–16)
AST SERPL-CCNC: 182 U/L (ref 0–39)
BILIRUB SERPL-MCNC: 3 MG/DL (ref 0–1.2)
BILIRUBIN DIRECT: 1.7 MG/DL (ref 0–0.3)
BILIRUBIN, INDIRECT: 1.3 MG/DL (ref 0–1)
BUN BLDV-MCNC: 12 MG/DL (ref 8–23)
CALCIUM SERPL-MCNC: 9.2 MG/DL (ref 8.6–10.2)
CHLORIDE BLD-SCNC: 105 MMOL/L (ref 98–107)
CO2: 22 MMOL/L (ref 22–29)
CREAT SERPL-MCNC: 0.9 MG/DL (ref 0.7–1.2)
GFR AFRICAN AMERICAN: >60
GFR NON-AFRICAN AMERICAN: >60 ML/MIN/1.73
GLUCOSE BLD-MCNC: 116 MG/DL (ref 74–99)
MAGNESIUM: 2.2 MG/DL (ref 1.6–2.6)
PHOSPHORUS: 4 MG/DL (ref 2.5–4.5)
POTASSIUM SERPL-SCNC: 3.3 MMOL/L (ref 3.5–5)
REASON FOR REJECTION: NORMAL
REJECTED TEST: NORMAL
SODIUM BLD-SCNC: 140 MMOL/L (ref 132–146)
TOTAL PROTEIN: 7 G/DL (ref 6.4–8.3)

## 2020-11-05 PROCEDURE — 1200000000 HC SEMI PRIVATE

## 2020-11-05 PROCEDURE — 6370000000 HC RX 637 (ALT 250 FOR IP): Performed by: INTERNAL MEDICINE

## 2020-11-05 PROCEDURE — 2580000003 HC RX 258: Performed by: INTERNAL MEDICINE

## 2020-11-05 PROCEDURE — 2580000003 HC RX 258: Performed by: STUDENT IN AN ORGANIZED HEALTH CARE EDUCATION/TRAINING PROGRAM

## 2020-11-05 PROCEDURE — 83735 ASSAY OF MAGNESIUM: CPT

## 2020-11-05 PROCEDURE — 80076 HEPATIC FUNCTION PANEL: CPT

## 2020-11-05 PROCEDURE — 6360000002 HC RX W HCPCS: Performed by: STUDENT IN AN ORGANIZED HEALTH CARE EDUCATION/TRAINING PROGRAM

## 2020-11-05 PROCEDURE — 36415 COLL VENOUS BLD VENIPUNCTURE: CPT

## 2020-11-05 PROCEDURE — 80048 BASIC METABOLIC PNL TOTAL CA: CPT

## 2020-11-05 PROCEDURE — 6370000000 HC RX 637 (ALT 250 FOR IP): Performed by: SURGERY

## 2020-11-05 PROCEDURE — 84100 ASSAY OF PHOSPHORUS: CPT

## 2020-11-05 PROCEDURE — 99232 SBSQ HOSP IP/OBS MODERATE 35: CPT | Performed by: SURGERY

## 2020-11-05 RX ORDER — POTASSIUM CHLORIDE 20 MEQ/1
40 TABLET, EXTENDED RELEASE ORAL ONCE
Status: COMPLETED | OUTPATIENT
Start: 2020-11-05 | End: 2020-11-05

## 2020-11-05 RX ADMIN — SODIUM CHLORIDE, PRESERVATIVE FREE 10 ML: 5 INJECTION INTRAVENOUS at 08:55

## 2020-11-05 RX ADMIN — ATORVASTATIN CALCIUM 40 MG: 40 TABLET, FILM COATED ORAL at 20:26

## 2020-11-05 RX ADMIN — POTASSIUM CHLORIDE 40 MEQ: 1500 TABLET, EXTENDED RELEASE ORAL at 14:14

## 2020-11-05 RX ADMIN — LEVOTHYROXINE SODIUM 137 MCG: 0.14 TABLET ORAL at 05:35

## 2020-11-05 RX ADMIN — AMLODIPINE BESYLATE 5 MG: 5 TABLET ORAL at 08:55

## 2020-11-05 RX ADMIN — PIPERACILLIN AND TAZOBACTAM 3.38 G: 3; .375 INJECTION, POWDER, LYOPHILIZED, FOR SOLUTION INTRAVENOUS at 01:23

## 2020-11-05 RX ADMIN — METOPROLOL SUCCINATE 50 MG: 50 TABLET, EXTENDED RELEASE ORAL at 08:55

## 2020-11-05 RX ADMIN — PIPERACILLIN AND TAZOBACTAM 3.38 G: 3; .375 INJECTION, POWDER, LYOPHILIZED, FOR SOLUTION INTRAVENOUS at 17:28

## 2020-11-05 RX ADMIN — SODIUM CHLORIDE: 9 INJECTION, SOLUTION INTRAVENOUS at 13:32

## 2020-11-05 RX ADMIN — ACETAMINOPHEN 650 MG: 325 TABLET ORAL at 15:32

## 2020-11-05 RX ADMIN — ACETAMINOPHEN 650 MG: 325 TABLET ORAL at 05:40

## 2020-11-05 RX ADMIN — PIPERACILLIN AND TAZOBACTAM 3.38 G: 3; .375 INJECTION, POWDER, LYOPHILIZED, FOR SOLUTION INTRAVENOUS at 08:55

## 2020-11-05 RX ADMIN — POTASSIUM CHLORIDE 40 MEQ: 1500 TABLET, EXTENDED RELEASE ORAL at 17:27

## 2020-11-05 RX ADMIN — SODIUM CHLORIDE: 9 INJECTION, SOLUTION INTRAVENOUS at 20:27

## 2020-11-05 ASSESSMENT — PAIN DESCRIPTION - DESCRIPTORS
DESCRIPTORS: ACHING;CONSTANT;DISCOMFORT
DESCRIPTORS: ACHING;CONSTANT;DISCOMFORT

## 2020-11-05 ASSESSMENT — PAIN DESCRIPTION - LOCATION
LOCATION: ABDOMEN
LOCATION: ABDOMEN

## 2020-11-05 ASSESSMENT — PAIN DESCRIPTION - PAIN TYPE
TYPE: ACUTE PAIN
TYPE: ACUTE PAIN

## 2020-11-05 ASSESSMENT — PAIN SCALES - GENERAL
PAINLEVEL_OUTOF10: 2
PAINLEVEL_OUTOF10: 0
PAINLEVEL_OUTOF10: 0
PAINLEVEL_OUTOF10: 5
PAINLEVEL_OUTOF10: 0
PAINLEVEL_OUTOF10: 3

## 2020-11-05 ASSESSMENT — PAIN DESCRIPTION - ONSET: ONSET: ON-GOING

## 2020-11-05 ASSESSMENT — PAIN DESCRIPTION - PROGRESSION: CLINICAL_PROGRESSION: NOT CHANGED

## 2020-11-05 ASSESSMENT — PAIN - FUNCTIONAL ASSESSMENT: PAIN_FUNCTIONAL_ASSESSMENT: ACTIVITIES ARE NOT PREVENTED

## 2020-11-05 ASSESSMENT — PAIN DESCRIPTION - FREQUENCY: FREQUENCY: CONTINUOUS

## 2020-11-05 ASSESSMENT — PAIN DESCRIPTION - ORIENTATION: ORIENTATION: RIGHT

## 2020-11-05 NOTE — PROGRESS NOTES
Floor called inquiring on flushing instructions for the patient's RLQ ofelia drain. Per Dr. Wilkie Primrose, it should be flushed with 0.9% Normal Saline 5 mL once a day. Ced Conti RN on the floor and informed her. Patient has an appointment to have his drain exchanged in 4 weeks, 12/3/20.

## 2020-11-05 NOTE — PROGRESS NOTES
[facility] PROGRESS NOTE      Chief complaint: Follow-up of cholangitis    The patient is a 79 y.o. male with history of obstructive colon cancer status post colectomy in 2019 and chemotherapy through venous access port until 04/2020, previously admitted in 09/2020 for acute cholecystitis status post cholecystostomy tube placement on 09/22, presented on 11/03 with fever up to 100 2 °F accompanied by malaise and chills, found to have transaminitis with multiple filling defects in the common duct as well as in the cystic duct on percutaneous cholecystostomy tube imaging. On admission, he was afebrile and hemodynamically stable with no leukocytosis. Chest x-ray was unremarkable. Piperacillin-tazobactam was started on admission. Subjective: Patient was seen and examined. No chills, has mild RUQ discomfort, no diarrhea, no rash, no itching. Objective:    Vitals:    11/05/20 0800   BP: 134/65   Pulse: 65   Resp: 18   Temp: 97.7 °F (36.5 °C)   SpO2: 96%     Constitutional: Alert, not in distress  Respiratory: Clear breath sounds, no crackles, no wheezes  Cardiovascular: Regular rate and rhythm, no murmurs  Gastrointestinal: Bowel sounds present, soft, nontender. Cholecystostomy tube in place with biliary fluid output. Skin: Warm and dry, no active dermatoses  Musculoskeletal: No joint swelling, no joint erythema    Labs, imaging, and medical records/notes were personally reviewed. Assessment:  Cholangitis with percutaneous cholecystostomy tube in place    Recommendations:  Continue piperacillin-tazobactam 3.375g q8h for now. Follow up blood cultures. Plan for ERCP is noted.     Thank you for involving me in the care of Maylin Martinez. I will continue to follow. Please do not hesitate to call for any questions or concerns.     Electronically signed by Moon Sotelo MD on 11/5/2020 at 11:05 AM

## 2020-11-05 NOTE — PROGRESS NOTES
[facility]   ATTENDING PHYSICIAN PROGRESS NOTE     I have examined the patient, reviewed the record, and discussed the case with the APN/ Resident. I have reviewed all relevant labs and imaging data. The following summarizes my clinical findings and independent assessment. CC: choledocholithiasis    Pt without complaints. Denies pain.     Awake and alert  Follows commands  Hrt:  Regular  Lungs:  Fairly clear bilaterally  Abd:  Soft; BS active; ostomy viable/functional; bilious drainage in drain bag; non-tender; no rebound; no guarding  Skin:  Warm/dry    Patient Active Problem List    Diagnosis Date Noted    Cholangitis 11/04/2020    History of MI (myocardial infarction) 11/04/2020    CAD (coronary artery disease) 09/24/2020    History of colon cancer 09/21/2020    S/P colostomy ([address] 09/21/2020    HLD (hyperlipidemia) 09/20/2020    HTN (hypertension) 09/20/2020    Hypothyroidism 09/20/2020    Former smoker 09/20/2020    History of prostate cancer 09/20/2020    Leukocytosis 09/20/2020    Acute cholecystitis 09/19/2020    Malignant neoplasm of splenic flexure ([address] 10/21/2019    Moderate protein-calorie malnutrition ([address] 10/18/2019       Choledocholithiasis--for possible ERCP 11/6  Monitor abd exam  Monitor bowel function  Monitor labs    Mode Barney MD, FACS  11/5/2020  9:10 AM

## 2020-11-05 NOTE — PLAN OF CARE
Problem: Falls - Risk of:  Goal: Will remain free from falls  Description: Will remain free from falls  11/5/2020 1134 by Dodie Whitehead RN  Outcome: Met This Shift  11/5/2020 0203 by Joanna Burnette LPN  Outcome: Met This Shift  Goal: Absence of physical injury  Description: Absence of physical injury  11/5/2020 1134 by Dodie Whitehead RN  Outcome: Met This Shift  11/5/2020 0203 by Joanna uBrnette LPN  Outcome: Met This Shift

## 2020-11-05 NOTE — PROGRESS NOTES
SURGICAL ENDOSCOPY  DAILY PROGRESS NOTE    Date:2020       [facility]:[id number]  Patient Esha Tracey     YOB: 1940     Age:80 y.o. Chief Complaint:  Chief Complaint   Patient presents with    Fever     States he had a fever this afternoon of 102 and is afraid his gallbladder drain is infected. Subjective:  [place] overnight. Patient endorses some increased abdominal pain. Objective:  /74   Pulse 59   Temp 98.7 °F (37.1 °C) (Temporal)   Resp 16   Ht 6' (1.829 m)   Wt 195 lb (88.5 kg)   SpO2 98%   BMI 26.45 kg/m²   Temp (24hrs), Av °F (36.7 °C), Min:97.3 °F (36.3 °C), Max:98.7 °F (37.1 °C)      I/O (24Hr): No intake/output data recorded. GENERAL:  No acute distress. Alert and interactive. LUNGS:  No cough. Nonlabored breathing on room air. CARDIOVASC:  Normal rate, no cyanosis. ABDOMEN:  Soft, non-distended, mildly-tender RUQ. IR ofelia drain bilious. Ostomy with stool. No guarding / rigidity / rebound. EXTREMITIES:  No edema, no deformities.     Assessment:  79 y.o. male with choledocholithiasis, cholangitis    Plan:  - trend bili's  - ERCP  (earlier if patient becomes unstable)  - zosyn    Electronically signed by Alycia Mcintosh MD on 2020 at 6:25 AM

## 2020-11-05 NOTE — CARE COORDINATION
Transition of care : This am the patient had a fever of 102 and feels his gallbladder drain is infected. Plan for an ERCP 11/6or earlier if becomes unstable. Plan remains home with [facility] and they are following the patient.   Son Rea Interinao able to pick him up upon discharge @ [phone] I will follow

## 2020-11-05 NOTE — PROGRESS NOTES
Hospital Medicine    Subjective:  Pt alert conversive no new complaints      Current Facility-Administered Medications:     piperacillin-tazobactam (ZOSYN) 3.375 g in dextrose 5 % 100 mL IVPB extended infusion (mini-bag), 3.375 g, Intravenous, Q8H, Amando Dalton DO, Stopped at 11/05/20 0530    0.9 % sodium chloride infusion admixture, , Intravenous, Q8H, Amando Dalton DO    amLODIPine (NORVASC) tablet 5 mg, 5 mg, Oral, Daily, Nicole Liu DO, 5 mg at 11/04/20 1011    aspirin chewable tablet 81 mg, 81 mg, Oral, Daily, Nicole Liu DO, Stopped at 11/04/20 1011    atorvastatin (LIPITOR) tablet 40 mg, 40 mg, Oral, Daily, Nicole Liu DO, 40 mg at 11/04/20 2145    clopidogrel (PLAVIX) tablet 75 mg, 75 mg, Oral, Daily, Nicole Liu DO, Stopped at 11/04/20 1010    levothyroxine (SYNTHROID) tablet 137 mcg, 137 mcg, Oral, Daily, Nicole Liu DO, 137 mcg at 11/05/20 0535    metoprolol succinate (TOPROL XL) extended release tablet 50 mg, 50 mg, Oral, Daily, Nicole Liu DO, 50 mg at 11/04/20 1011    sodium chloride flush 0.9 % injection 10 mL, 10 mL, Intravenous, 2 times per day, Ana Johnston, DO, 10 mL at 11/04/20 2145    sodium chloride flush 0.9 % injection 10 mL, 10 mL, Intravenous, PRN, Nicole Liu DO    acetaminophen (TYLENOL) tablet 650 mg, 650 mg, Oral, Q6H PRN, 650 mg at 11/05/20 0540 **OR** acetaminophen (TYLENOL) suppository 650 mg, 650 mg, Rectal, Q6H PRN, Nicole Liu DO    polyethylene glycol (GLYCOLAX) packet 17 g, 17 g, Oral, Daily PRN, Nicole Liu DO    promethazine (PHENERGAN) tablet 12.5 mg, 12.5 mg, Oral, Q6H PRN **OR** ondansetron (ZOFRAN) injection 4 mg, 4 mg, Intravenous, Q6H PRN, Nicole Liu DO    dextrose 5 % in lactated ringers infusion, , Intravenous, Continuous, Dolores Fisher MD, Last Rate: 100 mL/hr at 11/04/20 2201    Objective:    /74   Pulse 59   Temp 98.7 °F (37.1 °C) (Temporal)   Resp 16   Ht 6' (1.829 m) Wt 195 lb (88.5 kg)   SpO2 98%   BMI 26.45 kg/m²     Heart:  reg  Lungs:  ctab  Abd: + bs soft colostomy and cholecystostomy drain intact  Extrem:  W/o edema    CBC with Differential:    Lab Results   Component Value Date    WBC 5.8 11/04/2020    RBC 3.91 11/04/2020    HGB 11.9 11/04/2020    HCT 35.1 11/04/2020     11/04/2020    MCV 89.8 11/04/2020    MCH 30.4 11/04/2020    MCHC 33.9 11/04/2020    RDW 13.4 11/04/2020    LYMPHOPCT 17.2 11/04/2020    MONOPCT 10.3 11/04/2020    BASOPCT 0.5 11/04/2020    MONOSABS 0.59 11/04/2020    LYMPHSABS 0.99 11/04/2020    EOSABS 0.11 11/04/2020    BASOSABS 0.03 11/04/2020     CMP:    Lab Results   Component Value Date     11/05/2020    K 3.3 11/05/2020    K 4.0 11/03/2020     11/05/2020    CO2 22 11/05/2020    BUN 12 11/05/2020    CREATININE 0.9 11/05/2020    GFRAA >60 11/05/2020    LABGLOM >60 11/05/2020    GLUCOSE 116 11/05/2020    PROT 7.0 11/05/2020    LABALBU 3.6 11/05/2020    CALCIUM 9.2 11/05/2020    BILITOT 3.0 11/05/2020    ALKPHOS 287 11/05/2020     11/05/2020     11/05/2020     Warfarin PT/INR:    Lab Results   Component Value Date    INR 1.1 11/04/2020    INR 1.2 09/22/2020    INR 1.0 09/20/2020    PROTIME 12.4 11/04/2020    PROTIME 13.3 (H) 09/22/2020    PROTIME 11.8 09/20/2020       Assessment:    Active Problems:    HLD (hyperlipidemia)    HTN (hypertension)    Hypothyroidism    Former smoker    History of prostate cancer    History of colon cancer    S/P colostomy (HCC)    CAD (coronary artery disease)    Cholangitis    History of MI (myocardial infarction)  Resolved Problems:    * No resolved hospital problems. *      Plan:   For ercp tomorrow cont atb discussed case with surgery team at bedside        Derrek Sierra  7:33 AM  11/5/2020

## 2020-11-05 NOTE — ANESTHESIA PRE PROCEDURE
Department of Anesthesiology  Preprocedure Note       Name:  Concepción Hauser   Age:  79 y.o.  :  1940                                          MRN:  [id number]         Date:  2020      Surgeon: Lala Garcia):  Martinez Leal MD    Procedure: Procedure(s):  ERCP    Medications prior to admission:   Prior to Admission medications    Medication Sig Start Date End Date Taking?  Authorizing Provider   aspirin 81 MG chewable tablet Take 1 tablet by mouth daily 20  Yes Barrett Ron, DO   metoprolol succinate (TOPROL XL) 50 MG extended release tablet Take 1 tablet by mouth daily 20  Yes Barrett Ron, DO   amLODIPine (NORVASC) 5 MG tablet Take 1 tablet by mouth daily 20  Yes Barrett Ron, DO   clopidogrel (PLAVIX) 75 MG tablet Take 1 tablet by mouth daily 20  Yes Barrett Ron, DO   levothyroxine (SYNTHROID) 150 MCG tablet Take 150 mcg by mouth Daily    Yes Historical Provider, MD   atorvastatin (LIPITOR) 40 MG tablet Take 40 mg by mouth daily   Yes Historical Provider, MD   acetaminophen (TYLENOL) 500 MG tablet Take 500 mg by mouth every 6 hours as needed for Pain   Yes Historical Provider, MD   Multiple Vitamins-Minerals (THERAPEUTIC MULTIVITAMIN-MINERALS) tablet Take 1 tablet by mouth daily   Yes Historical Provider, MD       Current medications:    Current Facility-Administered Medications   Medication Dose Route Frequency Provider Last Rate Last Dose    potassium chloride (KLOR-CON M) extended release tablet 40 mEq  40 mEq Oral Once Hugo Humphrey MD        piperacillin-tazobactam (ZOSYN) 3.375 g in dextrose 5 % 100 mL IVPB extended infusion (mini-bag)  3.375 g Intravenous Q8H Amando Dalton DO   Stopped at 20 1332    0.9 % sodium chloride infusion admixture   Intravenous Q8H Amando Dalton DO 12.5 mL/hr at 20 1332      amLODIPine (NORVASC) tablet 5 mg  5 mg Oral Daily Paramjit Liu DO   5 mg at 20 0855    aspirin chewable tablet 81 mg  81 mg Oral Daily Derrek Kirks, DO   Stopped at 11/04/20 1011    atorvastatin (LIPITOR) tablet 40 mg  40 mg Oral Daily Ly Liu, DO   40 mg at 11/04/20 2145    clopidogrel (PLAVIX) tablet 75 mg  75 mg Oral Daily Josselyn Sierra, DO   Stopped at 11/04/20 1010    levothyroxine (SYNTHROID) tablet 137 mcg  137 mcg Oral Daily Ly Liu, DO   137 mcg at 11/05/20 0535    metoprolol succinate (TOPROL XL) extended release tablet 50 mg  50 mg Oral Daily Ly Rosalio Liu, DO   50 mg at 11/05/20 3423    sodium chloride flush 0.9 % injection 10 mL  10 mL Intravenous 2 times per day Ly Liu, DO   10 mL at 11/05/20 7455    sodium chloride flush 0.9 % injection 10 mL  10 mL Intravenous PRN Ly Liu, DO        acetaminophen (TYLENOL) tablet 650 mg  650 mg Oral Q6H PRN Ly Liu, DO   650 mg at 11/05/20 0540    Or    acetaminophen (TYLENOL) suppository 650 mg  650 mg Rectal Q6H PRN Ly Powellmer, DO        polyethylene glycol (GLYCOLAX) packet 17 g  17 g Oral Daily PRN Ly Liu, DO        promethazine (PHENERGAN) tablet 12.5 mg  12.5 mg Oral Q6H PRN Ly Liu, DO        Or    ondansetron [facility]) injection 4 mg  4 mg Intravenous Q6H PRN Ly iLu, DO        dextrose 5 % in lactated ringers infusion   Intravenous Continuous Theresa Masters  mL/hr at 11/04/20 2201         Allergies:  No Known Allergies    Problem List:    Patient Active Problem List   Diagnosis Code    Moderate protein-calorie malnutrition ([facility] [address] E44.0    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    Acute cholecystitis K81.0    HLD (hyperlipidemia) E78.5    HTN (hypertension) I10    Hypothyroidism E03.9    Former smoker Z87.891    History of prostate cancer Z85.46    Leukocytosis D72.829    History of colon cancer Z80.26    S/P colostomy ([address] Z93.3    CAD (coronary artery disease) I25.10    Cholangitis K83.09    History of MI (myocardial infarction) I25.2       Past Medical History:        Diagnosis Date    Cancer [facility])     prostate    Hyperlipidemia     Malignant neoplasm of splenic flexure ([address] 10/21/2019    Thyroid disease        Past Surgical History:        Procedure Laterality Date    CARDIAC CATHETERIZATION  09/21/2020    Dr. Param Johnson, No LV.   Staged PCI to LAD    CARDIAC CATHETERIZATION  09/25/2020    LAD PCI- Dr Basilio Overton  8/28/2020    COLONOSCOPY DIAGNOSTIC performed by Radha Adan MD at [place] [address] CT PTC NEW ACCESS  9/22/2020    CT PTC NEW ACCESS 9/22/2020 Gerhard Bolden MD [facility] CT    LAPAROTOMY EXPLORATORY N/A 10/13/2019    LAPAROTOMY EXPLORATORY, LARGE BOWEL RESECTION, CREATION OF COLOSTOMY performed by Radha Adan MD at [facility] N/A 10/22/2019    PORT INSERTION performed by Ross Briseno MD at [address] [place] [address] [address] History:    Social History     Tobacco Use    Smoking status: Former Smoker    Smokeless tobacco: Never Used    Tobacco comment: 40 + years    Substance Use Topics    Alcohol use: Not Currently     Alcohol/week: 4.0 standard drinks     Types: 4 Cans of beer per week     Comment: occ                                Counseling given: Not Answered  Comment: 40 + years       Vital Signs (Current):   Vitals:    11/04/20 0115 11/04/20 1000 11/05/20 0000 11/05/20 0800   BP: (!) 147/70 125/68 137/74 134/65   Pulse: 65 69 59 65   Resp: 18 16 16 18   Temp: 37.1 °C (98.8 °F) 36.3 °C (97.3 °F) 37.1 °C (98.7 °F) 36.5 °C (97.7 °F)   TempSrc: Temporal Temporal Temporal Temporal   SpO2: 100% 98%  96%   Weight:       Height:                                                  BP Readings from Last 3 Encounters:   11/05/20 134/65   10/14/20 131/79   09/26/20 132/63       NPO Status:  Pt instructed to be NPO at midnight                                                                               BMI:   Wt Readings from Last 3 Encounters:   11/03/20 195 lb (88.5 kg)   10/14/20 out).                 Abdominal:           Vascular: negative vascular ROS. Anesthesia Plan      MAC     ASA 3       Induction: intravenous. Anesthetic plan and risks discussed with patient. Plan discussed with CRNA and attending. Omid Zapata RN   11/5/2020    DOS STAFF ADDENDUM:    Pt seen and examined, physical exam updated, chart reviewed including anesthesia, drug and allergy history. H&P reviewed. No interval changes to history or physical examination (unless noted above). NPO status confirmed. Anesthetic plan, risks, benefits, alternatives discussed with patient. Patient verbalized an understanding and agrees to proceed.      Rubi Victor MD  Staff Anesthesiologist  7:25 AM

## 2020-11-05 NOTE — PLAN OF CARE
Problem: Falls - Risk of:  Goal: Will remain free from falls  Description: Will remain free from falls  11/5/2020 1605 by Reynaldo Rodriguez RN  Outcome: Met This Shift  11/5/2020 1134 by Lexx Murillo RN  Outcome: Met This Shift  Goal: Absence of physical injury  Description: Absence of physical injury  11/5/2020 1605 by Reynaldo Rodriguez RN  Outcome: Met This Shift  11/5/2020 1134 by Lexx Murillo RN  Outcome: Met This Shift     Problem: Pain:  Goal: Pain level will decrease  Description: Pain level will decrease  Outcome: Met This Shift  Goal: Control of acute pain  Description: Control of acute pain  Outcome: Met This Shift  Goal: Control of chronic pain  Description: Control of chronic pain  Outcome: Met This Shift

## 2020-11-06 ENCOUNTER — TELEPHONE (OUTPATIENT)
Dept: CARDIOLOGY CLINIC | Age: 80
End: 2020-11-06

## 2020-11-06 ENCOUNTER — APPOINTMENT (OUTPATIENT)
Dept: GENERAL RADIOLOGY | Age: 80
DRG: 445 | End: 2020-11-06
Payer: MEDICARE

## 2020-11-06 ENCOUNTER — ANESTHESIA (OUTPATIENT)
Dept: ENDOSCOPY | Age: 80
DRG: 445 | End: 2020-11-06
Payer: MEDICARE

## 2020-11-06 VITALS — DIASTOLIC BLOOD PRESSURE: 71 MMHG | OXYGEN SATURATION: 93 % | SYSTOLIC BLOOD PRESSURE: 126 MMHG

## 2020-11-06 PROBLEM — K80.50 CHOLEDOCHOLITHIASIS: Status: ACTIVE | Noted: 2020-11-06

## 2020-11-06 LAB
ALBUMIN SERPL-MCNC: 3.9 G/DL (ref 3.5–5.2)
ALP BLD-CCNC: 271 U/L (ref 40–129)
ALT SERPL-CCNC: 216 U/L (ref 0–40)
ANION GAP SERPL CALCULATED.3IONS-SCNC: 9 MMOL/L (ref 7–16)
AST SERPL-CCNC: 114 U/L (ref 0–39)
BASOPHILS ABSOLUTE: 0.03 E9/L (ref 0–0.2)
BASOPHILS RELATIVE PERCENT: 0.5 % (ref 0–2)
BILIRUB SERPL-MCNC: 1.4 MG/DL (ref 0–1.2)
BILIRUBIN DIRECT: 0.7 MG/DL (ref 0–0.3)
BILIRUBIN, INDIRECT: 0.7 MG/DL (ref 0–1)
BUN BLDV-MCNC: 6 MG/DL (ref 8–23)
CALCIUM SERPL-MCNC: 9.5 MG/DL (ref 8.6–10.2)
CHLORIDE BLD-SCNC: 105 MMOL/L (ref 98–107)
CO2: 27 MMOL/L (ref 22–29)
CREAT SERPL-MCNC: 0.8 MG/DL (ref 0.7–1.2)
EKG ATRIAL RATE: 53 BPM
EKG P AXIS: 46 DEGREES
EKG P-R INTERVAL: 164 MS
EKG Q-T INTERVAL: 430 MS
EKG QRS DURATION: 94 MS
EKG QTC CALCULATION (BAZETT): 403 MS
EKG R AXIS: 19 DEGREES
EKG T AXIS: 42 DEGREES
EKG VENTRICULAR RATE: 53 BPM
EOSINOPHILS ABSOLUTE: 0.21 E9/L (ref 0.05–0.5)
EOSINOPHILS RELATIVE PERCENT: 3.6 % (ref 0–6)
GFR AFRICAN AMERICAN: >60
GFR NON-AFRICAN AMERICAN: >60 ML/MIN/1.73
GLUCOSE BLD-MCNC: 111 MG/DL (ref 74–99)
HCT VFR BLD CALC: 36.2 % (ref 37–54)
HEMOGLOBIN: 12.3 G/DL (ref 12.5–16.5)
IMMATURE GRANULOCYTES #: 0.02 E9/L
IMMATURE GRANULOCYTES %: 0.3 % (ref 0–5)
LYMPHOCYTES ABSOLUTE: 1.61 E9/L (ref 1.5–4)
LYMPHOCYTES RELATIVE PERCENT: 27.7 % (ref 20–42)
MCH RBC QN AUTO: 30.4 PG (ref 26–35)
MCHC RBC AUTO-ENTMCNC: 34 % (ref 32–34.5)
MCV RBC AUTO: 89.4 FL (ref 80–99.9)
MONOCYTES ABSOLUTE: 0.48 E9/L (ref 0.1–0.95)
MONOCYTES RELATIVE PERCENT: 8.3 % (ref 2–12)
NEUTROPHILS ABSOLUTE: 3.46 E9/L (ref 1.8–7.3)
NEUTROPHILS RELATIVE PERCENT: 59.6 % (ref 43–80)
PDW BLD-RTO: 13.2 FL (ref 11.5–15)
PLATELET # BLD: 216 E9/L (ref 130–450)
PMV BLD AUTO: 9 FL (ref 7–12)
POTASSIUM SERPL-SCNC: 4.2 MMOL/L (ref 3.5–5)
RBC # BLD: 4.05 E12/L (ref 3.8–5.8)
SODIUM BLD-SCNC: 141 MMOL/L (ref 132–146)
TOTAL PROTEIN: 7.1 G/DL (ref 6.4–8.3)
WBC # BLD: 5.8 E9/L (ref 4.5–11.5)

## 2020-11-06 PROCEDURE — 85025 COMPLETE CBC W/AUTO DIFF WBC: CPT

## 2020-11-06 PROCEDURE — C1769 GUIDE WIRE: HCPCS | Performed by: SURGERY

## 2020-11-06 PROCEDURE — 2720000010 HC SURG SUPPLY STERILE: Performed by: SURGERY

## 2020-11-06 PROCEDURE — 3700000000 HC ANESTHESIA ATTENDED CARE: Performed by: SURGERY

## 2020-11-06 PROCEDURE — 2580000003 HC RX 258: Performed by: INTERNAL MEDICINE

## 2020-11-06 PROCEDURE — 2709999900 HC NON-CHARGEABLE SUPPLY: Performed by: SURGERY

## 2020-11-06 PROCEDURE — 6360000002 HC RX W HCPCS: Performed by: STUDENT IN AN ORGANIZED HEALTH CARE EDUCATION/TRAINING PROGRAM

## 2020-11-06 PROCEDURE — 93005 ELECTROCARDIOGRAM TRACING: CPT | Performed by: SURGERY

## 2020-11-06 PROCEDURE — 93010 ELECTROCARDIOGRAM REPORT: CPT | Performed by: INTERNAL MEDICINE

## 2020-11-06 PROCEDURE — 7100000000 HC PACU RECOVERY - FIRST 15 MIN: Performed by: SURGERY

## 2020-11-06 PROCEDURE — 3700000001 HC ADD 15 MINUTES (ANESTHESIA): Performed by: SURGERY

## 2020-11-06 PROCEDURE — 3609015200 HC ERCP REMOVE CALCULI/DEBRIS BILIARY/PANCREAS DUCT: Performed by: SURGERY

## 2020-11-06 PROCEDURE — 43264 ERCP REMOVE DUCT CALCULI: CPT | Performed by: SURGERY

## 2020-11-06 PROCEDURE — 0F798DZ DILATION OF COMMON BILE DUCT WITH INTRALUMINAL DEVICE, VIA NATURAL OR ARTIFICIAL OPENING ENDOSCOPIC: ICD-10-PCS | Performed by: SURGERY

## 2020-11-06 PROCEDURE — 0FC98ZZ EXTIRPATION OF MATTER FROM COMMON BILE DUCT, VIA NATURAL OR ARTIFICIAL OPENING ENDOSCOPIC: ICD-10-PCS | Performed by: SURGERY

## 2020-11-06 PROCEDURE — 6370000000 HC RX 637 (ALT 250 FOR IP): Performed by: INTERNAL MEDICINE

## 2020-11-06 PROCEDURE — 6360000004 HC RX CONTRAST MEDICATION: Performed by: SURGERY

## 2020-11-06 PROCEDURE — C1874 STENT, COATED/COV W/DEL SYS: HCPCS | Performed by: SURGERY

## 2020-11-06 PROCEDURE — 74330 X-RAY BILE/PANC ENDOSCOPY: CPT

## 2020-11-06 PROCEDURE — 3609014900 HC ERCP W/SPHINCTEROTOMY &/OR PAPILLOTOMY: Performed by: SURGERY

## 2020-11-06 PROCEDURE — 6360000002 HC RX W HCPCS

## 2020-11-06 PROCEDURE — 1200000000 HC SEMI PRIVATE

## 2020-11-06 PROCEDURE — 2580000003 HC RX 258

## 2020-11-06 PROCEDURE — 80076 HEPATIC FUNCTION PANEL: CPT

## 2020-11-06 PROCEDURE — 3609015100 HC ERCP STENT PLACEMENT BILIARY/PANCREATIC DUCT: Performed by: SURGERY

## 2020-11-06 PROCEDURE — 43274 ERCP DUCT STENT PLACEMENT: CPT | Performed by: SURGERY

## 2020-11-06 PROCEDURE — 36415 COLL VENOUS BLD VENIPUNCTURE: CPT

## 2020-11-06 PROCEDURE — 2500000003 HC RX 250 WO HCPCS

## 2020-11-06 PROCEDURE — 7100000001 HC PACU RECOVERY - ADDTL 15 MIN: Performed by: SURGERY

## 2020-11-06 PROCEDURE — 80048 BASIC METABOLIC PNL TOTAL CA: CPT

## 2020-11-06 PROCEDURE — 2580000003 HC RX 258: Performed by: STUDENT IN AN ORGANIZED HEALTH CARE EDUCATION/TRAINING PROGRAM

## 2020-11-06 DEVICE — STENT SYSTEM RMV
Type: IMPLANTABLE DEVICE | Status: FUNCTIONAL
Brand: WALLFLEX BILIARY

## 2020-11-06 RX ORDER — FENTANYL CITRATE 50 UG/ML
INJECTION, SOLUTION INTRAMUSCULAR; INTRAVENOUS PRN
Status: DISCONTINUED | OUTPATIENT
Start: 2020-11-06 | End: 2020-11-06 | Stop reason: SDUPTHER

## 2020-11-06 RX ORDER — LIDOCAINE HYDROCHLORIDE 20 MG/ML
INJECTION, SOLUTION INTRAVENOUS PRN
Status: DISCONTINUED | OUTPATIENT
Start: 2020-11-06 | End: 2020-11-06 | Stop reason: SDUPTHER

## 2020-11-06 RX ORDER — PROPOFOL 10 MG/ML
INJECTION, EMULSION INTRAVENOUS PRN
Status: DISCONTINUED | OUTPATIENT
Start: 2020-11-06 | End: 2020-11-06

## 2020-11-06 RX ORDER — PROPOFOL 10 MG/ML
INJECTION, EMULSION INTRAVENOUS PRN
Status: DISCONTINUED | OUTPATIENT
Start: 2020-11-06 | End: 2020-11-06 | Stop reason: SDUPTHER

## 2020-11-06 RX ORDER — SODIUM CHLORIDE 9 MG/ML
INJECTION, SOLUTION INTRAVENOUS CONTINUOUS PRN
Status: DISCONTINUED | OUTPATIENT
Start: 2020-11-06 | End: 2020-11-06 | Stop reason: SDUPTHER

## 2020-11-06 RX ORDER — PROPOFOL 10 MG/ML
INJECTION, EMULSION INTRAVENOUS CONTINUOUS PRN
Status: DISCONTINUED | OUTPATIENT
Start: 2020-11-06 | End: 2020-11-06 | Stop reason: SDUPTHER

## 2020-11-06 RX ADMIN — AMLODIPINE BESYLATE 5 MG: 5 TABLET ORAL at 09:20

## 2020-11-06 RX ADMIN — SODIUM CHLORIDE: 9 INJECTION, SOLUTION INTRAVENOUS at 05:30

## 2020-11-06 RX ADMIN — PIPERACILLIN AND TAZOBACTAM 3.38 G: 3; .375 INJECTION, POWDER, LYOPHILIZED, FOR SOLUTION INTRAVENOUS at 16:32

## 2020-11-06 RX ADMIN — SODIUM CHLORIDE: 9 INJECTION, SOLUTION INTRAVENOUS at 07:24

## 2020-11-06 RX ADMIN — LEVOTHYROXINE SODIUM 137 MCG: 0.14 TABLET ORAL at 05:44

## 2020-11-06 RX ADMIN — PROPOFOL 100 MCG/KG/MIN: 10 INJECTION, EMULSION INTRAVENOUS at 07:24

## 2020-11-06 RX ADMIN — PIPERACILLIN AND TAZOBACTAM 3.38 G: 3; .375 INJECTION, POWDER, LYOPHILIZED, FOR SOLUTION INTRAVENOUS at 02:00

## 2020-11-06 RX ADMIN — SODIUM CHLORIDE: 9 INJECTION, SOLUTION INTRAVENOUS at 20:32

## 2020-11-06 RX ADMIN — LIDOCAINE HYDROCHLORIDE 60 MG: 20 INJECTION, SOLUTION INTRAVENOUS at 07:24

## 2020-11-06 RX ADMIN — PROPOFOL 50 MG: 10 INJECTION, EMULSION INTRAVENOUS at 07:25

## 2020-11-06 RX ADMIN — METOPROLOL SUCCINATE 50 MG: 50 TABLET, EXTENDED RELEASE ORAL at 09:19

## 2020-11-06 RX ADMIN — GLUCAGON HYDROCHLORIDE 0.5 MG: 1 INJECTION, POWDER, FOR SOLUTION INTRAMUSCULAR; INTRAVENOUS; SUBCUTANEOUS at 07:32

## 2020-11-06 RX ADMIN — SODIUM CHLORIDE, PRESERVATIVE FREE 10 ML: 5 INJECTION INTRAVENOUS at 20:17

## 2020-11-06 RX ADMIN — ATORVASTATIN CALCIUM 40 MG: 40 TABLET, FILM COATED ORAL at 20:16

## 2020-11-06 RX ADMIN — PROPOFOL 20 MG: 10 INJECTION, EMULSION INTRAVENOUS at 07:28

## 2020-11-06 RX ADMIN — PIPERACILLIN AND TAZOBACTAM 3.38 G: 3; .375 INJECTION, POWDER, LYOPHILIZED, FOR SOLUTION INTRAVENOUS at 07:26

## 2020-11-06 RX ADMIN — FENTANYL CITRATE 50 MCG: 50 INJECTION, SOLUTION INTRAMUSCULAR; INTRAVENOUS at 07:29

## 2020-11-06 RX ADMIN — FENTANYL CITRATE 50 MCG: 50 INJECTION, SOLUTION INTRAMUSCULAR; INTRAVENOUS at 07:38

## 2020-11-06 ASSESSMENT — PAIN SCALES - GENERAL
PAINLEVEL_OUTOF10: 0

## 2020-11-06 ASSESSMENT — PAIN DESCRIPTION - PAIN TYPE: TYPE: SURGICAL PAIN

## 2020-11-06 ASSESSMENT — PAIN DESCRIPTION - LOCATION: LOCATION: ABDOMEN

## 2020-11-06 NOTE — CARE COORDINATION
Patient had an ERCP sphincterotomy, ERCP stone extraction and Stent placement (10mm x 60mm fully covered) for Choledocholithiasis today. Await further input and plan from general surgery. Patient is currently on a clear liquid diet. Per ID note today, Continue piperacillin-tazobactam 3.375g q8h for now. Anticipate no further antibiotic therapy indicated on discharge. Danna from [facility] updated. Home Health Care orders are in. Patient's son Jessy House will transport his father home @ time of discharge phone# [phone].   Jair Garcia RN CM

## 2020-11-06 NOTE — PROGRESS NOTES
PRITI PROGRESS NOTE      Chief complaint: Follow-up of cholangitis    The patient is a 79 y.o. male with history of obstructive colon cancer status post colectomy in 2019 and chemotherapy through venous access port until 04/2020, previously admitted in 09/2020 for acute cholecystitis status post cholecystostomy tube placement on 09/22, presented on 11/03 with fever up to 100 2 °F accompanied by malaise and chills, found to have transaminitis with multiple filling defects in the common duct as well as in the cystic duct on percutaneous cholecystostomy tube imaging. On admission, he was afebrile and hemodynamically stable with no leukocytosis. Chest x-ray was unremarkable. Piperacillin-tazobactam was started on admission. He underwent ERCP with sphinterotomy, stone extraction and biliary stent placement on 11/06. Subjective: Patient was seen and examined. No chills, has less RUQ discomfort, no diarrhea, no rash, no itching. He reports feeling well. Objective:    Vitals:    11/06/20 1030   BP: (!) 165/80   Pulse: 68   Resp: 18   Temp: 98.1 °F (36.7 °C)   SpO2: 96%     Constitutional: Alert, not in distress  Respiratory: Clear breath sounds, no crackles, no wheezes  Cardiovascular: Regular rate and rhythm, no murmurs  Gastrointestinal: Bowel sounds present, soft, nontender. Cholecystostomy tube in place with biliary fluid output. Skin: Warm and dry, no active dermatoses  Musculoskeletal: No joint swelling, no joint erythema    Labs, imaging, and medical records/notes were personally reviewed. Assessment:  Cholangitis, s/p ERCP with sphinterotomy, stone extraction and biliary stent placement on 11/06    Recommendations:  Continue piperacillin-tazobactam 3.375g q8h for now. Anticipate no further antibiotic therapy indicated on discharge. Follow up blood cultures.     Thank you for involving me in the care of Janae Lugo. I will continue to follow.  Please do not hesitate to call for any questions or concerns.     Electronically signed by Judy Swartz MD on 11/6/2020 at 10:58 AM

## 2020-11-06 NOTE — PLAN OF CARE
Problem: Falls - Risk of:  Goal: Will remain free from falls  Description: Will remain free from falls  11/6/2020 1047 by Alonso Murguia RN  Outcome: Met This Shift  11/6/2020 0115 by France Cameron LPN  Outcome: Met This Shift  Goal: Absence of physical injury  Description: Absence of physical injury  11/6/2020 1047 by Alonso Murguia RN  Outcome: Met This Shift  11/6/2020 0115 by France Cameron LPN  Outcome: Met This Shift     Problem: Pain:  Goal: Pain level will decrease  Description: Pain level will decrease  11/6/2020 1047 by Alonso Murguia RN  Outcome: Met This Shift  11/6/2020 0115 by France Cameron LPN  Outcome: Met This Shift  Goal: Control of acute pain  Description: Control of acute pain  11/6/2020 1047 by Alonso Murguia RN  Outcome: Met This Shift  11/6/2020 0115 by France Cameron LPN  Outcome: Met This Shift  Goal: Control of chronic pain  Description: Control of chronic pain  11/6/2020 1047 by Alonso Murguia RN  Outcome: Met This Shift  11/6/2020 0115 by France Cameron LPN  Outcome: Met This Shift

## 2020-11-06 NOTE — OP NOTE
SURGEON: Dallin Cortés MD        PREOPERATIVE DIAGNOSIS: Choledocholithiasis      POSTOPERATIVE DIAGNOSES:  1. Choledocholithiasis      OPERATION:  1. ERCP sphincterotomy  2. ERCP stone extraction  3. Stent placement (10mm x 60mm fully covered)      ANESTHESIA: LMAC       ESTIMATED BLOOD LOSS: Minimal.      INDICATION: This is a 79 y.o. male with radiologic evidence of choledocholithiasis and abdominal pain. The patient agreed to undergo the ERCP. The risks, benefits, and alternatives were explained to the patient for the procedure including bleeding, infection, perforation, and pancreatitis. The patient understood and agreed to proceed. DESCRIPTION OF PROCEDURE: The patient was brought to the operating room and he underwent General anesthesia by the anesthesia team. He was then placed in the  left lateral decubitus position. The flexible duodenoscope was inserted down  the oropharynx and passed down the esophagus into the stomach and into the duodenum. The papilla was identified. Next, using a Jagwire, cannulation was achieved of the common bile duct. A wire was passed and initial contrast injection confirm adequate cannulation of the CBD. There was a 4mm stone in the distal common bile duct. Next, a sphincterotomy was performed of the papillotome. This was hemostatic without any evidence of bleeding. Multiple sweeps with a 9-12mm balloon was performed. The common duct was opacified a 4mm stone was removed. There was good bile flow and no other stones were seen on cholangiogram.    In light of the patient's history of MRI and cholecystitis, I decided to place a stent in the common bile duct. A 10 mm x 60 mm fully covered wall flex was deployed into the common bile duct. It was in good position. The scope was then withdrawn. The patient tolerated the procedure well.       Dallin Cortés MD

## 2020-11-06 NOTE — PROGRESS NOTES
Hospital Medicine    Subjective:  Pt seen post ercp in pacu alert conversive no abd pain      Current Facility-Administered Medications:     iopamidol (ISOVUE-300) 61 % injection, , , PRN, Shea Morris MD, 6 mL at 11/06/20 0732    piperacillin-tazobactam (ZOSYN) 3.375 g in dextrose 5 % 100 mL IVPB extended infusion (mini-bag), 3.375 g, Intravenous, Q8H, Amando Dalton DO, Last Rate: 0 mL/hr at 11/06/20 0600, 3.375 g at 11/06/20 0726    0.9 % sodium chloride infusion admixture, , Intravenous, Q8H, Amando Dalton DO, Last Rate: 12.5 mL/hr at 11/06/20 0530    amLODIPine (NORVASC) tablet 5 mg, 5 mg, Oral, Daily, Sarah Distance Malmer, DO, 5 mg at 11/05/20 1399    aspirin chewable tablet 81 mg, 81 mg, Oral, Daily, Sarah Distance Malmer, DO, Stopped at 11/04/20 1011    atorvastatin (LIPITOR) tablet 40 mg, 40 mg, Oral, Daily, Sarah Distance Malmer, DO, 40 mg at 11/05/20 2026    clopidogrel (PLAVIX) tablet 75 mg, 75 mg, Oral, Daily, Algernon Oddi, DO, Stopped at 11/04/20 1010    levothyroxine (SYNTHROID) tablet 137 mcg, 137 mcg, Oral, Daily, Sarah Distance Malmer, DO, 137 mcg at 11/06/20 0544    metoprolol succinate (TOPROL XL) extended release tablet 50 mg, 50 mg, Oral, Daily, Sarah Distance Malmer, DO, 50 mg at 11/05/20 0623    sodium chloride flush 0.9 % injection 10 mL, 10 mL, Intravenous, 2 times per day, Algernon Oddi, DO, 10 mL at 11/05/20 0855    sodium chloride flush 0.9 % injection 10 mL, 10 mL, Intravenous, PRN, Sarah Distance Malmer, DO    acetaminophen (TYLENOL) tablet 650 mg, 650 mg, Oral, Q6H PRN, 650 mg at 11/05/20 1532 **OR** acetaminophen (TYLENOL) suppository 650 mg, 650 mg, Rectal, Q6H PRN, Sarah Distance Malmer, DO    polyethylene glycol (GLYCOLAX) packet 17 g, 17 g, Oral, Daily PRN, Sarah Distance Andremer, DO    promethazine (PHENERGAN) tablet 12.5 mg, 12.5 mg, Oral, Q6H PRN **OR** ondansetron (ZOFRAN) injection 4 mg, 4 mg, Intravenous, Q6H PRN, Sarah Mendy Liu, DO    dextrose 5 % in lactated ringers infusion, , Intravenous, Continuous, Husam Gay MD, Last Rate: 100 mL/hr at 11/04/20 2201    Objective:    BP (!) 168/66   Pulse 75   Temp 97.7 °F (36.5 °C) (Temporal)   Resp 21   Ht 6' (1.829 m)   Wt 196 lb 3.2 oz (89 kg)   SpO2 96%   BMI 26.61 kg/m²     Heart:  reg  Lungs:  ctab  Abd: + bs soft nontender colostomy intact  Extrem:  W/o edema    CBC with Differential:    Lab Results   Component Value Date    WBC 5.8 11/06/2020    RBC 4.05 11/06/2020    HGB 12.3 11/06/2020    HCT 36.2 11/06/2020     11/06/2020    MCV 89.4 11/06/2020    MCH 30.4 11/06/2020    MCHC 34.0 11/06/2020    RDW 13.2 11/06/2020    LYMPHOPCT 27.7 11/06/2020    MONOPCT 8.3 11/06/2020    BASOPCT 0.5 11/06/2020    MONOSABS 0.48 11/06/2020    LYMPHSABS 1.61 11/06/2020    EOSABS 0.21 11/06/2020    BASOSABS 0.03 11/06/2020     CMP:    Lab Results   Component Value Date     11/06/2020    K 4.2 11/06/2020    K 4.0 11/03/2020     11/06/2020    CO2 27 11/06/2020    BUN 6 11/06/2020    CREATININE 0.8 11/06/2020    GFRAA >60 11/06/2020    LABGLOM >60 11/06/2020    GLUCOSE 111 11/06/2020    PROT 7.1 11/06/2020    LABALBU 3.9 11/06/2020    CALCIUM 9.5 11/06/2020    BILITOT 1.4 11/06/2020    ALKPHOS 271 11/06/2020     11/06/2020     11/06/2020     Warfarin PT/INR:    Lab Results   Component Value Date    INR 1.1 11/04/2020    INR 1.2 09/22/2020    INR 1.0 09/20/2020    PROTIME 12.4 11/04/2020    PROTIME 13.3 (H) 09/22/2020    PROTIME 11.8 09/20/2020       Assessment:    Active Problems:    HLD (hyperlipidemia)    HTN (hypertension)    Hypothyroidism    Former smoker    History of prostate cancer    History of colon cancer    S/P colostomy (HCC)    CAD (coronary artery disease)    Cholangitis    History of MI (myocardial infarction)  Resolved Problems:    * No resolved hospital problems.  *  choledocolithiasis  S/p ercp cbd stone extraction                    Plan:  Cont post ercp care          Jenn Harry  8:33 AM  11/6/2020

## 2020-11-07 VITALS
HEART RATE: 60 BPM | SYSTOLIC BLOOD PRESSURE: 138 MMHG | WEIGHT: 196.2 LBS | RESPIRATION RATE: 17 BRPM | TEMPERATURE: 97.2 F | OXYGEN SATURATION: 96 % | HEIGHT: 72 IN | DIASTOLIC BLOOD PRESSURE: 67 MMHG | BODY MASS INDEX: 26.57 KG/M2

## 2020-11-07 LAB
ALBUMIN SERPL-MCNC: 3.8 G/DL (ref 3.5–5.2)
ALP BLD-CCNC: 238 U/L (ref 40–129)
ALT SERPL-CCNC: 168 U/L (ref 0–40)
ANION GAP SERPL CALCULATED.3IONS-SCNC: 12 MMOL/L (ref 7–16)
AST SERPL-CCNC: 82 U/L (ref 0–39)
BASOPHILS ABSOLUTE: 0.04 E9/L (ref 0–0.2)
BASOPHILS RELATIVE PERCENT: 0.5 % (ref 0–2)
BILIRUB SERPL-MCNC: 1.3 MG/DL (ref 0–1.2)
BILIRUBIN DIRECT: 0.5 MG/DL (ref 0–0.3)
BILIRUBIN, INDIRECT: 0.8 MG/DL (ref 0–1)
BUN BLDV-MCNC: 4 MG/DL (ref 8–23)
CALCIUM SERPL-MCNC: 9.3 MG/DL (ref 8.6–10.2)
CHLORIDE BLD-SCNC: 104 MMOL/L (ref 98–107)
CO2: 25 MMOL/L (ref 22–29)
CREAT SERPL-MCNC: 0.8 MG/DL (ref 0.7–1.2)
EOSINOPHILS ABSOLUTE: 0.19 E9/L (ref 0.05–0.5)
EOSINOPHILS RELATIVE PERCENT: 2.5 % (ref 0–6)
GFR AFRICAN AMERICAN: >60
GFR NON-AFRICAN AMERICAN: >60 ML/MIN/1.73
GLUCOSE BLD-MCNC: 109 MG/DL (ref 74–99)
HCT VFR BLD CALC: 36 % (ref 37–54)
HEMOGLOBIN: 12.5 G/DL (ref 12.5–16.5)
IMMATURE GRANULOCYTES #: 0.02 E9/L
IMMATURE GRANULOCYTES %: 0.3 % (ref 0–5)
LIPASE: 40 U/L (ref 13–60)
LYMPHOCYTES ABSOLUTE: 2.19 E9/L (ref 1.5–4)
LYMPHOCYTES RELATIVE PERCENT: 28.5 % (ref 20–42)
MCH RBC QN AUTO: 30.6 PG (ref 26–35)
MCHC RBC AUTO-ENTMCNC: 34.7 % (ref 32–34.5)
MCV RBC AUTO: 88 FL (ref 80–99.9)
MONOCYTES ABSOLUTE: 0.6 E9/L (ref 0.1–0.95)
MONOCYTES RELATIVE PERCENT: 7.8 % (ref 2–12)
NEUTROPHILS ABSOLUTE: 4.65 E9/L (ref 1.8–7.3)
NEUTROPHILS RELATIVE PERCENT: 60.4 % (ref 43–80)
PDW BLD-RTO: 13 FL (ref 11.5–15)
PLATELET # BLD: 229 E9/L (ref 130–450)
PMV BLD AUTO: 9.1 FL (ref 7–12)
POTASSIUM SERPL-SCNC: 4.1 MMOL/L (ref 3.5–5)
RBC # BLD: 4.09 E12/L (ref 3.8–5.8)
SODIUM BLD-SCNC: 141 MMOL/L (ref 132–146)
TOTAL PROTEIN: 7 G/DL (ref 6.4–8.3)
WBC # BLD: 7.7 E9/L (ref 4.5–11.5)

## 2020-11-07 PROCEDURE — 80076 HEPATIC FUNCTION PANEL: CPT

## 2020-11-07 PROCEDURE — 80048 BASIC METABOLIC PNL TOTAL CA: CPT

## 2020-11-07 PROCEDURE — 99232 SBSQ HOSP IP/OBS MODERATE 35: CPT | Performed by: SURGERY

## 2020-11-07 PROCEDURE — 2580000003 HC RX 258: Performed by: STUDENT IN AN ORGANIZED HEALTH CARE EDUCATION/TRAINING PROGRAM

## 2020-11-07 PROCEDURE — 85025 COMPLETE CBC W/AUTO DIFF WBC: CPT

## 2020-11-07 PROCEDURE — 83690 ASSAY OF LIPASE: CPT

## 2020-11-07 PROCEDURE — 6360000002 HC RX W HCPCS: Performed by: STUDENT IN AN ORGANIZED HEALTH CARE EDUCATION/TRAINING PROGRAM

## 2020-11-07 PROCEDURE — 6370000000 HC RX 637 (ALT 250 FOR IP): Performed by: INTERNAL MEDICINE

## 2020-11-07 PROCEDURE — 36415 COLL VENOUS BLD VENIPUNCTURE: CPT

## 2020-11-07 PROCEDURE — 2580000003 HC RX 258: Performed by: INTERNAL MEDICINE

## 2020-11-07 RX ORDER — LEVOTHYROXINE SODIUM 137 UG/1
137 TABLET ORAL DAILY
Qty: 30 TABLET | Refills: 3 | Status: SHIPPED | OUTPATIENT
Start: 2020-11-08

## 2020-11-07 RX ADMIN — SODIUM CHLORIDE: 9 INJECTION, SOLUTION INTRAVENOUS at 05:45

## 2020-11-07 RX ADMIN — AMLODIPINE BESYLATE 5 MG: 5 TABLET ORAL at 08:44

## 2020-11-07 RX ADMIN — LEVOTHYROXINE SODIUM 137 MCG: 0.14 TABLET ORAL at 05:44

## 2020-11-07 RX ADMIN — PIPERACILLIN AND TAZOBACTAM 3.38 G: 3; .375 INJECTION, POWDER, LYOPHILIZED, FOR SOLUTION INTRAVENOUS at 08:44

## 2020-11-07 RX ADMIN — SODIUM CHLORIDE, PRESERVATIVE FREE 10 ML: 5 INJECTION INTRAVENOUS at 08:43

## 2020-11-07 RX ADMIN — ASPIRIN 81 MG CHEWABLE TABLET 81 MG: 81 TABLET CHEWABLE at 08:43

## 2020-11-07 RX ADMIN — METOPROLOL SUCCINATE 50 MG: 50 TABLET, EXTENDED RELEASE ORAL at 08:44

## 2020-11-07 RX ADMIN — PIPERACILLIN AND TAZOBACTAM 3.38 G: 3; .375 INJECTION, POWDER, LYOPHILIZED, FOR SOLUTION INTRAVENOUS at 02:00

## 2020-11-07 RX ADMIN — CLOPIDOGREL 75 MG: 75 TABLET, FILM COATED ORAL at 08:44

## 2020-11-07 ASSESSMENT — PAIN SCALES - GENERAL: PAINLEVEL_OUTOF10: 0

## 2020-11-07 NOTE — PROGRESS NOTES
GENERAL SURGERY  DAILY PROGRESS NOTE    Date:2020       [id number]  Patient Lianet Gutierres     YOB: 1940     Age:80 y.o. Chief Complaint:  Chief Complaint   Patient presents with    Fever     States he had a fever this afternoon of 102 and is afraid his gallbladder drain is infected. Subjective:  Feeling great aafter ERCP. No nausea/vomiting. No pain. +flatus/stool in ostomy. Objective:  BP (!) 148/70   Pulse 59   Temp 98 °F (36.7 °C) (Temporal)   Resp 17   Ht 6' (1.829 m)   Wt 196 lb 3.2 oz (89 kg)   SpO2 98%   BMI 26.61 kg/m²   Temp (24hrs), Av.7 °F (36.5 °C), Min:97 °F (36.1 °C), Max:98.1 °F (36.7 °C)      I/O (24Hr):  I/O last 3 completed shifts: In: 8665 [P.O.:720; I.V.:2581; IV Piggyback:100]  Out: 7299 [Urine:2925; Drains:150; Stool:400]     GENERAL:  No acute distress. Alert and interactive. LUNGS:  No cough. Nonlabored breathing on room air. CARDIOVASC:  Normal rate, no cyanosis. ABDOMEN:  Soft, non-distended, non-tender. Ostomy with stool. RUQ drain serous mild bilious. No guarding / rigidity / rebound. EXTREMITIES:  No edema, no deformities.     Assessment:  79 y.o. male with cholangitis s/p ERCP     Plan:  - check AM lipase but clinically no postop pancreatitis  - advance diet and dc IVF  - abx per ID  - likely ok to dc today if tolerates diet    Electronically signed by Dolores Fisher MD on 2020 at 6:15 AM

## 2020-11-07 NOTE — PROGRESS NOTES
CLINICAL PHARMACY NOTE: MEDS TO [address] Select Patient?: No  Total # of Prescriptions Filled: 1   The following medications were delivered to the patient:    Total # of Interventions Completed: 1  Time Spent (min): 0    Additional Documentation:    Levothyroxine 137 pt said he has at home and doesn't need

## 2020-11-07 NOTE — PROGRESS NOTES
PRITI PROGRESS NOTE      Chief complaint: Follow-up of cholangitis    The patient is a 79 y.o. male with history of obstructive colon cancer status post colectomy in 2019 and chemotherapy through venous access port until 04/2020, previously admitted in 09/2020 for acute cholecystitis status post cholecystostomy tube placement on 09/22, presented on 11/03 with fever up to 100 2 °F accompanied by malaise and chills, found to have transaminitis with multiple filling defects in the common duct as well as in the cystic duct on percutaneous cholecystostomy tube imaging. On admission, he was afebrile and hemodynamically stable with no leukocytosis. Chest x-ray was unremarkable. Piperacillin-tazobactam was started on admission. He underwent ERCP with sphinterotomy, stone extraction and biliary stent placement on 11/06. Subjective: Patient was seen and examined. No chills, has mild RUQ discomfort, no diarrhea, no rash, no itching. He reports feeling well. Objective:    Vitals:    11/07/20 0800   BP: 138/67   Pulse: 60   Resp: 17   Temp: 97.2 °F (36.2 °C)   SpO2: 96%     Constitutional: Alert, not in distress  Respiratory: Clear breath sounds, no crackles, no wheezes  Cardiovascular: Regular rate and rhythm, no murmurs  Gastrointestinal: Bowel sounds present, soft, nontender. Cholecystostomy tube in place with biliary fluid output. Skin: Warm and dry, no active dermatoses  Musculoskeletal: No joint swelling, no joint erythema    Labs, imaging, and medical records/notes were personally reviewed. Assessment:  Cholangitis, s/p ERCP with sphinterotomy, stone extraction and biliary stent placement on 11/06    Recommendations:  No further antibiotic therapy indicated on discharge. Follow up blood cultures.     Thank you for involving me in the care of Lopez Valenzuela. I will continue to follow. Please do not hesitate to call for any questions or concerns.     Electronically signed by Paz Dailey MD on 11/7/2020 at 10:12 AM

## 2020-11-07 NOTE — CARE COORDINATION
SOCIAL WORK/CASEMANAGEMENT TRANSITION OF CARE [id number] [facility], [address], [place], [phone]): notified [facility] of discharge and orders today.  Erwin Rivera  11/7/2020

## 2020-11-07 NOTE — PROGRESS NOTES
[facility]   ATTENDING PHYSICIAN PROGRESS NOTE     I have examined the patient, reviewed the record, and discussed the case with the APN/ Resident. I have reviewed all relevant labs and imaging data. The following summarizes my clinical findings and independent assessment. CC: choledocholithiasis    Pt without complaints. Denies pain.     Awake and alert  Follows commands  Hrt:  Regular  Lungs:  Fairly clear bilaterally  Abd:  Soft; BS active; ostomy viable/functional; clear yellow drainage in drain bag; non-tender; no rebound; no guarding  Skin:  Warm/dry    Patient Active Problem List    Diagnosis Date Noted    Choledocholithiasis 11/06/2020    Cholangitis 11/04/2020    History of MI (myocardial infarction) 11/04/2020    CAD (coronary artery disease) 09/24/2020    History of colon cancer 09/21/2020    S/P colostomy ([address] 09/21/2020    HLD (hyperlipidemia) 09/20/2020    HTN (hypertension) 09/20/2020    Hypothyroidism 09/20/2020    Former smoker 09/20/2020    History of prostate cancer 09/20/2020    Leukocytosis 09/20/2020    Acute cholecystitis 09/19/2020    Malignant neoplasm of splenic flexure ([address] 10/21/2019    Moderate protein-calorie malnutrition ([address] 10/18/2019       Choledocholithiasis--s/p ERCP  Diet as tolerated  Monitor abd exam  Monitor bowel function  Monitor labs  Discharge planning    Marian Galvin MD, FACS  11/7/2020  9:35 AM

## 2020-11-07 NOTE — DISCHARGE SUMMARY
Discharge Summary    Whatley Shadow  :  1940  MRN:  [id number]    Admit date:  11/3/2020  Discharge date:  2020 1:02 PM    Admitting Physician:  Susanne Fritz DO    Discharge Diagnoses:    Patient Active Problem List    Diagnosis Date Noted    Choledocholithiasis 2020    Cholangitis 2020    History of MI (myocardial infarction) 2020    CAD (coronary artery disease) 2020    History of colon cancer 2020    [facility]P [facility] ([facility] 2020    HLD (hyperlipidemia) 2020    HTN (hypertension) 2020    Hypothyroidism 2020    Former smoker 2020    History of prostate cancer 2020    Leukocytosis 2020    Acute cholecystitis 2020    Malignant neoplasm of splenic flexure ([facility] 10/21/2019    Moderate protein-calorie malnutrition ([facility] 10/18/2019       Past Medical Hx :   Past Medical History:   Diagnosis Date    Cancer [facility])     prostate    Hyperlipidemia     Malignant neoplasm of splenic flexure ([facility] 10/21/2019    Thyroid disease        Past Surgical Hx :   Past Surgical History:   Procedure Laterality Date    CARDIAC CATHETERIZATION  2020    Dr. Anika Quintanilla, No LV.   Staged PCI to LAD    CARDIAC CATHETERIZATION  2020    LAD PCI- Dr Donnell Mcneill  2020    COLONOSCOPY DIAGNOSTIC performed by Boyd Marinelli MD at [address] NEW ACCESS  2020    CT PTC NEW ACCESS 2020 Sidney Hutchins MD [facility]    ERCP N/A 2020    ERCP ST[facility] Sen [facility] ERCP N/A 2020    ERCP STONE REMOVAL [facility] ERCP N/A 2020    ERCP SPHINCTER/PAPILLOTOMY performed by Kim Sen MD at [address] [place] N/A 10/13/2019    LAPAROTOMY EXPLORATORY, LARGE BOWEL RESECTION, CREATION OF COLOSTOMY performed by Boyd Marinelli MD at [address] [place] [address] SURGERY N/A 10/22/2019    PORT INSERTION performed by Viji Garcia MD at [address] [facility] [place]         Admission Condition:  poor    Discharged Condition:  good    Labs:  CBC:   Lab Results   Component Value Date    WBC 7.7 2020    RBC 4.09 2020    HGB 12.5 2020    HCT 36.0 2020    MCV 88.0 2020    MCH 30.6 2020    MCHC 34.7 2020    RDW 13.0 2020     2020    MPV 9.1 2020     Hemoglobin/Hematocrit:    Lab Results   Component Value Date    HGB 12.5 2020    HCT 36.0 2020        Radiology Results: Xr Chest Portable    Result Date: 11/3/2020  EXAMINATION: ONE XRAY VIEW OF THE CHEST 11/3/2020 10:51 pm COMPARISON: Chest x-ray from 2020 HISTORY: ORDERING SYSTEM PROVIDED HISTORY: fever TECHNOLOGIST PROVIDED HISTORY: Reason for exam:->fever What reading provider will be dictating this exam?->CRC FINDINGS: A left subclavian Port-A-Cath is noted with the distal tip in the region of the SVC. The lungs are without acute focal process. There is no effusion or pneumothorax. The cardiomediastinal silhouette is without acute process. The osseous structures are without acute process. No acute process. Ir Basilio Puff Exist Access W Imaging    Result Date: 11/3/2020  Patient MRN:  [id number] : 1940 Age: 79 years Gender: Male Order Date:  11/3/2020 9:09 AM EXAM: IR INJ PERC CHOLA EXIST ACCESS W IMAGING NUMBER OF IMAGES:  3 views INDICATION: K81.9 Cholecystitis What reading provider will be dictating this exam?->[facility] COMPARISON: None Cholecystostomy Tube Check Procedure: Following the routine sterile preparation and drape, under fluoroscopic control contrast was injected through the indwelling tube. The cholecystostomy tube appeared to be in good position. Time out occurred at 0907 hrs. 0.27 minutes of fluoroscopy was utilized.  Cholangiogram images demonstrate multiple filling defects in the common duct as well as multiple filling defects in the cystic duct. Contrast was visualized into the distal common duct however not into the duodenum     Tube check demonstrating the catheter to be in good position. Cholelithiasis Choledocholithiasis        Hospital Course: The patient is a 79 y. o. male with history of obstructive colon cancer status post colectomy in 2019 and chemotherapy through venous access port until 04/2020, previously admitted in 09/2020 for acute cholecystitis status post cholecystostomy tube placement on 09/22, presented on 11/03 with fever up to 100 2 °F accompanied by malaise and chills, found to have transaminitis with multiple filling defects in the common duct as well as in the cystic duct on percutaneous cholecystostomy tube imaging.  On admission, he was afebrile and hemodynamically stable with no leukocytosis.  Chest x-ray was unremarkable.  Piperacillin-tazobactam was started on admission. He underwent ERCP with sphinterotomy, stone extraction and biliary stent placement on 11/06. Assessment:  Cholangitis, s/p ERCP with sphinterotomy, stone extraction and biliary stent placement on 11/06     Recommendations:  No further antibiotic therapy indicated on discharge. Follow up blood cultures.   Discharge Medications:    Emi Junior   Home Medication Instructions [id number]    Printed on:11/07/20 1302   Medication Information                      acetaminophen (TYLENOL) 500 MG tablet  Take 500 mg by mouth every 6 hours as needed for Pain             amLODIPine (NORVASC) 5 MG tablet  Take 1 tablet by mouth daily             aspirin 81 MG chewable tablet  Take 1 tablet by mouth daily             atorvastatin (LIPITOR) 40 MG tablet  Take 40 mg by mouth daily             clopidogrel (PLAVIX) 75 MG tablet  Take 1 tablet by mouth daily             levothyroxine (SYNTHROID) 137 MCG tablet  Take 1 tablet by mouth Daily             metoprolol succinate (TOPROL XL) 50 MG extended release tablet  Take 1 tablet by mouth daily                 Discharge Exam:  /67   Pulse 60   Temp 97.2 °F (36.2 °C) (Temporal)   Resp 17   Ht 6' (1.829 m)   Wt 196 lb 3.2 oz (89 kg)   SpO2 96%   BMI 26.61 kg/m²   Head eyes ears are normal  Neck no masses no nodes  Heart:  reg  Lungs:  ctab  Abd: + bs soft nontender colostomy intact  Extrem:  W/o edema  Neurologically there is no sign or symptom         Disposition: home    Patient Instructions:   REFER TO AVR or PAZ document    Signed:  Kaleb West  [facility]  [employer] Ger[employer]  11/7/2020, 1:02 PM

## 2020-11-09 LAB
BLOOD CULTURE, ROUTINE: NORMAL
CULTURE, BLOOD 2: NORMAL

## 2020-11-16 ENCOUNTER — OFFICE VISIT (OUTPATIENT)
Dept: CARDIOLOGY CLINIC | Age: 80
End: 2020-11-16
Payer: MEDICARE

## 2020-11-16 VITALS
HEIGHT: 72 IN | SYSTOLIC BLOOD PRESSURE: 138 MMHG | RESPIRATION RATE: 16 BRPM | DIASTOLIC BLOOD PRESSURE: 58 MMHG | BODY MASS INDEX: 27.28 KG/M2 | HEART RATE: 58 BPM | WEIGHT: 201.4 LBS

## 2020-11-16 PROCEDURE — 93000 ELECTROCARDIOGRAM COMPLETE: CPT | Performed by: INTERNAL MEDICINE

## 2020-11-16 PROCEDURE — 99214 OFFICE O/P EST MOD 30 MIN: CPT | Performed by: INTERNAL MEDICINE

## 2020-11-16 RX ORDER — AMLODIPINE BESYLATE 10 MG/1
10 TABLET ORAL DAILY
Qty: 90 TABLET | Refills: 3 | Status: SHIPPED
Start: 2020-11-16 | End: 2021-09-02

## 2020-11-16 SDOH — HEALTH STABILITY: MENTAL HEALTH: HOW OFTEN DO YOU HAVE A DRINK CONTAINING ALCOHOL?: 2-3 TIMES A WEEK

## 2020-11-16 NOTE — PROGRESS NOTES
Date    CAD (coronary artery disease)     [facility]     [facility]state    Hyperlipidemia     Malignant neoplasm of splenic flexure ([facility] 10/21/2019    Thyroid disease           Social History:    Social History     Tobacco History     Smoking Status  Former Smoker    Smokeless Tobacco Use  Never Used    Tobacco Comment  40 + years           Alcohol History     Alcohol Use Status  Not Currently Drinks/Week  4 Cans of beer per week Amount  4.0 standard drinks of alcohol/wk Comment  occ          Drug Use     Drug Use Status  Never          Sexual Activity     Sexually Active  Not Asked                    Family History:       Problem Relation Age of Onset    Breast Cancer Mother     Diabetes Father     Macular Degen Father     Emphysema Father     No Known Problems Brother          Review of Systems   General ROS: No weight loss fevers or chills  Psychological ROS: No new depression or anxiety or altered mood  Ophthalmic ROS: No new vision changes or diplopia  Respiratory ROS: No cough, wheezing, shortness of breath, or hemoptysis  Cardiovascular ROS: See HPI  Gastrointestinal ROS: No nausea, vomiting, constipation, diarrhea, hematemesis, melena, or hematochezia  Genito-Urinary ROS: No dysuria, hematuria, or new incontinence  Musculoskeletal ROS: No new muscle pain, joint pain, joint swelling, or back pain  Neurological ROS: No new numbness or paresthesias, no focal weakness, no altered speech, no new memory loss  Dermatological ROS: No new rashes, no pruritus, no skin masses        Medications     Current Outpatient Medications   Medication Sig Dispense Refill    amLODIPine (NORVASC) 10 MG tablet Take 1 tablet by mouth daily 90 tablet 3    levothyroxine (SYNTHROID) 137 MCG tablet Take 1 tablet by mouth Daily 30 tablet 3    aspirin 81 MG chewable tablet Take 1 tablet by mouth daily 30 tablet 3    clopidogrel (PLAVIX) 75 MG tablet Take 1 tablet by mouth daily 30 tablet 11    atorvastatin (LIPITOR) 40 MG tablet Take 40 mg by mouth daily      acetaminophen (TYLENOL) 500 MG tablet Take 500 mg by mouth every 6 hours as needed for Pain       No current facility-administered medications for this visit. Physical Examination      BP (!) 138/58 (Site: Left Upper Arm, Position: Sitting, Cuff Size: Medium Adult)   Pulse 58   Resp 16   Ht 6' (1.829 m)   Wt 201 lb 6.4 oz (91.4 kg)   BMI 27.31 kg/m²     General: No acute distress, appears as stated age, nonicteric  Head: Atraumatic, no gross abnormalities or bruises  Neck: Supple and nontender, no carotid bruits, no JVP  Lungs: Clear to auscultation bilaterally, no wheezes, rales, or rhonchi  Heart: Regular rate and rhythm, no murmurs, rubs, or gallops  Abdomen: Soft, nontender, nondistended, normal bowel sounds  Extremities: No obvious deformities, no cyanosis, no edema  Neurological: Alert and oriented x3, EOMI, moving all extremities x4  Psychological: Normal mood and affect, cooperative  Skin: Color, texture, and turgor normal for age          Labs/Imaging/Diagnostics     Lab Results   Component Value Date     11/07/2020    K 4.1 11/07/2020    K 4.0 11/03/2020     11/07/2020    CO2 25 11/07/2020    BUN 4 11/07/2020    CREATININE 0.8 11/07/2020    GLUCOSE 109 11/07/2020    CALCIUM 9.3 11/07/2020        Estimated Creatinine Clearance: 82 mL/min (based on SCr of 0.8 mg/dL).     Lab Results   Component Value Date    WBC 7.7 11/07/2020    HGB 12.5 11/07/2020    HCT 36.0 (L) 11/07/2020    MCV 88.0 11/07/2020     11/07/2020       Lab Results   Component Value Date     (H) 11/07/2020    AST 82 (H) 11/07/2020    ALKPHOS 238 (H) 11/07/2020    BILITOT 1.3 (H) 11/07/2020       Lab Results   Component Value Date    LABALBU 3.8 11/07/2020       Lab Results   Component Value Date    CHOL 170 09/20/2020     Lab Results   Component Value Date    TRIG 92 09/20/2020     Lab Results   Component Value Date    HDL 77 09/20/2020     Lab Results   Component Value Date    LDLCALC 75 09/20/2020     Lab Results   Component Value Date    LABVLDL 18 09/20/2020     No results found for: [facility]    Lab Results   Component Value Date    CKTOTAL 66 09/20/2020    CKMB <1.0 09/21/2020    TROPONINI 0.05 (H) 09/26/2020       No results found for: BNP      Lab Results   Component Value Date    LABA1C 5.4 09/20/2020     No results found for: EAG     Pertinent Cardiovascular Studies:  EKG: Today  Normal sinus rhythm. Normal        Assessment and Plan:        67-year-old  male with history of PCI to the proximal to mid LAD with 2 MIRELA with rotational arthrectomy on IVUS in September 2020. He is asymptomatic from an anginal standpoint. He has no heart failure and no symptoms of arrhythmia. He is still planned for a cholecystectomy and colostomy reversal sometime in the spring. Diagnosis Orders   1. Coronary artery disease involving native coronary artery of native heart without angina pectoris     2. Status post insertion of drug eluting coronary artery stent        · From a cardiac standpoint, clopidogrel (Plavix) may be held for surgery safely after [place]. Recommend resuming after surgery when safe  · Aspirin should not be discontinued or held before surgery without cardiology clearance  · Discontinue metoprolol as he has no angina  · Increase amlodipine to 10 mg daily to optimize blood pressure control  · Continue atorvastatin 40 mg daily      Follow up with me in 6 months. We appreciate the opportunity to participate in His care!       Beto Madera MD  Interventional Cardiology/Structural Heart Disease  Office: [phone]  Fax: [phone]  FRANCINE Coordinator: Raeann Rhoades

## 2020-11-24 DIAGNOSIS — Z01.818 PRE-OP TESTING: Primary | ICD-10-CM

## 2020-11-27 ENCOUNTER — HOSPITAL ENCOUNTER (OUTPATIENT)
Age: 80
Discharge: HOME | End: 2020-11-29
Payer: MEDICARE

## 2020-11-27 PROCEDURE — U0003 INFECTIOUS AGENT DETECTION BY NUCLEIC ACID (DNA OR RNA); SEVERE ACUTE RESPIRATORY SYNDROME CORONAVIRUS 2 (SARS-COV-2) (CORONAVIRUS DISEASE [COVID-19]), AMPLIFIED PROBE TECHNIQUE, MAKING USE OF HIGH THROUGHPUT TECHNOLOGIES AS DESCRIBED BY CMS-2020-01-R: HCPCS

## 2020-11-28 LAB — SARS-COV-2, PCR: NOT DETECTED

## 2020-12-03 ENCOUNTER — ANCILLARY ORDERS (OUTPATIENT)
Dept: INTERVENTIONAL RADIOLOGY/VASCULAR | Age: 80
End: 2020-12-03

## 2020-12-03 ENCOUNTER — HOSPITAL ENCOUNTER (OUTPATIENT)
Dept: INTERVENTIONAL RADIOLOGY/VASCULAR | Age: 80
Discharge: HOME | End: 2020-12-05
Payer: MEDICARE

## 2020-12-03 VITALS
DIASTOLIC BLOOD PRESSURE: 87 MMHG | SYSTOLIC BLOOD PRESSURE: 164 MMHG | RESPIRATION RATE: 17 BRPM | OXYGEN SATURATION: 98 % | BODY MASS INDEX: 27.77 KG/M2 | HEIGHT: 72 IN | HEART RATE: 68 BPM | TEMPERATURE: 97.8 F | WEIGHT: 205 LBS

## 2020-12-03 PROCEDURE — 47536 EXCHANGE BILIARY DRG CATH: CPT

## 2020-12-03 PROCEDURE — 6360000002 HC RX W HCPCS: Performed by: RADIOLOGY

## 2020-12-03 PROCEDURE — 47531 INJECTION FOR CHOLANGIOGRAM: CPT

## 2020-12-03 PROCEDURE — 2709999900 IR INJ PERC CHOLA EXIST ACCESS W IMAGING

## 2020-12-03 PROCEDURE — 6360000004 HC RX CONTRAST MEDICATION: Performed by: RADIOLOGY

## 2020-12-03 PROCEDURE — 2580000003 HC RX 258: Performed by: RADIOLOGY

## 2020-12-03 PROCEDURE — 47536 EXCHANGE BILIARY DRG CATH: CPT | Performed by: RADIOLOGY

## 2020-12-03 RX ADMIN — PIPERACILLIN AND TAZOBACTAM 3.38 G: 3; .375 INJECTION, POWDER, LYOPHILIZED, FOR SOLUTION INTRAVENOUS at 09:02

## 2020-12-03 RX ADMIN — IOVERSOL 20 ML: 678 INJECTION INTRA-ARTERIAL; INTRAVENOUS at 11:14

## 2020-12-03 ASSESSMENT — PAIN - FUNCTIONAL ASSESSMENT: PAIN_FUNCTIONAL_ASSESSMENT: 0-10

## 2020-12-03 NOTE — BRIEF OP NOTE
Brief Postoperative Note    Maylin Martinez  YOB: 1940  [id number]    Pre-operative Diagnosis and Procedure: tube change    Post-operative Diagnosis: Same    Anesthesia: Local    Estimated Blood Loss: < 10 cc    Surgeon: Elly KINNEY     Complications: none    Specimen obtained: none     Findings: none     Chip Blunt, II   12/3/2020 10:21 AM

## 2020-12-03 NOTE — PROGRESS NOTES
Patient arrived via ambulation with cane to Radiology department for ofelia tube exchange. Allergies, home medications, H&P and fasting instructions reviewed with patient. Vital signs taken. IV placed, IV flushed and prn adapter attached. Dr Luis Alberto Le came and talked to patient. Procedural instructions given, questions answered, understanding expressed and consent signed. Patient given fluoroscopy education, no questions at this time.

## 2020-12-03 NOTE — INTERVAL H&P NOTE
H&P Update    Patient's History and Physical  was reviewed. The patient appears likely to able to tolerate the procedure. Risk and benefits discussed including ultimate complications, possibly death and consent obtained.     Romana Pitcher, II

## 2020-12-03 NOTE — PROGRESS NOTES
Patient is to follow up with Dr Christopher Frames office for stent placement then is to come back to use to have ofelia tube checked and possibly removed. Patient verbalized understanding. 1108 - Patient prepared for discharge, up and dressed, denies discomfort, dressing over RUQ ofelia tube dry and intact. Taken to door via ambulation with cane and released with discharge instructions to home.

## 2020-12-08 ENCOUNTER — TELEPHONE (OUTPATIENT)
Dept: SURGERY | Age: 80
End: 2020-12-08

## 2020-12-08 ENCOUNTER — OFFICE VISIT (OUTPATIENT)
Dept: SURGERY | Age: 80
End: 2020-12-08
Payer: MEDICARE

## 2020-12-08 ENCOUNTER — TELEPHONE (OUTPATIENT)
Dept: CARDIOLOGY CLINIC | Age: 80
End: 2020-12-08

## 2020-12-08 VITALS
BODY MASS INDEX: 27.77 KG/M2 | HEART RATE: 69 BPM | WEIGHT: 205 LBS | DIASTOLIC BLOOD PRESSURE: 67 MMHG | HEIGHT: 72 IN | SYSTOLIC BLOOD PRESSURE: 126 MMHG | TEMPERATURE: 97.2 F

## 2020-12-08 PROCEDURE — 99212 OFFICE O/P EST SF 10 MIN: CPT | Performed by: SURGERY

## 2020-12-08 PROCEDURE — 1123F ACP DISCUSS/DSCN MKR DOCD: CPT | Performed by: SURGERY

## 2020-12-08 PROCEDURE — G8484 FLU IMMUNIZE NO ADMIN: HCPCS | Performed by: SURGERY

## 2020-12-08 PROCEDURE — 4040F PNEUMOC VAC/ADMIN/RCVD: CPT | Performed by: SURGERY

## 2020-12-08 PROCEDURE — G8427 DOCREV CUR MEDS BY ELIG CLIN: HCPCS | Performed by: SURGERY

## 2020-12-08 PROCEDURE — 1036F TOBACCO NON-USER: CPT | Performed by: SURGERY

## 2020-12-08 PROCEDURE — G8417 CALC BMI ABV UP PARAM F/U: HCPCS | Performed by: SURGERY

## 2020-12-08 RX ORDER — AMOXICILLIN AND CLAVULANATE POTASSIUM 875; 125 MG/1; MG/1
TABLET, FILM COATED ORAL
COMMUNITY
Start: 2020-12-03 | End: 2020-12-15 | Stop reason: ALTCHOICE

## 2020-12-08 NOTE — PROGRESS NOTES
General Surgery History and Physical  [facility]    Patient's Name/Date of Birth: Janae Lugo / 1940    Date: December 8, 2020     Surgeon: Trang Carrillo MD    PCP: Kraig Giang DO     Chief Complaint: Choledocholithiasis, dislodged biliary stent    HPI:   Janae Lugo is a 79 y.o. male who presents for evaluation of dislodged biliary stent. He has a history of recent cholecystitis status post IR cholecystostomy tube. He was not a candidate for cholecystectomy has he had a recent myocardial infarction. He had a cholecystostomy tube check last week that revealed that the common bile duct stent had dislodged. He had fevers, chills, right upper quadrant discomfort after the procedure. He presents for replacement of his common bile duct stent as he is not yet a candidate for surgery. He denies nausea, vomiting, constipation, diarrhea, headache, chest pain, shortness of breath, fevers, chills at this time. Patient Active Problem List   Diagnosis    Moderate protein-calorie malnutrition ([address]    Malignant neoplasm of splenic flexure (HCC)    Acute cholecystitis    HLD (hyperlipidemia)    HTN (hypertension)    Hypothyroidism    Former smoker    History of prostate cancer    Leukocytosis    History of colon cancer    S/P colostomy ([address]    CAD (coronary artery disease)    Cholangitis    History of MI (myocardial infarction)    Choledocholithiasis       Past Medical History:   Diagnosis Date    CAD (coronary artery disease)     Cancer ([address]     prostate    Hyperlipidemia     Malignant neoplasm of splenic flexure ([address] 10/21/2019    Thyroid disease        Past Surgical History:   Procedure Laterality Date    CARDIAC CATHETERIZATION  09/21/2020    Dr. Flores Erm, No LV.   Staged PCI to LAD    CARDIAC CATHETERIZATION  09/25/2020    LAD PCI- Dr June Méndez  8/28/2020    COLONOSCOPY DIAGNOSTIC performed by Juana Ruiz MD at [facility] COLOSTOMY      CT [facility] NEW ACCESS  9/22/2020    CT [facility] NEW ACCESS 9/22/2020 Vicki Collazo MD [facility] CT    ERCP N/A 11/6/2020    ERCP STENT INSERTION performed by Daryle Krabbe, MD at [address] ERCP N/A 11/6/2020    ERCP STONE REMOVAL performed by Daryle Krabbe, MD at [address] ERCP N/A 11/6/2020    ERCP SPHINCTER/PAPILLOTOMY performed by Daryle Krabbe, MD at [address] [place] [address] N/A 10/13/2019    LAPAROTOMY EXPLORATORY, LARGE BOWEL RESECTION, CREATION OF COLOSTOMY performed by Nora Armendariz MD at [facility] N/A 10/22/2019    PORT INSERTION performed by Krupa Rueda MD at [address] [place] [address]         No Known Allergies    The patient has a family history that is negative for severe cardiovascular or respiratory issues, negative for reaction to anesthesia. Time spent reviewing past medical, surgical, social and family history, vitals, nursing assessment and images. No changes from above documented history.     Social History     Socioeconomic History    Marital status:      Spouse name: Not on file    Number of children: Not on file    Years of education: Not on file    Highest education level: Not on file   Occupational History    Not on file   Social Needs    Financial resource strain: Not on file    Food insecurity     Worry: Not on file     Inability: Not on file    Transportation needs     Medical: Not on file     Non-medical: Not on file   Tobacco Use    Smoking status: Former Smoker     Packs/day: 1.00     Years: 15.00     Pack years: 15.00     Types: Cigarettes    Smokeless tobacco: Never Used    Tobacco comment: quit age 45   Substance and Sexual Activity    Alcohol use: Yes     Frequency: 2-3 times a week     Comment: occ beer    Drug use: Never    Sexual activity: Not on file   Lifestyle    Physical activity     Days per week: Not on file     Minutes per session: Not on file    Stress: Not on file Relationships    Social connections     Talks on phone: Not on file     Gets together: Not on file     Attends Bahai service: Not on file     Active member of club or organization: Not on file     Attends meetings of clubs or organizations: Not on file     Relationship status: Not on file    Intimate partner violence     Fear of current or ex partner: Not on file     Emotionally abused: Not on file     Physically abused: Not on file     Forced sexual activity: Not on file   Other Topics Concern    Not on file   Social History Narrative    Drinks 2 cups of coffee daily. I have reviewed relevant labs from this admission and interpretation is included in my assessment and plan    Review of Systems    A complete 10 system review was performed and are otherwise negative unless mentioned in the above HPI. Specific negatives are listed below but may not include all those reviewed.     General ROS: negative obtundation, AMS  ENT ROS: negative rhinorrhea, epistaxis  Allergy and Immunology ROS: negative itchy/watery eyes or nasal congestion  Hematological and Lymphatic ROS: negative spontaneous bleeding or bruising  Endocrine ROS: negative  lethargy, mood swings, palpitations or polydipsia/polyuria  Respiratory ROS: negative sputum changes, stridor, tachypnea or wheezing  Cardiovascular ROS: negative for - loss of consciousness, murmur or orthopnea  Gastrointestinal ROS: negative for - hematochezia or hematemesis  Genito-Urinary ROS: negative for -  genital discharge or hematuria  Musculoskeletal ROS: negative for - focal weakness, gangrene  Psych/Neuro ROS: negative for - visual or auditory hallucinations, suicidal ideation    Physical exam:   /67   Pulse 69   Temp 97.2 °F (36.2 °C) (Temporal)   Ht 6' (1.829 m)   Wt 205 lb (93 kg)   BMI 27.80 kg/m²   General appearance:  NAD, appears stated age  Head: NCAT, PERRLA, EOMI, red conjunctiva  Neck: supple, no masses, trachea midline  Lungs: Equal chest rise bilateral, no retractions, no wheezing  Heart: Reg rate  Abdomen: soft, nondistended  Skin; warm and dry, no cyanosis  Gu: no cva tenderness  Extremities: atraumatic, no focal motor deficits, no open wounds  Psych: No tremor, visual hallucinations      Radiology: I reviewed relevant abdominal imaging from this admission and that available in the EMR including IR fluoroscopy from 12/3/2020. My assessment is patent cystic duct, dislodged common bile duct stent    Assessment:  Lopez Valenzuela is a 79 y.o. male with dislodged CBD stent, recent choledocholithiasis  Patient Active Problem List   Diagnosis    [facility]    Malignant neoplasm of splenic flexure (HCC)    Acute cholecystitis    HLD (hyperlipidemia)    HTN (hypertension)    Hypothyroidism    Former smoker    History of prostate cancer    Leukocytosis    History of colon cancer    S/P colostomy [facility]    CAD (coronary artery disease)    Cholangitis    History of MI (myocardial infarction)    Choledocholithiasis         Plan:  Proceed with ERCP with stent placement  The procedure, risks, benefits and alternatives were discussed. He agrees to proceed.         David Goodrich MD  8:36 AM  12/8/2020

## 2020-12-08 NOTE — TELEPHONE ENCOUNTER
Prior Authorization Form:      DEMOGRAPHICS:                     Patient Name:  Deion Nation  Patient :  1940            Insurance:  Payor: MEDICARE / Plan: MEDICARE PART A AND B / Product Type: *No Product type* /   Insurance ID Number:    Payor/Plan Subscr  Sex Relation Sub. Ins. ID Effective Group Num   1. [address] 1940 Male Self [id number] 1/1/15                                    [address]  [address]  1940 Male  [id number] 1/1/15                                    [address]         DIAGNOSIS & PROCEDURE:                       Procedure/Operation: ERCP with stent insertion           CPT Code: 36885    Diagnosis:  Choledocholilithiasis    ICD10 Code: K80.50    Location:  [facility]    Surgeon:  Katarzyna Calles INFORMATION:                          Date: 20    Time: TBD              Anesthesia:  MAC/TIVA                                                       Status:  Outpatient        Special Comments:         Electronically signed by Gomez Whittaker RN on 2020 at 12:38 PM

## 2020-12-08 NOTE — TELEPHONE ENCOUNTER
Cough: Care Instructions  Your Care Instructions    A cough is your body's response to something that bothers your throat or airways. Many things can cause a cough. You might cough because of a cold or the flu, bronchitis, or asthma. Smoking, postnasal drip, allergies, and stomach acid that backs up into your throat also can cause coughs. A cough is a symptom, not a disease. Most coughs stop when the cause, such as a cold, goes away. You can take a few steps at home to cough less and feel better. Follow-up care is a key part of your treatment and safety. Be sure to make and go to all appointments, and call your doctor if you are having problems. It's also a good idea to know your test results and keep a list of the medicines you take. How can you care for yourself at home? · Drink lots of water and other fluids. This helps thin the mucus and soothes a dry or sore throat. Honey or lemon juice in hot water or tea may ease a dry cough. · Take cough medicine as directed by your doctor. · Prop up your head on pillows to help you breathe and ease a dry cough. · Try cough drops to soothe a dry or sore throat. Cough drops don't stop a cough. Medicine-flavored cough drops are no better than candy-flavored drops or hard candy. · Do not smoke. Avoid secondhand smoke. If you need help quitting, talk to your doctor about stop-smoking programs and medicines. These can increase your chances of quitting for good. When should you call for help? Call 911 anytime you think you may need emergency care. For example, call if:  ? · You have severe trouble breathing. ?Call your doctor now or seek immediate medical care if:  ? · You cough up blood. ? · You have new or worse trouble breathing. ? · You have a new or higher fever. ? · You have a new rash. ? Watch closely for changes in your health, and be sure to contact your doctor if:  ? · You cough more deeply or more often, especially if you notice more mucus or a Per Dr. Dodie Hammans, patient is scheduled for ERCP with stent insertion at [facility] on 12/18/20. Surgery scheduled via telephone, surgeon's calendar updated. Dr. Dodie Hammans to enter orders. Follow up appointment scheduled in [facility], per patient request. Patient instructed to hold Plavix for 7 days prior to procedure.    Electronically signed by Stephanie Kate RN on 12/8/2020 at 12:38 PM change in the color of your mucus. ? · You have new symptoms, such as a sore throat, an earache, or sinus pain. ? · You do not get better as expected. Where can you learn more? Go to http://gurjit-flaco.info/. Enter D279 in the search box to learn more about \"Cough: Care Instructions. \"  Current as of: May 12, 2017  Content Version: 11.4  © 2006-2017 [employer]. Care instructions adapted under license by [employer] (which disclaims liability or warranty for this information). If you have questions about a medical condition or this instruction, always ask your healthcare professional. [place] [address] any warranty or liability for your use of this information. Upper Respiratory Infection (Cold): Care Instructions  Your Care Instructions    An upper respiratory infection, or URI, is an infection of the nose, sinuses, or throat. URIs are spread by coughs, sneezes, and direct contact. The common cold is the most frequent kind of URI. The flu and sinus infections are other kinds of URIs. Almost all URIs are caused by viruses. Antibiotics won't cure them. But you can treat most infections with home care. This may include drinking lots of fluids and taking over-the-counter pain medicine. You will probably feel better in 4 to 10 days. The doctor has checked you carefully, but problems can develop later. If you notice any problems or new symptoms, get medical treatment right away. Follow-up care is a key part of your treatment and safety. Be sure to make and go to all appointments, and call your doctor if you are having problems. It's also a good idea to know your test results and keep a list of the medicines you take. How can you care for yourself at home? · To prevent dehydration, drink plenty of fluids, enough so that your urine is light yellow or clear like water. Choose water and other caffeine-free clear liquids until you feel better.  If you have kidney, heart, or liver disease and have to limit fluids, talk with your doctor before you increase the amount of fluids you drink. · Take an over-the-counter pain medicine, such as acetaminophen (Tylenol), ibuprofen (Advil, Motrin), or naproxen (Aleve). Read and follow all instructions on the label. · Before you use cough and cold medicines, check the label. These medicines may not be safe for young children or for people with certain health problems. · Be careful when taking over-the-counter cold or flu medicines and Tylenol at the same time. Many of these medicines have acetaminophen, which is Tylenol. Read the labels to make sure that you are not taking more than the recommended dose. Too much acetaminophen (Tylenol) can be harmful. · Get plenty of rest.  · Do not smoke or allow others to smoke around you. If you need help quitting, talk to your doctor about stop-smoking programs and medicines. These can increase your chances of quitting for good. When should you call for help? Call 911 anytime you think you may need emergency care. For example, call if:  ? · You have severe trouble breathing. ?Call your doctor now or seek immediate medical care if:  ? · You seem to be getting much sicker. ? · You have new or worse trouble breathing. ? · You have a new or higher fever. ? · You have a new rash. ? Watch closely for changes in your health, and be sure to contact your doctor if:  ? · You have a new symptom, such as a sore throat, an earache, or sinus pain. ? · You cough more deeply or more often, especially if you notice more mucus or a change in the color of your mucus. ? · You do not get better as expected. Where can you learn more? Go to http://gurjit-flaco.info/. Enter E099 in the search box to learn more about \"Upper Respiratory Infection (Cold): Care Instructions. \"  Current as of: May 12, 2017  Content Version: 11.4  © 2932-3656 Healthwise, [facility].  Care instructions adapted under license by [employer] (which disclaims liability or warranty for this information). If you have questions about a medical condition or this instruction, always ask your healthcare professional. [address] any warranty or liability for your use of this information.

## 2020-12-08 NOTE — TELEPHONE ENCOUNTER
Saw Dr. Zev Mchugh, Endocrinologist.  He is going to stop the Plavix on Friday, the 11th. Procedure is the 18th at [facility]. E;'s main. Going to put a stent in. Just wanted to let you know.

## 2020-12-08 NOTE — TELEPHONE ENCOUNTER
Lester Calderon MD  You; Jessica Carrasco 5 hours ago (11:30 AM)       Please make sure that the aspirin does not get stopped and that the Plavix is resumed as soon as possible after surgery    Message text          I Faxed DR Emmaline Kawasaki your request DR David Whitney  Next called Svitlana Humphrey wife Jennifer Serrano with the advise of DR David Whitney for ASA and Plavix  She verbalized understanding      Luis Alberto Richardson RN

## 2020-12-09 ENCOUNTER — TELEPHONE (OUTPATIENT)
Dept: SURGERY | Age: 80
End: 2020-12-09

## 2020-12-09 NOTE — TELEPHONE ENCOUNTER
Message received from Dr. Anika Quintanilla regarding patient holding Plavix for procedure. Patient is able to hold Plavix for 7 days prior to procedure scheduled for 12.18.2020 with Dr. Sara Ely and he will remain on his ASA. Call placed to patient to discuss. Patients wife provided with the above instructions and verbalized understanding.   Electronically signed by Dion Kowalski on 12/9/20 at 11:26 AM EST

## 2020-12-10 NOTE — PROGRESS NOTES
Patients wife notified of covid testing at [facility] site on 12/11/20. Patient asked to bring ID and to self quarantine until day of procedure.

## 2020-12-11 ENCOUNTER — HOSPITAL ENCOUNTER (OUTPATIENT)
Age: 80
Discharge: HOME | End: 2020-12-13
Payer: MEDICARE

## 2020-12-11 PROCEDURE — U0003 INFECTIOUS AGENT DETECTION BY NUCLEIC ACID (DNA OR RNA); SEVERE ACUTE RESPIRATORY SYNDROME CORONAVIRUS 2 (SARS-COV-2) (CORONAVIRUS DISEASE [COVID-19]), AMPLIFIED PROBE TECHNIQUE, MAKING USE OF HIGH THROUGHPUT TECHNOLOGIES AS DESCRIBED BY CMS-2020-01-R: HCPCS

## 2020-12-12 LAB
SARS-COV-2: NOT DETECTED
SOURCE: NORMAL

## 2020-12-15 DIAGNOSIS — Z01.812 PRE-PROCEDURE LAB EXAM: Primary | ICD-10-CM

## 2020-12-15 NOTE — PROGRESS NOTES
[address] PRE-ADMISSION TESTING GENERAL INSTRUCTIONS- [facility]-phone number:[phone]    GENERAL INSTRUCTIONS  [x] Antibacterial Soap shower Night before and/or AM of Surgery  [] Ready Prep CHG wipe instruction sheet and wipes given. [x] Nothing by mouth after midnight, including gum, candy, mints, or water. [x] You may brush your teeth, gargle, but do NOT swallow water. []Hibiclens shower  the night before and the morning of surgery. Do not use             Hibiclens on your face or head. [x]No smoking, chewing tobacco, illegal drugs, or alcohol within 24 hours of your surgery. [x] Jewelry, valuables or body piercing's should not be brought to the hospital. All body and/or tongue piercing's must be removed prior to arriving to hospital.  ALL hair pins must be removed. [x] Do not wear makeup, lotions, powders, deodorant. Nail polish as directed by the nurse. [x] Arrange transportation with a responsible adult  to and from the hospital. If you do not have a responsible adult  to transport you, you will need to make arrangements with a medical transportation company (i.e. [facility]. A Uber/taxi/bus is not appropriate unless you are accompanied by a responsible adult ). Arrange for someone to be with you for the remainder of the day and for 24 hours after your procedure due to having had anesthesia. Who will be your  for transportation? Tee nelson, son  Who will be staying with you for 24 hrs after your procedure? Lexie Broad, spouse  [x] Bring insurance card and photo ID.  [] Transfusion Bracelet: Please bring with you to hospital, day of surgery  [] Bring urine specimen day of surgery. Any small container is acceptable. [] Use inhalers the morning of surgery and bring with you to hospital.  [] Bring copy of living will or healthcare power of  papers to be placed in your electronic record. [] CPAP/BI-PAP: Please bring your machine if you are to spend the night in the hospital.     PARKING INSTRUCTIONS:   [x] Arrival Time: 7 am Park on [address], enter the main entrance. One person may accompany you. Wear a mask. [x] To reach the [facility] from [address], upon entering the hospital, take elevator B to the 3rd floor. Check in with the . A parking token will be provided if needed. EDUCATION INSTRUCTIONS:      [] Knee or hip replacement booklet & exercise pamphlets given. [] [address] placed in chart. [] Pre-admission Testing educational folder given  [] Incentive Spirometry,coughing & deep breathing exercises reviewed. []Medication information sheet(s)   []Fluoroscopy-Xray used in surgery reviewed with patient. Educational pamphlet placed in chart. [x]Pain: Post-op pain is normal and to be expected. You will be asked to rate your pain from 0-10 (a zero is not acceptable-education is needed). Your post-op pain goal is:   [] Ask your nurse for your pain medication. [] Joint camp offered. [] Joint replacement booklets given. [x] Other:  Wear loose comfortable clothing  MEDICATION INSTRUCTIONS:  [x]Bring a complete list of your medications, please write the last time you took the medicine, give this list to the nurse. [x] Take the following medications the morning of surgery with 1-2 ounces of water: Refer to list  [] Stop herbal supplements and vitamins 5 days before your surgery. [] DO NOT take any diabetic medicine the morning of surgery. Follow instructions for insulin the day before surgery. [] If you are diabetic and your blood sugar is low or you feel symptomatic, you may drink 1-2 ounces of apple juice or take a glucose tablet. The morning of your procedure, you may call the pre-op area if you have concerns about your blood sugar [phone]. [] Use your inhalers the morning of surgery. Bring your emergency inhaler with you day of surgery. [x] Follow physician instructions regarding any blood thinners you may be taking. WHAT TO EXPECT:  [x] The day of surgery you will be greeted and checked in by the [facility]. Please bring your photo ID and insurance card. A nurse will greet you in accordance to the time you are needed in the pre-op area to prepare you for surgery. Please do not be discouraged if you are not greeted in the order you arrive as there are many variables that are involved in patient preparation. Your patience is greatly appreciated as you wait for your nurse. Please bring in items such as: books, magazines, newspapers, electronics, or any other items  to occupy your time in the waiting area. [x]  Delays may occur with surgery and staff will make a sincere effort to keep you informed of delays. If any delays occur with your procedure, we apologize ahead of time for your inconvenience as we recognize the value of your time.

## 2020-12-18 ENCOUNTER — ANESTHESIA (OUTPATIENT)
Dept: ENDOSCOPY | Age: 80
End: 2020-12-18
Payer: MEDICARE

## 2020-12-18 ENCOUNTER — HOSPITAL ENCOUNTER (OUTPATIENT)
Age: 80
Setting detail: OUTPATIENT SURGERY
Discharge: HOME | End: 2020-12-18
Attending: SURGERY | Admitting: SURGERY
Payer: MEDICARE

## 2020-12-18 ENCOUNTER — APPOINTMENT (OUTPATIENT)
Dept: GENERAL RADIOLOGY | Age: 80
End: 2020-12-18
Attending: SURGERY
Payer: MEDICARE

## 2020-12-18 ENCOUNTER — ANESTHESIA EVENT (OUTPATIENT)
Dept: ENDOSCOPY | Age: 80
End: 2020-12-18
Payer: MEDICARE

## 2020-12-18 VITALS
HEIGHT: 72 IN | OXYGEN SATURATION: 97 % | TEMPERATURE: 98.1 F | BODY MASS INDEX: 27.77 KG/M2 | SYSTOLIC BLOOD PRESSURE: 141 MMHG | DIASTOLIC BLOOD PRESSURE: 70 MMHG | RESPIRATION RATE: 18 BRPM | HEART RATE: 78 BPM | WEIGHT: 205 LBS

## 2020-12-18 VITALS — SYSTOLIC BLOOD PRESSURE: 164 MMHG | OXYGEN SATURATION: 96 % | DIASTOLIC BLOOD PRESSURE: 94 MMHG

## 2020-12-18 PROCEDURE — 3609014300 HC ERCP BALLOON DILATE BILIARY/PANC DUCT/AMPULLA EA: Performed by: SURGERY

## 2020-12-18 PROCEDURE — 3700000000 HC ANESTHESIA ATTENDED CARE: Performed by: SURGERY

## 2020-12-18 PROCEDURE — 43274 ERCP DUCT STENT PLACEMENT: CPT | Performed by: SURGERY

## 2020-12-18 PROCEDURE — 3609018800 HC ERCP DX COLLECTION SPECIMEN BRUSHING/WASHING: Performed by: SURGERY

## 2020-12-18 PROCEDURE — 7100000010 HC PHASE II RECOVERY - FIRST 15 MIN: Performed by: SURGERY

## 2020-12-18 PROCEDURE — 6360000002 HC RX W HCPCS: Performed by: NURSE ANESTHETIST, CERTIFIED REGISTERED

## 2020-12-18 PROCEDURE — 2720000010 HC SURG SUPPLY STERILE: Performed by: SURGERY

## 2020-12-18 PROCEDURE — 7100000011 HC PHASE II RECOVERY - ADDTL 15 MIN: Performed by: SURGERY

## 2020-12-18 PROCEDURE — C1769 GUIDE WIRE: HCPCS | Performed by: SURGERY

## 2020-12-18 PROCEDURE — 2500000003 HC RX 250 WO HCPCS: Performed by: NURSE ANESTHETIST, CERTIFIED REGISTERED

## 2020-12-18 PROCEDURE — 2580000003 HC RX 258: Performed by: NURSE ANESTHETIST, CERTIFIED REGISTERED

## 2020-12-18 PROCEDURE — 2709999900 HC NON-CHARGEABLE SUPPLY: Performed by: SURGERY

## 2020-12-18 PROCEDURE — 3609015100 HC ERCP STENT PLACEMENT BILIARY/PANCREATIC DUCT: Performed by: SURGERY

## 2020-12-18 PROCEDURE — 3700000001 HC ADD 15 MINUTES (ANESTHESIA): Performed by: SURGERY

## 2020-12-18 PROCEDURE — C1874 STENT, COATED/COV W/DEL SYS: HCPCS | Performed by: SURGERY

## 2020-12-18 PROCEDURE — 3209999900 FLUORO FOR SURGICAL PROCEDURES

## 2020-12-18 DEVICE — STENT SYSTEM RMV
Type: IMPLANTABLE DEVICE | Site: BILE DUCT | Status: FUNCTIONAL
Brand: WALLFLEX BILIARY

## 2020-12-18 RX ORDER — MIDAZOLAM HYDROCHLORIDE 1 MG/ML
INJECTION INTRAMUSCULAR; INTRAVENOUS PRN
Status: DISCONTINUED | OUTPATIENT
Start: 2020-12-18 | End: 2020-12-18 | Stop reason: SDUPTHER

## 2020-12-18 RX ORDER — KETAMINE HCL IN NACL, ISO-OSM 100MG/10ML
SYRINGE (ML) INJECTION PRN
Status: DISCONTINUED | OUTPATIENT
Start: 2020-12-18 | End: 2020-12-18 | Stop reason: SDUPTHER

## 2020-12-18 RX ORDER — CIPROFLOXACIN 2 MG/ML
400 INJECTION, SOLUTION INTRAVENOUS ONCE
Status: DISCONTINUED | OUTPATIENT
Start: 2020-12-18 | End: 2020-12-18 | Stop reason: HOSPADM

## 2020-12-18 RX ORDER — LIDOCAINE HYDROCHLORIDE 20 MG/ML
INJECTION, SOLUTION INTRAVENOUS PRN
Status: DISCONTINUED | OUTPATIENT
Start: 2020-12-18 | End: 2020-12-18 | Stop reason: SDUPTHER

## 2020-12-18 RX ORDER — HYDROCODONE BITARTRATE AND ACETAMINOPHEN 5; 325 MG/1; MG/1
1 TABLET ORAL
Status: DISCONTINUED | OUTPATIENT
Start: 2020-12-18 | End: 2020-12-18 | Stop reason: HOSPADM

## 2020-12-18 RX ORDER — CIPROFLOXACIN 2 MG/ML
INJECTION, SOLUTION INTRAVENOUS PRN
Status: DISCONTINUED | OUTPATIENT
Start: 2020-12-18 | End: 2020-12-18 | Stop reason: SDUPTHER

## 2020-12-18 RX ORDER — SODIUM CHLORIDE 0.9 % (FLUSH) 0.9 %
10 SYRINGE (ML) INJECTION PRN
Status: DISCONTINUED | OUTPATIENT
Start: 2020-12-18 | End: 2020-12-18 | Stop reason: HOSPADM

## 2020-12-18 RX ORDER — SODIUM CHLORIDE 0.9 % (FLUSH) 0.9 %
10 SYRINGE (ML) INJECTION EVERY 12 HOURS SCHEDULED
Status: DISCONTINUED | OUTPATIENT
Start: 2020-12-18 | End: 2020-12-18 | Stop reason: HOSPADM

## 2020-12-18 RX ORDER — PROPOFOL 10 MG/ML
INJECTION, EMULSION INTRAVENOUS CONTINUOUS PRN
Status: DISCONTINUED | OUTPATIENT
Start: 2020-12-18 | End: 2020-12-18 | Stop reason: SDUPTHER

## 2020-12-18 RX ORDER — SODIUM CHLORIDE 9 MG/ML
INJECTION, SOLUTION INTRAVENOUS CONTINUOUS PRN
Status: DISCONTINUED | OUTPATIENT
Start: 2020-12-18 | End: 2020-12-18 | Stop reason: SDUPTHER

## 2020-12-18 RX ADMIN — PROPOFOL 75 MCG/KG/MIN: 10 INJECTION, EMULSION INTRAVENOUS at 09:35

## 2020-12-18 RX ADMIN — MIDAZOLAM 1 MG: 1 INJECTION INTRAMUSCULAR; INTRAVENOUS at 09:30

## 2020-12-18 RX ADMIN — MIDAZOLAM 1 MG: 1 INJECTION INTRAMUSCULAR; INTRAVENOUS at 09:24

## 2020-12-18 RX ADMIN — SODIUM CHLORIDE: 9 INJECTION, SOLUTION INTRAVENOUS at 09:24

## 2020-12-18 RX ADMIN — Medication 20 MG: at 09:35

## 2020-12-18 RX ADMIN — LIDOCAINE HYDROCHLORIDE 10 MG: 20 INJECTION, SOLUTION INTRAVENOUS at 09:35

## 2020-12-18 RX ADMIN — CIPROFLOXACIN 400 MG: 2 INJECTION, SOLUTION INTRAVENOUS at 09:24

## 2020-12-18 ASSESSMENT — PAIN SCALES - GENERAL
PAINLEVEL_OUTOF10: 0

## 2020-12-18 ASSESSMENT — PAIN - FUNCTIONAL ASSESSMENT: PAIN_FUNCTIONAL_ASSESSMENT: 0-10

## 2020-12-18 NOTE — ANESTHESIA PRE PROCEDURE
Department of Anesthesiology  Preprocedure Note       Name:  Tim Stroud   Age:  79 y.o.  :  1940                                          MRN:  [id number]         Date:  2020      Surgeon: Agatha Reyna):  Doreen Foss MD    Procedure: Procedure(s):  ERCP    Medications prior to admission:   Prior to Admission medications    Medication Sig Start Date End Date Taking? Authorizing Provider   amLODIPine (NORVASC) 10 MG tablet Take 1 tablet by mouth daily 20  Yes Jeyson Gill MD   levothyroxine (SYNTHROID) 137 MCG tablet Take 1 tablet by mouth Daily 20  Yes Alyson Evans MD   aspirin 81 MG chewable tablet Take 1 tablet by mouth daily 20  Yes Emil Heath DO   atorvastatin (LIPITOR) 40 MG tablet Take 40 mg by mouth daily   Yes Historical Provider, MD   acetaminophen (TYLENOL) 500 MG tablet Take 500 mg by mouth every 6 hours as needed for Pain   Yes Historical Provider, MD   clopidogrel (PLAVIX) 75 MG tablet Take 1 tablet by mouth daily 20   Emil Heath DO       Current medications:    No current facility-administered medications for this encounter.       Current Outpatient Medications   Medication Sig Dispense Refill    amLODIPine (NORVASC) 10 MG tablet Take 1 tablet by mouth daily 90 tablet 3    levothyroxine (SYNTHROID) 137 MCG tablet Take 1 tablet by mouth Daily 30 tablet 3    aspirin 81 MG chewable tablet Take 1 tablet by mouth daily 30 tablet 3    atorvastatin (LIPITOR) 40 MG tablet Take 40 mg by mouth daily      acetaminophen (TYLENOL) 500 MG tablet Take 500 mg by mouth every 6 hours as needed for Pain      clopidogrel (PLAVIX) 75 MG tablet Take 1 tablet by mouth daily 30 tablet 11       Allergies:  No Known Allergies    Problem List:    Patient Active Problem List   Diagnosis Code    Moderate protein-calorie malnutrition ([facility] E44.0    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    Acute cholecystitis K81.0    HLD (hyperlipidemia) E78.5  HTN (hypertension) I10    Hypothyroidism E03.9    Former smoker Z87.891    History of prostate cancer Z85.46    Leukocytosis D72.829    History of colon cancer Z80.26    S[facility]P colostomy ([facility] Z93.3    CAD (coronary artery disease) I25.10    Cholangitis K83.09    History of MI (myocardial infarction) I25.2    Choledocholithiasis K80.50       Past Medical History:        Diagnosis Date    CAD (coronary artery disease)     Cancer ([facility]     prostate    Hyperlipidemia     Malignant neoplasm of splenic flexure ([facility] 10/21/2019    Thyroid disease        Past Surgical History:        Procedure Laterality Date    CARDIAC CATHETERIZATION  09/21/2020    Dr. Shemar Mason, No LV.   Staged PCI to LAD    CARDIAC CATHETERIZATION  09/25/2020    LAD PCI- Dr Sonido Holloway  8/28/2020    COLONOSCOPY DIAGNOSTIC performed by Mi Navas MD at [place] [address] CT PTC NEW ACCESS  9/22/2020    CT PTC NEW ACCESS 9/22/2020 Regina Olivas MD [facility] CT    ERCP N/A 11/6/2020    ERCP STENT INSERTION performed by Rose Pritchett MD at [address] ERCP N/A 11/6/2020    ERCP STONE REMOVAL performed by Rose Pritchett MD at [address] ERCP N/A 11/6/2020    ERCP SPHINCTER/PAPILLOTOMY performed by Rose Pritchett MD at [address] [place] [address] N/A 10/13/2019    LAPAROTOMY EXPLORATORY, LARGE BOWEL RESECTION, CREATION OF COLOSTOMY performed by Mi Navas MD at [address] [place] [address] N/A 10/22/2019    PORT INSERTION performed by Paulino Calderon MD at [address] [place] [address] History:    Social History     Tobacco Use    Smoking status: Former Smoker     Packs/day: 1.00     Years: 15.00     Pack years: 15.00     Types: Cigarettes    Smokeless tobacco: Never Used    Tobacco comment: quit age 45   Substance Use Topics    Alcohol use: Yes     Frequency: 2-3 times a week     Comment: occ beer Counseling given: Not Answered  Comment: quit age 45      Vital Signs (Current):   Vitals:    12/15/20 1419   Weight: 205 lb (93 kg)   Height: 6' (1.829 m)                                              BP Readings from Last 3 Encounters:   12/08/20 126/67   12/03/20 (!) 164/87   11/16/20 (!) 138/58       NPO Status:                                                                                 BMI:   Wt Readings from Last 3 Encounters:   12/08/20 205 lb (93 kg)   12/03/20 205 lb (93 kg)   11/16/20 201 lb 6.4 oz (91.4 kg)     Body mass index is 27.8 kg/m². CBC:   Lab Results   Component Value Date    WBC 7.7 11/07/2020    RBC 4.09 11/07/2020    HGB 12.5 11/07/2020    HCT 36.0 11/07/2020    MCV 88.0 11/07/2020    RDW 13.0 11/07/2020     11/07/2020       CMP:   Lab Results   Component Value Date     11/07/2020    K 4.1 11/07/2020    K 4.0 11/03/2020     11/07/2020    CO2 25 11/07/2020    BUN 4 11/07/2020    CREATININE 0.8 11/07/2020    GFRAA >60 11/07/2020    LABGLOM >60 11/07/2020    GLUCOSE 109 11/07/2020    PROT 7.0 11/07/2020    CALCIUM 9.3 11/07/2020    BILITOT 1.3 11/07/2020    ALKPHOS 238 11/07/2020    AST 82 11/07/2020     11/07/2020       POC Tests: No results for input(s): POCGLU, POCNA, POCK, POCCL, POCBUN, POCHEMO, POCHCT in the last 72 hours.     Coags:   Lab Results   Component Value Date    PROTIME 12.4 11/04/2020    INR 1.1 11/04/2020    APTT 40.7 09/26/2020       HCG (If Applicable): No results found for: PREGTESTUR, PREGSERUM, HCG, HCGQUANT     ABGs: No results found for: PHART, PO2ART, SXS1XEG, LTZ7ASW, BEART, L4NEIHDG     Type & Screen (If Applicable):  No results found for: LABABO, LABRH    Drug/Infectious Status (If Applicable):  No results found for: HIV, HEPCAB    COVID-19 Screening (If Applicable):   Lab Results   Component Value Date    COVID19 Not Detected 12/11/2020    COVID19 Not Detected 11/27/2020     Sinus bradycardia Otherwise normal ECG  When compared with ECG of 25-SEP-2020 07:00,  No significant change was found  Confirmed by Risa Navas ([id number]) on 11/6/2020 6:54:08 AM      Conclusions:   1. Severe calcific disease in the proximal to mid LAD approaching   95% stenosis with MOSES-3 flow.  Additionally there is a possible   moderate to severe focal lesion in the distal LAD just distal to   the second diagonal in a tortuous segment. a. PCI today deferred due to active cholecystitis with fevers   2. Otherwise mild nonobstructive epicardial CAD   3. Normal left filling pressure     PLAN:   1. Discussed with Dr. Julian Thurston of general surgery:   a. Due to ongoing fever and right upper quadrant pain and   critical proximal mid LAD disease he will be referred to IR for   percutaneous cholecystostomy under moderate sedation tomorrow or   Wednesday   b. Start Plavix after the cholecystostomy.  Per Dr. Julian Thurston surgery   can be performed on dual antiplatelet therapy. c. Return to the Cath Lab on Friday for PCI of the proximal to   mid LAD via radial access with rotational atherectomy   2. In the meantime goal blood pressure [id number] systolic   3. We will continue to follow pending PCI     Gilbert Jiang MD   Interventional Cardiology/Structural Heart Disease   Cell: [phone]     1. Lesion location: Proximal to mid LAD; stenosis 95%; MOSES flow   3. The lesion was crossed with a Rotafloppy wire.  Primary   rotational atherectomy was performed using 1.5 mm giorgi at 160 K   RPM for 3 passes.  This was followed by balloon angioplasty using   a 3.0 mm balloon which expanded adequately.  Stenting was   performed using 2.5 x 28 and 3.5 x 38 overlapping Xience Bianca   MIRELA from distal to proximal. Post-dilation was performed with 3.0   NC and 4.0 NC from distal to proximal at high pressure. IVUS was   performed and revealed minimum CSA 7.5 mm² inside the stent and   7.7 mm² proximal to the stent.  There was loss of flow into a small diagonal that was severely diseased at baseline; this was   not amenable to PCI; the patient had no symptoms related to that. Residual angiographic stenosis 0%; post-PCI MOSES flow 3. Hemodynamics: Ao: 148/62 with a mean of 95     Hemostasis:   Transradial band was applied for patent arterial hemostasis. Complications: None   Estimated blood loss: 20 mL   Contrast used: 200 mL   Air kerma: 1373 mGy     Conclusions:   1. Successful PCI of the proximal to mid LAD with 2 overlapping   MIRELA facilitated by rotational atherectomy and guided by IVUS     PLAN:   1. Aspirin 81 mg p.o. daily indefinitely   2. Clopidogrel 75 mg p.o. daily for at least 6 months and   preferably long-term   3. Continue atorvastatin 40 mg daily   4. Continue Toprol-XL 50 mg p.o. daily   5. Inpatient then outpatient cardiac rehab   6. Okay for discharge from cardiology standpoint tomorrow morning   provided no acute events overnight   7. Follow-up with Dr. Ad Berger in the office in 4 weeks    Anesthesia Evaluation  Patient summary reviewed and Nursing notes reviewed no history of anesthetic complications:   Airway: Mallampati: III  TM distance: >3 FB   Neck ROM: full  Mouth opening: > = 3 FB Dental:          Pulmonary: breath sounds clear to auscultation  (+) decreased breath sounds,                            ROS comment: Hx. Of tobacco abuse w/o dx. Of COPD or need of medical therapy   Cardiovascular:  Exercise tolerance: good (>4 METS),   (+) hypertension: moderate, past MI: no interval change and 1-6 months, CAD: obstructive, hyperlipidemia      ECG reviewed  Rhythm: regular  Rate: normal    Stress test reviewed       Beta Blocker:  Not on Beta Blocker      ROS comment: CAD s/p cardiac catheterization on 9/15/20 with overlapping Xience MIRELA to the 95% LAD lesion on DAPT with Plavix and ASA. LD Plavix 7 days ago. LD ASA yesterday. Denies anginal symptoms or exertional dyspnea and is at baseline.      Neuro/Psych:

## 2020-12-18 NOTE — PROGRESS NOTES
Admitted to Same Day Surgery. Preop instructions given to patient. COVID testing completed on: 12/11/20   Results of the test: not detected  The patient verbally confirms that they did adhere to the self-quarantine guidelines. No signs or symptoms expressed or observed.

## 2020-12-18 NOTE — ANESTHESIA POSTPROCEDURE EVALUATION
Department of Anesthesiology  Postprocedure Note    Patient: Wilda Brantley  MRN: [id number]  YOB: 1940  Date of evaluation: 12/18/2020  Time:  10:44 AM     Procedure Summary     Date: 12/18/20 Room / Location: [facility] / [facility]    Anesthesia Start: 5252 Anesthesia Stop: 4867    Procedures:       ERCP (N/A )      ERCP DILATION BALLOON (N/A )      ERCP STENT INSERTION (N/A ) Diagnosis: (CHOLELITHIASIS)    Surgeons: Rose Pritchett MD Responsible Provider: Luis F Blood DO    Anesthesia Type: MAC ASA Status: 3          Anesthesia Type: MAC    Eloina Phase I: Eloina Score: 10    Eloina Phase II: Eloina Score: 10    Last vitals: Reviewed and per EMR flowsheets.        Anesthesia Post Evaluation    Patient location during evaluation: bedside  Patient participation: complete - patient participated  Level of consciousness: awake  Pain score: 2  Airway patency: patent  Nausea & Vomiting: no vomiting and no nausea  Complications: no  Cardiovascular status: hemodynamically stable  Respiratory status: acceptable  Hydration status: stable

## 2020-12-18 NOTE — OP NOTE
SURGEON: Kierra Brown MD        PREOPERATIVE DIAGNOSIS:  Cholecystitis, history of choledocholithiasis      POSTOPERATIVE DIAGNOSES:  1. Same      OPERATION:  1. ERCP stent placement      ANESTHESIA:  LMAC       ESTIMATED BLOOD LOSS: Minimal.         INDICATION: This is a  61-year-old male with radiologic evidence of choledocholithiasis and abdominal pain. He has a history of recent cholecystitis status post IR cholecystostomy tube. He was not a candidate for cholecystectomy has he had a recent myocardial infarction. He had a cholecystostomy tube check last week that revealed that the common bile duct stent had dislodged. The patient agreed to undergo the ERCP. The risks, benefits, and alternatives were explained to the patient for the procedure including bleeding, infection, perforation, and pancreatitis. The patient understood and agreed to proceed. DESCRIPTION OF PROCEDURE: The patient was brought to the operating room and he underwent  [facility] anesthesia by the anesthesia team.   He was then placed in the supine position. The flexible duodenoscope was inserted down  the oropharynx and passed down the esophagus into the stomach and into the  duodenum. The papilla was identified. Next, using a sphincterotome with a guidewire, cannulation was achieved of the common bile duct. Contrast injection revealed adequate cannulation of the common bile duct. A 9-12 balloon was then passed into the common bile duct and multiple sweeps were made. There was no debris or stone in the common bile duct. 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 There was opacification of the proximal cystic duct however there was no contrast into the gallbladder. The cholecystostomy tube was in place. A 10 mm x 60 mm fully covered wall flex stent was then advanced into the common bile duct. There was some mild difficulty advancing the stent through the mid common bile duct in the area of mild stricture however this was able to be accomplished. There was good bile flow and no other abnormality encountered. The scope was then withdrawn. The patient tolerated the procedure well.       Dallin Cortés MD

## 2020-12-21 ENCOUNTER — HOSPITAL ENCOUNTER (OUTPATIENT)
Age: 80
Discharge: HOME | DRG: 390 | End: 2020-12-23
Payer: MEDICARE

## 2020-12-21 PROCEDURE — U0003 INFECTIOUS AGENT DETECTION BY NUCLEIC ACID (DNA OR RNA); SEVERE ACUTE RESPIRATORY SYNDROME CORONAVIRUS 2 (SARS-COV-2) (CORONAVIRUS DISEASE [COVID-19]), AMPLIFIED PROBE TECHNIQUE, MAKING USE OF HIGH THROUGHPUT TECHNOLOGIES AS DESCRIBED BY CMS-2020-01-R: HCPCS

## 2020-12-23 ENCOUNTER — APPOINTMENT (OUTPATIENT)
Dept: GENERAL RADIOLOGY | Age: 80
DRG: 390 | End: 2020-12-23
Payer: MEDICARE

## 2020-12-23 ENCOUNTER — APPOINTMENT (OUTPATIENT)
Dept: CT IMAGING | Age: 80
DRG: 390 | End: 2020-12-23
Payer: MEDICARE

## 2020-12-23 ENCOUNTER — HOSPITAL ENCOUNTER (INPATIENT)
Age: 80
LOS: 1 days | Discharge: HOME | DRG: 390 | End: 2020-12-24
Attending: EMERGENCY MEDICINE | Admitting: INTERNAL MEDICINE
Payer: MEDICARE

## 2020-12-23 PROBLEM — K56.609 SBO (SMALL BOWEL OBSTRUCTION) (HCC): Status: ACTIVE | Noted: 2020-12-23

## 2020-12-23 LAB
ALBUMIN SERPL-MCNC: 4.6 G/DL (ref 3.5–5.2)
ALP BLD-CCNC: 128 U/L (ref 40–129)
ALT SERPL-CCNC: 14 U/L (ref 0–40)
ANION GAP SERPL CALCULATED.3IONS-SCNC: 11 MMOL/L (ref 7–16)
AST SERPL-CCNC: 19 U/L (ref 0–39)
BASOPHILS ABSOLUTE: 0.05 E9/L (ref 0–0.2)
BASOPHILS RELATIVE PERCENT: 0.4 % (ref 0–2)
BILIRUB SERPL-MCNC: 0.6 MG/DL (ref 0–1.2)
BILIRUBIN DIRECT: <0.2 MG/DL (ref 0–0.3)
BILIRUBIN, INDIRECT: NORMAL MG/DL (ref 0–1)
BUN BLDV-MCNC: 9 MG/DL (ref 8–23)
CALCIUM SERPL-MCNC: 9.7 MG/DL (ref 8.6–10.2)
CHLORIDE BLD-SCNC: 104 MMOL/L (ref 98–107)
CO2: 25 MMOL/L (ref 22–29)
CREAT SERPL-MCNC: 0.9 MG/DL (ref 0.7–1.2)
EOSINOPHILS ABSOLUTE: 0.08 E9/L (ref 0.05–0.5)
EOSINOPHILS RELATIVE PERCENT: 0.6 % (ref 0–6)
GFR AFRICAN AMERICAN: >60
GFR NON-AFRICAN AMERICAN: >60 ML/MIN/1.73
GLUCOSE BLD-MCNC: 163 MG/DL (ref 74–99)
HCT VFR BLD CALC: 43 % (ref 37–54)
HEMOGLOBIN: 14.9 G/DL (ref 12.5–16.5)
IMMATURE GRANULOCYTES #: 0.06 E9/L
IMMATURE GRANULOCYTES %: 0.5 % (ref 0–5)
LACTIC ACID: 2.3 MMOL/L (ref 0.5–2.2)
LIPASE: 29 U/L (ref 13–60)
LYMPHOCYTES ABSOLUTE: 1.61 E9/L (ref 1.5–4)
LYMPHOCYTES RELATIVE PERCENT: 12.6 % (ref 20–42)
MAGNESIUM: 2.1 MG/DL (ref 1.6–2.6)
MCH RBC QN AUTO: 30.2 PG (ref 26–35)
MCHC RBC AUTO-ENTMCNC: 34.7 % (ref 32–34.5)
MCV RBC AUTO: 87 FL (ref 80–99.9)
MONOCYTES ABSOLUTE: 0.58 E9/L (ref 0.1–0.95)
MONOCYTES RELATIVE PERCENT: 4.5 % (ref 2–12)
NEUTROPHILS ABSOLUTE: 10.37 E9/L (ref 1.8–7.3)
NEUTROPHILS RELATIVE PERCENT: 81.4 % (ref 43–80)
PDW BLD-RTO: 12.6 FL (ref 11.5–15)
PLATELET # BLD: 276 E9/L (ref 130–450)
PMV BLD AUTO: 9.2 FL (ref 7–12)
POTASSIUM SERPL-SCNC: 4 MMOL/L (ref 3.5–5)
RBC # BLD: 4.94 E12/L (ref 3.8–5.8)
SARS-COV-2: NOT DETECTED
SODIUM BLD-SCNC: 140 MMOL/L (ref 132–146)
SOURCE: NORMAL
TOTAL PROTEIN: 7.8 G/DL (ref 6.4–8.3)
TROPONIN: <0.01 NG/ML (ref 0–0.03)
WBC # BLD: 12.8 E9/L (ref 4.5–11.5)

## 2020-12-23 PROCEDURE — 84484 ASSAY OF TROPONIN QUANT: CPT

## 2020-12-23 PROCEDURE — 1200000000 HC SEMI PRIVATE

## 2020-12-23 PROCEDURE — 93005 ELECTROCARDIOGRAM TRACING: CPT | Performed by: EMERGENCY MEDICINE

## 2020-12-23 PROCEDURE — 83735 ASSAY OF MAGNESIUM: CPT

## 2020-12-23 PROCEDURE — 6360000002 HC RX W HCPCS: Performed by: EMERGENCY MEDICINE

## 2020-12-23 PROCEDURE — 6370000000 HC RX 637 (ALT 250 FOR IP): Performed by: STUDENT IN AN ORGANIZED HEALTH CARE EDUCATION/TRAINING PROGRAM

## 2020-12-23 PROCEDURE — 80048 BASIC METABOLIC PNL TOTAL CA: CPT

## 2020-12-23 PROCEDURE — 71045 X-RAY EXAM CHEST 1 VIEW: CPT

## 2020-12-23 PROCEDURE — 99285 EMERGENCY DEPT VISIT HI MDM: CPT

## 2020-12-23 PROCEDURE — 96372 THER/PROPH/DIAG INJ SC/IM: CPT

## 2020-12-23 PROCEDURE — 85025 COMPLETE CBC W/AUTO DIFF WBC: CPT

## 2020-12-23 PROCEDURE — 2580000003 HC RX 258: Performed by: INTERNAL MEDICINE

## 2020-12-23 PROCEDURE — 83605 ASSAY OF LACTIC ACID: CPT

## 2020-12-23 PROCEDURE — 2500000003 HC RX 250 WO HCPCS: Performed by: EMERGENCY MEDICINE

## 2020-12-23 PROCEDURE — 6360000002 HC RX W HCPCS: Performed by: INTERNAL MEDICINE

## 2020-12-23 PROCEDURE — 2580000003 HC RX 258: Performed by: EMERGENCY MEDICINE

## 2020-12-23 PROCEDURE — 74176 CT ABD & PELVIS W/O CONTRAST: CPT

## 2020-12-23 PROCEDURE — 96374 THER/PROPH/DIAG INJ IV PUSH: CPT

## 2020-12-23 PROCEDURE — 96375 TX/PRO/DX INJ NEW DRUG ADDON: CPT

## 2020-12-23 PROCEDURE — 83690 ASSAY OF LIPASE: CPT

## 2020-12-23 PROCEDURE — 6370000000 HC RX 637 (ALT 250 FOR IP): Performed by: INTERNAL MEDICINE

## 2020-12-23 PROCEDURE — 80076 HEPATIC FUNCTION PANEL: CPT

## 2020-12-23 RX ORDER — LEVOTHYROXINE SODIUM 137 UG/1
137 TABLET ORAL DAILY
Status: DISCONTINUED | OUTPATIENT
Start: 2020-12-23 | End: 2020-12-24 | Stop reason: HOSPADM

## 2020-12-23 RX ORDER — ONDANSETRON 2 MG/ML
4 INJECTION INTRAMUSCULAR; INTRAVENOUS EVERY 6 HOURS PRN
Status: DISCONTINUED | OUTPATIENT
Start: 2020-12-23 | End: 2020-12-24 | Stop reason: HOSPADM

## 2020-12-23 RX ORDER — LACTULOSE 10 G/15ML
20 SOLUTION ORAL 3 TIMES DAILY
Status: DISCONTINUED | OUTPATIENT
Start: 2020-12-23 | End: 2020-12-24 | Stop reason: HOSPADM

## 2020-12-23 RX ORDER — ASPIRIN 81 MG/1
81 TABLET, CHEWABLE ORAL DAILY
Status: DISCONTINUED | OUTPATIENT
Start: 2020-12-23 | End: 2020-12-24 | Stop reason: HOSPADM

## 2020-12-23 RX ORDER — ACETAMINOPHEN 325 MG/1
650 TABLET ORAL EVERY 6 HOURS PRN
Status: DISCONTINUED | OUTPATIENT
Start: 2020-12-23 | End: 2020-12-24 | Stop reason: HOSPADM

## 2020-12-23 RX ORDER — ATORVASTATIN CALCIUM 40 MG/1
40 TABLET, FILM COATED ORAL NIGHTLY
Status: DISCONTINUED | OUTPATIENT
Start: 2020-12-23 | End: 2020-12-24 | Stop reason: HOSPADM

## 2020-12-23 RX ORDER — CLOPIDOGREL BISULFATE 75 MG/1
75 TABLET ORAL DAILY
Status: DISCONTINUED | OUTPATIENT
Start: 2020-12-23 | End: 2020-12-24 | Stop reason: HOSPADM

## 2020-12-23 RX ORDER — PROMETHAZINE HYDROCHLORIDE 25 MG/1
12.5 TABLET ORAL EVERY 6 HOURS PRN
Status: DISCONTINUED | OUTPATIENT
Start: 2020-12-23 | End: 2020-12-24 | Stop reason: HOSPADM

## 2020-12-23 RX ORDER — SODIUM CHLORIDE 9 MG/ML
INJECTION, SOLUTION INTRAVENOUS CONTINUOUS
Status: DISCONTINUED | OUTPATIENT
Start: 2020-12-23 | End: 2020-12-24 | Stop reason: HOSPADM

## 2020-12-23 RX ORDER — POLYETHYLENE GLYCOL 3350 17 G/17G
17 POWDER, FOR SOLUTION ORAL DAILY PRN
Status: DISCONTINUED | OUTPATIENT
Start: 2020-12-23 | End: 2020-12-24

## 2020-12-23 RX ORDER — AMLODIPINE BESYLATE 10 MG/1
10 TABLET ORAL DAILY
Status: DISCONTINUED | OUTPATIENT
Start: 2020-12-23 | End: 2020-12-24 | Stop reason: HOSPADM

## 2020-12-23 RX ORDER — SODIUM CHLORIDE 0.9 % (FLUSH) 0.9 %
10 SYRINGE (ML) INJECTION EVERY 12 HOURS SCHEDULED
Status: DISCONTINUED | OUTPATIENT
Start: 2020-12-23 | End: 2020-12-24 | Stop reason: HOSPADM

## 2020-12-23 RX ORDER — SODIUM CHLORIDE 0.9 % (FLUSH) 0.9 %
10 SYRINGE (ML) INJECTION PRN
Status: DISCONTINUED | OUTPATIENT
Start: 2020-12-23 | End: 2020-12-24 | Stop reason: HOSPADM

## 2020-12-23 RX ORDER — ACETAMINOPHEN 650 MG/1
650 SUPPOSITORY RECTAL EVERY 6 HOURS PRN
Status: DISCONTINUED | OUTPATIENT
Start: 2020-12-23 | End: 2020-12-24 | Stop reason: HOSPADM

## 2020-12-23 RX ORDER — SODIUM CHLORIDE 9 MG/ML
INJECTION, SOLUTION INTRAVENOUS CONTINUOUS
Status: DISCONTINUED | OUTPATIENT
Start: 2020-12-23 | End: 2020-12-23

## 2020-12-23 RX ORDER — METOCLOPRAMIDE HYDROCHLORIDE 5 MG/ML
5 INJECTION INTRAMUSCULAR; INTRAVENOUS ONCE
Status: COMPLETED | OUTPATIENT
Start: 2020-12-23 | End: 2020-12-23

## 2020-12-23 RX ADMIN — ATORVASTATIN CALCIUM 40 MG: 40 TABLET, FILM COATED ORAL at 20:50

## 2020-12-23 RX ADMIN — LACTULOSE 20 G: 20 SOLUTION ORAL at 18:44

## 2020-12-23 RX ADMIN — CLOPIDOGREL 75 MG: 75 TABLET, FILM COATED ORAL at 18:44

## 2020-12-23 RX ADMIN — ASPIRIN 81 MG CHEWABLE TABLET 81 MG: 81 TABLET CHEWABLE at 18:44

## 2020-12-23 RX ADMIN — FAMOTIDINE 20 MG: 10 INJECTION, SOLUTION INTRAVENOUS at 09:49

## 2020-12-23 RX ADMIN — SODIUM CHLORIDE: 9 INJECTION, SOLUTION INTRAVENOUS at 18:18

## 2020-12-23 RX ADMIN — METOCLOPRAMIDE HYDROCHLORIDE 5 MG: 5 INJECTION INTRAMUSCULAR; INTRAVENOUS at 09:49

## 2020-12-23 RX ADMIN — ENOXAPARIN SODIUM 40 MG: 40 INJECTION SUBCUTANEOUS at 18:44

## 2020-12-23 RX ADMIN — LEVOTHYROXINE SODIUM 137 MCG: 0.14 TABLET ORAL at 18:44

## 2020-12-23 RX ADMIN — AMLODIPINE BESYLATE 10 MG: 10 TABLET ORAL at 18:44

## 2020-12-23 RX ADMIN — BISACODYL 10 MG: 5 TABLET, COATED ORAL at 23:08

## 2020-12-23 RX ADMIN — SODIUM CHLORIDE: 9 INJECTION, SOLUTION INTRAVENOUS at 15:35

## 2020-12-23 ASSESSMENT — PAIN SCALES - GENERAL
PAINLEVEL_OUTOF10: 0
PAINLEVEL_OUTOF10: 1
PAINLEVEL_OUTOF10: 5

## 2020-12-23 ASSESSMENT — PAIN DESCRIPTION - LOCATION: LOCATION: ABDOMEN

## 2020-12-23 ASSESSMENT — PAIN DESCRIPTION - ORIENTATION: ORIENTATION: RIGHT;LEFT;MID

## 2020-12-23 ASSESSMENT — PAIN DESCRIPTION - DESCRIPTORS: DESCRIPTORS: PRESSURE

## 2020-12-23 ASSESSMENT — PAIN DESCRIPTION - PAIN TYPE: TYPE: ACUTE PAIN

## 2020-12-23 NOTE — ED PROVIDER NOTES
Social History:  reports that he has quit smoking. His smoking use included cigarettes. He has a 15.00 pack-year smoking history. He has never used smokeless tobacco. He reports current alcohol use. He reports that he does not use drugs. Family History: family history includes Breast Cancer in his mother; Diabetes in his father; Emphysema in his father; Virgene Puls in his father; No Known Problems in his brother. . Unless otherwise noted, family history is non contributory    The patients home medications have been reviewed. Allergies: Patient has no known allergies. ---------------------------------------------------PHYSICAL EXAM--------------------------------------    Constitutional/General: Alert and oriented x3  Head: Normocephalic and atraumatic  Eyes: PERRL, EOMI, sclera non icteric  Mouth: Oropharynx clear, handling secretions, no trismus, no asymmetry of the posterior oropharynx or uvular edema  Neck: Supple, full ROM, no stridor, no meningeal signs  Respiratory: Lungs clear to auscultation bilaterally,Not in respiratory distress  Cardiovascular:  Regular rate. Regular rhythm. 2+ distal pulses. Equal extremity pulses. Chest: No chest wall tenderness  GI:  Abdomen Soft, mild distention, tenderness in bilateral upper quadrants as well as the epigastrium. There is a biliary drain external to the right upper abdomen which is draining clear green fluid. The right lower quadrant there colostomy with parastomal herniation which patient states is unchanged from baseline  Musculoskeletal: Moves all extremities x 4. Warm and well perfused, no clubbing, cyanosis, or edema. Capillary refill <3 seconds  Integument: skin warm and dry. No rashes. Neurologic: GCS 15, no focal deficits, symmetric strength 5/5 in the upper and lower extremities bilaterally  Psychiatric: Normal Affect      EKG: Interpreted by emergency department physician, Dr. Aleksandra Cifuentes   This EKG is signed and interpreted by me. Rate: 85  Rhythm: Sinus  Interpretation: Sinus rhythm sinus arrhythmia, normal axis, ME is 156, QRS is 88, QTc is 449, no other acute findings stable compared to prior  Comparison: stable as compared to patient's most recent EKG      -------------------------------------------------- RESULTS -------------------------------------------------  I have personally reviewed all laboratory and imaging results for this patient. Results are listed below.      LABS: (Lab results interpreted by me)  Results for orders placed or performed during the hospital encounter of 12/23/20   Troponin   Result Value Ref Range    Troponin <0.01 0.00 - 0.03 ng/mL   CBC Auto Differential   Result Value Ref Range    WBC 12.8 (H) 4.5 - 11.5 E9/L    RBC 4.94 3.80 - 5.80 E12/L    Hemoglobin 14.9 12.5 - 16.5 g/dL    Hematocrit 43.0 37.0 - 54.0 %    MCV 87.0 80.0 - 99.9 fL    MCH 30.2 26.0 - 35.0 pg    MCHC 34.7 (H) 32.0 - 34.5 %    RDW 12.6 11.5 - 15.0 fL    Platelets 335 473 - 633 E9/L    MPV 9.2 7.0 - 12.0 fL    Neutrophils % 81.4 (H) 43.0 - 80.0 %    Immature Granulocytes % 0.5 0.0 - 5.0 %    Lymphocytes % 12.6 (L) 20.0 - 42.0 %    Monocytes % 4.5 2.0 - 12.0 %    Eosinophils % 0.6 0.0 - 6.0 %    Basophils % 0.4 0.0 - 2.0 %    Neutrophils Absolute 10.37 (H) 1.80 - 7.30 E9/L    Immature Granulocytes # 0.06 E9/L    Lymphocytes Absolute 1.61 1.50 - 4.00 E9/L    Monocytes Absolute 0.58 0.10 - 0.95 E9/L    Eosinophils Absolute 0.08 0.05 - 0.50 E9/L    Basophils Absolute 0.05 0.00 - 0.20 D2/P   Basic Metabolic Panel   Result Value Ref Range    Sodium 140 132 - 146 mmol/L    Potassium 4.0 3.5 - 5.0 mmol/L    Chloride 104 98 - 107 mmol/L    CO2 25 22 - 29 mmol/L    Anion Gap 11 7 - 16 mmol/L    Glucose 163 (H) 74 - 99 mg/dL    BUN 9 8 - 23 mg/dL    CREATININE 0.9 0.7 - 1.2 mg/dL    GFR Non-African American >60 >=60 mL/min/1.73    GFR African American >60     Calcium 9.7 8.6 - 10.2 mg/dL   Hepatic Function Panel   Result Value Ref Range Total Protein 7.8 6.4 - 8.3 g/dL    Alb 4.6 3.5 - 5.2 g/dL    Alkaline Phosphatase 128 40 - 129 U/L    ALT 14 0 - 40 U/L    AST 19 0 - 39 U/L    Total Bilirubin 0.6 0.0 - 1.2 mg/dL    Bilirubin, Direct <0.2 0.0 - 0.3 mg/dL    Bilirubin, Indirect see below 0.0 - 1.0 mg/dL   Lipase   Result Value Ref Range    Lipase 29 13 - 60 U/L   Lactic Acid, Plasma   Result Value Ref Range    Lactic Acid 2.3 (H) 0.5 - 2.2 mmol/L   Magnesium   Result Value Ref Range    Magnesium 2.1 1.6 - 2.6 mg/dL   ,       RADIOLOGY:  Interpreted by Radiologist unless otherwise specified  CT ABDOMEN PELVIS WO CONTRAST Additional Contrast? None   Final Result   1. Recent placement of a cholecystostomy tube. There is no abscess seen   within the gallbladder fossa and there is no perihepatic abscess. The   gallbladder is completely decompressed   2. Stent within the distal common bile duct extending into the duodenum. Passed biliary stent now noted within the cecum. 3. 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 This finding is chronic   4. Mild dilatation of multiple loops of small bowel consistent with a   small-bowel ileus or early small bowel obstruction. The small bowel loops   measure up to 2.8 cm. Significant air-fluid levels are not appreciated. XR CHEST PORTABLE   Final Result   No acute process. ------------------------- NURSING NOTES AND VITALS REVIEWED ---------------------------   The nursing notes within the ED encounter and vital signs as below have been reviewed by myself  /71   Pulse 82   Temp 97.6 °F (36.4 °C) (Temporal)   Resp 17   Ht 6' (1.829 m)   Wt 205 lb (93 kg)   SpO2 98%   BMI 27.80 kg/m²     Oxygen Saturation Interpretation: Normal    The cardiac monitor revealed NSR with a heart rate in the 80s as interpreted by me. The cardiac monitor was ordered secondary to the patient's heart rate and to monitor the patient for dysrhythmia.    CPT 93986 The patients available past medical records and past encounters were reviewed. ------------------------------ ED COURSE/MEDICAL DECISION MAKING----------------------  Medications   0.9 % sodium chloride infusion ( Intravenous New Bag 12/23/20 1535)   famotidine (PEPCID) injection 20 mg (20 mg Intravenous Given 12/23/20 0949)   metoclopramide (REGLAN) injection 5 mg (5 mg Intravenous Given 12/23/20 0949)                    Medical Decision Making:     I, Dr. Sean Wolf am the primary provider of record    Evidence of bowel obstruction. Surgery and medicine were consulted. His nausea and pain have improved. Will be kept for further observation      Re-Evaluations:          Re-evaluation. Patients symptoms are improving  Repeat physical examination is not changed        This patient's ED course included: a personal history and physicial examination, re-evaluation prior to disposition, IV medications, cardiac monitoring, continuous pulse oximetry and complex medical decision making and emergency management    This patient has remained hemodynamically stable during their ED course. Consultations:  Spoke with Dr. Alice Gutierres (Surgery). Discussed case. They will provide consultation. Spoke with Dr. Gaby Ridley (Medicine). Discussed case. They will admit this patient. Counseling: The emergency provider has spoken with the patient and discussed todays results, in addition to providing specific details for the plan of care and counseling regarding the diagnosis and prognosis. Questions are answered at this time and they are agreeable with the plan.       --------------------------------- IMPRESSION AND DISPOSITION ---------------------------------    IMPRESSION  1. SBO (small bowel obstruction) (HCC)    2. Abdominal pain, epigastric    3.  Non-intractable vomiting with nausea, unspecified vomiting type        DISPOSITION  Disposition: Admit to med/surg floor  Patient condition is stable NOTE: This report was transcribed using voice recognition software.  Every effort was made to ensure accuracy; however, inadvertent computerized transcription errors may be present       Savita Lopez DO  12/23/20 2340

## 2020-12-23 NOTE — Clinical Note
Patient Class: Observation [104]   REQUIRED: Diagnosis: SBO (small [facility] obstruction) ([facility] [987506]   Estimated Length of Stay: Estimated stay of less than 2 midnights   Admitting Provider: Risa Gonzalez [[id number]]   Telemetry Bed Required?: No

## 2020-12-23 NOTE — CONSULTS
 ERCP N/A 12/18/2020    ERCP STENT INSERTION performed by Gustavus Scheuermann, MD at [address] [place] [address] N/A 10/13/2019    LAPAROTOMY EXPLORATORY, LARGE BOWEL RESECTION, CREATION OF COLOSTOMY performed by Hamilton Maldonado MD at [facility] N/A 10/22/2019    PORT INSERTION performed by Shayla Ibarra MD at [address] [place] [address]         Medications Prior to Admission:    Prior to Admission medications    Medication Sig Start Date End Date Taking?  Authorizing Provider   amLODIPine (NORVASC) 10 MG tablet Take 1 tablet by mouth daily 11/16/20  Yes Lewis Bruce MD   levothyroxine (SYNTHROID) 137 MCG tablet Take 1 tablet by mouth Daily 11/8/20  Yes Mae Montilla MD   aspirin 81 MG chewable tablet Take 1 tablet by mouth daily 9/26/20  Yes Ana Saliva, DO   clopidogrel (PLAVIX) 75 MG tablet Take 1 tablet by mouth daily 9/26/20  Yes Ana Saliva, DO   atorvastatin (LIPITOR) 40 MG tablet Take 40 mg by mouth nightly    Yes Historical Provider, MD   acetaminophen (TYLENOL) 500 MG tablet Take 500 mg by mouth every 6 hours as needed for Pain   Yes Historical Provider, MD       No Known Allergies    Family History   Problem Relation Age of Onset    Breast Cancer Mother     Diabetes Father     Macular Degen Father     Emphysema Father     No Known Problems Brother        Social History     Tobacco Use    Smoking status: Former Smoker     Packs/day: 1.00     Years: 15.00     Pack years: 15.00     Types: Cigarettes    Smokeless tobacco: Never Used    Tobacco comment: quit age 45   Substance Use Topics    Alcohol use: Yes     Frequency: 2-3 times a week     Comment: occ beer    Drug use: Never         Review of Systems   General ROS: negative  Hematological and Lymphatic ROS: negative  Respiratory ROS: negative  Cardiovascular ROS: negative  Gastrointestinal ROS: positive for - abdominal pain, appetite loss and nausea/vomiting  Genito-Urinary ROS: negative Musculoskeletal ROS: negative      PHYSICAL EXAM:    Vitals:    12/23/20 1652   BP: (!) 153/79   Pulse: 80   Resp: 16   Temp: 97.5 °F (36.4 °C)   SpO2: 98%       General Appearance:  awake, alert, oriented, in no acute distress  Skin:  Skin color, texture, turgor normal. No rashes or lesions. Head/face:  NCAT  Eyes:  No gross abnormalities. Lungs:  Normal expansion. Clear to auscultation. No rales, rhonchi, or wheezing. Heart:  Heart regular rate and rhythm  Abdomen:  Soft,mildly tender in epigastric region, ostomy in place with brown stool in bag. Percutaneous cholecystostomy tube in place draining bile. Extremities: Extremities warm to touch, pink, with no edema. LABS:    CBC  Recent Labs     12/23/20  0957   WBC 12.8*   HGB 14.9   HCT 43.0        BMP  Recent Labs     12/23/20  0957      K 4.0      CO2 25   BUN 9   CREATININE 0.9   CALCIUM 9.7     Liver Function  Recent Labs     12/23/20  0957   LIPASE 29   BILITOT 0.6   BILIDIR <0.2   AST 19   ALT 14   ALKPHOS 128   PROT 7.8   LABALBU 4.6     No results for input(s): LACTATE in the last 72 hours. No results for input(s): INR, PTT in the last 72 hours.     Invalid input(s): PT    RADIOLOGY    Ct Abdomen Pelvis Wo Contrast Additional Contrast? None    Result Date: 12/23/2020 EXAMINATION: CT OF THE ABDOMEN AND PELVIS WITHOUT CONTRAST 12/23/2020 10:58 am TECHNIQUE: CT of the abdomen and pelvis was performed without the administration of intravenous contrast. Multiplanar reformatted images are provided for review. Dose modulation, iterative reconstruction, and/or weight based adjustment of the mA/kV was utilized to reduce the radiation dose to as low as reasonably achievable. COMPARISON: 09/19/2020 HISTORY: ORDERING SYSTEM PROVIDED HISTORY: Bilateral upper quadrant pain, history of biliary stent, concern for small bowel obstruction TECHNOLOGIST PROVIDED HISTORY: Reason for exam:->Bilateral upper quadrant pain, history of biliary stent, concern for small bowel obstruction Additional Contrast?->None What reading provider will be dictating this exam?->CRC FINDINGS: Lower Chest:  Visualized portion of the lower chest demonstrates no acute abnormality. Organs: The liver is homogeneous. There is pneumobilia seen within the left intrahepatic biliary ducts. The spleen and pancreas are unremarkable. The adrenal glands are unremarkable. There is no right or left obstructive uropathy. There is no calculus seen within the course of the ureters. GI/Bowel: There is a 8 [place] drainage catheter within the gallbladder lumen. There is no abscess. No perihepatic abscess is noted. The gallbladder is completely decompressed. There is a stent seen within the distal common bowel duct extending into the duodenum. . There is no small bowel obstruction. There are multiple dilated loops of small bowel measuring up to 2.8 cm favored to represent a small-bowel ileus or less likely an early small bowel obstruction. There is a biliary stent which has passed and is seen within the cecum. The stent measures approximately 6 cm in length and is favored to represent a migrated common bowel duct stent. Note is made of a right lower quadrant ostomy. The sigmoid colon and rectum are unremarkable. Pelvis:  There is no pelvic mass or pelvic inflammatory process Peritoneum/Retroperitoneum:  No evidence of retroperitoneal lymphadenopathy. The abdominal aorta is normal caliber. Bones/Soft Tissues:  Age related degenerative changes of the visualized osseous structures without focal destructive lesion. 1. Recent placement of a cholecystostomy tube. There is no abscess seen within the gallbladder fossa and there is no perihepatic abscess. The gallbladder is completely decompressed 2. Stent within the distal common bile duct extending into the duodenum. Passed biliary stent now noted within the cecum. 3. 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 This finding is chronic 4. Mild dilatation of multiple loops of small bowel consistent with a small-bowel ileus or early small bowel obstruction. The small bowel loops measure up to 2.8 cm. Significant air-fluid levels are not appreciated. Xr Chest Portable    Result Date: 12/23/2020  EXAMINATION: ONE XRAY VIEW OF THE CHEST 12/23/2020 9:11 am COMPARISON: November 3, 2020 HISTORY: ORDERING SYSTEM PROVIDED HISTORY: upper abd pain TECHNOLOGIST PROVIDED HISTORY: Reason for exam:->upper abd pain What reading provider will be dictating this exam?->CRC FINDINGS: Stable left-sided central venous catheter. The lungs are without acute focal process. There is no effusion or pneumothorax. The cardiomediastinal silhouette is without acute process. The osseous structures are without acute process. No acute process.         ASSESSMENT:  79 y.o. male with possible SBO and significant stool burden on CT    PLAN:  - NPO, can advance to clears as tolerated tonight  - IVF  -increase bowel regimen  -monitor bowel function    Discussed with Dr. Matty Ramirez    Electronically signed by Adrian Maxwell MD on 12/23/20 at 5:14 PM EST

## 2020-12-24 VITALS
TEMPERATURE: 98 F | HEART RATE: 73 BPM | WEIGHT: 205 LBS | RESPIRATION RATE: 18 BRPM | HEIGHT: 72 IN | DIASTOLIC BLOOD PRESSURE: 71 MMHG | BODY MASS INDEX: 27.77 KG/M2 | SYSTOLIC BLOOD PRESSURE: 122 MMHG | OXYGEN SATURATION: 96 %

## 2020-12-24 LAB
ALBUMIN SERPL-MCNC: 3.9 G/DL (ref 3.5–5.2)
ALP BLD-CCNC: 120 U/L (ref 40–129)
ALT SERPL-CCNC: 12 U/L (ref 0–40)
ANION GAP SERPL CALCULATED.3IONS-SCNC: 12 MMOL/L (ref 7–16)
AST SERPL-CCNC: 16 U/L (ref 0–39)
BILIRUB SERPL-MCNC: 0.9 MG/DL (ref 0–1.2)
BUN BLDV-MCNC: 10 MG/DL (ref 8–23)
CALCIUM SERPL-MCNC: 9 MG/DL (ref 8.6–10.2)
CHLORIDE BLD-SCNC: 108 MMOL/L (ref 98–107)
CO2: 22 MMOL/L (ref 22–29)
CREAT SERPL-MCNC: 0.9 MG/DL (ref 0.7–1.2)
GFR AFRICAN AMERICAN: >60
GFR NON-AFRICAN AMERICAN: >60 ML/MIN/1.73
GLUCOSE BLD-MCNC: 108 MG/DL (ref 74–99)
HCT VFR BLD CALC: 38.4 % (ref 37–54)
HEMOGLOBIN: 13.2 G/DL (ref 12.5–16.5)
MCH RBC QN AUTO: 30 PG (ref 26–35)
MCHC RBC AUTO-ENTMCNC: 34.4 % (ref 32–34.5)
MCV RBC AUTO: 87.3 FL (ref 80–99.9)
PDW BLD-RTO: 12.6 FL (ref 11.5–15)
PLATELET # BLD: 246 E9/L (ref 130–450)
PMV BLD AUTO: 9.2 FL (ref 7–12)
POTASSIUM SERPL-SCNC: 3.6 MMOL/L (ref 3.5–5)
RBC # BLD: 4.4 E12/L (ref 3.8–5.8)
SODIUM BLD-SCNC: 142 MMOL/L (ref 132–146)
TOTAL PROTEIN: 6.8 G/DL (ref 6.4–8.3)
WBC # BLD: 7.8 E9/L (ref 4.5–11.5)

## 2020-12-24 PROCEDURE — 6370000000 HC RX 637 (ALT 250 FOR IP): Performed by: STUDENT IN AN ORGANIZED HEALTH CARE EDUCATION/TRAINING PROGRAM

## 2020-12-24 PROCEDURE — 6360000002 HC RX W HCPCS: Performed by: INTERNAL MEDICINE

## 2020-12-24 PROCEDURE — 85027 COMPLETE CBC AUTOMATED: CPT

## 2020-12-24 PROCEDURE — 2580000003 HC RX 258: Performed by: INTERNAL MEDICINE

## 2020-12-24 PROCEDURE — 6370000000 HC RX 637 (ALT 250 FOR IP): Performed by: INTERNAL MEDICINE

## 2020-12-24 PROCEDURE — 96372 THER/PROPH/DIAG INJ SC/IM: CPT

## 2020-12-24 PROCEDURE — 36415 COLL VENOUS BLD VENIPUNCTURE: CPT

## 2020-12-24 PROCEDURE — 80053 COMPREHEN METABOLIC PANEL: CPT

## 2020-12-24 RX ORDER — DOCUSATE SODIUM 100 MG/1
100 CAPSULE, LIQUID FILLED ORAL 2 TIMES DAILY
Qty: 60 CAPSULE | Refills: 0 | Status: SHIPPED | OUTPATIENT
Start: 2020-12-24 | End: 2021-01-23

## 2020-12-24 RX ORDER — SENNA PLUS 8.6 MG/1
1 TABLET ORAL 2 TIMES DAILY
Qty: 60 TABLET | Refills: 0 | Status: SHIPPED | OUTPATIENT
Start: 2020-12-24 | End: 2021-01-23

## 2020-12-24 RX ORDER — DOCUSATE SODIUM 100 MG/1
100 CAPSULE, LIQUID FILLED ORAL 2 TIMES DAILY
Status: DISCONTINUED | OUTPATIENT
Start: 2020-12-24 | End: 2020-12-24 | Stop reason: HOSPADM

## 2020-12-24 RX ORDER — POLYETHYLENE GLYCOL 3350 17 G/17G
17 POWDER, FOR SOLUTION ORAL DAILY
Status: DISCONTINUED | OUTPATIENT
Start: 2020-12-24 | End: 2020-12-24 | Stop reason: HOSPADM

## 2020-12-24 RX ORDER — SENNA PLUS 8.6 MG/1
2 TABLET ORAL NIGHTLY
Status: DISCONTINUED | OUTPATIENT
Start: 2020-12-24 | End: 2020-12-24 | Stop reason: HOSPADM

## 2020-12-24 RX ADMIN — SODIUM CHLORIDE, PRESERVATIVE FREE 10 ML: 5 INJECTION INTRAVENOUS at 09:36

## 2020-12-24 RX ADMIN — AMLODIPINE BESYLATE 10 MG: 10 TABLET ORAL at 09:35

## 2020-12-24 RX ADMIN — DOCUSATE SODIUM 100 MG: 100 CAPSULE, LIQUID FILLED ORAL at 09:41

## 2020-12-24 RX ADMIN — ENOXAPARIN SODIUM 40 MG: 40 INJECTION SUBCUTANEOUS at 09:35

## 2020-12-24 RX ADMIN — ASPIRIN 81 MG CHEWABLE TABLET 81 MG: 81 TABLET CHEWABLE at 09:35

## 2020-12-24 RX ADMIN — LEVOTHYROXINE SODIUM 137 MCG: 0.14 TABLET ORAL at 06:38

## 2020-12-24 RX ADMIN — MAGNESIUM HYDROXIDE 30 ML: 2400 SUSPENSION ORAL at 09:35

## 2020-12-24 RX ADMIN — BISACODYL 10 MG: 5 TABLET, COATED ORAL at 03:59

## 2020-12-24 RX ADMIN — POLYETHYLENE GLYCOL 3350 17 G: 17 POWDER, FOR SOLUTION ORAL at 09:41

## 2020-12-24 RX ADMIN — LACTULOSE 20 G: 20 SOLUTION ORAL at 09:35

## 2020-12-24 RX ADMIN — CLOPIDOGREL 75 MG: 75 TABLET, FILM COATED ORAL at 09:35

## 2020-12-24 ASSESSMENT — PAIN SCALES - GENERAL: PAINLEVEL_OUTOF10: 0

## 2020-12-24 NOTE — H&P
[address]                  [address] [facility],  [facility]                              HISTORY AND PHYSICAL    PATIENT NAME: Omar Malhotra                    :        1940  MED REC NO:   [id number]                            ROOM:       [id number]  ACCOUNT NO:   [de-identified]                           ADMIT DATE: 2020  PROVIDER:     Bassem Curry DO    CHIEF COMPLAINT:  Abdominal pain. HISTORY OF PRESENT ILLNESS:  The patient is a 70-year-old  male  who presented to the emergency room complaining of onset of upper  abdominal pain which started the evening prior to presentation to the  emergency room. The patient with significant history of cholecystitis,  status post cholecystostomy tube; choledocholithiasis, status post  biliary stent. The patient had recent biliary stent replacement. Diagnostic evaluation in the emergency room this admission revealed  findings consistent with the possible small bowel obstruction. The  patient was admitted for further evaluation and treatment. PAST MEDICAL HISTORY:  Cholecystitis; MI; coronary artery disease,  status post coronary artery stent; choledocholithiasis; hypothyroid;  colon cancer, status post colostomy; hyperlipidemia; prostate cancer. PAST SURGICAL HISTORY:  Colostomy, exploratory laparotomy, large bowel  resection, MediPort insertion, prostatectomy, cholecystostomy tube  placement, biliary stent placement. MEDICATIONS PRIOR TO ADMISSION:  Amlodipine, Synthroid, aspirin, Plavix,  Lipitor, Tylenol p.r.n. REVIEW OF SYSTEMS:  Remarkable for above-stated chief complaint. ALLERGIES:  None known. SOCIAL HISTORY:  The patient quit tobacco.  Admits to regular alcohol  use. PHYSICAL EXAMINATION:  GENERAL APPEARANCE:  Physical exam reveals a 70-year-old  male,  who is alert and oriented x3, cooperative and a good historian. VITAL SIGNS:  On admission, temperature 97.6, pulse 88, respirations 18,  blood pressure 145/69. HEENT:  Head:  Normocephalic, atraumatic. Eyes:  Pupils equal and  reactive to light. Extraocular muscles intact. Fundi not well  visualized. Nose, no obstruction, polyp or discharge noted. Mouth:   Mucosa without lesion. Teeth:  Upper edentulous, lower fair repair. Pharynx noninjected without exudate. NECK:  Supple. No JVD. No thyromegaly. No carotid bruits. HEART:  Regular rate and rhythm without murmur. LUNGS:  Clear to auscultation bilaterally. ABDOMEN:  Positive bowel sounds. There is minimal tenderness to  palpation in the epigastrium. There is a colostomy noted. There is a  cholecystostomy drainage tube noted. BACK:  With minimal increased thoracic kyphosis. EXTREMITIES:  Without edema. LYMPH NODES:  No adenopathy noted. SKIN:  Without rash or lesion. IMPRESSION:  Abdominal pain. CAT scan findings consistent with small  bowel obstruction, status post cholecystostomy tube; status post biliary  stent; history of colon CA, status post colostomy and colon resection;  history of prostate CA; hyperlipidemia; hypothyroidism; history of MI;  coronary artery disease, status post coronary artery stent. PLAN:  Admit, n.p.o., IV fluids. Surgery to see. Discharge plan, home  when stable.         Jamie Coles DO    D: 12/24/2020 8:42:20       T: 12/24/2020 8:50:41     [id number]  Job#: [id number]     Doc#: [id number]    CC:

## 2020-12-24 NOTE — HOME CARE
Patient is active with [facility] for skilled nursing. Will need home care orders at discharge.  Melanie Mims lpn

## 2020-12-24 NOTE — CARE COORDINATION
SOCIAL WORK/CASEMANAGEMENT TRANSITION OF CARE [id number] Kavita Henderson, [address], [place], [phone]):   [facility] but since pt wasn't here more than 24 hours a nakul order is not needed. Met with pt and plan is home with wife. Pt is a  but only uses the [place] for hearing aides. No other needs per pt.  Corey Lee  12/24/2020

## 2020-12-24 NOTE — PLAN OF CARE
Problem: Falls - Risk of:  Goal: Will remain free from falls  Description: Will remain free from falls  12/23/2020 2344 by Alma Flores RN  Outcome: Met This Shift     Problem: Falls - Risk of:  Goal: Absence of physical injury  Description: Absence of physical injury  12/23/2020 2344 by Alma Flores RN  Outcome: Met This Shift     Problem: Pain:  Goal: Pain level will decrease  Description: Pain level will decrease  Outcome: Met This Shift

## 2020-12-24 NOTE — DISCHARGE INSTR - DIET
? Good nutrition is important when healing from an illness, injury, or surgery. Follow any nutrition recommendations given to you during your hospital stay. ? If you were given an oral nutrition supplement while in the hospital, continue to take this supplement at home. You can take it with meals, in-between meals, and/or before bedtime. These supplements can be purchased at most local grocery stores, pharmacies, and chain [employer]-stores. ? If you have any questions about your diet or nutrition, call the hospital and ask for the dietitian.

## 2020-12-25 LAB
EKG ATRIAL RATE: 85 BPM
EKG P AXIS: 31 DEGREES
EKG P-R INTERVAL: 156 MS
EKG Q-T INTERVAL: 378 MS
EKG QRS DURATION: 88 MS
EKG QTC CALCULATION (BAZETT): 449 MS
EKG R AXIS: 23 DEGREES
EKG T AXIS: 22 DEGREES
EKG VENTRICULAR RATE: 85 BPM

## 2020-12-25 PROCEDURE — 93010 ELECTROCARDIOGRAM REPORT: CPT | Performed by: INTERNAL MEDICINE

## 2020-12-28 ENCOUNTER — HOSPITAL ENCOUNTER (OUTPATIENT)
Dept: INTERVENTIONAL RADIOLOGY/VASCULAR | Age: 80
Discharge: HOME | End: 2020-12-30
Payer: MEDICARE

## 2020-12-28 ENCOUNTER — CARE COORDINATION (OUTPATIENT)
Dept: CASE MANAGEMENT | Age: 80
End: 2020-12-28

## 2020-12-28 PROCEDURE — 47531 INJECTION FOR CHOLANGIOGRAM: CPT | Performed by: RADIOLOGY

## 2020-12-28 PROCEDURE — 6360000004 HC RX CONTRAST MEDICATION: Performed by: RADIOLOGY

## 2020-12-28 PROCEDURE — 2709999900 IR INJ PERC CHOLA EXIST ACCESS W IMAGING

## 2020-12-28 PROCEDURE — 47531 INJECTION FOR CHOLANGIOGRAM: CPT

## 2020-12-28 RX ADMIN — IOVERSOL 15 ML: 678 INJECTION INTRA-ARTERIAL; INTRAVENOUS at 09:37

## 2020-12-28 NOTE — CARE COORDINATION
Andre[facility] Transitions Initial Follow Up Call    Call within 2 business days of discharge: Yes    Patient: Issa Stark Patient : 1940   MRN: [id number]  Reason for Admission: SBO  Discharge Date: 20 RARS: Readmission Risk Score: 16      Last Discharge [address]       Complaint Diagnosis Description Type Department Provider    20 Abdominal Pain SBO (small bowel obstruction) ([facility] . .. ED to Hosp-Admission (Discharged) (ADMITTED) SEYZ 8WE Jenn Harry, DO; Adrienne Zhou. .. Spoke with: Issa Stark, patient and patient's wife Mehul Hillman on HIPAA    Facility[place] [facility]      Challenges to be reviewed by the provider   Additional needs identified to be addressed with provider No  none         Method of communication with provider : none    Was this a readmission? No  Patient stated reason for admission: abdominal pain and emesis  Patients top risk factors for readmission: medical condition    Care Transition Nurse (CTN) contacted the patient by telephone to perform post hospital discharge assessment. Verified name and  with patient as identifiers. Provided introduction to self, and explanation of the CTN role. CTN reviewed discharge instructions, medical action plan and red flags with patient who verbalized understanding. Patient given an opportunity to ask questions and does not have any further questions or concerns at this time. Were discharge instructions available to patient? Yes. Reviewed appropriate site of care based on symptoms and resources available to patient including: PCP, Specialist, Home health and When to call 911. The patient agrees to contact the PCP office for questions related to their healthcare. Medication reconciliation was performed with patient, who verbalizes understanding of administration of home medications. Advised obtaining a 90-day supply of all daily and as-needed medications. Discussed follow-up appointments.  If no appointment was previously scheduled, appointment scheduling offered: Appt scheduled by patient for 12/30/20. Is follow up appointment scheduled within 7 days of discharge? Yes  Non-[facility] follow up appointment(s): Appt with Dr. Bishop Calderon on 12/30/20    Plan for follow-up call in 7-10 days based on severity of symptoms and risk factors. Plan for next call: symptom management-abdominal pain and emesis, self management-Patient independent with IR flushing and colostomy care. , follow up appointment-Completed follow up with Dr. Bishop Calderon and Dr. Sara Ely and medication management-Medication changes and questions. CTN provided contact information for future needs. Non-face-to-face services provided:  Scheduled appointment with PCP-Patient has appt with Dr. Bishop Calderon on 12/30/20. Patient drives himself. Scheduled appointment with Specialist-Verified with patient and wife, appt with Dr. Sara Ely on 12/29/20. Obtained and reviewed discharge summary and/or continuity of care documents  Reviewed and followed up on pending diagnostic tests and treatments-Verified with patient went to IR this morning. Communication with home health agencies or other community services the patient is currently using-Per patient and wife, [facility] scheduled for snv today.     Care Transitions 24 Hour Call    Do you have any ongoing symptoms?: No  Do you have a copy of your discharge instructions?: Yes  Do you have all of your prescriptions and are they filled?: Yes  Have you been contacted by a [address]?: No  Have you scheduled your follow up appointment?: Yes  How are you going to get to your appointment?: Car - drive self  Were you discharged with any Home Care or Post Acute Services: Yes  Post Acute Services: Home Health (Comment: [facility])  Do you feel like you have everything you need to keep you well at home?: Yes  Care Transitions Interventions     DME Assistance: Completed        Spoke with patient and his wife for initial BPCI care transition call post hospital discharge. Patient is agreeable to follow up post discharge from the hospital. Med review completed. 1111f not entered. Due to Plavix and Aspirin, bleeding precautions reviewed with patient. Patient denies any abnormal or uncontrolled bleeding. Patient instructed to report any abnormal bleeding and to call 911 for any uncontrolled bleeding or injury to head. Patient verbalized understanding. Patient presented to the emergency department on 12/23/20 for abdominal pain and emesis. Patient denies abdominal pain and emesis at this time. Patient reports he is now taking Colace daily and Senna daily due to increased GI motility. Patient independent with colostomy care and IR drain flushing. Patient reports he draws up saline from ns bottle. Patient notified saline should be poured into a sterile cup and drawn up with a sterile syringe. Patient should be using a new syringe and specimen cup daily. Patient and wife to request supplies from home care nurse during snv today. This CTN to contact [facility]. Patient reports he did take Tylenol today after IR procedure prophylactically. Spoke to Betty at [facility] regarding need for supplies for IR drain flushing. CTN explained that a member of the Care Transition Central Team will be contacting patient for further follow up calls. Patient is in agreement and denies any further needs or concerns at this time.      Follow Up  Future Appointments   Date Time Provider Josie Yanes   12/29/2020  2:00 PM Floyd Burdick MD [facility] GEN SURG [facility]   1/25/2021  9:00 AM [facility] ANGIO RM 12 SEYZ [address] Jie RN

## 2020-12-28 NOTE — PROGRESS NOTES
Patient was identified by 2 identifiers and taken to IR procedure room for scheduled ofelia tube check. The procedure was reviewed, all questions were answered, and the patient verbalized understanding. Past medical history, medications, and allergies were reviewed. Patient denies fever, chills, redness, or increase in drainage or discomfort from the drain site. Upon arrival, the patient's drainage dressing is clean, dry, and intact. 8415 - Time out for contrast injection with KAVITHA Castellon RN, and Radha Hernandez. The RUQ insertion site was imaged and flushed. No leaking was noted around the drainage tube. Appointment for follow-up drain check was given for 4 weeks to recheck the patency of the drain and possible drain exchange. This appointment was marked on the tube check sheet and given to unit secretary to schedule with Dr. Desirae Mendoza. New revolution was placed.

## 2020-12-29 ENCOUNTER — OFFICE VISIT (OUTPATIENT)
Dept: SURGERY | Age: 80
End: 2020-12-29

## 2020-12-29 VITALS
RESPIRATION RATE: 18 BRPM | DIASTOLIC BLOOD PRESSURE: 63 MMHG | SYSTOLIC BLOOD PRESSURE: 129 MMHG | TEMPERATURE: 98.4 F | BODY MASS INDEX: 27.77 KG/M2 | HEART RATE: 86 BPM | HEIGHT: 72 IN | WEIGHT: 205 LBS

## 2020-12-29 PROCEDURE — 99024 POSTOP FOLLOW-UP VISIT: CPT | Performed by: SURGERY

## 2020-12-30 NOTE — PROGRESS NOTES
General Surgery Office Note  [facility] Surgery  Consandre P. Shreya Ohara MD    Patient's Name/Date of Birth: Roosevelt Raya / 1940    Date: December 29, 2020     Surgeon: Shreya Ohara MD    Chief Complaint:   Chief Complaint   Patient presents with    Follow-up     ERCP follow up, has metal stent       Patient Active Problem List   Diagnosis    Moderate protein[facility]calorie malnutrition [facility]    Malignant neoplasm of splenic flexure ([facility]    Acute cholecystitis    HLD (hyperlipidemia)    HTN (hypertension)    Hypothyroidism    Former smoker    History of prostate cancer    Leukocytosis    History of colon cancer    [facility]P colostomy [facility]    CAD (coronary artery disease)    Cholangitis    History of MI (myocardial infarction)    Choledocholithiasis    [facility]       Subjective: doing well since leaving hospital. No pain or constipation    Objective:  /63   Pulse 86   Temp 98.4 °F (36.9 °C) (Temporal)   Resp 18   Ht 6' (1.829 m)   Wt 205 lb (93 kg)   BMI 27.80 kg/m²   Labs:  No results for input(s): WBC, HGB, HCT in the last 72 hours. Invalid input(s): PLR  Lab Results   Component Value Date    CREATININE 0.9 12/24/2020    BUN 10 12/24/2020     12/24/2020    K 3.6 12/24/2020     (H) 12/24/2020    CO2 22 12/24/2020     No results for input(s): LIPASE, AMYLASE in the last 72 hours. General appearance: AA, NAD  HEENT: NCAT, PERRLA, EOMI  Lungs: Clear, equal rise bilateral  Heart: Reg  Abdomen: soft, nondistended, nontender, incisions well healed, no signs of infection, no cellulitis, no hematoma  Skin: No lesions, incisions well healed  Psych: No distress, conversive, no hallucinations  : No ulcers or lesions  Rectal: No bleeding    A complete 10 system review was performed and are otherwise negative unless mentioned in the above HPI. Specific negatives are listed below but may not include all those reviewed.     General ROS: negative obtundation, AMS

## 2021-01-04 ENCOUNTER — CARE COORDINATION (OUTPATIENT)
Dept: CASE MANAGEMENT | Age: 81
End: 2021-01-04

## 2021-01-04 NOTE — CARE COORDINATION
[facility] Transitions Follow Up Call    2021    Patient: Melchor Juarez  Patient : 1940   MRN: [id number]  Reason for Admission:   Discharge Date: 20 RARS: Readmission Risk Score: 16         Spoke with: Patient's spouse, Inna Mendez. Inna Mendez reports home health services are currently in the home. Inna Mendez provides patient update on the Subsequent call. Care Transitions Subsequent and Final Call    Subsequent and Final Calls  Do you have any ongoing symptoms?: No  Have your medications changed?: No  Do you have any questions related to your medications?: No  Do you currently have any active services?: Yes  Are you currently active with any services?: Home Health  -patient being released from services today; 2021  Identified Barriers: None  Care Transitions Interventions  No Identified Needs      Inna Mendez is pleasant in conversation and reports the patient is doing well.   -denies patient with any C/O abdominal pain or emesis  -patient had f/u with Dr. Antione Jennings on 2020   -colostomy care and IR drain; self-care per patient   -reports patient with good appetite   -patient urinating without difficulty   -taking prescribed medications as ordered     Emotional support provided; discussed will continue to follow.      Follow Up  Future Appointments   Date Time Provider Josie Yanes   2021  9:00 AM [place] ANGIO RM 12 SEY[facility]   2021  1:45 PM Kristie Harris MD [facility] GEN SURG [facility]       Itz Joshi RN

## 2021-01-12 ENCOUNTER — CARE COORDINATION (OUTPATIENT)
Dept: CASE MANAGEMENT | Age: 81
End: 2021-01-12

## 2021-01-12 DIAGNOSIS — Z01.818 PRE-OP TESTING: Primary | ICD-10-CM

## 2021-01-12 NOTE — CARE COORDINATION
[facility]re[facility] Transitions Follow Up Call    2021    Patient: Isabela Bhagat  Patient : 1940   MRN: [id number]  Reason for Admission:   Discharge Date: 20 RARS: Readmission Risk Score: 16         Spoke briefly with: Patient's spouse, Pedro Reusing. Patient is sharing on the call.   -Lugenia Reusing reports the patient is doing well.   -denies patient with any C/O pain   -colostomy care and IR drain; self-care per patient   -patient urinating without difficulty  -reports patient with good appetite   -taking prescribed medications as ordered    Emotional support provided;will continue to follow.              Follow Up  Future Appointments   Date Time Provider Josie Yanes   2021  9:00 AM [place] ANGIO RM 12 SEY[facility] SPEC [facility]   2021  1:45 PM Meryle Siad, MD [facility] GEN SURG [facility]       Sam Woods RN

## 2021-01-18 ENCOUNTER — HOSPITAL ENCOUNTER (OUTPATIENT)
Age: 81
Discharge: HOME | End: 2021-01-18
Payer: MEDICARE

## 2021-01-18 DIAGNOSIS — Z01.818 PRE-OP TESTING: ICD-10-CM

## 2021-01-18 PROCEDURE — U0003 INFECTIOUS AGENT DETECTION BY NUCLEIC ACID (DNA OR RNA); SEVERE ACUTE RESPIRATORY SYNDROME CORONAVIRUS 2 (SARS-COV-2) (CORONAVIRUS DISEASE [COVID-19]), AMPLIFIED PROBE TECHNIQUE, MAKING USE OF HIGH THROUGHPUT TECHNOLOGIES AS DESCRIBED BY CMS-2020-01-R: HCPCS

## 2021-01-19 LAB
SARS-COV-2: NOT DETECTED
SOURCE: NORMAL

## 2021-01-19 NOTE — DISCHARGE SUMMARY
[address]                  [address] [facility],  [facility]                               DISCHARGE SUMMARY    PATIENT NAME: Gabriel Ivey                    :        1940  MED REC NO:   [id number]                            ROOM:       8415  ACCOUNT NO:   [de-identified]                           ADMIT DATE: 2020  PROVIDER:     Yemi Villar DO                  DISCHARGE DATE:  2020    DISCHARGE DIAGNOSES:  Abdominal pain, constipation, status post  cholecystostomy tube, hyperlipidemia, coronary artery disease, history  of prostate CA, history of colon CA, hypothyroidism, status post  colostomy. HOSPITAL COURSE:  The patient is a 80-year-old  male who  presented to the emergency room complaining of abdominal pain. He was  admitted to the hospital.  He was seen by Surgery. He was treated for  constipation. His symptoms improved. He was discharged home in stable  condition on 2020. Discharge meds as per discharge med rec which include Colace 100 mg  b.i.d., Senokot 1 tablet b.i.d., Tylenol p.r.n., amlodipine 10 mg daily,  aspirin 81 mg daily Lipitor 40 mg daily, Plavix 75 mg daily, Synthroid  137 mcg daily. The patient instructed to follow up with Dr. Yemi Villar. Call office for appointment. Follow up with Dr. Wing Jackson. Call  office for appointment.         Jair Devries DO    D: 2021 13:38:31       T: 2021 13:42:41     [email or web address]  Job#: [id number]     Doc#: [id number]    CC:  Yemi Villar DO

## 2021-01-25 ENCOUNTER — HOSPITAL ENCOUNTER (OUTPATIENT)
Dept: INTERVENTIONAL RADIOLOGY/VASCULAR | Age: 81
Discharge: HOME | End: 2021-01-27
Payer: MEDICARE

## 2021-01-25 VITALS
DIASTOLIC BLOOD PRESSURE: 80 MMHG | RESPIRATION RATE: 18 BRPM | HEART RATE: 68 BPM | SYSTOLIC BLOOD PRESSURE: 155 MMHG | OXYGEN SATURATION: 98 % | TEMPERATURE: 97.9 F

## 2021-01-25 DIAGNOSIS — K80.45 CALCULUS OF BILE DUCT WITH CHRONIC CHOLECYSTITIS WITH OBSTRUCTION: ICD-10-CM

## 2021-01-25 PROCEDURE — 6360000004 HC RX CONTRAST MEDICATION: Performed by: RADIOLOGY

## 2021-01-25 PROCEDURE — 47531 INJECTION FOR CHOLANGIOGRAM: CPT | Performed by: RADIOLOGY

## 2021-01-25 PROCEDURE — 47531 INJECTION FOR CHOLANGIOGRAM: CPT

## 2021-01-25 PROCEDURE — 2709999900 IR INJ PERC CHOLA EXIST ACCESS W IMAGING

## 2021-01-25 RX ADMIN — IOVERSOL 6 ML: 678 INJECTION INTRA-ARTERIAL; INTRAVENOUS at 09:20

## 2021-01-25 ASSESSMENT — PAIN - FUNCTIONAL ASSESSMENT: PAIN_FUNCTIONAL_ASSESSMENT: 0-10

## 2021-01-25 NOTE — PROGRESS NOTES
The patient was brought to IR table and prepped and draped sterilely for image-guided 8 Ethiopian pigtail cholecystostomy drain check with possible exchange or removal. Patient denies fever, chills, redness, or increase in drainage or discomfort from the drain site. Upon arrival, the patient's drainage dressing remains clean, dry, and intact. The patient states on average dressing has been draining  mL of clear/light yellow fluid daily. He is flushing the drain with 5 mL 0.9% Normal Saline daily. 1492 - Time out was performed with patient, Sisi Cerda, RT Rashard, and Donavan Steiner.     6707 - 6 mL of Isovue 300 was injected into the drain and the patient was scanned. Dr. Venkat Nuñez reviewed the images and stated the patient's drain could be removed. Drain site was covered with gauze and OpSite. Discharge instructions were reviewed with the patient. The patient was given an After Visit Summary (AVS), questions were answered, and the patient verbalized understanding.

## 2021-01-25 NOTE — PROGRESS NOTES
Patient was identified via 2 patient identifiers and arrived to pre-work up area in stable condition, ambulatory with cane. The patient is here for an image-guided 8 Nepalese pigtail cholecystostomy drain check with possible exchange or removal.  Drain was placed 09/22/2020 and last checked 12/28/20. Patient reports no problems flushing or draining, reports no leaking at the site. Pre-procedure routine/checklist was completed. The patient stopped Plavix 01/14 and aspirin 81 mg has been stopped x3 weeks. The patient's medical history, allergies, medications, labs, and NPO status were all reviewed and verified. The patient has been NPO since midnight. The procedure was explained, informed consent was obtained and questions were answered. The patient verbalized understanding. BP (!) 155/80   Pulse 68   Temp 97.9 °F (36.6 °C) (Temporal)   Resp 18   SpO2 98% , Room air. Lab Results   Component Value Date    INR 1.1 11/04/2020    PROTIME 12.4 11/04/2020     12/24/2020    CREATININE 0.9 12/24/2020    BUN 10 12/24/2020    LABGLOM >60 12/24/2020    GFRAA >60 12/24/2020    HGB 13.2 12/24/2020    HCT 38.4 12/24/2020    WBC 7.8 12/24/2020    COVID19 Not Detected 01/18/2021     ALLERGIES: Patient has no known allergies. No labs and no perioperative prophylactic antibiotic per Dr. Constanza Martinez.

## 2021-01-27 ENCOUNTER — CARE COORDINATION (OUTPATIENT)
Dept: CASE MANAGEMENT | Age: 81
End: 2021-01-27

## 2021-01-27 NOTE — CARE COORDINATION
[facility] Transitions Follow Up Call    2021    Patient: Rani Daniel  Patient : 1940   MRN: [id number]  Reason for Admission:   Discharge Date: 20 RARS: Readmission Risk Score: 16    Attempted to reach patient by phone for follow up; BPCI-A. HIPAA compliant message left on patient's voicemail; CTN contact information provided.         Arabella Dunn RN

## 2021-02-03 ENCOUNTER — CARE COORDINATION (OUTPATIENT)
Dept: CASE MANAGEMENT | Age: 81
End: 2021-02-03

## 2021-02-03 NOTE — CARE COORDINATION
[facility] Transitions Follow Up Call    2/3/2021    Patient: Benjamin Hinkle  Patient : 1940   MRN: [id number]  Reason for Admission:   Discharge Date: 20 RARS: Readmission Risk Score: 16       Attempted to reach patient by phone for follow up; BPCI-A. HIPAA compliant message left on patient's voicemail; CTN contact information provided.      Brittany Heath RN

## 2021-02-17 ENCOUNTER — CARE COORDINATION (OUTPATIENT)
Dept: CASE MANAGEMENT | Age: 81
End: 2021-02-17

## 2021-02-17 NOTE — CARE COORDINATION
[facility] Transitions Bundled Payments for Care Improvement (BPCI) Follow Up Call  Qualifying Diagnosis of Qualifying Gastrointestinal Diagnosis  2/17/2021  Patient Name:  Blayne Born   YOB: 1940  Discharge Date:  12/24/20  RARS:  Readmission Risk Score: 16    PCP:  Kamron Olivo DO    Assessment:   Disorientation/Confusion: denies   Black, Tarry, Bright Red Blood in stools: denies   Abdominal pain/discomfort: denies   Fatigue/Weakness: getting stronger  Appetite: good   Sleep Quality: good   Nausea, Vomiting, or Diarrhea: regular BM's   Shortness of Breath: denies   Medication needs/Questions: denies   Transportation Need:  denies   Live Alone: no   Ability to [employer], [place]: ok   Follow Up Appointment Scheduled: completed   Do you feel like you have everything you need to stay well at home?  yes   Agreeable to ongoing Care Transition Communications: yes  Care Transitions will continue to follow per [facility] , RN, CTN   Future Appointments   Date Time Provider Josie Yanes   4/20/2021  1:45 PM Marium Castañeda MD BD GEN SURG [facility]

## 2021-03-03 ENCOUNTER — CARE COORDINATION (OUTPATIENT)
Dept: CASE MANAGEMENT | Age: 81
End: 2021-03-03

## 2021-03-14 ENCOUNTER — IMMUNIZATION (OUTPATIENT)
Dept: PRIMARY CARE CLINIC | Age: 81
End: 2021-03-14
Payer: MEDICARE

## 2021-03-14 PROCEDURE — 0001A COVID-19, PFIZER VACCINE 30MCG/0.3ML DOSE: CPT | Performed by: EMERGENCY MEDICINE

## 2021-03-14 PROCEDURE — 91300 COVID-19, PFIZER VACCINE 30MCG/0.3ML DOSE: CPT | Performed by: EMERGENCY MEDICINE

## 2021-03-24 ENCOUNTER — CARE COORDINATION (OUTPATIENT)
Dept: CASE MANAGEMENT | Age: 81
End: 2021-03-24

## 2021-03-24 NOTE — CARE COORDINATION
[facility] Transitions Follow Up Call    3/24/2021    Patient: Julita Montgomery  Patient : 1940   MRN: [id number]  Reason for Admission:   Discharge Date: 20 RARS: Readmission Risk Score: 16       Attempted to reach patient by phone regarding follow up; BPCI-A. HIPAA compliant message left on patient's voicemail; CTN contact information provided. Final call; no further outreach.        Nabeel Spine, RN

## 2021-04-12 ENCOUNTER — IMMUNIZATION (OUTPATIENT)
Dept: PRIMARY CARE CLINIC | Age: 81
End: 2021-04-12
Payer: MEDICARE

## 2021-04-12 PROCEDURE — 91300 COVID-19, PFIZER VACCINE 30MCG/0.3ML DOSE: CPT | Performed by: NURSE PRACTITIONER

## 2021-04-12 PROCEDURE — 0002A COVID-19, PFIZER VACCINE 30MCG/0.3ML DOSE: CPT | Performed by: NURSE PRACTITIONER

## 2021-04-14 ENCOUNTER — TELEPHONE (OUTPATIENT)
Dept: SURGERY | Age: 81
End: 2021-04-14

## 2021-04-20 ENCOUNTER — TELEPHONE (OUTPATIENT)
Dept: SURGERY | Age: 81
End: 2021-04-20

## 2021-04-20 ENCOUNTER — OFFICE VISIT (OUTPATIENT)
Dept: SURGERY | Age: 81
End: 2021-04-20
Payer: MEDICARE

## 2021-04-20 VITALS
OXYGEN SATURATION: 97 % | SYSTOLIC BLOOD PRESSURE: 148 MMHG | DIASTOLIC BLOOD PRESSURE: 80 MMHG | TEMPERATURE: 97.9 F | HEIGHT: 72 IN | BODY MASS INDEX: 27.77 KG/M2 | WEIGHT: 205 LBS | HEART RATE: 86 BPM

## 2021-04-20 DIAGNOSIS — K81.9 CHOLECYSTITIS: Primary | ICD-10-CM

## 2021-04-20 PROCEDURE — 4040F PNEUMOC VAC/ADMIN/RCVD: CPT | Performed by: SURGERY

## 2021-04-20 PROCEDURE — G8427 DOCREV CUR MEDS BY ELIG CLIN: HCPCS | Performed by: SURGERY

## 2021-04-20 PROCEDURE — 99212 OFFICE O/P EST SF 10 MIN: CPT | Performed by: SURGERY

## 2021-04-20 PROCEDURE — 1123F ACP DISCUSS/DSCN MKR DOCD: CPT | Performed by: SURGERY

## 2021-04-20 PROCEDURE — 1036F TOBACCO NON-USER: CPT | Performed by: SURGERY

## 2021-04-20 PROCEDURE — G8417 CALC BMI ABV UP PARAM F/U: HCPCS | Performed by: SURGERY

## 2021-04-20 NOTE — TELEPHONE ENCOUNTER
Patient will have colostomy reversal and lap ofelia. Patient will contact us once procedure is schedule and we will follow up and schedule patient to have ERCP with stent removal 6 weeks after his surgery. Patient and wife verbalized understanding.   Electronically signed by Yennifer Camarena on 4/20/21 at 1:24 PM EDT

## 2021-04-20 NOTE — PROGRESS NOTES
General Surgery Office Note  [facility] Surgery  Consandre P. Eleuterio Carreno MD    Patient's Name/Date of Birth: Liana Farfan / 1940    Date: April 20, 2021     Surgeon: Eleuterio Carreno MD    Chief Complaint:   Chief Complaint   Patient presents with    Follow-up     discuss ERCP       Patient Active Problem List   Diagnosis    Moderate protein-calorie malnutrition ([facility]    Malignant neoplasm of splenic flexure ([facility]    Acute cholecystitis    HLD (hyperlipidemia)    HTN (hypertension)    Hypothyroidism    Former smoker    History of prostate cancer    Leukocytosis    History of colon cancer    [facility]    CAD (coronary artery disease)    Cholangitis    History of MI (myocardial infarction)    Choledocholithiasis    SBO (small bowel obstruction) ([facility]       Subjective: Patient has done well. Denies any symptoms. No upper abdominal pain or nausea. No issues since last ERCP with stent placement    Objective:  BP (!) 148/80   Pulse 86   Temp 97.9 °F (36.6 °C) (Temporal)   Ht 6' (1.829 m)   Wt 205 lb (93 kg)   SpO2 97%   BMI 27.80 kg/m²   Labs:  No results for input(s): WBC, HGB, HCT in the last 72 hours. Invalid input(s): PLR  Lab Results   Component Value Date    CREATININE 0.9 12/24/2020    BUN 10 12/24/2020     12/24/2020    K 3.6 12/24/2020     (H) 12/24/2020    CO2 22 12/24/2020     No results for input(s): LIPASE, AMYLASE in the last 72 hours. General appearance: AA, NAD  HEENT: NCAT, PERRLA, EOMI  Lungs: Clear, equal rise bilateral  Heart: Reg  Abdomen: soft, nondistended, nontender  Skin: No lesions  Psych: No distress, conversive, no hallucinations  : No ulcers or lesions  Rectal: No bleeding    A complete 10 system review was performed and are otherwise negative unless mentioned in the above HPI. Specific negatives are listed below but may not include all those reviewed.     General ROS: negative obtundation, AMS  ENT ROS: negative rhinorrhea, epistaxis Allergy and Immunology ROS: negative itchy/watery eyes or nasal congestion  Hematological and Lymphatic ROS: negative spontaneous bleeding or bruising  Endocrine ROS: negative  lethargy, mood swings, palpitations or polydipsia/polyuria  Respiratory ROS: negative sputum changes, stridor, tachypnea or wheezing  Cardiovascular ROS: negative for - loss of consciousness, murmur or orthopnea  Gastrointestinal ROS: negative for - hematochezia or hematemesis  Genito-Urinary ROS: negative for -  genital discharge or hematuria  Musculoskeletal ROS: negative for - focal weakness, gangrene  Psych/Neuro ROS: negative for - visual or auditory hallucinations, suicidal ideation      Time spent reviewing past medical, surgical, social and family history, vitals, nursing assessment and images.     Imaging:  NA    Pathology: N/A    Assessment/Plan:  Storm Parish is a 79 y.o. male history of cholecystitis and cholecystostomy tube, choledocholithiasis status post ERCP with stent in place, colostomy    Patient to see cardiology this week to see if cleared for surgery for colostomy reversal and cholecystectomy with Dr Anastacia Albarran  We discussed patient will follow up for ERCP with stent removal 6 weeks after cholecystectomy  All questions answered    Physician Signature: Electronically signed by Dr. Lachelle Carr  4/20/2021  1:55 PM

## 2021-04-23 ENCOUNTER — OFFICE VISIT (OUTPATIENT)
Dept: CARDIOLOGY CLINIC | Age: 81
End: 2021-04-23
Payer: MEDICARE

## 2021-04-23 VITALS
HEART RATE: 93 BPM | RESPIRATION RATE: 19 BRPM | WEIGHT: 219.8 LBS | HEIGHT: 72 IN | BODY MASS INDEX: 29.77 KG/M2 | OXYGEN SATURATION: 97 % | SYSTOLIC BLOOD PRESSURE: 144 MMHG | DIASTOLIC BLOOD PRESSURE: 72 MMHG

## 2021-04-23 DIAGNOSIS — I25.10 CORONARY ARTERY DISEASE INVOLVING NATIVE CORONARY ARTERY OF NATIVE HEART WITHOUT ANGINA PECTORIS: Primary | ICD-10-CM

## 2021-04-23 DIAGNOSIS — Z95.5 STATUS POST INSERTION OF DRUG ELUTING CORONARY ARTERY STENT: ICD-10-CM

## 2021-04-23 DIAGNOSIS — I10 ESSENTIAL HYPERTENSION: ICD-10-CM

## 2021-04-23 PROCEDURE — 1036F TOBACCO NON-USER: CPT | Performed by: INTERNAL MEDICINE

## 2021-04-23 PROCEDURE — 1123F ACP DISCUSS/DSCN MKR DOCD: CPT | Performed by: INTERNAL MEDICINE

## 2021-04-23 PROCEDURE — G8417 CALC BMI ABV UP PARAM F/U: HCPCS | Performed by: INTERNAL MEDICINE

## 2021-04-23 PROCEDURE — 99213 OFFICE O/P EST LOW 20 MIN: CPT | Performed by: INTERNAL MEDICINE

## 2021-04-23 PROCEDURE — 4040F PNEUMOC VAC/ADMIN/RCVD: CPT | Performed by: INTERNAL MEDICINE

## 2021-04-23 PROCEDURE — G8427 DOCREV CUR MEDS BY ELIG CLIN: HCPCS | Performed by: INTERNAL MEDICINE

## 2021-04-23 PROCEDURE — 93000 ELECTROCARDIOGRAM COMPLETE: CPT | Performed by: INTERNAL MEDICINE

## 2021-04-23 RX ORDER — METOPROLOL SUCCINATE 50 MG/1
50 TABLET, EXTENDED RELEASE ORAL DAILY
Qty: 90 TABLET | Refills: 3 | Status: SHIPPED | OUTPATIENT
Start: 2021-04-23

## 2021-04-23 NOTE — PROGRESS NOTES
[address] [facility]   Heart and Vascular [place]   Clinic Note     Date:4/23/21   Patient Name:Frank Salvador  YOB: 1940  Age: 79 y.o. Primary Care Provider: DO Sepideh Fernandes     This is a very pleasant 71-year-old  male who returns for post hospital follow-up. He has been doing very well and he had home rehab. He denies chest pain, dyspnea, orthopnea, PND, lower extremity edema, palpitations, syncope and presyncope. He has not had any pathologic bleeding    A focused history review includes:  1. CAD with 95% proximal to mid LAD calcific stenosis discovered in the hospital setting for #3. Otherwise mild nonobstructive disease  1. Status post successful PCI with 2 overlapping MIRELA with rotational atherectomy and IVUS 9/25/2020 (Dr. William Eagle)  2. TTE with normal biventricular size and systolic function and no significant valve disease (Dr. William Eagle)  2. Obstructive colon cancer status post hemicolectomy 2019 and colostomy  3. Acute cholecystitis September 2020 status post cholecystostomy  1. Presented November 2020 with ascending cholangitis status post ERCP with sphincterotomy, stone extraction and biliary stent placement  4.  Hypertension    Past History    Past Medical History:         Diagnosis Date    CAD (coronary artery disease)     Cancer ([address]     prostate    Hyperlipidemia     Malignant neoplasm of splenic flexure ([facility] [address] 10/21/2019    Thyroid disease           Social History:    Social History     Tobacco History     Smoking Status  Former Smoker    Smokeless Tobacco Use  Never Used    Tobacco Comment  40 + years           Alcohol History     Alcohol Use Status  Not Currently Drinks/Week  4 Cans of beer per week Amount  4.0 standard drinks of alcohol/wk Comment  occ          Drug Use     Drug Use Status  Never          Sexual Activity     Sexually Active  Not Asked                    Family History:       Problem Relation Age of Onset    Breast Cancer Mother     Diabetes Father     Macular Degen Father     Emphysema Father     No Known Problems Brother          Review of Systems   As above        Medications     Current Outpatient Medications   Medication Sig Dispense Refill    amLODIPine (NORVASC) 10 MG tablet Take 1 tablet by mouth daily 90 tablet 3    levothyroxine (SYNTHROID) 137 MCG tablet Take 1 tablet by mouth Daily 30 tablet 3    aspirin 81 MG chewable tablet Take 1 tablet by mouth daily 30 tablet 3    clopidogrel (PLAVIX) 75 MG tablet Take 1 tablet by mouth daily 30 tablet 11    atorvastatin (LIPITOR) 40 MG tablet Take 40 mg by mouth nightly       acetaminophen (TYLENOL) 500 MG tablet Take 500 mg by mouth every 6 hours as needed for Pain       No current facility-administered medications for this visit. Physical Examination      BP (!) 144/72   Pulse 93   Resp 19   Ht 6' (1.829 m)   Wt 219 lb 12.8 oz (99.7 kg)   SpO2 97%   BMI 29.81 kg/m²     General: No acute distress, appears as stated age, nonicteric  Head: Atraumatic, no gross abnormalities or bruises  Neck: Supple and nontender, no carotid bruits, no JVP  Lungs: Clear to auscultation bilaterally, no wheezes, rales, or rhonchi  Heart: Regular rate and rhythm, no murmurs, rubs, or gallops  Abdomen: Soft, nontender, nondistended, normal bowel sounds  Extremities: No obvious deformities, no cyanosis, no edema  Neurological: Alert and oriented x3, EOMI, moving all extremities x4  Psychological: Normal mood and affect, cooperative  Skin: Color, texture, and turgor normal for age          Labs/Imaging/Diagnostics     Lab Results   Component Value Date     12/24/2020    K 3.6 12/24/2020    K 4.0 11/03/2020     12/24/2020    CO2 22 12/24/2020    BUN 10 12/24/2020    CREATININE 0.9 12/24/2020    GLUCOSE 108 12/24/2020    CALCIUM 9.0 12/24/2020        CrCl cannot be calculated (Patient's most recent lab result is older than the maximum 120 days allowed. ). Lab Results   Component Value Date    WBC 7.8 12/24/2020    HGB 13.2 12/24/2020    HCT 38.4 12/24/2020    MCV 87.3 12/24/2020     12/24/2020       Lab Results   Component Value Date    ALT 12 12/24/2020    AST 16 12/24/2020    ALKPHOS 120 12/24/2020    BILITOT 0.9 12/24/2020       Lab Results   Component Value Date    LABALBU 3.9 12/24/2020       Lab Results   Component Value Date    CHOL 170 09/20/2020     Lab Results   Component Value Date    TRIG 92 09/20/2020     Lab Results   Component Value Date    HDL 77 09/20/2020     Lab Results   Component Value Date    LDLCALC 75 09/20/2020     Lab Results   Component Value Date    LABVLDL 18 09/20/2020     No results found for: [facility]    Lab Results   Component Value Date    CKTOTAL 66 09/20/2020    CKMB <1.0 09/21/2020    TROPONINI <0.01 12/23/2020       No results found for: BNP      Lab Results   Component Value Date    LABA1C 5.4 09/20/2020     No results found for: EAG     Pertinent Cardiovascular Studies:  EKG: Today  Normal sinus rhythm. Normal        Assessment and Plan:        76-year-old  male with history of PCI to the proximal to mid LAD with 2 MIRELA with rotational arthrectomy on IVUS in September 2020. He is asymptomatic from an anginal standpoint. He has no heart failure and no symptoms of arrhythmia. He is still planned for a cholecystectomy and colostomy reversal sometime in the spring. Diagnosis Orders   1. Coronary artery disease involving native coronary artery of native heart without angina pectoris  EKG 12 Lead      · From a cardiac standpoint, clopidogrel (Plavix) may be held for surgery safely for as long as needed. Recommend resuming after surgery when safe  · Aspirin should be continued without interruption in the perioperative period  · No other cardiac testing required before surgery.   · Toprol-XL 50 mg p.o. daily to optimize his blood pressure control  · Continue amlodipine 10 mg daily  · Continue atorvastatin 40 mg daily      Follow up with me in 12 months. We appreciate the opportunity to participate in His care!       Thad Spence MD  Interventional Cardiology/Structural Heart Disease  Office: [phone]  Fax: [phone]  : Faviola Tanner

## 2021-04-23 NOTE — PATIENT INSTRUCTIONS
1. Start metoprolol (Toprol XL) for blood pressure and heart rate in addition to amlodipine (Norvasc)  2. Continue aspirin without interruption indefinitely  3. You may stop clopidogrel (Plavix) for surgery but resume as soon as safe  4. Continue atorvastatin (Lipitor) as is  5.  Return in 12 months

## 2021-04-27 ENCOUNTER — TELEPHONE (OUTPATIENT)
Dept: SURGERY | Age: 81
End: 2021-04-27

## 2021-04-27 RX ORDER — NEOMYCIN SULFATE 500 MG/1
TABLET ORAL
Qty: 6 TABLET | Refills: 0 | Status: SHIPPED | OUTPATIENT
Start: 2021-04-27 | End: 2021-05-07

## 2021-04-27 RX ORDER — METRONIDAZOLE 500 MG/1
500 TABLET ORAL 3 TIMES DAILY
Qty: 3 TABLET | Refills: 0 | Status: SHIPPED | OUTPATIENT
Start: 2021-04-27 | End: 2021-04-29 | Stop reason: SDUPTHER

## 2021-04-27 NOTE — TELEPHONE ENCOUNTER
Prior Authorization Form:      DEMOGRAPHICS:                     Patient Name:  Samantha Eli  Patient :  1940            Insurance:  Payor: MEDICARE / Plan: MEDICARE PART A AND B / Product Type: *No Product type* /   Insurance ID Number:    Payor/Plan Subscr  Sex Relation Sub. Ins. ID Effective Group Num   1. [address] 1940 Male Self [id number] 1/1/15                                    [address] [address]   2. [address]  1940 Male Self [id number] 1/1/15                                    [address]         DIAGNOSIS & PROCEDURE:                       Procedure/Operation: Laparoscopic Cholecystectomy, Laparoscopic Colostomy Reversal, Mediport Removal            CPT Code: 31951, 40647, 29661    Diagnosis:  Acute Cholecystitis, Presence of Colostomy, Malignant Neoplasm of Splenic Flexure    ICD10 Code: K81.0, Z93.3, C18.5    Location:  [address] [place] [address]    Surgeon:  Dr Reuben Ybarra INFORMATION:                          Date: 2021    Time: 0730              Anesthesia:  General                                                       Status:  AM Admit         Special Comments:  Patient will be admitted following procedure for observation due to nature of procedure.        Electronically signed by Freya Hines on 2021 at 2:56 PM

## 2021-04-27 NOTE — TELEPHONE ENCOUNTER
MA scheduled patient for lap ofelia, lap colostomy reversal, and mediport removal on 6/7/2021 @ 7:30am, arrival time 5:30am.     MA contacted patient's partner, Eulalio Bunn, and informed of date, time, location, and prep instructions. Eulalio Bunn verbalized confirmation and understanding. Patient is to be NPO the day prior, obtain and take a bottle of magnesium citrate until stools are clear and perform and enema the day prior (OTC). Patient informed that antibiotics were ordered and sent to pharmacy to take the day prior as directed on the bottle. Patient informed that he will be admitted to the hospital, most likely on a telemetry floor for about 5 days post op. Eulalio Bunn verbalized confirmation and understanding. Procedure letter mailed to patient as reminder. Eulalio Bunn informed to call the office with any questions or concerns.      Electronically signed by Rossi Garrett on 4/27/21 at 2:26 PM EDT

## 2021-04-29 RX ORDER — METRONIDAZOLE 500 MG/1
500 TABLET ORAL 3 TIMES DAILY
Qty: 3 TABLET | Refills: 0 | Status: SHIPPED | OUTPATIENT
Start: 2021-04-29 | End: 2021-04-30

## 2021-05-20 NOTE — PROGRESS NOTES
Patient states has received the last dose of the COVID vaccine and is 2 weeks post last dose. Patient instructed to bring the Call Britannia card or a picture of the card,  PAT visit. Patient verbalized understanding.

## 2021-05-28 NOTE — PROGRESS NOTES
[address] [facility] PRE-ADMISSION TESTING GENERAL INSTRUCTIONS- [facility]-phone number:[phone]    GENERAL INSTRUCTIONS  [x] Antibacterial Soap shower Night before  Surgery  [x] Gian wipe instruction sheet and wipes given. [x] Nothing by mouth after midnight, including gum, candy, mints, or water. [x] You may brush your teeth, gargle, but do NOT swallow water. [x]No smoking, chewing tobacco, illegal drugs, or alcohol within 24 hours of your surgery. [x] Jewelry, valuables or body piercing's should not be brought to the hospital. All body and/or tongue piercing's must be removed prior to arriving to hospital   [x] Arrange transportation with a responsible adult  to and from the hospital. _.  [x] Transfusion Bracelet: Please bring with you to hospital, day of surgery  [x] Bring copy of living will or healthcare power of  papers to be placed in your electronic record. PARKING INSTRUCTIONS:   [x] Arrival Time:__0530 for surgery 6-7 Monday with dr Vogt___________  · [x] Parking lot '\"I\"  is located on [facility] (the corner of [facility] and [facility]). To enter, press the button and the gate will lift. A free token will be provided to exit the lot    EDUCATION INSTRUCTIONS:   .     [x] Pre-admission Testing educational folder given  [x] Incentive Spirometry,coughing & deep breathing exercises reviewed. .    [x]Pain: Post-op pain is normal and to be expected. You will be asked to rate your pain from 0-10(a zero is not acceptable-education is needed). Your post-op pain goal is:  [x] Ask your nurse for your pain medication. [] Other:___________________________    MEDICATION INSTRUCTIONS:   [x]Bring a complete list of your medications, please write the last time you took the medicine, give this list to the nurse. SEE MED SHEET  [x] Take the following medications the morning of surgery with 1-2 ounces of water: Marella Drought   [x] Follow physician instructions regarding any blood thinners you may be taking. PLAVIX LAST DOSE 5-30  WHAT TO EXPECT:  [x] The day of surgery you will be greeted and checked in by the [facility] [employer].  In addition, you will be registered in the [place] by a Patient Access Representative. Please bring your photo ID and insurance card. A nurse will greet you in accordance to the time you are needed in the pre-op area to prepare you for surgery. Please do not be discouraged if you are not greeted in the order you arrive as there are many variables that are involved in patient preparation. Your patience is greatly appreciated as you wait for your nurse. Please bring in items such as: books, magazines, newspapers, electronics, or any other items  to occupy your time in the waiting area.

## 2021-06-02 ENCOUNTER — HOSPITAL ENCOUNTER (OUTPATIENT)
Dept: PREADMISSION TESTING | Age: 81
Discharge: HOME | End: 2021-06-02
Payer: MEDICARE

## 2021-06-02 VITALS
SYSTOLIC BLOOD PRESSURE: 167 MMHG | BODY MASS INDEX: 28.71 KG/M2 | HEIGHT: 72 IN | DIASTOLIC BLOOD PRESSURE: 77 MMHG | OXYGEN SATURATION: 98 % | WEIGHT: 212 LBS | HEART RATE: 59 BPM | TEMPERATURE: 97.9 F | RESPIRATION RATE: 16 BRPM

## 2021-06-02 DIAGNOSIS — Z01.818 PRE-OP TESTING: Primary | ICD-10-CM

## 2021-06-02 LAB
ABO/RH: NORMAL
ANION GAP SERPL CALCULATED.3IONS-SCNC: 10 MMOL/L (ref 7–16)
ANTIBODY SCREEN: NORMAL
BUN BLDV-MCNC: 12 MG/DL (ref 6–23)
CALCIUM SERPL-MCNC: 9.4 MG/DL (ref 8.6–10.2)
CHLORIDE BLD-SCNC: 102 MMOL/L (ref 98–107)
CO2: 26 MMOL/L (ref 22–29)
CREAT SERPL-MCNC: 0.8 MG/DL (ref 0.7–1.2)
GFR AFRICAN AMERICAN: >60
GFR NON-AFRICAN AMERICAN: >60 ML/MIN/1.73
GLUCOSE BLD-MCNC: 111 MG/DL (ref 74–99)
HCT VFR BLD CALC: 43.7 % (ref 37–54)
HEMOGLOBIN: 15 G/DL (ref 12.5–16.5)
MCH RBC QN AUTO: 30.2 PG (ref 26–35)
MCHC RBC AUTO-ENTMCNC: 34.3 % (ref 32–34.5)
MCV RBC AUTO: 88.1 FL (ref 80–99.9)
PDW BLD-RTO: 12.7 FL (ref 11.5–15)
PLATELET # BLD: 237 E9/L (ref 130–450)
PMV BLD AUTO: 9.2 FL (ref 7–12)
POTASSIUM SERPL-SCNC: 4 MMOL/L (ref 3.5–5)
RBC # BLD: 4.96 E12/L (ref 3.8–5.8)
SODIUM BLD-SCNC: 138 MMOL/L (ref 132–146)
WBC # BLD: 7.4 E9/L (ref 4.5–11.5)

## 2021-06-02 PROCEDURE — 36415 COLL VENOUS BLD VENIPUNCTURE: CPT

## 2021-06-02 PROCEDURE — 87081 CULTURE SCREEN ONLY: CPT

## 2021-06-02 PROCEDURE — 86901 BLOOD TYPING SEROLOGIC RH(D): CPT

## 2021-06-02 PROCEDURE — 85027 COMPLETE CBC AUTOMATED: CPT

## 2021-06-02 PROCEDURE — 86850 RBC ANTIBODY SCREEN: CPT

## 2021-06-02 PROCEDURE — 80048 BASIC METABOLIC PNL TOTAL CA: CPT

## 2021-06-02 PROCEDURE — 86900 BLOOD TYPING SEROLOGIC ABO: CPT

## 2021-06-03 LAB — MRSA CULTURE ONLY: NORMAL

## 2021-06-06 ENCOUNTER — ANESTHESIA EVENT (OUTPATIENT)
Dept: OPERATING ROOM | Age: 81
DRG: 330 | End: 2021-06-06
Payer: MEDICARE

## 2021-06-07 ENCOUNTER — HOSPITAL ENCOUNTER (INPATIENT)
Age: 81
LOS: 5 days | Discharge: HOME | DRG: 330 | End: 2021-06-12
Attending: SURGERY | Admitting: SURGERY
Payer: MEDICARE

## 2021-06-07 ENCOUNTER — APPOINTMENT (OUTPATIENT)
Dept: GENERAL RADIOLOGY | Age: 81
DRG: 330 | End: 2021-06-07
Attending: SURGERY
Payer: MEDICARE

## 2021-06-07 ENCOUNTER — ANESTHESIA (OUTPATIENT)
Dept: OPERATING ROOM | Age: 81
DRG: 330 | End: 2021-06-07
Payer: MEDICARE

## 2021-06-07 VITALS
DIASTOLIC BLOOD PRESSURE: 89 MMHG | OXYGEN SATURATION: 100 % | TEMPERATURE: 95 F | SYSTOLIC BLOOD PRESSURE: 138 MMHG | RESPIRATION RATE: 15 BRPM

## 2021-06-07 DIAGNOSIS — R52 PAIN: ICD-10-CM

## 2021-06-07 PROBLEM — Z98.890 HISTORY OF COLOSTOMY REVERSAL: Status: ACTIVE | Noted: 2021-06-07

## 2021-06-07 LAB
ANION GAP SERPL CALCULATED.3IONS-SCNC: 16 MMOL/L (ref 7–16)
BUN BLDV-MCNC: 17 MG/DL (ref 6–23)
CALCIUM SERPL-MCNC: 8.5 MG/DL (ref 8.6–10.2)
CHLORIDE BLD-SCNC: 103 MMOL/L (ref 98–107)
CO2: 20 MMOL/L (ref 22–29)
CREAT SERPL-MCNC: 1.1 MG/DL (ref 0.7–1.2)
GFR AFRICAN AMERICAN: >60
GFR NON-AFRICAN AMERICAN: >60 ML/MIN/1.73
GLUCOSE BLD-MCNC: 158 MG/DL (ref 74–99)
HCT VFR BLD CALC: 41.3 % (ref 37–54)
HEMOGLOBIN: 14.5 G/DL (ref 12.5–16.5)
MCH RBC QN AUTO: 31 PG (ref 26–35)
MCHC RBC AUTO-ENTMCNC: 35.1 % (ref 32–34.5)
MCV RBC AUTO: 88.2 FL (ref 80–99.9)
PDW BLD-RTO: 12.4 FL (ref 11.5–15)
PLATELET # BLD: 225 E9/L (ref 130–450)
PMV BLD AUTO: 9.4 FL (ref 7–12)
POTASSIUM SERPL-SCNC: 4 MMOL/L (ref 3.5–5)
RBC # BLD: 4.68 E12/L (ref 3.8–5.8)
SODIUM BLD-SCNC: 139 MMOL/L (ref 132–146)
WBC # BLD: 13.4 E9/L (ref 4.5–11.5)

## 2021-06-07 PROCEDURE — 47563 LAPARO CHOLECYSTECTOMY/GRAPH: CPT | Performed by: SURGERY

## 2021-06-07 PROCEDURE — 74018 RADEX ABDOMEN 1 VIEW: CPT

## 2021-06-07 PROCEDURE — 6360000002 HC RX W HCPCS: Performed by: SURGERY

## 2021-06-07 PROCEDURE — 3700000000 HC ANESTHESIA ATTENDED CARE: Performed by: SURGERY

## 2021-06-07 PROCEDURE — 2580000003 HC RX 258

## 2021-06-07 PROCEDURE — 2060000000 HC ICU INTERMEDIATE R&B

## 2021-06-07 PROCEDURE — 2709999900 HC NON-CHARGEABLE SUPPLY: Performed by: SURGERY

## 2021-06-07 PROCEDURE — 6360000002 HC RX W HCPCS: Performed by: ANESTHESIOLOGY

## 2021-06-07 PROCEDURE — 88307 TISSUE EXAM BY PATHOLOGIST: CPT

## 2021-06-07 PROCEDURE — 36590 REMOVAL TUNNELED CV CATH: CPT | Performed by: SURGERY

## 2021-06-07 PROCEDURE — 3600000014 HC SURGERY LEVEL 4 ADDTL 15MIN: Performed by: SURGERY

## 2021-06-07 PROCEDURE — 2500000003 HC RX 250 WO HCPCS: Performed by: SURGERY

## 2021-06-07 PROCEDURE — 3600000004 HC SURGERY LEVEL 4 BASE: Performed by: SURGERY

## 2021-06-07 PROCEDURE — 2500000003 HC RX 250 WO HCPCS

## 2021-06-07 PROCEDURE — 85027 COMPLETE CBC AUTOMATED: CPT

## 2021-06-07 PROCEDURE — 0FB44ZZ EXCISION OF GALLBLADDER, PERCUTANEOUS ENDOSCOPIC APPROACH: ICD-10-PCS | Performed by: SURGERY

## 2021-06-07 PROCEDURE — 80048 BASIC METABOLIC PNL TOTAL CA: CPT

## 2021-06-07 PROCEDURE — 6370000000 HC RX 637 (ALT 250 FOR IP): Performed by: SURGERY

## 2021-06-07 PROCEDURE — 2720000010 HC SURG SUPPLY STERILE: Performed by: SURGERY

## 2021-06-07 PROCEDURE — 6360000004 HC RX CONTRAST MEDICATION: Performed by: SURGERY

## 2021-06-07 PROCEDURE — 2580000003 HC RX 258: Performed by: SURGERY

## 2021-06-07 PROCEDURE — 7100000001 HC PACU RECOVERY - ADDTL 15 MIN: Performed by: SURGERY

## 2021-06-07 PROCEDURE — 7100000000 HC PACU RECOVERY - FIRST 15 MIN: Performed by: SURGERY

## 2021-06-07 PROCEDURE — BF111ZZ FLUOROSCOPY OF BILIARY AND PANCREATIC DUCTS USING LOW OSMOLAR CONTRAST: ICD-10-PCS | Performed by: SURGERY

## 2021-06-07 PROCEDURE — 44626 REPAIR BOWEL OPENING: CPT | Performed by: SURGERY

## 2021-06-07 PROCEDURE — 0DBL0ZZ EXCISION OF TRANSVERSE COLON, OPEN APPROACH: ICD-10-PCS | Performed by: SURGERY

## 2021-06-07 PROCEDURE — 88304 TISSUE EXAM BY PATHOLOGIST: CPT

## 2021-06-07 PROCEDURE — 36415 COLL VENOUS BLD VENIPUNCTURE: CPT

## 2021-06-07 PROCEDURE — 6360000002 HC RX W HCPCS

## 2021-06-07 PROCEDURE — 3700000001 HC ADD 15 MINUTES (ANESTHESIA): Performed by: SURGERY

## 2021-06-07 PROCEDURE — 2700000000 HC OXYGEN THERAPY PER DAY

## 2021-06-07 RX ORDER — ACETAMINOPHEN 650 MG/1
650 SUPPOSITORY RECTAL EVERY 6 HOURS PRN
Status: DISCONTINUED | OUTPATIENT
Start: 2021-06-07 | End: 2021-06-12 | Stop reason: HOSPADM

## 2021-06-07 RX ORDER — FENTANYL CITRATE 50 UG/ML
INJECTION, SOLUTION INTRAMUSCULAR; INTRAVENOUS PRN
Status: DISCONTINUED | OUTPATIENT
Start: 2021-06-07 | End: 2021-06-07 | Stop reason: SDUPTHER

## 2021-06-07 RX ORDER — SODIUM CHLORIDE 0.9 % (FLUSH) 0.9 %
5-40 SYRINGE (ML) INJECTION EVERY 12 HOURS SCHEDULED
Status: DISCONTINUED | OUTPATIENT
Start: 2021-06-07 | End: 2021-06-12 | Stop reason: HOSPADM

## 2021-06-07 RX ORDER — ONDANSETRON 2 MG/ML
INJECTION INTRAMUSCULAR; INTRAVENOUS PRN
Status: DISCONTINUED | OUTPATIENT
Start: 2021-06-07 | End: 2021-06-07 | Stop reason: SDUPTHER

## 2021-06-07 RX ORDER — SODIUM CHLORIDE 0.9 % (FLUSH) 0.9 %
5-40 SYRINGE (ML) INJECTION PRN
Status: DISCONTINUED | OUTPATIENT
Start: 2021-06-07 | End: 2021-06-12 | Stop reason: HOSPADM

## 2021-06-07 RX ORDER — ONDANSETRON 4 MG/1
4 TABLET, ORALLY DISINTEGRATING ORAL EVERY 8 HOURS PRN
Status: DISCONTINUED | OUTPATIENT
Start: 2021-06-07 | End: 2021-06-12 | Stop reason: HOSPADM

## 2021-06-07 RX ORDER — METOPROLOL TARTRATE 5 MG/5ML
5 INJECTION INTRAVENOUS EVERY 6 HOURS
Status: DISCONTINUED | OUTPATIENT
Start: 2021-06-07 | End: 2021-06-11

## 2021-06-07 RX ORDER — ONDANSETRON 2 MG/ML
4 INJECTION INTRAMUSCULAR; INTRAVENOUS EVERY 6 HOURS PRN
Status: DISCONTINUED | OUTPATIENT
Start: 2021-06-07 | End: 2021-06-12 | Stop reason: HOSPADM

## 2021-06-07 RX ORDER — ATORVASTATIN CALCIUM 40 MG/1
40 TABLET, FILM COATED ORAL NIGHTLY
Status: DISCONTINUED | OUTPATIENT
Start: 2021-06-07 | End: 2021-06-12 | Stop reason: HOSPADM

## 2021-06-07 RX ORDER — SODIUM CHLORIDE, SODIUM LACTATE, POTASSIUM CHLORIDE, CALCIUM CHLORIDE 600; 310; 30; 20 MG/100ML; MG/100ML; MG/100ML; MG/100ML
INJECTION, SOLUTION INTRAVENOUS CONTINUOUS
Status: DISCONTINUED | OUTPATIENT
Start: 2021-06-07 | End: 2021-06-07

## 2021-06-07 RX ORDER — SODIUM CHLORIDE, SODIUM LACTATE, POTASSIUM CHLORIDE, CALCIUM CHLORIDE 600; 310; 30; 20 MG/100ML; MG/100ML; MG/100ML; MG/100ML
INJECTION, SOLUTION INTRAVENOUS CONTINUOUS
Status: DISCONTINUED | OUTPATIENT
Start: 2021-06-07 | End: 2021-06-09

## 2021-06-07 RX ORDER — SODIUM CHLORIDE 0.9 % (FLUSH) 0.9 %
5-40 SYRINGE (ML) INJECTION EVERY 12 HOURS SCHEDULED
Status: DISCONTINUED | OUTPATIENT
Start: 2021-06-07 | End: 2021-06-07 | Stop reason: HOSPADM

## 2021-06-07 RX ORDER — ACETAMINOPHEN 650 MG
TABLET, EXTENDED RELEASE ORAL PRN
Status: DISCONTINUED | OUTPATIENT
Start: 2021-06-07 | End: 2021-06-07 | Stop reason: ALTCHOICE

## 2021-06-07 RX ORDER — SODIUM CHLORIDE 9 MG/ML
INJECTION, SOLUTION INTRAVENOUS CONTINUOUS PRN
Status: DISCONTINUED | OUTPATIENT
Start: 2021-06-07 | End: 2021-06-07 | Stop reason: SDUPTHER

## 2021-06-07 RX ORDER — PROPOFOL 10 MG/ML
INJECTION, EMULSION INTRAVENOUS PRN
Status: DISCONTINUED | OUTPATIENT
Start: 2021-06-07 | End: 2021-06-07 | Stop reason: SDUPTHER

## 2021-06-07 RX ORDER — SODIUM CHLORIDE 9 MG/ML
25 INJECTION, SOLUTION INTRAVENOUS PRN
Status: DISCONTINUED | OUTPATIENT
Start: 2021-06-07 | End: 2021-06-07 | Stop reason: HOSPADM

## 2021-06-07 RX ORDER — SODIUM CHLORIDE, SODIUM LACTATE, POTASSIUM CHLORIDE, CALCIUM CHLORIDE 600; 310; 30; 20 MG/100ML; MG/100ML; MG/100ML; MG/100ML
INJECTION, SOLUTION INTRAVENOUS CONTINUOUS PRN
Status: DISCONTINUED | OUTPATIENT
Start: 2021-06-07 | End: 2021-06-07 | Stop reason: SDUPTHER

## 2021-06-07 RX ORDER — LIDOCAINE HYDROCHLORIDE 20 MG/ML
INJECTION, SOLUTION INTRAVENOUS PRN
Status: DISCONTINUED | OUTPATIENT
Start: 2021-06-07 | End: 2021-06-07 | Stop reason: SDUPTHER

## 2021-06-07 RX ORDER — SODIUM CHLORIDE 0.9 % (FLUSH) 0.9 %
5-40 SYRINGE (ML) INJECTION PRN
Status: DISCONTINUED | OUTPATIENT
Start: 2021-06-07 | End: 2021-06-07 | Stop reason: HOSPADM

## 2021-06-07 RX ORDER — LEVOTHYROXINE SODIUM 137 UG/1
137 TABLET ORAL DAILY
Status: DISCONTINUED | OUTPATIENT
Start: 2021-06-08 | End: 2021-06-12 | Stop reason: HOSPADM

## 2021-06-07 RX ORDER — SODIUM CHLORIDE 9 MG/ML
25 INJECTION, SOLUTION INTRAVENOUS PRN
Status: DISCONTINUED | OUTPATIENT
Start: 2021-06-07 | End: 2021-06-12 | Stop reason: HOSPADM

## 2021-06-07 RX ORDER — CEFAZOLIN SODIUM 1 G/3ML
INJECTION, POWDER, FOR SOLUTION INTRAMUSCULAR; INTRAVENOUS PRN
Status: DISCONTINUED | OUTPATIENT
Start: 2021-06-07 | End: 2021-06-07 | Stop reason: SDUPTHER

## 2021-06-07 RX ORDER — VECURONIUM BROMIDE 1 MG/ML
INJECTION, POWDER, LYOPHILIZED, FOR SOLUTION INTRAVENOUS PRN
Status: DISCONTINUED | OUTPATIENT
Start: 2021-06-07 | End: 2021-06-07 | Stop reason: SDUPTHER

## 2021-06-07 RX ORDER — ASPIRIN 81 MG/1
81 TABLET, CHEWABLE ORAL DAILY
Status: DISCONTINUED | OUTPATIENT
Start: 2021-06-07 | End: 2021-06-12 | Stop reason: HOSPADM

## 2021-06-07 RX ORDER — EPHEDRINE SULFATE/0.9% NACL/PF 50 MG/5 ML
SYRINGE (ML) INTRAVENOUS PRN
Status: DISCONTINUED | OUTPATIENT
Start: 2021-06-07 | End: 2021-06-07 | Stop reason: SDUPTHER

## 2021-06-07 RX ORDER — DEXAMETHASONE SODIUM PHOSPHATE 10 MG/ML
INJECTION INTRAMUSCULAR; INTRAVENOUS PRN
Status: DISCONTINUED | OUTPATIENT
Start: 2021-06-07 | End: 2021-06-07 | Stop reason: SDUPTHER

## 2021-06-07 RX ORDER — ACETAMINOPHEN 325 MG/1
650 TABLET ORAL EVERY 6 HOURS PRN
Status: DISCONTINUED | OUTPATIENT
Start: 2021-06-07 | End: 2021-06-12 | Stop reason: HOSPADM

## 2021-06-07 RX ADMIN — FENTANYL CITRATE 25 MCG: 50 INJECTION, SOLUTION INTRAMUSCULAR; INTRAVENOUS at 11:39

## 2021-06-07 RX ADMIN — Medication 5 MG: at 10:54

## 2021-06-07 RX ADMIN — ASPIRIN 81 MG CHEWABLE TABLET 81 MG: 81 TABLET CHEWABLE at 20:55

## 2021-06-07 RX ADMIN — FENTANYL CITRATE 50 MCG: 50 INJECTION, SOLUTION INTRAMUSCULAR; INTRAVENOUS at 13:29

## 2021-06-07 RX ADMIN — SODIUM CHLORIDE, POTASSIUM CHLORIDE, SODIUM LACTATE AND CALCIUM CHLORIDE: 600; 310; 30; 20 INJECTION, SOLUTION INTRAVENOUS at 15:42

## 2021-06-07 RX ADMIN — HYDROMORPHONE HYDROCHLORIDE 0.5 MG: 1 INJECTION, SOLUTION INTRAMUSCULAR; INTRAVENOUS; SUBCUTANEOUS at 17:56

## 2021-06-07 RX ADMIN — Medication 10 MG: at 10:59

## 2021-06-07 RX ADMIN — VECURONIUM BROMIDE 1 MG: 10 INJECTION, POWDER, LYOPHILIZED, FOR SOLUTION INTRAVENOUS at 09:43

## 2021-06-07 RX ADMIN — VECURONIUM BROMIDE 1 MG: 10 INJECTION, POWDER, LYOPHILIZED, FOR SOLUTION INTRAVENOUS at 13:10

## 2021-06-07 RX ADMIN — METOPROLOL TARTRATE 5 MG: 1 INJECTION, SOLUTION INTRAVENOUS at 21:16

## 2021-06-07 RX ADMIN — PROPOFOL 170 MG: 10 INJECTION, EMULSION INTRAVENOUS at 07:37

## 2021-06-07 RX ADMIN — Medication 5 MG: at 13:04

## 2021-06-07 RX ADMIN — VECURONIUM BROMIDE 1 MG: 10 INJECTION, POWDER, LYOPHILIZED, FOR SOLUTION INTRAVENOUS at 12:32

## 2021-06-07 RX ADMIN — FENTANYL CITRATE 50 MCG: 50 INJECTION, SOLUTION INTRAMUSCULAR; INTRAVENOUS at 10:43

## 2021-06-07 RX ADMIN — VECURONIUM BROMIDE 6 MG: 10 INJECTION, POWDER, LYOPHILIZED, FOR SOLUTION INTRAVENOUS at 07:37

## 2021-06-07 RX ADMIN — SODIUM CHLORIDE 25 ML: 9 INJECTION, SOLUTION INTRAVENOUS at 06:44

## 2021-06-07 RX ADMIN — FENTANYL CITRATE 25 MCG: 50 INJECTION, SOLUTION INTRAMUSCULAR; INTRAVENOUS at 11:46

## 2021-06-07 RX ADMIN — ENOXAPARIN SODIUM 40 MG: 40 INJECTION SUBCUTANEOUS at 06:46

## 2021-06-07 RX ADMIN — Medication 10 ML: at 21:18

## 2021-06-07 RX ADMIN — VECURONIUM BROMIDE 1 MG: 10 INJECTION, POWDER, LYOPHILIZED, FOR SOLUTION INTRAVENOUS at 08:14

## 2021-06-07 RX ADMIN — HYDROMORPHONE HYDROCHLORIDE 0.5 MG: 1 INJECTION, SOLUTION INTRAMUSCULAR; INTRAVENOUS; SUBCUTANEOUS at 14:54

## 2021-06-07 RX ADMIN — SODIUM CHLORIDE, POTASSIUM CHLORIDE, SODIUM LACTATE AND CALCIUM CHLORIDE: 600; 310; 30; 20 INJECTION, SOLUTION INTRAVENOUS at 12:09

## 2021-06-07 RX ADMIN — LIDOCAINE HYDROCHLORIDE 80 MG: 20 INJECTION, SOLUTION INTRAVENOUS at 07:37

## 2021-06-07 RX ADMIN — Medication 5 MG: at 10:26

## 2021-06-07 RX ADMIN — SODIUM CHLORIDE, POTASSIUM CHLORIDE, SODIUM LACTATE AND CALCIUM CHLORIDE: 600; 310; 30; 20 INJECTION, SOLUTION INTRAVENOUS at 09:05

## 2021-06-07 RX ADMIN — VECURONIUM BROMIDE 1 MG: 10 INJECTION, POWDER, LYOPHILIZED, FOR SOLUTION INTRAVENOUS at 08:38

## 2021-06-07 RX ADMIN — METRONIDAZOLE 500 MG: 500 INJECTION, SOLUTION INTRAVENOUS at 15:38

## 2021-06-07 RX ADMIN — Medication 5 MG: at 10:03

## 2021-06-07 RX ADMIN — VECURONIUM BROMIDE 1 MG: 10 INJECTION, POWDER, LYOPHILIZED, FOR SOLUTION INTRAVENOUS at 11:37

## 2021-06-07 RX ADMIN — HYDROMORPHONE HYDROCHLORIDE 0.5 MG: 1 INJECTION, SOLUTION INTRAMUSCULAR; INTRAVENOUS; SUBCUTANEOUS at 14:48

## 2021-06-07 RX ADMIN — Medication 10 MG: at 10:19

## 2021-06-07 RX ADMIN — SODIUM CHLORIDE, POTASSIUM CHLORIDE, SODIUM LACTATE AND CALCIUM CHLORIDE: 600; 310; 30; 20 INJECTION, SOLUTION INTRAVENOUS at 11:05

## 2021-06-07 RX ADMIN — Medication 5 MG: at 09:32

## 2021-06-07 RX ADMIN — VECURONIUM BROMIDE 2 MG: 10 INJECTION, POWDER, LYOPHILIZED, FOR SOLUTION INTRAVENOUS at 10:10

## 2021-06-07 RX ADMIN — HYDROMORPHONE HYDROCHLORIDE 1 MG: 1 INJECTION, SOLUTION INTRAMUSCULAR; INTRAVENOUS; SUBCUTANEOUS at 21:11

## 2021-06-07 RX ADMIN — DEXAMETHASONE SODIUM PHOSPHATE 10 MG: 10 INJECTION INTRAMUSCULAR; INTRAVENOUS at 07:43

## 2021-06-07 RX ADMIN — SUGAMMADEX 300 MG: 100 INJECTION, SOLUTION INTRAVENOUS at 13:43

## 2021-06-07 RX ADMIN — FENTANYL CITRATE 100 MCG: 50 INJECTION, SOLUTION INTRAMUSCULAR; INTRAVENOUS at 07:37

## 2021-06-07 RX ADMIN — ONDANSETRON HYDROCHLORIDE 4 MG: 2 INJECTION, SOLUTION INTRAMUSCULAR; INTRAVENOUS at 13:44

## 2021-06-07 RX ADMIN — VECURONIUM BROMIDE 1 MG: 10 INJECTION, POWDER, LYOPHILIZED, FOR SOLUTION INTRAVENOUS at 11:07

## 2021-06-07 RX ADMIN — Medication 2000 MG: at 07:47

## 2021-06-07 RX ADMIN — CEFAZOLIN 2000 MG: 1 INJECTION, POWDER, FOR SOLUTION INTRAMUSCULAR; INTRAVENOUS at 13:31

## 2021-06-07 RX ADMIN — METHOCARBAMOL 1000 MG: 100 INJECTION, SOLUTION INTRAMUSCULAR; INTRAVENOUS at 17:46

## 2021-06-07 RX ADMIN — FENTANYL CITRATE 50 MCG: 50 INJECTION, SOLUTION INTRAMUSCULAR; INTRAVENOUS at 08:24

## 2021-06-07 RX ADMIN — VECURONIUM BROMIDE 1 MG: 10 INJECTION, POWDER, LYOPHILIZED, FOR SOLUTION INTRAVENOUS at 09:07

## 2021-06-07 RX ADMIN — METRONIDAZOLE 500 MG: 500 INJECTION, SOLUTION INTRAVENOUS at 06:47

## 2021-06-07 RX ADMIN — Medication 5 MG: at 09:54

## 2021-06-07 RX ADMIN — SODIUM CHLORIDE: 9 INJECTION, SOLUTION INTRAVENOUS at 07:26

## 2021-06-07 RX ADMIN — HYDROMORPHONE HYDROCHLORIDE 0.5 MG: 1 INJECTION, SOLUTION INTRAMUSCULAR; INTRAVENOUS; SUBCUTANEOUS at 15:48

## 2021-06-07 RX ADMIN — ATORVASTATIN CALCIUM 40 MG: 40 TABLET, FILM COATED ORAL at 20:55

## 2021-06-07 ASSESSMENT — PULMONARY FUNCTION TESTS
PIF_VALUE: 24
PIF_VALUE: 24
PIF_VALUE: 16
PIF_VALUE: 16
PIF_VALUE: 24
PIF_VALUE: 14
PIF_VALUE: 16
PIF_VALUE: 16
PIF_VALUE: 17
PIF_VALUE: 16
PIF_VALUE: 15
PIF_VALUE: 24
PIF_VALUE: 15
PIF_VALUE: 16
PIF_VALUE: 25
PIF_VALUE: 24
PIF_VALUE: 25
PIF_VALUE: 27
PIF_VALUE: 20
PIF_VALUE: 24
PIF_VALUE: 25
PIF_VALUE: 16
PIF_VALUE: 16
PIF_VALUE: 25
PIF_VALUE: 25
PIF_VALUE: 4
PIF_VALUE: 15
PIF_VALUE: 15
PIF_VALUE: 16
PIF_VALUE: 25
PIF_VALUE: 17
PIF_VALUE: 25
PIF_VALUE: 0
PIF_VALUE: 22
PIF_VALUE: 16
PIF_VALUE: 23
PIF_VALUE: 16
PIF_VALUE: 24
PIF_VALUE: 3
PIF_VALUE: 16
PIF_VALUE: 24
PIF_VALUE: 29
PIF_VALUE: 16
PIF_VALUE: 28
PIF_VALUE: 25
PIF_VALUE: 15
PIF_VALUE: 16
PIF_VALUE: 24
PIF_VALUE: 23
PIF_VALUE: 15
PIF_VALUE: 25
PIF_VALUE: 15
PIF_VALUE: 26
PIF_VALUE: 16
PIF_VALUE: 26
PIF_VALUE: 25
PIF_VALUE: 22
PIF_VALUE: 16
PIF_VALUE: 23
PIF_VALUE: 26
PIF_VALUE: 28
PIF_VALUE: 16
PIF_VALUE: 22
PIF_VALUE: 2
PIF_VALUE: 16
PIF_VALUE: 24
PIF_VALUE: 24
PIF_VALUE: 15
PIF_VALUE: 16
PIF_VALUE: 25
PIF_VALUE: 16
PIF_VALUE: 24
PIF_VALUE: 23
PIF_VALUE: 24
PIF_VALUE: 0
PIF_VALUE: 24
PIF_VALUE: 16
PIF_VALUE: 15
PIF_VALUE: 24
PIF_VALUE: 20
PIF_VALUE: 25
PIF_VALUE: 16
PIF_VALUE: 23
PIF_VALUE: 15
PIF_VALUE: 16
PIF_VALUE: 23
PIF_VALUE: 27
PIF_VALUE: 25
PIF_VALUE: 21
PIF_VALUE: 26
PIF_VALUE: 16
PIF_VALUE: 16
PIF_VALUE: 24
PIF_VALUE: 22
PIF_VALUE: 16
PIF_VALUE: 22
PIF_VALUE: 15
PIF_VALUE: 15
PIF_VALUE: 16
PIF_VALUE: 16
PIF_VALUE: 24
PIF_VALUE: 16
PIF_VALUE: 17
PIF_VALUE: 15
PIF_VALUE: 14
PIF_VALUE: 16
PIF_VALUE: 16
PIF_VALUE: 24
PIF_VALUE: 16
PIF_VALUE: 25
PIF_VALUE: 21
PIF_VALUE: 23
PIF_VALUE: 24
PIF_VALUE: 16
PIF_VALUE: 16
PIF_VALUE: 21
PIF_VALUE: 15
PIF_VALUE: 25
PIF_VALUE: 14
PIF_VALUE: 16
PIF_VALUE: 24
PIF_VALUE: 15
PIF_VALUE: 16
PIF_VALUE: 28
PIF_VALUE: 13
PIF_VALUE: 24
PIF_VALUE: 16
PIF_VALUE: 25
PIF_VALUE: 21
PIF_VALUE: 22
PIF_VALUE: 15
PIF_VALUE: 16
PIF_VALUE: 28
PIF_VALUE: 24
PIF_VALUE: 16
PIF_VALUE: 15
PIF_VALUE: 24
PIF_VALUE: 16
PIF_VALUE: 15
PIF_VALUE: 15
PIF_VALUE: 24
PIF_VALUE: 24
PIF_VALUE: 16
PIF_VALUE: 16
PIF_VALUE: 24
PIF_VALUE: 23
PIF_VALUE: 24
PIF_VALUE: 25
PIF_VALUE: 24
PIF_VALUE: 16
PIF_VALUE: 16
PIF_VALUE: 27
PIF_VALUE: 16
PIF_VALUE: 14
PIF_VALUE: 22
PIF_VALUE: 24
PIF_VALUE: 16
PIF_VALUE: 25
PIF_VALUE: 15
PIF_VALUE: 16
PIF_VALUE: 17
PIF_VALUE: 27
PIF_VALUE: 0
PIF_VALUE: 28
PIF_VALUE: 23
PIF_VALUE: 16
PIF_VALUE: 16
PIF_VALUE: 24
PIF_VALUE: 19
PIF_VALUE: 3
PIF_VALUE: 3
PIF_VALUE: 23
PIF_VALUE: 23
PIF_VALUE: 15
PIF_VALUE: 25
PIF_VALUE: 21
PIF_VALUE: 22
PIF_VALUE: 25
PIF_VALUE: 24
PIF_VALUE: 27
PIF_VALUE: 25
PIF_VALUE: 15
PIF_VALUE: 15
PIF_VALUE: 16
PIF_VALUE: 16
PIF_VALUE: 27
PIF_VALUE: 25
PIF_VALUE: 16
PIF_VALUE: 27
PIF_VALUE: 16
PIF_VALUE: 15
PIF_VALUE: 16
PIF_VALUE: 14
PIF_VALUE: 15
PIF_VALUE: 15
PIF_VALUE: 19
PIF_VALUE: 23
PIF_VALUE: 16
PIF_VALUE: 24
PIF_VALUE: 15
PIF_VALUE: 23
PIF_VALUE: 14
PIF_VALUE: 16
PIF_VALUE: 15
PIF_VALUE: 23
PIF_VALUE: 15
PIF_VALUE: 36
PIF_VALUE: 14
PIF_VALUE: 16
PIF_VALUE: 16
PIF_VALUE: 17
PIF_VALUE: 24
PIF_VALUE: 23
PIF_VALUE: 24
PIF_VALUE: 23
PIF_VALUE: 24
PIF_VALUE: 27
PIF_VALUE: 15
PIF_VALUE: 23
PIF_VALUE: 15
PIF_VALUE: 26
PIF_VALUE: 15
PIF_VALUE: 16
PIF_VALUE: 25
PIF_VALUE: 27
PIF_VALUE: 25
PIF_VALUE: 13
PIF_VALUE: 26
PIF_VALUE: 24
PIF_VALUE: 15
PIF_VALUE: 11
PIF_VALUE: 25
PIF_VALUE: 23
PIF_VALUE: 25
PIF_VALUE: 26
PIF_VALUE: 15
PIF_VALUE: 23
PIF_VALUE: 15
PIF_VALUE: 16
PIF_VALUE: 19
PIF_VALUE: 25
PIF_VALUE: 24
PIF_VALUE: 16
PIF_VALUE: 24
PIF_VALUE: 25
PIF_VALUE: 23
PIF_VALUE: 18
PIF_VALUE: 16
PIF_VALUE: 24
PIF_VALUE: 16
PIF_VALUE: 24
PIF_VALUE: 16
PIF_VALUE: 19
PIF_VALUE: 25
PIF_VALUE: 16
PIF_VALUE: 24
PIF_VALUE: 16
PIF_VALUE: 16
PIF_VALUE: 21
PIF_VALUE: 15
PIF_VALUE: 16
PIF_VALUE: 20
PIF_VALUE: 0
PIF_VALUE: 25
PIF_VALUE: 24
PIF_VALUE: 16
PIF_VALUE: 24
PIF_VALUE: 16
PIF_VALUE: 24
PIF_VALUE: 3
PIF_VALUE: 15
PIF_VALUE: 16
PIF_VALUE: 16
PIF_VALUE: 25
PIF_VALUE: 1
PIF_VALUE: 22
PIF_VALUE: 15
PIF_VALUE: 24
PIF_VALUE: 16
PIF_VALUE: 18
PIF_VALUE: 22
PIF_VALUE: 18
PIF_VALUE: 16
PIF_VALUE: 24
PIF_VALUE: 16
PIF_VALUE: 15
PIF_VALUE: 16
PIF_VALUE: 24
PIF_VALUE: 25
PIF_VALUE: 23
PIF_VALUE: 15
PIF_VALUE: 22
PIF_VALUE: 16
PIF_VALUE: 24
PIF_VALUE: 16
PIF_VALUE: 16
PIF_VALUE: 24
PIF_VALUE: 15
PIF_VALUE: 25
PIF_VALUE: 15
PIF_VALUE: 17
PIF_VALUE: 16
PIF_VALUE: 25
PIF_VALUE: 14
PIF_VALUE: 24
PIF_VALUE: 14
PIF_VALUE: 24
PIF_VALUE: 24
PIF_VALUE: 15
PIF_VALUE: 22
PIF_VALUE: 25
PIF_VALUE: 18
PIF_VALUE: 15
PIF_VALUE: 16
PIF_VALUE: 23
PIF_VALUE: 24
PIF_VALUE: 25
PIF_VALUE: 24
PIF_VALUE: 21
PIF_VALUE: 23
PIF_VALUE: 25
PIF_VALUE: 16
PIF_VALUE: 27
PIF_VALUE: 27
PIF_VALUE: 0
PIF_VALUE: 14
PIF_VALUE: 17
PIF_VALUE: 16
PIF_VALUE: 15
PIF_VALUE: 25
PIF_VALUE: 24
PIF_VALUE: 26
PIF_VALUE: 16
PIF_VALUE: 21
PIF_VALUE: 16
PIF_VALUE: 1
PIF_VALUE: 27
PIF_VALUE: 25
PIF_VALUE: 24
PIF_VALUE: 24
PIF_VALUE: 16
PIF_VALUE: 24
PIF_VALUE: 16
PIF_VALUE: 24
PIF_VALUE: 16
PIF_VALUE: 14
PIF_VALUE: 16
PIF_VALUE: 21
PIF_VALUE: 16
PIF_VALUE: 24
PIF_VALUE: 23
PIF_VALUE: 25
PIF_VALUE: 23
PIF_VALUE: 15
PIF_VALUE: 24
PIF_VALUE: 24
PIF_VALUE: 22
PIF_VALUE: 15
PIF_VALUE: 17
PIF_VALUE: 26
PIF_VALUE: 24
PIF_VALUE: 16
PIF_VALUE: 16
PIF_VALUE: 23
PIF_VALUE: 24
PIF_VALUE: 15
PIF_VALUE: 15
PEFR_L/MIN: 14
PIF_VALUE: 24
PIF_VALUE: 17
PIF_VALUE: 27
PIF_VALUE: 15
PIF_VALUE: 16
PIF_VALUE: 23
PIF_VALUE: 15
PIF_VALUE: 16
PIF_VALUE: 24
PIF_VALUE: 24
PIF_VALUE: 15
PIF_VALUE: 16
PIF_VALUE: 24
PIF_VALUE: 16
PIF_VALUE: 24
PIF_VALUE: 16
PIF_VALUE: 25
PIF_VALUE: 16
PIF_VALUE: 1
PIF_VALUE: 25
PIF_VALUE: 15
PIF_VALUE: 16
PIF_VALUE: 23
PIF_VALUE: 27
PIF_VALUE: 16

## 2021-06-07 ASSESSMENT — PAIN DESCRIPTION - PAIN TYPE
TYPE: SURGICAL PAIN

## 2021-06-07 ASSESSMENT — PAIN DESCRIPTION - DESCRIPTORS
DESCRIPTORS: ACHING
DESCRIPTORS: ACHING
DESCRIPTORS: ACHING;BURNING
DESCRIPTORS: ACHING

## 2021-06-07 ASSESSMENT — PAIN SCALES - GENERAL
PAINLEVEL_OUTOF10: 0
PAINLEVEL_OUTOF10: 8
PAINLEVEL_OUTOF10: 0
PAINLEVEL_OUTOF10: 7
PAINLEVEL_OUTOF10: 8
PAINLEVEL_OUTOF10: 3
PAINLEVEL_OUTOF10: 7
PAINLEVEL_OUTOF10: 7
PAINLEVEL_OUTOF10: 8
PAINLEVEL_OUTOF10: 6

## 2021-06-07 ASSESSMENT — PAIN DESCRIPTION - LOCATION
LOCATION: ABDOMEN

## 2021-06-07 ASSESSMENT — PAIN - FUNCTIONAL ASSESSMENT: PAIN_FUNCTIONAL_ASSESSMENT: 0-10

## 2021-06-07 ASSESSMENT — PAIN DESCRIPTION - PROGRESSION: CLINICAL_PROGRESSION: GRADUALLY WORSENING

## 2021-06-07 ASSESSMENT — PAIN DESCRIPTION - FREQUENCY
FREQUENCY: CONTINUOUS

## 2021-06-07 NOTE — ANESTHESIA PRE PROCEDURE
by Gretel Dasilva MD at [address] ERCP N/A 2020    ERCP DILATION BALLOON performed by Gretel Dasilva MD at [address] ERCP N/A 2020    ERCP STENT INSERTION performed by Gretel Dasilva MD at [facility] N/A 10/13/2019    LAPAROTOMY EXPLORATORY, LARGE BOWEL RESECTION, CREATION OF COLOSTOMY performed by Rebecca Rizvi MD at [facility] N/A 10/22/2019    PORT INSERTION performed by Nicole Gifford MD at [address] [address] History:    Social History     Tobacco Use    Smoking status: Former Smoker     Packs/day: 1.00     Years: 15.00     Pack years: 15.00     Types: Cigarettes     Quit date:      Years since quittin.4    Smokeless tobacco: Never Used    Tobacco comment: quit age 45   Substance Use Topics    Alcohol use: Yes     Alcohol/week: 14.0 standard drinks     Types: 14 Cans of beer per week     Comment: daily                                Counseling given: Not Answered  Comment: quit age 45      Vital Signs (Current):   Vitals:    21 0623   BP: (!) 143/77   Pulse: 79   Resp: 20   Temp: 36.4 °C (97.6 °F)   TempSrc: Temporal   SpO2: 97%   Weight: 212 lb (96.2 kg)   Height: 6' (1.829 m)                                              BP Readings from Last 3 Encounters:   21 (!) 143/77   21 (!) 167/77   21 (!) 144/72       NPO Status: Time of last liquid consumption:                         Time of last solid consumption:                         Date of last liquid consumption: 21                        Date of last solid food consumption: 21    BMI:   Wt Readings from Last 3 Encounters:   21 212 lb (96.2 kg)   21 212 lb (96.2 kg)   21 219 lb 12.8 oz (99.7 kg)     Body mass index is 28.75 kg/m².     CBC:   Lab Results   Component Value Date    WBC 7.4 2021    RBC 4.96 2021    HGB 15.0 2021    HCT 43.7 2021 MCV 88.1 06/02/2021    RDW 12.7 06/02/2021     06/02/2021       CMP:   Lab Results   Component Value Date     06/02/2021    K 4.0 06/02/2021    K 4.0 11/03/2020     06/02/2021    CO2 26 06/02/2021    BUN 12 06/02/2021    CREATININE 0.8 06/02/2021    GFRAA >60 06/02/2021    LABGLOM >60 06/02/2021    GLUCOSE 111 06/02/2021    PROT 6.8 12/24/2020    CALCIUM 9.4 06/02/2021    BILITOT 0.9 12/24/2020    ALKPHOS 120 12/24/2020    AST 16 12/24/2020    ALT 12 12/24/2020       POC Tests: No results for input(s): POCGLU, POCNA, POCK, POCCL, POCBUN, POCHEMO, POCHCT in the last 72 hours. Coags:   Lab Results   Component Value Date    PROTIME 12.4 11/04/2020    INR 1.1 11/04/2020    APTT 40.7 09/26/2020       HCG (If Applicable): No results found for: PREGTESTUR, PREGSERUM, HCG, HCGQUANT     ABGs: No results found for: PHART, PO2ART, YFF2WXI, KBQ7LPJ, BEART, C1XVWALA     Type & Screen (If Applicable):  No results found for: LABABO, LABRH    Drug/Infectious Status (If Applicable):  No results found for: HIV, HEPCAB    COVID-19 Screening (If Applicable):   Lab Results   Component Value Date    COVID19 Not Detected 01/18/2021    COVID19 Not Detected 11/27/2020       EKG 4/23/21  NSR    ECHO 9/23/2020  Summary   Normal left ventricle size. Estimated left ventricle ejection fraction 60   %. Borderline concentric left ventricular hypertrophy. Normal right ventricular size and function. No significant valve disease. Mildly dilated aortic root measuring 3.3-3.5 cm throughout. Mild tricuspid regurgitation. Physiologic and/or trace mitral regurgitation is present. Mild pulmonic regurgitation present. Anesthesia Evaluation  Patient summary reviewed and Nursing notes reviewed no history of anesthetic complications:   Airway: Mallampati: III  TM distance: >3 FB   Neck ROM: full  Mouth opening: > = 3 FB Dental:    (+) upper dentures and partials  Comment: Patient denies any loose, cracked or chipped teeth. Has lower partials. Pulmonary:Negative Pulmonary ROS breath sounds clear to auscultation      Smoker:  former smoker quit 77'                           Cardiovascular:    (+) hypertension:, past MI ( October 2020):, CAD:, CABG/stent ( drug eluting coronary artery stent):, hyperlipidemia      ECG reviewed  Rhythm: regular  Rate: normal  Echocardiogram reviewed         Beta Blocker:  Dose within 24 Hrs         Neuro/Psych:   Negative Neuro/Psych ROS              GI/Hepatic/Renal:            ROS comment: Malignant neoplasm of splenic flexure, acute cholecystitis, cholangitis, choledocholithiasis, colostomy. Endo/Other:    (+) hypothyroidism, blood dyscrasia (Aspirin last taken 6/6/21. Plavix last taken 5/30/21): anticoagulation therapy:., malignancy/cancer ( Prostate CA, colon CA). ROS comment: Alcohol use Abdominal:           Vascular:           ROS comment: Numbness in hands and feet. Anesthesia Plan      general     ASA 3     (18g RH PIV)  Induction: intravenous. BIS    Anesthetic plan and risks discussed with patient. Use of blood products discussed with patient whom consented to blood products. Plan discussed with attending and CRNA.                 Galo Bethea RN   6/7/2021

## 2021-06-07 NOTE — PROGRESS NOTES
COVID testing not done,  Patient is fully vaccintated  Results of the test: na  The patient verbally confirms that they did adhere to the self-quarantine guidelines. No signs or symptoms expressed or observed.

## 2021-06-07 NOTE — H&P
GENERAL SURGERY  H&P NOTE  6/7/2021    ZACARIAS Santana is a 79 y.o. male who presents for elective laparoscopic colostomy reversal and cholecystectomy. He denies changes in his medical condition since he was last seen. Past Medical History:   Diagnosis Date    CAD (coronary artery disease)     Cancer ([facility] [address]     prostate    Hyperlipidemia     Malignant neoplasm of splenic flexure ([facility] 10/21/2019    Thyroid disease     Wears partial dentures     upper       Past Surgical History:   Procedure Laterality Date    CARDIAC CATHETERIZATION  09/21/2020    Dr. Luis Mark, No LV. Staged PCI to LAD    CARDIAC CATHETERIZATION  09/25/2020    LAD PCI- Dr Dale Risk  8/28/2020    COLONOSCOPY DIAGNOSTIC performed by Naomi Tillman MD at [place] [address] CT PTC NEW ACCESS  9/22/2020    CT PTC NEW ACCESS 9/22/2020 Becky Yeung MD [facility] CT    ERCP N/A 11/6/2020    ERCP STENT INSERTION performed by Avon Pallas, MD at [address] ERCP N/A 11/6/2020    ERCP STONE REMOVAL performed by Avon Pallas, MD at [address] ERCP N/A 11/6/2020    ERCP SPHINCTER/PAPILLOTOMY performed by Avon Pallas, MD at [address] ERCP N/A 12/18/2020    ERCP performed by Avon Pallas, MD at [address] ERCP N/A 12/18/2020    ERCP DILATION BALLOON performed by Avon Pallas, MD at [address] ERCP N/A 12/18/2020    ERCP STENT INSERTION performed by Avon Pallas, MD at [address] [place] [address] N/A 10/13/2019    LAPAROTOMY EXPLORATORY, LARGE BOWEL RESECTION, CREATION OF COLOSTOMY performed by Naomi Tillman MD at [facility] N/A 10/22/2019    PORT INSERTION performed by Isha Herrera MD at [address] [place] [address]         Medications Prior to Admission:    Prior to Admission medications    Medication Sig Start Date End Date Taking?  Authorizing Provider   metoprolol succinate (TOPROL XL) 50 MG extended release tablet Take 1 tablet by mouth daily 21  Yes Devin Askew MD   amLODIPine (NORVASC) 10 MG tablet Take 1 tablet by mouth daily 20  Yes Devin Askew MD   levothyroxine (SYNTHROID) 137 MCG tablet Take 1 tablet by mouth Daily 20  Yes Daphne Boyer MD   aspirin 81 MG chewable tablet Take 1 tablet by mouth daily 20  Yes Li Myrick DO   clopidogrel (PLAVIX) 75 MG tablet Take 1 tablet by mouth daily 20  Yes Li Myrick DO   atorvastatin (LIPITOR) 40 MG tablet Take 40 mg by mouth nightly    Yes Historical Provider, MD   acetaminophen (TYLENOL) 500 MG tablet Take 500 mg by mouth every 6 hours as needed for Pain    Historical Provider, MD       No Known Allergies    Family History   Problem Relation Age of Onset    Breast Cancer Mother     Diabetes Father     Macular Degen Father     Emphysema Father     No Known Problems Brother        Social History     Tobacco Use    Smoking status: Former Smoker     Packs/day: 1.00     Years: 15.00     Pack years: 15.00     Types: Cigarettes     Quit date:      Years since quittin.4    Smokeless tobacco: Never Used    Tobacco comment: quit age 45   Vaping Use    Vaping Use: Never used   Substance Use Topics    Alcohol use:  Yes     Alcohol/week: 14.0 standard drinks     Types: 14 Cans of beer per week     Comment: daily    Drug use: Never         Review of Systems   General ROS: negative  Hematological and Lymphatic ROS: negative  Respiratory ROS: no cough, shortness of breath, or wheezing  Cardiovascular ROS: no chest pain or dyspnea on exertion  Gastrointestinal ROS: no abdominal pain, change in bowel habits, or black or bloody stools  Genito-Urinary ROS: no dysuria, trouble voiding, or hematuria  Musculoskeletal ROS: negative      PHYSICAL EXAM:    Vitals:    21 0623   BP: (!) 143/77   Pulse: 79   Resp: 20   Temp: 97.6 °F (36.4 °C)   SpO2: 97%       General Appearance:  awake, alert, oriented, in no acute distress  Skin: Skin color, texture, turgor normal. No rashes or lesions. Head/face:  NCAT  Eyes:  No gross abnormalities. Lungs:  Normal expansion. Clear to auscultation. No rales, rhonchi, or wheezing. Heart:  Heart regular rate and rhythm  Abdomen:  Soft, NT, ND, ostomy with prolapse  Extremities: Extremities warm to touch, pink, with no edema. ASSESSMENT:  79 y.o. male for elective laparoscopic colostomy reversal and cholecystectomy     PLAN:  I reviewed the procedure with the patient. I discussed the risks, benefits, and alternatives of the procedure. The patient understands and consents. All questions were answered.   Seen and examined with Dr. Daljit Arellano     Electronically signed by Kaylynnjose david Snyder DO on 6/7/21 at 7:12 AM EDT

## 2021-06-07 NOTE — BRIEF OP NOTE
Brief Postoperative Note      Patient: Versa Closs  YOB: 1940  MRN: [id number]    Date of Procedure: 6/7/2021    Pre-Op Diagnosis: CURRENT PROLAPSED COLOSTOMY, CHOLELITHIASIS HX CHOLEDOCHOLITHIASIS AND CHOLECYSTITIS,  HX OBSTRUCTING COLON ADENOCARCINOMA WITH EMERGENT LEFT HEMICOLECTOMY AND  MEDIPORT PLACEMENT     Post-Op Diagnosis: Same       Procedure(s):  39 MINUTES  LYSIS OF ADHESIONS   LAPAROSCOPIC  SUBTOTAL CHOLECYSTECTOMY  INTRAOPERATIVE CHOLANGIOGRAM   LAPAROSCOPIC CONVERTED TO  OPEN COLOSTOMY REVERSAL WITH PARTIAL TRANSVERSE COLECTOMY   19F ANGELITA DRAIN PLACEMENT IN THE GALLBLADDER FOSSA   MEDI PORT REMOVAL    Surgeon(s):  Jordon Hernandez MD    Assistant:  Resident: Sreedhar De Oliveira DO    Anesthesia: General    Estimated Blood Loss (mL): 442EQ     Complications:  Other: LEFT FIN WOULD NOT STAY TIGHT AND LEFT KNEE INTERNALLY ROTATED AND  THE PATIENT SLIDE~ 4INCHES DOWN THE BED     Specimens:   ID Type Source Tests Collected by Time Destination   A : Colostomy Tissue Tissue SURGICAL PATHOLOGY Jordon Hernandez MD 6/7/2021 0825    B : Gallbladder Tissue Tissue SURGICAL PATHOLOGY Jordon Hernandez MD 6/7/2021 1138    C : Transverse colon Tissue Tissue SURGICAL PATHOLOGY Jordon Hernandez MD 6/7/2021 1230        Implants:  * No implants in log *      Drains:   Closed/Suction Drain Right RUQ Bulb 19 Maltese (Active)       Urethral Catheter Non-latex 16 fr (Active)       [REMOVED] Colostomy RLQ Transverse (Removed)       Findings: AS ABOVE     Electronically signed by Jordon Hernandez MD on 6/7/2021 at 1:36 PM

## 2021-06-07 NOTE — ANESTHESIA PRE PROCEDURE
Department of Anesthesiology  Preprocedure Note       Name:  Oumou Mckay   Age:  79 y.o.  :  1940                                          MRN:  [id number]         Date:  2021      Surgeon: Christopher Baca):  Tyron Singleton MD    Procedure: Procedure(s):  LAPAROSCOPIC CHOLECYSTECTOMY POSSIBLE OPEN  LAPAROSCOPIC COLOSTOMY REVERSAL, POSSIBLE OPEN  MEDI PORT REMOVAL    Medications prior to admission:   Prior to Admission medications    Medication Sig Start Date End Date Taking? Authorizing Provider   metoprolol succinate (TOPROL XL) 50 MG extended release tablet Take 1 tablet by mouth daily 21   Odessa Finch MD   amLODIPine (NORVASC) 10 MG tablet Take 1 tablet by mouth daily 20   Odessa Finch MD   levothyroxine (SYNTHROID) 137 MCG tablet Take 1 tablet by mouth Daily 20   Josy Lopez MD   aspirin 81 MG chewable tablet Take 1 tablet by mouth daily 20   Keon Couch,    clopidogrel (PLAVIX) 75 MG tablet Take 1 tablet by mouth daily 20   Keon Couch, DO   atorvastatin (LIPITOR) 40 MG tablet Take 40 mg by mouth nightly     Historical Provider, MD   acetaminophen (TYLENOL) 500 MG tablet Take 500 mg by mouth every 6 hours as needed for Pain    Historical Provider, MD       Current medications:    No current facility-administered medications for this visit. No current outpatient medications on file.      Facility-Administered Medications Ordered in Other Visits   Medication Dose Route Frequency Provider Last Rate Last Admin    sodium chloride flush 0.9 % injection 5-40 mL  5-40 mL Intravenous 2 times per day Tyron Singleton MD        sodium chloride flush 0.9 % injection 5-40 mL  5-40 mL Intravenous PRN Tyron Singleton MD        0.9 % sodium chloride infusion  25 mL Intravenous PRN Tyron Singleton  mL/hr at 21 0644 25 mL at 21 0644    ceFAZolin (ANCEF) 2000 mg in sterile water 20 mL IV syringe  2,000 mg Intravenous 30 Min Geena Banerjee MD   2,000 mg at 06/07/21 0747    metronidazole (FLAGYL) 500 mg in NaCl 100 mL IVPB premix  500 mg Intravenous 30 Min Pre-Op Álvaro Maloney  mL/hr at 06/07/21 0647 500 mg at 06/07/21 0926    lactated ringers infusion   Intravenous Continuous Álvaro Maloney MD        enoxaparin (LOVENOX) injection 40 mg  40 mg Subcutaneous 30 Min Pre-Op Álvaro Maloney MD   40 mg at 06/07/21 0646    HYDROmorphone (DILAUDID) injection 0.5 mg  0.5 mg Intravenous Q5 Min PRN Facundo Meza MD        0.9 % sodium chloride infusion   Intravenous Continuous PRN Mell Atkinson RN   New Bag at 06/07/21 0726    fentaNYL (SUBLIMAZE) injection   Intravenous PRN Mell Atkinson RN   100 mcg at 06/07/21 0737    propofol injection   Intravenous PRN Mell Atkinson RN   170 mg at 06/07/21 0737    vecuronium (NORCURON) injection   Intravenous PRN Mell Atkinson RN   6 mg at 06/07/21 0737    lidocaine (cardiac) (XYLOCAINE) injection   Intravenous PRN Mell Atkinson RN   80 mg at 06/07/21 0737    dexamethasone (DECADRON) injection   Intravenous PRN Mell Atkinson RN   10 mg at 06/07/21 5487       Allergies:  No Known Allergies    Problem List:    Patient Active Problem List   Diagnosis Code    Moderate protein[facility]calorie malnutrition ([facility] E44.0    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    Acute cholecystitis K81.0    HLD (hyperlipidemia) E78.5    Essential hypertension I10    Hypothyroidism E03.9    Former smoker Z87.891    History of prostate cancer Z85.46    Leukocytosis D72.829    History of colon cancer Z80.26    S/P colostomy ([facility] Z93.3    CAD (coronary artery disease) I25.10    Cholangitis K83.09    History of MI (myocardial infarction) I25.2    Choledocholithiasis K80.50    SBO (small bowel obstruction) ([facility] K56.609    Status post insertion of drug eluting coronary artery stent Z95.5       Past Medical History:        Diagnosis Date    CAD (coronary artery disease)     Cancer ([facility]     prostate    Hyperlipidemia     Malignant neoplasm of splenic flexure ([facility] 10/21/2019    Thyroid disease     Wears partial dentures     upper       Past Surgical History:        Procedure Laterality Date    CARDIAC CATHETERIZATION  2020    Dr. Irene Deutsch, No LV. Staged PCI to LAD    CARDIAC CATHETERIZATION  2020    LAD PCI-  Marycarmen Bears  2020    COLONOSCOPY DIAGNOSTIC performed by Minoo Chahal MD at [place] [address] CT PTC NEW ACCESS  2020    CT PTC NEW ACCESS 2020 Rory Herrera MD [facility]    ERCP N/A 2020    ERCP STENT INSERTION performed by Humaira Menon MD at [address] ERCP N/A 2020    ERCP STONE REMOVAL performed by Humaira Menon MD at [address] ERCP N/A 2020    ERCP SPHINCTER/PAPILLOTOMY performed by Humaira Menon MD at [address] ERCP N/A 2020    ERCP performed by Humaira Menon MD at [address] ERCP N/A 2020    ERCP DILATION BALLOON performed by Humaira Menon MD at [address] ERCP N/A 2020    ERCP STENT INSERTION performed by Humaira Menon MD at [address] N/A 10/13/2019    LAPAROTOMY EXPLORATORY, LARGE BOWEL RESECTION, CREATION OF COLOSTOMY performed by Minoo Chahal MD at [facility] N/A 10/22/2019    PORT INSERTION performed by Claudene Arista, MD at [address] [place] [address] [address] History:    Social History     Tobacco Use    Smoking status: Former Smoker     Packs/day: 1.00     Years: 15.00     Pack years: 15.00     Types: Cigarettes     Quit date:      Years since quittin.4    Smokeless tobacco: Never Used    Tobacco comment: quit age 45   Substance Use Topics    Alcohol use:  Yes     Alcohol/week: 14.0 standard drinks     Types: 14 Cans of beer per week     Comment: daily Counseling given: Not Answered  Comment: quit age 45      Vital Signs (Current): There were no vitals filed for this visit. BP Readings from Last 3 Encounters:   06/07/21 (!) 143/77   06/02/21 (!) 167/77   04/23/21 (!) 144/72       NPO Status:                                                                                 BMI:   Wt Readings from Last 3 Encounters:   06/07/21 212 lb (96.2 kg)   06/02/21 212 lb (96.2 kg)   04/23/21 219 lb 12.8 oz (99.7 kg)     There is no height or weight on file to calculate BMI.    CBC:   Lab Results   Component Value Date    WBC 7.4 06/02/2021    RBC 4.96 06/02/2021    HGB 15.0 06/02/2021    HCT 43.7 06/02/2021    MCV 88.1 06/02/2021    RDW 12.7 06/02/2021     06/02/2021       CMP:   Lab Results   Component Value Date     06/02/2021    K 4.0 06/02/2021    K 4.0 11/03/2020     06/02/2021    CO2 26 06/02/2021    BUN 12 06/02/2021    CREATININE 0.8 06/02/2021    GFRAA >60 06/02/2021    LABGLOM >60 06/02/2021    GLUCOSE 111 06/02/2021    PROT 6.8 12/24/2020    CALCIUM 9.4 06/02/2021    BILITOT 0.9 12/24/2020    ALKPHOS 120 12/24/2020    AST 16 12/24/2020    ALT 12 12/24/2020       POC Tests: No results for input(s): POCGLU, POCNA, POCK, POCCL, POCBUN, POCHEMO, POCHCT in the last 72 hours.     Coags:   Lab Results   Component Value Date    PROTIME 12.4 11/04/2020    INR 1.1 11/04/2020    APTT 40.7 09/26/2020       HCG (If Applicable): No results found for: PREGTESTUR, PREGSERUM, HCG, HCGQUANT     ABGs: No results found for: PHART, PO2ART, MDF3EHN, GBX7LDG, BEART, I9GCDWKE     Type & Screen (If Applicable):  No results found for: LABABO, LABRH    Drug/Infectious Status (If Applicable):  No results found for: HIV, HEPCAB    COVID-19 Screening (If Applicable):   Lab Results   Component Value Date    COVID19 Not Detected 01/18/2021    COVID19 Not Detected 11/27/2020           Anesthesia Evaluation  Patient summary reviewed  Airway:         Dental:          Pulmonary:                             ROS comment: Former Smoker. Cardiovascular:    (+) hypertension:, CAD:, hyperlipidemia      ECG reviewed      Echocardiogram reviewed         Beta Blocker:  Dose within 24 Hrs      ROS comment: EKG: Normal Sinus Rhythm 85. ECHO:  Normal left ventricle size. Estimated left ventricle ejection fraction 60 %. Normal right ventricular size and function. No significant valve disease. Mildly dilated aortic root measuring 3.3-3.5 cm throughout. CATH:  1. 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 .   Endo/Other:    (+) hypothyroidism::., .                 Abdominal:           Vascular: negative vascular ROS. Anesthesia Plan      general     ASA 3       Induction: intravenous. BIS    Anesthetic plan and risks discussed with patient. Plan discussed with CRNA. Attending anesthesiologist reviewed and agrees with Viji Chong MD   2021        Department of Anesthesiology  Preprocedure Note       Name:  Anderson Tanner   Age:  79 y.o.  :  1940                                          MRN:  [id number]         Date:  2021      Surgeon: Corinthia Goodpasture):  Eliezer Balderas MD    Procedure: Procedure(s):  LAPAROSCOPIC CHOLECYSTECTOMY POSSIBLE OPEN  LAPAROSCOPIC COLOSTOMY REVERSAL, POSSIBLE OPEN  MEDI PORT REMOVAL    Medications prior to admission:   Prior to Admission medications    Medication Sig Start Date End Date Taking?  Authorizing Provider   metoprolol succinate (TOPROL XL) 50 MG extended release tablet Take 1 tablet by mouth daily 21   Theresa Carcamo MD   amLODIPine (NORVASC) 10 MG tablet Take 1 tablet by mouth daily 11/16/20   Triston Cobos MD   levothyroxine (SYNTHROID) 137 MCG tablet Take 1 tablet by mouth Daily 11/8/20   Carol Ann Mendosa MD   aspirin 81 MG chewable tablet Take 1 tablet by mouth daily 9/26/20   Aviva Sandhoff Malmer, DO   clopidogrel (PLAVIX) 75 MG tablet Take 1 tablet by mouth daily 9/26/20   Patricia Stoner, DO   atorvastatin (LIPITOR) 40 MG tablet Take 40 mg by mouth nightly     Historical Provider, MD   acetaminophen (TYLENOL) 500 MG tablet Take 500 mg by mouth every 6 hours as needed for Pain    Historical Provider, MD       Current medications:    No current facility-administered medications for this visit. No current outpatient medications on file.      Facility-Administered Medications Ordered in Other Visits   Medication Dose Route Frequency Provider Last Rate Last Admin    sodium chloride flush 0.9 % injection 5-40 mL  5-40 mL Intravenous 2 times per day Edwin Meckel, MD        sodium chloride flush 0.9 % injection 5-40 mL  5-40 mL Intravenous PRN Edwin Meckel, MD        0.9 % sodium chloride infusion  25 mL Intravenous PRN Edwin Meckel,  mL/hr at 06/07/21 0644 25 mL at 06/07/21 0644    ceFAZolin (ANCEF) 2000 mg in sterile water 20 mL IV syringe  2,000 mg Intravenous 30 Min Pre-Op Edwin Meckel, MD   2,000 mg at 06/07/21 0747    metronidazole (FLAGYL) 500 mg in NaCl 100 mL IVPB premix  500 mg Intravenous 30 Min Pre-Op Edwin Meckel,  mL/hr at 06/07/21 0647 500 mg at 06/07/21 0647    lactated ringers infusion   Intravenous Continuous Haley Vogt MD        enoxaparin (LOVENOX) injection 40 mg  40 mg Subcutaneous 30 Min Pre-Op Edwin Meckel, MD   40 mg at 06/07/21 0646    HYDROmorphone (DILAUDID) injection 0.5 mg  0.5 mg Intravenous Q5 Min PRN Mitra Herbert MD        0.9 % sodium chloride infusion   Intravenous Continuous PRN Jasen Schneider RN   New Bag at 06/07/21 0726    fentaNYL (SUBLIMAZE) injection Intravenous PRN Shaheed San RN   100 mcg at 06/07/21 3941    propofol injection   Intravenous PRN Shaheed San RN   170 mg at 06/07/21 0737    vecuronium (NORCURON) injection   Intravenous PRN Shaheed San RN   6 mg at 06/07/21 0737    lidocaine (cardiac) (XYLOCAINE) injection   Intravenous PRN Shaheed San RN   80 mg at 06/07/21 0737    dexamethasone (DECADRON) injection   Intravenous PRN Shaheed San RN   10 mg at 06/07/21 1531       Allergies:  No Known Allergies    Problem List:    Patient Active Problem List   Diagnosis Code    Moderate protein-calorie malnutrition ([facility] E44.0    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    Acute cholecystitis K81.0    HLD (hyperlipidemia) E78.5    Essential hypertension I10    Hypothyroidism E03.9    Former smoker Z87.891    History of prostate cancer Z85.46    Leukocytosis D72.829    History of colon cancer Z80.26    S/P colostomy ([facility] Z93.3    CAD (coronary artery disease) I25.10    Cholangitis K83.09    History of MI (myocardial infarction) I25.2    Choledocholithiasis K80.50    SBO (small bowel obstruction) ([facility] K56.609    Status post insertion of drug eluting coronary artery stent Z95.5       Past Medical History:        Diagnosis Date    CAD (coronary artery disease)     Cancer ([facility]     prostate    Hyperlipidemia     Malignant neoplasm of splenic flexure ([facility] 10/21/2019    Thyroid disease     Wears partial dentures     upper       Past Surgical History:        Procedure Laterality Date    CARDIAC CATHETERIZATION  09/21/2020    Dr. Rose Sanchez, No LV.   Staged PCI to LAD    CARDIAC CATHETERIZATION  09/25/2020    LAD PCI- Dr Mary Lind  8/28/2020    COLONOSCOPY DIAGNOSTIC performed by Nigel Everett MD at [address] NEW ACCESS  9/22/2020    [facility] NEW ACCESS 9/22/2020 Ha Membreno MD [facility]    ERCP N/A 11/6/2020    ERCP STENT INSERTION performed by [facility] 06/02/2021    MCV 88.1 06/02/2021    RDW 12.7 06/02/2021     06/02/2021       CMP:   Lab Results   Component Value Date     06/02/2021    K 4.0 06/02/2021    K 4.0 11/03/2020     06/02/2021    CO2 26 06/02/2021    BUN 12 06/02/2021    CREATININE 0.8 06/02/2021    GFRAA >60 06/02/2021    LABGLOM >60 06/02/2021    GLUCOSE 111 06/02/2021    PROT 6.8 12/24/2020    CALCIUM 9.4 06/02/2021    BILITOT 0.9 12/24/2020    ALKPHOS 120 12/24/2020    AST 16 12/24/2020    ALT 12 12/24/2020       POC Tests: No results for input(s): POCGLU, POCNA, POCK, POCCL, POCBUN, POCHEMO, POCHCT in the last 72 hours. Coags:   Lab Results   Component Value Date    PROTIME 12.4 11/04/2020    INR 1.1 11/04/2020    APTT 40.7 09/26/2020       HCG (If Applicable): No results found for: PREGTESTUR, PREGSERUM, HCG, HCGQUANT     ABGs: No results found for: PHART, PO2ART, VMX1XBZ, UMH5UJH, BEART, R6AUJHBL     Type & Screen (If Applicable):  No results found for: LABABO, LABRH    Drug/Infectious Status (If Applicable):  No results found for: HIV, HEPCAB    COVID-19 Screening (If Applicable):   Lab Results   Component Value Date    COVID19 Not Detected 01/18/2021    COVID19 Not Detected 11/27/2020           Anesthesia Evaluation  Patient summary reviewed  Airway:         Dental:          Pulmonary:                             ROS comment: Former Smoker. Cardiovascular:    (+) hypertension:, CAD:, hyperlipidemia      ECG reviewed      Echocardiogram reviewed         Beta Blocker:  Dose within 24 Hrs      ROS comment: EKG: Normal Sinus Rhythm 85. ECHO:  Normal left ventricle size. Estimated left ventricle ejection fraction 60 %. Normal right ventricular size and function. No significant valve disease. Mildly dilated aortic root measuring 3.3-3.5 cm throughout. CATH:  1.  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 .   Endo/Other:    (+) hypothyroidism::., .                 Abdominal:           Vascular: negative vascular ROS. Anesthesia Plan      general     ASA 3       Induction: intravenous. BIS    Anesthetic plan and risks discussed with patient. Plan discussed with CRNA. Attending anesthesiologist reviewed and agrees with Kurtis Carmen MD   2021        Department of Anesthesiology  Preprocedure Note       Name:  Kinsey Parker   Age:  79 y.o.  :  1940                                          MRN:  [id number]         Date:  2021      Surgeon: Heriberto Padron):  Erick Hamm MD    Procedure: Procedure(s):  LAPAROSCOPIC CHOLECYSTECTOMY POSSIBLE OPEN  LAPAROSCOPIC COLOSTOMY REVERSAL, POSSIBLE OPEN  MEDI PORT REMOVAL    Medications prior to admission:   Prior to Admission medications    Medication Sig Start Date End Date Taking?  Authorizing Provider   metoprolol succinate (TOPROL XL) 50 MG extended release tablet Take 1 tablet by mouth daily 21  Yes Jerry Lamb MD   amLODIPine (NORVASC) 10 MG tablet Take 1 tablet by mouth daily 20  Yes Jerry Lamb MD   levothyroxine (SYNTHROID) 137 MCG tablet Take 1 tablet by mouth Daily 20  Yes Jerrod Hdz MD   aspirin 81 MG chewable tablet Take 1 tablet by mouth daily 20  Yes Lindsey Olivarez DO   clopidogrel (PLAVIX) 75 MG tablet Take 1 tablet by mouth daily 20  Yes Lindsey Olivarez DO   atorvastatin (LIPITOR) 40 MG tablet Take 40 mg by mouth nightly    Yes Historical Provider, MD   acetaminophen (TYLENOL) 500 MG tablet Take 500 mg by mouth every 6 hours as needed for Pain    Historical Provider, MD       Current medications:    Current Facility-Administered Medications   Medication Dose Route Frequency Provider Last Rate Last Admin  sodium chloride flush 0.9 % injection 5-40 mL  5-40 mL Intravenous 2 times per day Amy Castelan MD        sodium chloride flush 0.9 % injection 5-40 mL  5-40 mL Intravenous PRN Amy Castelan MD        0.9 % sodium chloride infusion  25 mL Intravenous PRN Amy Castelan  mL/hr at 06/07/21 0644 25 mL at 06/07/21 0644    ceFAZolin (ANCEF) 2000 mg in sterile water 20 mL IV syringe  2,000 mg Intravenous 30 Min Pre-Op Amy Castelan MD        metronidazole (FLAGYL) 500 mg in NaCl 100 mL IVPB premix  500 mg Intravenous 30 Min Pre-Op Amy Castelan  mL/hr at 06/07/21 0647 500 mg at 06/07/21 4834    lactated ringers infusion   Intravenous Continuous Amy Castelan MD        enoxaparin (LOVENOX) injection 40 mg  40 mg Subcutaneous 30 Min Pre-Op Amy Castelan MD   40 mg at 06/07/21 4703       Allergies:  No Known Allergies    Problem List:    Patient Active Problem List   Diagnosis Code    [facility]cal[facility] E44.0    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    Acute cholecystitis K81.0    HLD (hyperlipidemia) E78.5    Essential hypertension I10    Hypothyroidism E03.9    Former smoker Z87.891    History of prostate cancer Z85.46    Leukocytosis D72.829    History of colon cancer Z80.26    S/P colostomy [facility] Z93.3    CAD (coronary artery disease) I25.10    Cholangitis K83.09    History of MI (myocardial infarction) I25.2    Choledocholithiasis K80.50    SBO (small bowel obstruction) ([facility] K56.609    Status post insertion of drug eluting coronary artery stent Z95.5       Past Medical History:        Diagnosis Date    CAD (coronary artery disease)     Cancer ([facility]     prostate    Hyperlipidemia     Malignant neoplasm of splenic flexure ([facility] 10/21/2019    Thyroid disease     Wears partial dentures     upper       Past Surgical History:        Procedure Laterality Date    CARDIAC CATHETERIZATION  2020    Dr. William Eagle, No LV. Staged PCI to LAD    CARDIAC CATHETERIZATION  2020    LAD PCI- Dr Fitzgerald Rings  2020    COLONOSCOPY DIAGNOSTIC performed by Nicanor Santamaria MD at [place] [address] CT PTC NEW ACCESS  2020    CT PTC NEW ACCESS 2020 Mary Singh MD [facility]    ERCP N/A 2020    ERCP STENT INSERTION performed by Randee Tyler MD at [address] ERCP N/A 2020    ERCP STONE REMOVAL performed by Randee Tyler MD at [address] ERCP N/A 2020    ERCP SPHINCTER/PAPILLOTOMY performed by Randee Tyler MD at [address] ERCP N/A 2020    ERCP performed by Randee Tyler MD at [address] ERCP N/A 2020    ERCP DILATION BALLOON performed by Randee Tyler MD at [address] ERCP N/A 2020    ERCP STENT INSERTION performed by Randee Tyler MD at [address] [place] [address] N/A 10/13/2019    LAPAROTOMY EXPLORATORY, LARGE BOWEL RESECTION, CREATION OF COLOSTOMY performed by Nicanor Santamaria MD at [facility] N/A 10/22/2019    PORT INSERTION performed by Trae Lake MD at [address] [place] [address] History:    Social History     Tobacco Use    Smoking status: Former Smoker     Packs/day: 1.00     Years: 15.00     Pack years: 15.00     Types: Cigarettes     Quit date:      Years since quittin.4    Smokeless tobacco: Never Used    Tobacco comment: quit age 45   Substance Use Topics    Alcohol use:  Yes     Alcohol/week: 14.0 standard drinks     Types: 14 Cans of beer per week     Comment: daily                                Counseling given: Not Answered  Comment: quit age 45      Vital Signs (Current):   Vitals:    21 0623   BP: (!) 143/77   Pulse: 79   Resp: 20   Temp: 97.6 °F (36.4 °C)   TempSrc: Temporal   SpO2: 97%   Weight: 212 lb (96.2 kg)   Height: 6' (1.829 m) BP Readings from Last 3 Encounters:   06/07/21 (!) 143/77   06/02/21 (!) 167/77   04/23/21 (!) 144/72       NPO Status: Time of last liquid consumption: 2100                        Time of last solid consumption: 2100                        Date of last liquid consumption: 06/06/21                        Date of last solid food consumption: 06/05/21    BMI:   Wt Readings from Last 3 Encounters:   06/07/21 212 lb (96.2 kg)   06/02/21 212 lb (96.2 kg)   04/23/21 219 lb 12.8 oz (99.7 kg)     Body mass index is 28.75 kg/m². CBC:   Lab Results   Component Value Date    WBC 7.4 06/02/2021    RBC 4.96 06/02/2021    HGB 15.0 06/02/2021    HCT 43.7 06/02/2021    MCV 88.1 06/02/2021    RDW 12.7 06/02/2021     06/02/2021       CMP:   Lab Results   Component Value Date     06/02/2021    K 4.0 06/02/2021    K 4.0 11/03/2020     06/02/2021    CO2 26 06/02/2021    BUN 12 06/02/2021    CREATININE 0.8 06/02/2021    GFRAA >60 06/02/2021    LABGLOM >60 06/02/2021    GLUCOSE 111 06/02/2021    PROT 6.8 12/24/2020    CALCIUM 9.4 06/02/2021    BILITOT 0.9 12/24/2020    ALKPHOS 120 12/24/2020    AST 16 12/24/2020    ALT 12 12/24/2020       POC Tests: No results for input(s): POCGLU, POCNA, POCK, POCCL, POCBUN, POCHEMO, POCHCT in the last 72 hours.     Coags:   Lab Results   Component Value Date    PROTIME 12.4 11/04/2020    INR 1.1 11/04/2020    APTT 40.7 09/26/2020       HCG (If Applicable): No results found for: PREGTESTUR, PREGSERUM, HCG, HCGQUANT     ABGs: No results found for: PHART, PO2ART, HWI8WSQ, SNC2JSX, BEART, A6ACXCRW     Type & Screen (If Applicable):  No results found for: LABABO, LABRH    Drug/Infectious Status (If Applicable):  No results found for: HIV, HEPCAB    COVID-19 Screening (If Applicable):   Lab Results   Component Value Date    COVID19 Not Detected 01/18/2021    COVID19 Not Detected 11/27/2020           Anesthesia Evaluation  Patient summary reviewed  Airway: Mallampati: I  TM distance: >3 FB   Neck ROM: full  Mouth opening: > = 3 FB Dental:    (+) upper dentures      Pulmonary: breath sounds clear to auscultation                            ROS comment: Former Smoker. Cardiovascular:    (+) hypertension:, CAD:, hyperlipidemia      ECG reviewed  Rhythm: regular  Rate: normal  Echocardiogram reviewed         Beta Blocker:  Dose within 24 Hrs      ROS comment: EKG: Normal Sinus Rhythm 85. ECHO:  Normal left ventricle size. Estimated left ventricle ejection fraction 60 %. Normal right ventricular size and function. No significant valve disease. Mildly dilated aortic root measuring 3.3-3.5 cm throughout. CATH:  1. 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 .   Endo/Other:    (+) hypothyroidism::., .                 Abdominal:           Vascular: negative vascular ROS. Anesthesia Plan      general     ASA 3       Induction: intravenous. BIS    Anesthetic plan and risks discussed with patient. Plan discussed with CRNA.     Attending anesthesiologist reviewed and agrees with Ochoa Moura MD   6/7/2021

## 2021-06-08 LAB
ANION GAP SERPL CALCULATED.3IONS-SCNC: 9 MMOL/L (ref 7–16)
APTT: 27.9 SEC (ref 24.5–35.1)
BUN BLDV-MCNC: 12 MG/DL (ref 6–23)
CALCIUM SERPL-MCNC: 8.9 MG/DL (ref 8.6–10.2)
CHLORIDE BLD-SCNC: 105 MMOL/L (ref 98–107)
CO2: 23 MMOL/L (ref 22–29)
CREAT SERPL-MCNC: 0.8 MG/DL (ref 0.7–1.2)
GFR AFRICAN AMERICAN: >60
GFR NON-AFRICAN AMERICAN: >60 ML/MIN/1.73
GLUCOSE BLD-MCNC: 137 MG/DL (ref 74–99)
HCT VFR BLD CALC: 39.8 % (ref 37–54)
HEMOGLOBIN: 13.8 G/DL (ref 12.5–16.5)
INR BLD: 1.1
MCH RBC QN AUTO: 30.4 PG (ref 26–35)
MCHC RBC AUTO-ENTMCNC: 34.7 % (ref 32–34.5)
MCV RBC AUTO: 87.7 FL (ref 80–99.9)
PDW BLD-RTO: 12.5 FL (ref 11.5–15)
PLATELET # BLD: 253 E9/L (ref 130–450)
PMV BLD AUTO: 9.3 FL (ref 7–12)
POTASSIUM REFLEX MAGNESIUM: 4.2 MMOL/L (ref 3.5–5)
PROTHROMBIN TIME: 12.2 SEC (ref 9.3–12.4)
RBC # BLD: 4.54 E12/L (ref 3.8–5.8)
SODIUM BLD-SCNC: 137 MMOL/L (ref 132–146)
WBC # BLD: 16.2 E9/L (ref 4.5–11.5)

## 2021-06-08 PROCEDURE — 6370000000 HC RX 637 (ALT 250 FOR IP): Performed by: STUDENT IN AN ORGANIZED HEALTH CARE EDUCATION/TRAINING PROGRAM

## 2021-06-08 PROCEDURE — 6370000000 HC RX 637 (ALT 250 FOR IP): Performed by: SURGERY

## 2021-06-08 PROCEDURE — 6360000002 HC RX W HCPCS: Performed by: SURGERY

## 2021-06-08 PROCEDURE — 80048 BASIC METABOLIC PNL TOTAL CA: CPT

## 2021-06-08 PROCEDURE — 36415 COLL VENOUS BLD VENIPUNCTURE: CPT

## 2021-06-08 PROCEDURE — 99024 POSTOP FOLLOW-UP VISIT: CPT | Performed by: SURGERY

## 2021-06-08 PROCEDURE — 2580000003 HC RX 258: Performed by: SURGERY

## 2021-06-08 PROCEDURE — 85027 COMPLETE CBC AUTOMATED: CPT

## 2021-06-08 PROCEDURE — 85730 THROMBOPLASTIN TIME PARTIAL: CPT

## 2021-06-08 PROCEDURE — 2060000000 HC ICU INTERMEDIATE R&B

## 2021-06-08 PROCEDURE — 2500000003 HC RX 250 WO HCPCS: Performed by: SURGERY

## 2021-06-08 PROCEDURE — 85610 PROTHROMBIN TIME: CPT

## 2021-06-08 RX ORDER — CLOPIDOGREL BISULFATE 75 MG/1
75 TABLET ORAL DAILY
Status: DISCONTINUED | OUTPATIENT
Start: 2021-06-08 | End: 2021-06-12 | Stop reason: HOSPADM

## 2021-06-08 RX ADMIN — METOPROLOL TARTRATE 5 MG: 1 INJECTION, SOLUTION INTRAVENOUS at 15:24

## 2021-06-08 RX ADMIN — SODIUM CHLORIDE, POTASSIUM CHLORIDE, SODIUM LACTATE AND CALCIUM CHLORIDE: 600; 310; 30; 20 INJECTION, SOLUTION INTRAVENOUS at 18:48

## 2021-06-08 RX ADMIN — METHOCARBAMOL 1000 MG: 100 INJECTION, SOLUTION INTRAMUSCULAR; INTRAVENOUS at 01:29

## 2021-06-08 RX ADMIN — METOPROLOL TARTRATE 5 MG: 1 INJECTION, SOLUTION INTRAVENOUS at 01:29

## 2021-06-08 RX ADMIN — Medication 2000 MG: at 21:29

## 2021-06-08 RX ADMIN — METHOCARBAMOL 1000 MG: 100 INJECTION, SOLUTION INTRAMUSCULAR; INTRAVENOUS at 17:41

## 2021-06-08 RX ADMIN — SODIUM CHLORIDE, POTASSIUM CHLORIDE, SODIUM LACTATE AND CALCIUM CHLORIDE: 600; 310; 30; 20 INJECTION, SOLUTION INTRAVENOUS at 10:59

## 2021-06-08 RX ADMIN — METHOCARBAMOL 1000 MG: 100 INJECTION, SOLUTION INTRAMUSCULAR; INTRAVENOUS at 10:59

## 2021-06-08 RX ADMIN — ATORVASTATIN CALCIUM 40 MG: 40 TABLET, FILM COATED ORAL at 21:28

## 2021-06-08 RX ADMIN — Medication 10 ML: at 21:29

## 2021-06-08 RX ADMIN — ASPIRIN 81 MG CHEWABLE TABLET 81 MG: 81 TABLET CHEWABLE at 08:57

## 2021-06-08 RX ADMIN — ENOXAPARIN SODIUM 40 MG: 40 INJECTION SUBCUTANEOUS at 08:57

## 2021-06-08 RX ADMIN — METOPROLOL TARTRATE 5 MG: 1 INJECTION, SOLUTION INTRAVENOUS at 08:24

## 2021-06-08 RX ADMIN — CLOPIDOGREL BISULFATE 75 MG: 75 TABLET ORAL at 08:57

## 2021-06-08 RX ADMIN — METOPROLOL TARTRATE 5 MG: 1 INJECTION, SOLUTION INTRAVENOUS at 21:28

## 2021-06-08 RX ADMIN — SODIUM CHLORIDE, POTASSIUM CHLORIDE, SODIUM LACTATE AND CALCIUM CHLORIDE: 600; 310; 30; 20 INJECTION, SOLUTION INTRAVENOUS at 01:28

## 2021-06-08 RX ADMIN — LEVOTHYROXINE SODIUM 137 MCG: 0.14 TABLET ORAL at 06:59

## 2021-06-08 ASSESSMENT — PAIN DESCRIPTION - PAIN TYPE
TYPE: SURGICAL PAIN
TYPE: SURGICAL PAIN

## 2021-06-08 ASSESSMENT — PAIN SCALES - GENERAL
PAINLEVEL_OUTOF10: 4
PAINLEVEL_OUTOF10: 3
PAINLEVEL_OUTOF10: 0
PAINLEVEL_OUTOF10: 4

## 2021-06-08 ASSESSMENT — PAIN DESCRIPTION - LOCATION
LOCATION: ABDOMEN
LOCATION: ABDOMEN

## 2021-06-08 NOTE — ANESTHESIA POSTPROCEDURE EVALUATION
Department of Anesthesiology  Postprocedure Note    Patient: Juve Santiago  MRN: [id number]  YOB: 1940  Date of evaluation: 6/8/2021  Time:  6:46 AM     Procedure Summary     Date: 06/07/21 Room / Location: [facility] OR 04 / [facility]    Anesthesia Start: 1290 Anesthesia Stop: 8269    Procedures:       LAPAROSCOPIC  SUBTOTAL CHOLECYSTECTOMY INTRAOPERATIVE CHOLANGIOGRAM (N/A Abdomen)      LAPAROSCOPIC CONVERTED TO  OPEN COLOSTOMY REVERSAL WITH PARTIAL TRANSVERSE COLECTOMY (N/A Abdomen)      MEDI PORT REMOVAL (N/A Chest) Diagnosis: (CURRENT COLOSTOMY, CHOLELITHIASIS, COLON ADENOCARCINOMA)    Surgeons: Belkys Roman MD Responsible Provider: Yenni Oh MD    Anesthesia Type: general ASA Status: 3          Anesthesia Type: general    Eloina Phase I: Eloina Score: 9    Eloina Phase II:      Last vitals: Reviewed and per EMR flowsheets.        Anesthesia Post Evaluation    Patient location during evaluation: PACU  Patient participation: complete - patient participated  Level of consciousness: awake  Pain score: 0  Airway patency: patent  Nausea & Vomiting: no nausea  Complications: no  Cardiovascular status: blood pressure returned to baseline  Respiratory status: acceptable  Hydration status: euvolemic

## 2021-06-08 NOTE — HOME CARE
[address] [facility] 9 referral received, awaiting final orders. Spoke with patient and verified demographics. Will follow. Consuelo Lennon, RAQUELN [address] [facility] 9.

## 2021-06-08 NOTE — DISCHARGE SUMMARY
11      atorvastatin (LIPITOR) 40 MG tablet Take 40 mg by mouth nightly       acetaminophen (TYLENOL) 500 MG tablet Take 500 mg by mouth every 6 hours as needed for Pain           POSTOPERATIVE TOPICS  After you go home, you may wish to have someone at home with you at least part of each day for the first one or two weeks to help with meals, bathing, etc. Often it is possible for you to stay with a relative or friend for several weeks until you are back on your feet. If no one is available, let us know so that we can begin planning for a home health aide or home healthcare referral. Sometimes placement in a temporary rehabilitation facility is an option. Constipation  Many people get constipated following surgery from the pain medications. This is the most common problem experienced after discharge. Increase fluid intake and fiber in your diet. We recommend all patients start taking stool softeners (such as Metamucil®) two to three times per day as directed on the label while you are still taking narcotic pain medicine. Don't wait until you are constipated to start. Also, increase physical activity such as walking. Over the counter stool softeners, laxatives, etc:   Miralax   Milk of magnesia   Benefiber, Fibercon, citrucel   Colace (docusate)   Dulcolax   Senokot (senna)    Sleeping Problems  Occasionally patients will have problems sleeping at night after discharge home. Be sure you are not napping too much during the day. Naps should be limited to a total of one hour per day. Be sure to exercise by walking frequently, especially outside of your house, so you are tired and sleepy by evening. Finally, most people find that just before bedtime taking a warm bath and drinking some milk products (milk, milkshake, ice cream, etc.) will relax them, making them drowsy and promote a good night sleep.  Taking a dose of pain medication just before bedtime also helps, especially if the reason for sleeping poorly is incisional pain. Fever  Take your temperature in the evening if you feel feverish especially in the first couple of weeks you are home. Call us if you have a temperature over 100.4°F degrees. Many patients experience night sweats early after surgery that produce enough perspiration to dampen your bedclothes. They are not usually caused by a fever. Although frustrating and worrisome, these sweats are of no significance and will resolve on their own; however, check your temperature if they occur. Leg Swelling  For the first 4?6 weeks following surgery, many patients notice swelling of their ankles and sometimes their lower legs. This is usually the result of sitting for longer periods of time with their legs hanging down. This problem is quite preventable if you sit with your legs elevated on a footstool or recliner. If you have swelling, it will also tend to resolve by sleeping at night with your legs elevated on a pillow or two. You also should try wearing the tight?fitting stockings (MARIA ELENA hose). Wearing these stocking during the day will usually decrease leg swelling (edema) quite rapidly. Blood Clot in the Leg  Very rarely, a patient will develop a red, swollen, sore leg after surgery. This could be a sign of a blood clot forming. You should call us if you notice this happening. You should also call us if you have increasing cough, pain, or shortness of breath. We must be kept informed of your progress, especially if your condition worsens. Post?operative Depression  It is relatively common and normal for patients undergoing any type of major surgery to feel blue and slightly depressed in the early period after surgery, especially after they first arrive home. This is a common and very natural reaction generally related to the frustration of not being able to return rapidly to normal activities after surgery. This feeling is natural and passes quickly within a few days or weeks.  The best remedy is to not sit in the house and brood, but rather to spend as much time as possible keeping busy, especially outside of the house walking, going to the shopping mall, the movies or dinner. At home, keeping busy with reading or other hobbies will take your mind off your surgery and the inconvenience of your convalescence. Remember this early depression is very common and normal and will pass. Diet  Your doctor will advise on any special diets. While a well?balanced diet does provide all the vitamins and minerals your body needs, you may wish to take a vitamin supplement. Often, many foods may taste bland or even unpleasant after surgery. This is usually caused by medications and will pass with time, especially after you no longer require strong pain medication. In addition, you should drink plenty of fluids like water and juice, to help keep your bowels moving. Activities  A sensible balance of rest and activity is the key to recovery. Keep in mind that you will have good days when you seem to have a lot of energy, and days when you feel as though you are backsliding. This is normal convalescence. Try not to overextend yourself and slowly increase your activities every day. Household Tasks and Lifting  Although you may perform light tasks around the house, you should stop if you tire or have pain. You should lift no more than 10 pounds for about 6 weeks following surgery. Lifting may also include such tasks as vacuuming and lawn?mowing, so be sensible. Exercise  Walking is the single best exercise for anyone. Beginning upon your discharge from the hospital, we recommend that you do it daily, preferably two to three times per day. You should start by going short distances and gradually work your way up to a mile or more per day. If the weather outside is rainy or too hot, most [facility] hospital have air conditioned shopping malls that are perfect for walking.  Going for walks outside of your home is preferable as it will improve your self?confidence, feeling of well?being, and generally hasten recovery. We encourage an early return to normal activity. Sports  You should not engage in any strenuous sports such as weight?lifting, tennis or jogging, for the first 6 weeks after surgery. Then, restart these sports gradually. Other activities such as walking or riding a stationary bicycle are accepted and encouraged. Golf is initially restricted to some extent after surgery. Putting practice may be started as soon as you are comfortable. After 2?3 weeks you may start chipping practice. However, you should wait 6 weeks before beginning drives, and then start at a driving range. It may be 8 weeks before you are playing a full 9 or 18 holes of golf. Driving  Driving should be left to someone else if you are taking narcotic pain medicine. You may, however, take car trips as a passenger. If the trip will last for over an hour, get out and walk every hour to stretch your legs. Always wear your seatbelt. Your driving restriction should only last 2?3 weeks. Social Activity  Resume your social and recreational activity slowly at first. Fatigue is the best gauge of overdoing. When you first return home, limit the number of visits by well?meaning, enthusiastic friends, but then gradually ease up these restrictions as you gain back your strength. Sex  Sexual activity is like exercise. Your capacity to tolerate it will return as you continue your recovery. There is no set time when you may resume sexual activity. Touching, hugging and massage are all wonderful for your mood and morale and are good alternatives until you feel ready for intercourse. Certain medications may affect sexual performance, so be patient. If you have questions, discuss this with your surgeon. Travel  Depending on the type of surgery you had, there may be restrictions on air travel for a short time after surgery.  Ask your surgeon before making reservations. Generally, do not travel further than 250 miles until you have returned for your initial first post?op clinic visit 2 weeks after hospital discharge. Work  Most people can return to work approximately 6?8 weeks after surgery. An earlier return may be possible depending on the type of surgery you had and your type of work. If your job is particularly strenuous, light duty work should be done until at least 8 weeks after surgery. Alcohol  Alcohol consumption is discouraged but not prohibited DO NOT DRINK alcohol while on narcotic pain medications since they interact and cause bad side effects. Use alcohol in moderation, and NEVER together with narcotic pain medication. Smoking  Tobacco is strictly forbidden. It is necessary to avoid all smoke while you are healing, and it is very strongly recommended that you stop smoking entirely. You must avoid tobacco use to preserve lung function. Avoid second hand smoke also. If you have had surgical removal of a lung cancer, continued smoking substantially increases the risk of a second lung cancer developing later. We will be very happy to refer you to a smoking cessation program to help you remain tobacco free. Cold and Flu Exposure  You should avoid large crowds or anyone with a known cold or flu until 6?8 weeks following your surgery. Your immune system is minimally depressed for a few weeks after surgery, but will return to normal by 4?6 weeks. If you develop a cough with significant phlegm production during this period, contact your surgeon. Flu Shot/ Pneumonia Shot  Many people ask about getting a flu shot. We recommend that you obtain a flu shot yearly. However, you should ask your family doctor or oncologist first. Also, inquire about a pneumonia shot (Pneumovax®), which is given once every five years.     Emergencies  If you develop a sudden, serious and apparently life-threatening problem such as severe shortness of breath, chest pain

## 2021-06-09 LAB
ALBUMIN SERPL-MCNC: 3.4 G/DL (ref 3.5–5.2)
ALP BLD-CCNC: 84 U/L (ref 40–129)
ALT SERPL-CCNC: 35 U/L (ref 0–40)
ANION GAP SERPL CALCULATED.3IONS-SCNC: 10 MMOL/L (ref 7–16)
AST SERPL-CCNC: 26 U/L (ref 0–39)
BASOPHILS ABSOLUTE: 0.03 E9/L (ref 0–0.2)
BASOPHILS RELATIVE PERCENT: 0.2 % (ref 0–2)
BILIRUB SERPL-MCNC: 1 MG/DL (ref 0–1.2)
BUN BLDV-MCNC: 9 MG/DL (ref 6–23)
CALCIUM SERPL-MCNC: 8.5 MG/DL (ref 8.6–10.2)
CHLORIDE BLD-SCNC: 101 MMOL/L (ref 98–107)
CO2: 25 MMOL/L (ref 22–29)
CREAT SERPL-MCNC: 0.9 MG/DL (ref 0.7–1.2)
EOSINOPHILS ABSOLUTE: 0.04 E9/L (ref 0.05–0.5)
EOSINOPHILS RELATIVE PERCENT: 0.3 % (ref 0–6)
GFR AFRICAN AMERICAN: >60
GFR NON-AFRICAN AMERICAN: >60 ML/MIN/1.73
GLUCOSE BLD-MCNC: 100 MG/DL (ref 74–99)
HCT VFR BLD CALC: 36.4 % (ref 37–54)
HEMOGLOBIN: 12.5 G/DL (ref 12.5–16.5)
IMMATURE GRANULOCYTES #: 0.06 E9/L
IMMATURE GRANULOCYTES %: 0.4 % (ref 0–5)
LYMPHOCYTES ABSOLUTE: 2.42 E9/L (ref 1.5–4)
LYMPHOCYTES RELATIVE PERCENT: 17.9 % (ref 20–42)
MCH RBC QN AUTO: 30.4 PG (ref 26–35)
MCHC RBC AUTO-ENTMCNC: 34.3 % (ref 32–34.5)
MCV RBC AUTO: 88.6 FL (ref 80–99.9)
MONOCYTES ABSOLUTE: 1.2 E9/L (ref 0.1–0.95)
MONOCYTES RELATIVE PERCENT: 8.9 % (ref 2–12)
NEUTROPHILS ABSOLUTE: 9.79 E9/L (ref 1.8–7.3)
NEUTROPHILS RELATIVE PERCENT: 72.3 % (ref 43–80)
PDW BLD-RTO: 12.8 FL (ref 11.5–15)
PLATELET # BLD: 230 E9/L (ref 130–450)
PMV BLD AUTO: 9.4 FL (ref 7–12)
POTASSIUM SERPL-SCNC: 3.7 MMOL/L (ref 3.5–5)
RBC # BLD: 4.11 E12/L (ref 3.8–5.8)
SODIUM BLD-SCNC: 136 MMOL/L (ref 132–146)
TOTAL PROTEIN: 6.3 G/DL (ref 6.4–8.3)
WBC # BLD: 13.5 E9/L (ref 4.5–11.5)

## 2021-06-09 PROCEDURE — 6360000002 HC RX W HCPCS: Performed by: SURGERY

## 2021-06-09 PROCEDURE — 2500000003 HC RX 250 WO HCPCS: Performed by: SURGERY

## 2021-06-09 PROCEDURE — 2500000003 HC RX 250 WO HCPCS: Performed by: STUDENT IN AN ORGANIZED HEALTH CARE EDUCATION/TRAINING PROGRAM

## 2021-06-09 PROCEDURE — 6370000000 HC RX 637 (ALT 250 FOR IP): Performed by: STUDENT IN AN ORGANIZED HEALTH CARE EDUCATION/TRAINING PROGRAM

## 2021-06-09 PROCEDURE — 99024 POSTOP FOLLOW-UP VISIT: CPT | Performed by: SURGERY

## 2021-06-09 PROCEDURE — 80053 COMPREHEN METABOLIC PANEL: CPT

## 2021-06-09 PROCEDURE — 6370000000 HC RX 637 (ALT 250 FOR IP): Performed by: SURGERY

## 2021-06-09 PROCEDURE — 36415 COLL VENOUS BLD VENIPUNCTURE: CPT

## 2021-06-09 PROCEDURE — 2060000000 HC ICU INTERMEDIATE R&B

## 2021-06-09 PROCEDURE — 85025 COMPLETE CBC W/AUTO DIFF WBC: CPT

## 2021-06-09 PROCEDURE — 97165 OT EVAL LOW COMPLEX 30 MIN: CPT

## 2021-06-09 PROCEDURE — 2580000003 HC RX 258: Performed by: SURGERY

## 2021-06-09 PROCEDURE — 97530 THERAPEUTIC ACTIVITIES: CPT

## 2021-06-09 RX ORDER — OXYCODONE HYDROCHLORIDE 10 MG/1
10 TABLET ORAL EVERY 4 HOURS PRN
Status: DISCONTINUED | OUTPATIENT
Start: 2021-06-09 | End: 2021-06-12 | Stop reason: HOSPADM

## 2021-06-09 RX ORDER — AMLODIPINE BESYLATE 10 MG/1
10 TABLET ORAL DAILY
Status: DISCONTINUED | OUTPATIENT
Start: 2021-06-09 | End: 2021-06-12 | Stop reason: HOSPADM

## 2021-06-09 RX ORDER — OXYCODONE HYDROCHLORIDE 5 MG/1
5 TABLET ORAL EVERY 4 HOURS PRN
Status: DISCONTINUED | OUTPATIENT
Start: 2021-06-09 | End: 2021-06-12 | Stop reason: HOSPADM

## 2021-06-09 RX ORDER — DEXTROSE, SODIUM CHLORIDE, AND POTASSIUM CHLORIDE 5; .45; .15 G/100ML; G/100ML; G/100ML
INJECTION INTRAVENOUS CONTINUOUS
Status: DISCONTINUED | OUTPATIENT
Start: 2021-06-09 | End: 2021-06-11

## 2021-06-09 RX ADMIN — METHOCARBAMOL 1000 MG: 100 INJECTION, SOLUTION INTRAMUSCULAR; INTRAVENOUS at 01:26

## 2021-06-09 RX ADMIN — Medication 10 ML: at 09:23

## 2021-06-09 RX ADMIN — ASPIRIN 81 MG CHEWABLE TABLET 81 MG: 81 TABLET CHEWABLE at 09:23

## 2021-06-09 RX ADMIN — ENOXAPARIN SODIUM 40 MG: 40 INJECTION SUBCUTANEOUS at 09:23

## 2021-06-09 RX ADMIN — POTASSIUM CHLORIDE, DEXTROSE MONOHYDRATE AND SODIUM CHLORIDE: 150; 5; 450 INJECTION, SOLUTION INTRAVENOUS at 06:30

## 2021-06-09 RX ADMIN — LEVOTHYROXINE SODIUM 137 MCG: 0.14 TABLET ORAL at 06:30

## 2021-06-09 RX ADMIN — SODIUM CHLORIDE, POTASSIUM CHLORIDE, SODIUM LACTATE AND CALCIUM CHLORIDE: 600; 310; 30; 20 INJECTION, SOLUTION INTRAVENOUS at 02:57

## 2021-06-09 RX ADMIN — METHOCARBAMOL 1000 MG: 100 INJECTION, SOLUTION INTRAMUSCULAR; INTRAVENOUS at 10:55

## 2021-06-09 RX ADMIN — METOPROLOL TARTRATE 5 MG: 1 INJECTION, SOLUTION INTRAVENOUS at 01:26

## 2021-06-09 RX ADMIN — METOPROLOL TARTRATE 5 MG: 1 INJECTION, SOLUTION INTRAVENOUS at 14:41

## 2021-06-09 RX ADMIN — METOPROLOL TARTRATE 5 MG: 1 INJECTION, SOLUTION INTRAVENOUS at 20:51

## 2021-06-09 RX ADMIN — METHOCARBAMOL 1000 MG: 100 INJECTION, SOLUTION INTRAMUSCULAR; INTRAVENOUS at 18:49

## 2021-06-09 RX ADMIN — AMLODIPINE BESYLATE 10 MG: 10 TABLET ORAL at 14:15

## 2021-06-09 RX ADMIN — Medication 10 ML: at 20:51

## 2021-06-09 RX ADMIN — METOPROLOL TARTRATE 5 MG: 1 INJECTION, SOLUTION INTRAVENOUS at 09:23

## 2021-06-09 RX ADMIN — ATORVASTATIN CALCIUM 40 MG: 40 TABLET, FILM COATED ORAL at 20:51

## 2021-06-09 RX ADMIN — POTASSIUM CHLORIDE, DEXTROSE MONOHYDRATE AND SODIUM CHLORIDE: 150; 5; 450 INJECTION, SOLUTION INTRAVENOUS at 18:49

## 2021-06-09 RX ADMIN — CLOPIDOGREL BISULFATE 75 MG: 75 TABLET ORAL at 09:23

## 2021-06-09 ASSESSMENT — PAIN SCALES - GENERAL
PAINLEVEL_OUTOF10: 0

## 2021-06-09 NOTE — OP NOTE
elected to return to this dissection once we had the aid of cholangiography. We positioned the patient head down to dissect out our rectal stump. We were then able to visualize it coming up out of the pelvis and adhesed to the left lateral abdominal wall. With scissors, we were able to lyse these adhesions and had sufficient mobility of our stump. At this time we attempted to pass rectal dilators, but due to the length of our stump, it was too long to facilitate an end to end stapler. We decided to perform instead a side to side anastomosis as there were significant small bowel adhesions within the pelvis that were more dense and would prove difficult to dissect. Now, with the aid of cholangiography equiptment, we moved back to performing our cholecystectomy. Using a combination of blunt and sharp dissection we were able to identify the cystic artery as it coursed on top of the gallbladder along its mid point, it was clipped and transected. We were unable to develop a safe plane between the gallbladder and liver edge more inferiorly and were able to visualize what we believed to be the common bile duct. We elected to perform a Wade clamp cholangiogram along what we believed to be the middle of the gallbladder. The table was not in position to perform a cholangiogram with conventional C-arm, therefore we placed a flat plate under the patient and performed an xray after injecting dye. This demonstrated that we were high on the gallbladder and away from the common bile duct as evidenced by visualization of the common duct stent. We removed the flat plate and in doing so the patients left leg within the fin rotated internally and the patient had slid down the bed. The fin would not completely tighten we slid the patient back up the bed and took his legs out of the lithotomy fins and positioned him supine, securing the legs with a leg strap.  We then proceeded to take the gallbladder in a dome-down approach to the level of the performed cholangiogram. At this point, we did have a portion of the back wall of the gallbladder posteriorly and inferiorly and again with concern of the inflammatory mass and proximity to the common bile duct, we elected to transect the gallbladder at this point with a green load endo JESSICA stapler. There was a small posterior vessel bleeding on the liver bed that was controlled with clips. We then turned to creation of our colo-rectal anastomosis, we continued some more laparoscopic lysis of adhesions of the proximal colon from adjacent small bowel which provided us sufficient length. But due to the need for stapled side to side anastomosis, we were not able to facilitate this laparoscopically. We performed a lower midline laparotomy. We transected the stomal end using a blue load JESSICA stapler, and then elected to resect another small segment to facilitate no kinking of our anastomosis. Two enterotomies were made in the proximal colon and rectal stump and a side to side stapled anastomosis was created using a blue load JESSICA stapler. The common channel was closed with interrupted silk sutures and a second layer of lembert silk sutures. We then irrigated the abdomen until clear. A 19F round drain was placed through the subxyphoid port into the gallbladder fossa and secured with a nylon suture. The lower midline fascia was closed in the standard fashion with 0-looped PDS. The ostomy site fascia was closed with interrupted figure of eight #1 PDS sutures. The wounds were irrigated and stapled in an interrupted fashion and packed with betadine telfa ronal. The skin was washed and dried and a sterile dressing applied. The left chest was prepped and draped. An incision was made through the previous scar with a scalpel and cautery used to enter the capsule around the indwelling port. The sutures securing the port were clipped and removed.  The port was grasped and retracted slightly, a purse string 3-0 vicryl suture was placed around the catheter tract. The port was then removed entirely and the purse string suture tied down without back bleeding. The cavity was irrigated and closed in layers of 3-0 vicryl and 4-0 monocryl suture. The skin was washed and dried and skin glue applied. Needle, sponge, and instrument counts were reported as correct x2. Dr. Gege Weinstein was present and scrubbed throughout the case. The patient tolerated the procedure well without complications. He was transferred to the recovery area in good condition. Electronically signed by Rey Irwin DO on 6/9/21 at 8:51 AM [facility]   Attending Physician Statement:  I was present during the entire procedure and was actively supervising and directing the resident. There were no immediate complications.     Joyce Junior MD

## 2021-06-09 NOTE — PROGRESS NOTES
[facility]  ATTENDING PHYSICIAN PROGRESS NOTE     I have examined the patient and  reviewed the record. I have reviewed all relevant labs and imaging data. The following summarizes my clinical findings and independent assessment. CC: post op    S. Patient has been out of bed. Complains of soreness    O.  Vitals:    06/09/21 0915   BP: (!) 150/67   Pulse: 89   Resp: 16   Temp: 99.2 °F (37.3 °C)   SpO2: 93%     NAD  Abdomen soft, approp tender    ASSESSMENT:  Active Problems:    History of colostomy reversal  Resolved Problems:    * No resolved hospital problems. *       PLAN:  Postop day 2  Await bowel function, okay for ice chips  Patient has been ambulating  History of MI-on aspirin, resume Plavix  Remove Krause since urine output has been adequate  DVT Proph: SCDS/Suzanne Lopes MD, FACS  6/9/2021  11:51 AM    NOTE: This report was transcribed using voice recognition software. Every effort was made to ensure accuracy; however, inadvertent computerized transcription errors may be present.

## 2021-06-09 NOTE — PROGRESS NOTES
Occupational Therapy    OCCUPATIONAL THERAPY INITIAL EVALUATION    [facility] [address] [place] [address]    Date:2021                                             Patient Name: Matthias Flores  MRN: [id number]  : 1940  Room: [address] [place] [address]    Evaluating OT: Rhesa Opitz, OTD, OTR/L; #[id number]    Referring Provider: Montserrat Villafuerte DO  Specific Provider Orders/Date: OT Evaluation and Treatment, 2021    Diagnosis: History of colostomy reversal [Z98.890]  Surgery: prolapsed colostomy cholelithiasis  s/p lysis of adhesions, laparoscopic subtotal cholecystectomy, intraoperative cholangiogram, laparoscopic converted to open colostomy reversal with partial transverse colectomy, ANGELITA drain placement in gallbladder leeann, mediport removal (21)    Pertinent Medical History: CAD, prostate cancer, malignant neoplasm of splenic flexure, thyroid disease, cardiac cath, colostomy, ERCP with stent insertion             Precautions:  Fall Risk, tele, abdominal, ANGELITA drain      Assessment of current deficits   [x] Functional mobility   [x]ADLs  [x] Strength               []Cognition   [x] Functional transfers   [x] IADLs         [x] Safety Awareness   [x]Endurance   [x] Fine Coordination              [x] Balance      [] Vision/perception   [x]Sensation    []Gross Motor Coordination  [] ROM  [] Delirium                   [] Motor Control     OT PLAN OF CARE   OT POC based on physician orders, patient diagnosis and results of clinical assessment    Frequency/Duration: 1-3 days/wk for 2 weeks PRN   Specific OT Treatment Interventions to include:   * Instruction/training on adapted ADL techniques and AE recommendations to increase functional independence within        precautions  * Training on energy conservation strategies, correct breathing pattern and techniques to improve independence/tolerance for self-care routine  * Functional transfer/mobility training/DME recommendations for increased independence, safety, and fall prevention  * Patient/Family education to increase follow through with safety techniques and functional independence  * Recommendation of environmental modifications for increased safety with functional transfers/mobility and ADLs  * Splinting/positioning for increased function, prevention of contractures, and improve skin integrity  * Therapeutic exercise to improve motor endurance, ROM, and functional strength for ADLs/functional transfers  * Therapeutic activities to facilitate/challenge dynamic balance, stand tolerance for increased safety and independence with ADLs  * Therapeutic activities to facilitate gross/fine motor skills for increased independence with ADLs  * Positioning to improve skin integrity, interaction with environment and functional independence  * Delirium prevention/treatment  * Manual techniques for edema management    Recommended Adaptive Equipment: TBD pending discharge plan, AE as needed for ADLs    Home Living: Humaeint lives with his wife in a 2 story house with ~5 step to enter with B railing. Bedroom/bathroom are located on the 2nd floor. Bathroom setup: Walk-in shower with shower chair   Equipment owned: shower chair, cane, reacher, sock-aid    Prior Level of Function: I/mod I with ADLs. Shares IADLs with spouse. Patient completed functional mobility using no device at home and cane in the community.   Driving: yes  Occupation: Retired  Leisure: Gardening    Pain Level: Reports \"soreness\" in abdomen  Cognition: A&O: 4/4; Follows multi step directions   Memory:  Good   Sequencing:  Fair+   Problem solving:  Fair+  Judgement/safety:  Fair+     Functional Assessment:  AM-PAC Daily Activity Raw Score: 16/24   Initial Eval Status  Date: 6/9/21 Treatment Status  Date: STGs = LTGs  Time frame: 10-14 days   Feeding Modified [place]       Grooming Contact Guard Assist   Completed standing at sink to wash face Modified [place]    UB Dressing Moderate Assist   Doffed/donned hospital gown, verbal cues for positioning  Modified [place]    LB Dressing Moderate Assist   Simulated  Modified [place]   Using AE as needed   Bathing Moderate Assist  Modified [place]    Toileting Minimal Assist   Modified [place]    Bed Mobility  Supine to sit: Moderate Assist   Sit to supine: Minimal Assist   Modified log roll techniqu3  Supine to sit: Modified [place]   Sit to supine: Modified [place]    Functional Transfers Contact Guard Assist   Modified [place]    Functional Mobility Contact Guard Assist   Completed in room/bathroom without device  Independent   Functional household distance   Balance Sitting:     Static: SBA    Dynamic: SBA  Standing: CGA     Activity Tolerance Fair  Good   Visual/  Perceptual Glasses: yes                Hand Dominance: R handed   AROM (PROM) Strength Additional Info:    RUE  WFL NT   : 4-/5 Good FMC/dexterity noted during ADL tasks       LUE WFL NT  : 4-/5 Good FMC/dexterity noted during ADL tasks       Hearing: WFL   Sensation:  No c/o numbness or tingling   Tone: WFL   Edema: none observed B UE    Comments: Nursing approved OT session. Upon arrival patient semi-supine in bed, agreeable to session. At end of session, patient semi-supine in bed with call light and phone within reach, all lines and tubes intact. OT evaluation performed with education provided regarding the purpose and benefits of OT session, along with mobility and I/ADL completion. Overall patient demonstrated decreased activity tolerance and weakness limiting independence and safety during completion of ADL/functional transfer/mobility tasks. Education provided regarding modified techniques for ADLs for safety and pain management. Patient verbalized and demonstrated good understanding.  Patient would benefit from continued skilled OT to increase safety and independence with completion of ADL/IADL tasks for functional independence and improved quality of life. Treatment: OT treatment provided this date includes:    Instruction/training on safety and adapted techniques for completion of ADLs: Facilitated grooming tasks standing at stink. Set-up for item retrieval with CGA for balance to wash face. Mod A to [facility]/ don hospital gown with verbal cues and assistance with positioning through R UE due to IV placement.   Instruction/training on safe functional mobility/transfer techniques: Therapist assessed and modified environment to maximize safety and patient performance. Mod A supine>sit and min A sit>supine with verbal cues for modified log roll technique. CGA sit<>stand and to complete functional mobility in room/bathroom without device.   Skilled monitoring of vitals:  Skilled monitoring of the patient's response throughout treatment. Rehab Potential: Good  for established goals     Patient / Family Goal: To return home      Patient and/or family were instructed on functional diagnosis, prognosis/goals and OT plan of care. Demonstrated good understanding. Eval Complexity: Low    Time In: 10:15  Time Out: 10:44  Total Treatment Time: 14 minutes    Min Units   OT Eval Low 97165  X  1   OT Eval Medium 40230      OT Eval High 94000       OT Re-Eval [id number]       Therapeutic Ex 90691       Therapeutic Activities 84523  8  1   ADL/Self Care 21585  6  0   Orthotic Management 10955       Neuro Re-Ed 95642       Non-Billable Time              Evaluation Time includes thorough review of current medical information, gathering information on past medical history/social history and prior level of function, completion of standardized testing/informal observation of tasks, assessment of data and education on plan of care and goals.             Dalila Holter, OTD, OTR/L; #[id number]

## 2021-06-10 LAB
ANION GAP SERPL CALCULATED.3IONS-SCNC: 11 MMOL/L (ref 7–16)
BASOPHILS ABSOLUTE: 0.03 E9/L (ref 0–0.2)
BASOPHILS RELATIVE PERCENT: 0.3 % (ref 0–2)
BUN BLDV-MCNC: 7 MG/DL (ref 6–23)
CALCIUM SERPL-MCNC: 8.6 MG/DL (ref 8.6–10.2)
CHLORIDE BLD-SCNC: 106 MMOL/L (ref 98–107)
CO2: 23 MMOL/L (ref 22–29)
CREAT SERPL-MCNC: 0.8 MG/DL (ref 0.7–1.2)
EOSINOPHILS ABSOLUTE: 0.16 E9/L (ref 0.05–0.5)
EOSINOPHILS RELATIVE PERCENT: 1.6 % (ref 0–6)
GFR AFRICAN AMERICAN: >60
GFR NON-AFRICAN AMERICAN: >60 ML/MIN/1.73
GLUCOSE BLD-MCNC: 125 MG/DL (ref 74–99)
HCT VFR BLD CALC: 35.9 % (ref 37–54)
HEMOGLOBIN: 12.2 G/DL (ref 12.5–16.5)
IMMATURE GRANULOCYTES #: 0.05 E9/L
IMMATURE GRANULOCYTES %: 0.5 % (ref 0–5)
LYMPHOCYTES ABSOLUTE: 2.01 E9/L (ref 1.5–4)
LYMPHOCYTES RELATIVE PERCENT: 19.5 % (ref 20–42)
MCH RBC QN AUTO: 30.1 PG (ref 26–35)
MCHC RBC AUTO-ENTMCNC: 34 % (ref 32–34.5)
MCV RBC AUTO: 88.6 FL (ref 80–99.9)
MONOCYTES ABSOLUTE: 0.88 E9/L (ref 0.1–0.95)
MONOCYTES RELATIVE PERCENT: 8.5 % (ref 2–12)
NEUTROPHILS ABSOLUTE: 7.18 E9/L (ref 1.8–7.3)
NEUTROPHILS RELATIVE PERCENT: 69.6 % (ref 43–80)
PDW BLD-RTO: 12.4 FL (ref 11.5–15)
PLATELET # BLD: 207 E9/L (ref 130–450)
PMV BLD AUTO: 9.4 FL (ref 7–12)
POTASSIUM SERPL-SCNC: 3.8 MMOL/L (ref 3.5–5)
RBC # BLD: 4.05 E12/L (ref 3.8–5.8)
SODIUM BLD-SCNC: 140 MMOL/L (ref 132–146)
WBC # BLD: 10.3 E9/L (ref 4.5–11.5)

## 2021-06-10 PROCEDURE — 2500000003 HC RX 250 WO HCPCS: Performed by: SURGERY

## 2021-06-10 PROCEDURE — 6360000002 HC RX W HCPCS: Performed by: SURGERY

## 2021-06-10 PROCEDURE — 99024 POSTOP FOLLOW-UP VISIT: CPT | Performed by: SURGERY

## 2021-06-10 PROCEDURE — 36415 COLL VENOUS BLD VENIPUNCTURE: CPT

## 2021-06-10 PROCEDURE — 85025 COMPLETE CBC W/AUTO DIFF WBC: CPT

## 2021-06-10 PROCEDURE — 2580000003 HC RX 258: Performed by: SURGERY

## 2021-06-10 PROCEDURE — 1200000000 HC SEMI PRIVATE

## 2021-06-10 PROCEDURE — 6370000000 HC RX 637 (ALT 250 FOR IP): Performed by: STUDENT IN AN ORGANIZED HEALTH CARE EDUCATION/TRAINING PROGRAM

## 2021-06-10 PROCEDURE — 6370000000 HC RX 637 (ALT 250 FOR IP): Performed by: SURGERY

## 2021-06-10 PROCEDURE — 80048 BASIC METABOLIC PNL TOTAL CA: CPT

## 2021-06-10 PROCEDURE — 2500000003 HC RX 250 WO HCPCS: Performed by: STUDENT IN AN ORGANIZED HEALTH CARE EDUCATION/TRAINING PROGRAM

## 2021-06-10 RX ADMIN — AMLODIPINE BESYLATE 10 MG: 10 TABLET ORAL at 09:14

## 2021-06-10 RX ADMIN — METOPROLOL TARTRATE 5 MG: 1 INJECTION, SOLUTION INTRAVENOUS at 21:36

## 2021-06-10 RX ADMIN — ENOXAPARIN SODIUM 40 MG: 40 INJECTION SUBCUTANEOUS at 09:14

## 2021-06-10 RX ADMIN — METOPROLOL TARTRATE 5 MG: 1 INJECTION, SOLUTION INTRAVENOUS at 01:39

## 2021-06-10 RX ADMIN — METHOCARBAMOL 1000 MG: 100 INJECTION, SOLUTION INTRAMUSCULAR; INTRAVENOUS at 18:36

## 2021-06-10 RX ADMIN — LEVOTHYROXINE SODIUM 137 MCG: 0.14 TABLET ORAL at 06:40

## 2021-06-10 RX ADMIN — METOPROLOL TARTRATE 5 MG: 1 INJECTION, SOLUTION INTRAVENOUS at 09:14

## 2021-06-10 RX ADMIN — METHOCARBAMOL 1000 MG: 100 INJECTION, SOLUTION INTRAMUSCULAR; INTRAVENOUS at 09:32

## 2021-06-10 RX ADMIN — CLOPIDOGREL BISULFATE 75 MG: 75 TABLET ORAL at 09:14

## 2021-06-10 RX ADMIN — METOPROLOL TARTRATE 5 MG: 1 INJECTION, SOLUTION INTRAVENOUS at 14:58

## 2021-06-10 RX ADMIN — POTASSIUM CHLORIDE, DEXTROSE MONOHYDRATE AND SODIUM CHLORIDE: 150; 5; 450 INJECTION, SOLUTION INTRAVENOUS at 09:45

## 2021-06-10 RX ADMIN — Medication 10 ML: at 09:14

## 2021-06-10 RX ADMIN — METHOCARBAMOL 1000 MG: 100 INJECTION, SOLUTION INTRAMUSCULAR; INTRAVENOUS at 01:44

## 2021-06-10 RX ADMIN — ASPIRIN 81 MG CHEWABLE TABLET 81 MG: 81 TABLET CHEWABLE at 09:14

## 2021-06-10 RX ADMIN — ATORVASTATIN CALCIUM 40 MG: 40 TABLET, FILM COATED ORAL at 21:36

## 2021-06-10 ASSESSMENT — PAIN SCALES - GENERAL
PAINLEVEL_OUTOF10: 0
PAINLEVEL_OUTOF10: 0

## 2021-06-10 NOTE — CARE COORDINATION
Chart reviewed, pt continued on IV Robaxin Q 8 hr, narvaez d/c, awaiting bowel function, sips and chips; discharge plan remains home with [facility], will need order prior to discharge; son or daughter to transport once medically stable; will check for general floor transfer.

## 2021-06-11 LAB
ANION GAP SERPL CALCULATED.3IONS-SCNC: 9 MMOL/L (ref 7–16)
BASOPHILS ABSOLUTE: 0.03 E9/L (ref 0–0.2)
BASOPHILS RELATIVE PERCENT: 0.4 % (ref 0–2)
BUN BLDV-MCNC: 7 MG/DL (ref 6–23)
CALCIUM SERPL-MCNC: 8.8 MG/DL (ref 8.6–10.2)
CHLORIDE BLD-SCNC: 104 MMOL/L (ref 98–107)
CO2: 24 MMOL/L (ref 22–29)
CREAT SERPL-MCNC: 0.8 MG/DL (ref 0.7–1.2)
EOSINOPHILS ABSOLUTE: 0.27 E9/L (ref 0.05–0.5)
EOSINOPHILS RELATIVE PERCENT: 3.4 % (ref 0–6)
GFR AFRICAN AMERICAN: >60
GFR NON-AFRICAN AMERICAN: >60 ML/MIN/1.73
GLUCOSE BLD-MCNC: 125 MG/DL (ref 74–99)
HCT VFR BLD CALC: 34.3 % (ref 37–54)
HEMOGLOBIN: 11.9 G/DL (ref 12.5–16.5)
IMMATURE GRANULOCYTES #: 0.03 E9/L
IMMATURE GRANULOCYTES %: 0.4 % (ref 0–5)
LYMPHOCYTES ABSOLUTE: 1.76 E9/L (ref 1.5–4)
LYMPHOCYTES RELATIVE PERCENT: 22.1 % (ref 20–42)
MCH RBC QN AUTO: 30.4 PG (ref 26–35)
MCHC RBC AUTO-ENTMCNC: 34.7 % (ref 32–34.5)
MCV RBC AUTO: 87.5 FL (ref 80–99.9)
MONOCYTES ABSOLUTE: 0.6 E9/L (ref 0.1–0.95)
MONOCYTES RELATIVE PERCENT: 7.5 % (ref 2–12)
NEUTROPHILS ABSOLUTE: 5.27 E9/L (ref 1.8–7.3)
NEUTROPHILS RELATIVE PERCENT: 66.2 % (ref 43–80)
PDW BLD-RTO: 12.3 FL (ref 11.5–15)
PLATELET # BLD: 225 E9/L (ref 130–450)
PMV BLD AUTO: 9.2 FL (ref 7–12)
POTASSIUM SERPL-SCNC: 3.9 MMOL/L (ref 3.5–5)
RBC # BLD: 3.92 E12/L (ref 3.8–5.8)
SODIUM BLD-SCNC: 137 MMOL/L (ref 132–146)
WBC # BLD: 8 E9/L (ref 4.5–11.5)

## 2021-06-11 PROCEDURE — 6370000000 HC RX 637 (ALT 250 FOR IP): Performed by: STUDENT IN AN ORGANIZED HEALTH CARE EDUCATION/TRAINING PROGRAM

## 2021-06-11 PROCEDURE — 36415 COLL VENOUS BLD VENIPUNCTURE: CPT

## 2021-06-11 PROCEDURE — 2580000003 HC RX 258: Performed by: SURGERY

## 2021-06-11 PROCEDURE — 99024 POSTOP FOLLOW-UP VISIT: CPT | Performed by: SURGERY

## 2021-06-11 PROCEDURE — 6360000002 HC RX W HCPCS: Performed by: SURGERY

## 2021-06-11 PROCEDURE — 1200000000 HC SEMI PRIVATE

## 2021-06-11 PROCEDURE — 85025 COMPLETE CBC W/AUTO DIFF WBC: CPT

## 2021-06-11 PROCEDURE — 80048 BASIC METABOLIC PNL TOTAL CA: CPT

## 2021-06-11 PROCEDURE — 2500000003 HC RX 250 WO HCPCS: Performed by: SURGERY

## 2021-06-11 PROCEDURE — 6370000000 HC RX 637 (ALT 250 FOR IP): Performed by: SURGERY

## 2021-06-11 RX ORDER — METHOCARBAMOL 500 MG/1
1000 TABLET, FILM COATED ORAL 3 TIMES DAILY
Status: DISCONTINUED | OUTPATIENT
Start: 2021-06-11 | End: 2021-06-12 | Stop reason: HOSPADM

## 2021-06-11 RX ORDER — METOPROLOL SUCCINATE 50 MG/1
50 TABLET, EXTENDED RELEASE ORAL DAILY
Status: DISCONTINUED | OUTPATIENT
Start: 2021-06-11 | End: 2021-06-12 | Stop reason: HOSPADM

## 2021-06-11 RX ADMIN — METHOCARBAMOL 1000 MG: 500 TABLET ORAL at 17:25

## 2021-06-11 RX ADMIN — ATORVASTATIN CALCIUM 40 MG: 40 TABLET, FILM COATED ORAL at 20:49

## 2021-06-11 RX ADMIN — Medication 10 ML: at 20:49

## 2021-06-11 RX ADMIN — Medication 10 ML: at 09:10

## 2021-06-11 RX ADMIN — ASPIRIN 81 MG CHEWABLE TABLET 81 MG: 81 TABLET CHEWABLE at 09:05

## 2021-06-11 RX ADMIN — METHOCARBAMOL 1000 MG: 100 INJECTION, SOLUTION INTRAMUSCULAR; INTRAVENOUS at 01:47

## 2021-06-11 RX ADMIN — CLOPIDOGREL BISULFATE 75 MG: 75 TABLET ORAL at 09:05

## 2021-06-11 RX ADMIN — ENOXAPARIN SODIUM 40 MG: 40 INJECTION SUBCUTANEOUS at 09:05

## 2021-06-11 RX ADMIN — METOPROLOL TARTRATE 5 MG: 1 INJECTION, SOLUTION INTRAVENOUS at 01:47

## 2021-06-11 RX ADMIN — METHOCARBAMOL 1000 MG: 500 TABLET ORAL at 11:27

## 2021-06-11 RX ADMIN — AMLODIPINE BESYLATE 10 MG: 10 TABLET ORAL at 09:05

## 2021-06-11 RX ADMIN — LEVOTHYROXINE SODIUM 137 MCG: 0.14 TABLET ORAL at 06:22

## 2021-06-11 RX ADMIN — METOPROLOL SUCCINATE 50 MG: 50 TABLET, EXTENDED RELEASE ORAL at 09:05

## 2021-06-11 ASSESSMENT — PAIN SCALES - GENERAL
PAINLEVEL_OUTOF10: 0

## 2021-06-11 NOTE — CARE COORDINATION
Chart reviewed, pt had BM yesterday, on RAbrice d/senait'savita, started on CLD this morning, anticipate discharge within the next 24 hrs pending pt tolerating diet and advancement of diet. Discharge plan remains home with [facility], will need orders prior to discharge; son or daughter to transport once medically stable.

## 2021-06-11 NOTE — PROGRESS NOTES
[facility]  ATTENDING PHYSICIAN PROGRESS NOTE     I have examined the patient and  reviewed the record. I have reviewed all relevant labs and imaging data. The following summarizes my clinical findings and independent assessment. CC: post op    S. Patient has been ambulating. Pt is passing gas now. 1bm    O.  Vitals:    06/11/21 1615   BP: 135/70   Pulse: 76   Resp: 18   Temp: 98 °F (36.7 °C)   SpO2:      NAD  Abdomen soft, approp tender, incision clean dry intact. Slight distended    ASSESSMENT:  Active Problems:    History of colostomy reversal    Pain  Resolved Problems:    * No resolved hospital problems. *       PLAN:  Postop day 4  Clear liquids. Patient has been ambulating  History of MI-on aspirin, resume Plavix  Krause has been out-good urine output  DVT Proph: RITAS/Suzanne Wood MD, FACS  6/11/2021  4:47 PM    NOTE: This report was transcribed using voice recognition software. Every effort was made to ensure accuracy; however, inadvertent computerized transcription errors may be present.

## 2021-06-12 VITALS
OXYGEN SATURATION: 96 % | RESPIRATION RATE: 17 BRPM | BODY MASS INDEX: 28.71 KG/M2 | TEMPERATURE: 98.5 F | HEIGHT: 72 IN | DIASTOLIC BLOOD PRESSURE: 63 MMHG | WEIGHT: 212 LBS | SYSTOLIC BLOOD PRESSURE: 141 MMHG | HEART RATE: 83 BPM

## 2021-06-12 PROCEDURE — 99024 POSTOP FOLLOW-UP VISIT: CPT | Performed by: SURGERY

## 2021-06-12 PROCEDURE — 6370000000 HC RX 637 (ALT 250 FOR IP): Performed by: STUDENT IN AN ORGANIZED HEALTH CARE EDUCATION/TRAINING PROGRAM

## 2021-06-12 PROCEDURE — 2580000003 HC RX 258: Performed by: SURGERY

## 2021-06-12 PROCEDURE — 6370000000 HC RX 637 (ALT 250 FOR IP): Performed by: SURGERY

## 2021-06-12 RX ADMIN — METOPROLOL SUCCINATE 50 MG: 50 TABLET, EXTENDED RELEASE ORAL at 09:17

## 2021-06-12 RX ADMIN — CLOPIDOGREL BISULFATE 75 MG: 75 TABLET ORAL at 09:18

## 2021-06-12 RX ADMIN — LEVOTHYROXINE SODIUM 137 MCG: 0.14 TABLET ORAL at 05:44

## 2021-06-12 RX ADMIN — ASPIRIN 81 MG CHEWABLE TABLET 81 MG: 81 TABLET CHEWABLE at 09:17

## 2021-06-12 RX ADMIN — METHOCARBAMOL 1000 MG: 500 TABLET ORAL at 03:00

## 2021-06-12 RX ADMIN — METHOCARBAMOL 1000 MG: 500 TABLET ORAL at 09:17

## 2021-06-12 RX ADMIN — AMLODIPINE BESYLATE 10 MG: 10 TABLET ORAL at 09:18

## 2021-06-12 RX ADMIN — Medication 10 ML: at 09:18

## 2021-06-12 ASSESSMENT — PAIN SCALES - GENERAL: PAINLEVEL_OUTOF10: 0

## 2021-06-12 NOTE — PROGRESS NOTES
[facility]  ATTENDING PHYSICIAN PROGRESS NOTE     I have examined the patient and  reviewed the record. I have reviewed all relevant labs and imaging data. The following summarizes my clinical findings and independent assessment. CC: post op    S. Patient tolerated clear liquids. He is having bowel movements    O. Vitals:    06/12/21 0800   BP: (!) 141/63   Pulse: 83   Resp: 17   Temp: 98.5 °F (36.9 °C)   SpO2:      NAD  Abdomen soft, approp tender, incision clean dry intact. Slight distended    ASSESSMENT:  Active Problems:    History of colostomy reversal    Pain  Resolved Problems:    * No resolved hospital problems. *       PLAN:  Postop day 5  Advance to general diet  Remove drain  History of MI-on aspirin, resume Plavix  Krause has been out-good urine output  DVT Proph: SCDS/Lovenox  Discharge home today. Follow-up with Dr. Elayne Sahu needs this in 2 weeks. Remove drain prior to discharge       Fadumo Mims MD, [facility]  6/12/2021  9:42 AM    NOTE: This report was transcribed using voice recognition software. Every effort was made to ensure accuracy; however, inadvertent computerized transcription errors may be present.

## 2021-06-12 NOTE — PROGRESS NOTES
Patient discharged. RN called wife and made aware and went over post op instructions with her. RN educated patient on medications and post op care. Patient to be discharged with home health and follow up with surgeon.

## 2021-06-14 ENCOUNTER — TELEPHONE (OUTPATIENT)
Dept: SURGERY | Age: 81
End: 2021-06-14

## 2021-06-14 NOTE — TELEPHONE ENCOUNTER
MA contacted Livia Garcia, patient's wife, to schedule post op appointment with Dr Ivana Lanes. Patient scheduled for appointment on 6/23/2021 @ 1:30pm with Dr Ivana Lanes in ' [facility]. Livia Garcia reminded of date, time, location, and what to bring to appointment.      Electronically signed by Tanika Mendoza on 6/14/21 at 12:36 PM EDT

## 2021-06-17 ENCOUNTER — OFFICE VISIT (OUTPATIENT)
Dept: SURGERY | Age: 81
End: 2021-06-17
Payer: MEDICARE

## 2021-06-17 ENCOUNTER — TELEPHONE (OUTPATIENT)
Dept: SURGERY | Age: 81
End: 2021-06-17

## 2021-06-17 VITALS
DIASTOLIC BLOOD PRESSURE: 62 MMHG | SYSTOLIC BLOOD PRESSURE: 135 MMHG | HEART RATE: 87 BPM | OXYGEN SATURATION: 99 % | TEMPERATURE: 97.3 F | WEIGHT: 201 LBS | HEIGHT: 72 IN | BODY MASS INDEX: 27.22 KG/M2

## 2021-06-17 DIAGNOSIS — K92.1 BLOOD IN STOOL: ICD-10-CM

## 2021-06-17 DIAGNOSIS — R50.9 FEVER, UNSPECIFIED FEVER CAUSE: Primary | ICD-10-CM

## 2021-06-17 DIAGNOSIS — R50.9 FEVER, UNSPECIFIED FEVER CAUSE: ICD-10-CM

## 2021-06-17 LAB
ALBUMIN SERPL-MCNC: 3.7 G/DL (ref 3.5–5.2)
ALP BLD-CCNC: 135 U/L (ref 40–129)
ALT SERPL-CCNC: 34 U/L (ref 0–40)
ANION GAP SERPL CALCULATED.3IONS-SCNC: 9 MMOL/L (ref 7–16)
AST SERPL-CCNC: 28 U/L (ref 0–39)
BASOPHILS ABSOLUTE: 0.04 E9/L (ref 0–0.2)
BASOPHILS RELATIVE PERCENT: 0.4 % (ref 0–2)
BILIRUB SERPL-MCNC: 0.6 MG/DL (ref 0–1.2)
BUN BLDV-MCNC: 16 MG/DL (ref 6–23)
CALCIUM SERPL-MCNC: 8.9 MG/DL (ref 8.6–10.2)
CHLORIDE BLD-SCNC: 105 MMOL/L (ref 98–107)
CO2: 25 MMOL/L (ref 22–29)
CREAT SERPL-MCNC: 0.9 MG/DL (ref 0.7–1.2)
EOSINOPHILS ABSOLUTE: 0.08 E9/L (ref 0.05–0.5)
EOSINOPHILS RELATIVE PERCENT: 0.8 % (ref 0–6)
GFR AFRICAN AMERICAN: >60
GFR NON-AFRICAN AMERICAN: >60 ML/MIN/1.73
GLUCOSE BLD-MCNC: 130 MG/DL (ref 74–99)
HCT VFR BLD CALC: 34.1 % (ref 37–54)
HEMOGLOBIN: 11.3 G/DL (ref 12.5–16.5)
IMMATURE GRANULOCYTES #: 0.05 E9/L
IMMATURE GRANULOCYTES %: 0.5 % (ref 0–5)
LYMPHOCYTES ABSOLUTE: 1.63 E9/L (ref 1.5–4)
LYMPHOCYTES RELATIVE PERCENT: 16.3 % (ref 20–42)
MCH RBC QN AUTO: 30 PG (ref 26–35)
MCHC RBC AUTO-ENTMCNC: 33.1 % (ref 32–34.5)
MCV RBC AUTO: 90.5 FL (ref 80–99.9)
MONOCYTES ABSOLUTE: 0.56 E9/L (ref 0.1–0.95)
MONOCYTES RELATIVE PERCENT: 5.6 % (ref 2–12)
NEUTROPHILS ABSOLUTE: 7.64 E9/L (ref 1.8–7.3)
NEUTROPHILS RELATIVE PERCENT: 76.4 % (ref 43–80)
PDW BLD-RTO: 12.3 FL (ref 11.5–15)
PLATELET # BLD: 452 E9/L (ref 130–450)
PMV BLD AUTO: 9.3 FL (ref 7–12)
POTASSIUM SERPL-SCNC: 4.2 MMOL/L (ref 3.5–5)
RBC # BLD: 3.77 E12/L (ref 3.8–5.8)
SODIUM BLD-SCNC: 139 MMOL/L (ref 132–146)
TOTAL PROTEIN: 7.2 G/DL (ref 6.4–8.3)
WBC # BLD: 10 E9/L (ref 4.5–11.5)

## 2021-06-17 PROCEDURE — 99212 OFFICE O/P EST SF 10 MIN: CPT | Performed by: SURGERY

## 2021-06-17 PROCEDURE — 99024 POSTOP FOLLOW-UP VISIT: CPT | Performed by: SURGERY

## 2021-06-17 NOTE — TELEPHONE ENCOUNTER
MA received phone call from patient's wife, Minerva Chappell, stating the patient has been having on and off fevers for the last two nights. That they had called the on-call doctor last night and had not heard back. Minerva Chappell stated that the patient made a BM last night and had quite a bit of bright red/dark blood. Patient denies experiencing abdominal pain, nausea, vomiting, or chills. Minerva Chappell states PT/OT has been over and checked the vitals and everything has remained normal. They are concerned and would like advisement from Dr Verena Luu. MA made phone call to Dr Verena Luu to discuss. Dr Verena Luu recommended seeing them in the office today if possible. MA contacted the patient to inform of advisement. Catrachito Morley stated that he needs to make some phone calls but he will try and be here before 12:00pm today. Patient informed to please call the office to notify if he can make it or not.      Electronically signed by Marito Dubose on 6/17/21 at 10:05 AM EDT

## 2021-06-18 NOTE — PROGRESS NOTES
Postop Progress Note    Subjective:  Patient called in secondary to a temperature of 99 last night and 101 2 nights prior. He had been doing well eating well denies any pain or tenderness and having bowel function but over the last 24 hours has noticed more of a darker black bowel function with blood. Objective:   Physical Exam  HENT:      Head: Normocephalic. Eyes:      Extraocular Movements: Extraocular movements intact. Pupils: Pupils are equal, round, and reactive to light. Cardiovascular:      Rate and Rhythm: Normal rate. Pulmonary:      Effort: Pulmonary effort is normal. No respiratory distress. Abdominal:      General: Abdomen is flat. There is no distension. Tenderness: There is no abdominal tenderness. Hernia: No hernia is present. Musculoskeletal:         General: Normal range of motion. Cervical back: Neck supple. Skin:     General: Skin is warm. Neurological:      Mental Status: He is alert. Incision: healing well, no hernia, no swelling, minimal drainage at old colostomy site where it use to be packed  ( mild pain at this location only) , staples c/d/i   Assessment:  Visit Diagnoses and Associated Orders     Fever, unspecified fever cause    -  Primary    CBC WITH AUTO DIFFERENTIAL [54569 Custom]   - Future Order    COMPREHENSIVE METABOLIC PANEL [71270 Custom]   - Future Order         Blood in stool        CBC WITH AUTO DIFFERENTIAL [[id number] Custom]   - Future Order               Plan:   1. Continue any current medications  2. Will get CBC and CMP to assess LFTs, Hb and WBC    3. Wound care discussed, Pack old ostomy site secodnary to small amount of drainage , remove staples . Hold on Abx pending Labs   4. Wound/Incision: healing well  5. Disposition: No heavy lifting.    6. Follow up: next week     Physician Signature: Electronically signed by Viv Baltazar MD

## 2021-06-23 ENCOUNTER — OFFICE VISIT (OUTPATIENT)
Dept: SURGERY | Age: 81
End: 2021-06-23
Payer: MEDICARE

## 2021-06-23 VITALS
HEART RATE: 73 BPM | DIASTOLIC BLOOD PRESSURE: 62 MMHG | TEMPERATURE: 97.7 F | BODY MASS INDEX: 27.67 KG/M2 | SYSTOLIC BLOOD PRESSURE: 127 MMHG | WEIGHT: 204.3 LBS | OXYGEN SATURATION: 99 % | HEIGHT: 72 IN

## 2021-06-23 DIAGNOSIS — Z93.3 S/P COLOSTOMY (HCC): Chronic | ICD-10-CM

## 2021-06-23 PROCEDURE — 99212 OFFICE O/P EST SF 10 MIN: CPT | Performed by: SURGERY

## 2021-06-23 PROCEDURE — 99024 POSTOP FOLLOW-UP VISIT: CPT | Performed by: SURGERY

## 2021-06-24 NOTE — PROGRESS NOTES
Postop Progress Note    Subjective:  Patient  Doing well no further blood in his stool. Mild intermittent LLQ pain seems to coordinate with constipation went away after a large BM. No nausea or vomiting. No further fevers or chills. .    Objective:   Physical Exam  HENT:      Head: Normocephalic. Eyes:      Extraocular Movements: Extraocular movements intact. Pupils: Pupils are equal, round, and reactive to light. Cardiovascular:      Rate and Rhythm: Normal rate. Pulmonary:      Effort: Pulmonary effort is normal. No respiratory distress. Comments: Mediport scar well healed   Abdominal:      General: Abdomen is flat. There is no distension. Tenderness: There is no abdominal tenderness. Hernia: No hernia is present. Comments: Midline scar    Musculoskeletal:         General: Normal range of motion. Cervical back: Neck supple. Skin:     General: Skin is warm. Neurological:      Mental Status: He is alert. Incision: healing well, no hernia, no swelling, no drainage at old colostomy site  Packing replaced     Assessment:  Visit Diagnoses and Associated Orders     S/P colostomy ([address]                 6/7   45 MINUTES  LYSIS OF ADHESIONS   LAPAROSCOPIC  SUBTOTAL CHOLECYSTECTOMY  INTRAOPERATIVE CHOLANGIOGRAM   LAPAROSCOPIC CONVERTED TO  OPEN COLOSTOMY REVERSAL WITH PARTIAL TRANSVERSE COLECTOMY   19F ANGELITA DRAIN PLACEMENT IN THE GALLBLADDER FOSSA   MEDI PORT REMOVAL     Plan:   1. Continue any current medications  2. Labs normal last visit and all symptoms resolved   3. Wound care discussed, Pack old ostomy site  4. Wound/Incision: healing well  5. Disposition: No heavy lifting. 6. Follow up: after he gets back from vacation   7.   OK to go in the swimming pool and ocean if wound healed     Physician Signature: Electronically signed by Nigel Everett MD

## 2021-07-07 ENCOUNTER — OFFICE VISIT (OUTPATIENT)
Dept: SURGERY | Age: 81
End: 2021-07-07

## 2021-07-07 VITALS
TEMPERATURE: 98.1 F | HEART RATE: 70 BPM | HEIGHT: 72 IN | WEIGHT: 204 LBS | DIASTOLIC BLOOD PRESSURE: 58 MMHG | SYSTOLIC BLOOD PRESSURE: 111 MMHG | BODY MASS INDEX: 27.63 KG/M2

## 2021-07-07 DIAGNOSIS — Z46.89 ENCOUNTER FOR REMOVAL OF BILIARY STENT: Primary | ICD-10-CM

## 2021-07-07 PROCEDURE — 1111F DSCHRG MED/CURRENT MED MERGE: CPT | Performed by: SURGERY

## 2021-07-07 PROCEDURE — G8417 CALC BMI ABV UP PARAM F/U: HCPCS | Performed by: SURGERY

## 2021-07-07 PROCEDURE — 99999 PR OFFICE/OUTPT VISIT,PROCEDURE ONLY: CPT | Performed by: SURGERY

## 2021-07-07 PROCEDURE — 1036F TOBACCO NON-USER: CPT | Performed by: SURGERY

## 2021-07-07 PROCEDURE — 4040F PNEUMOC VAC/ADMIN/RCVD: CPT | Performed by: SURGERY

## 2021-07-07 PROCEDURE — 1123F ACP DISCUSS/DSCN MKR DOCD: CPT | Performed by: SURGERY

## 2021-07-07 PROCEDURE — G8427 DOCREV CUR MEDS BY ELIG CLIN: HCPCS | Performed by: SURGERY

## 2021-07-07 NOTE — PROGRESS NOTES
General Surgery History and Physical  [facility]    Patient's Name/Date of Birth: Wilda Brantley / 1940    Date: July 7, 2021     Surgeon: Rose Pritchett MD    PCP: Kam Ho DO     Chief Complaint: CBD stent in place    HPI:   Wilda Brantley is a 79 y.o. male who presents for evaluation of of removal of biliary stent. 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 His chronic cholecystitis was treated with an ERCP with stent placement as he was found to have choledocholithiasis. He is now on the gone cholecystectomy as well as takedown of the ostomy. This was delayed by an MI that required several months of treatment. He is not doing well and presents for his ERCP with stent removal.    Patient Active Problem List   Diagnosis    Moderate protein-calorie malnutrition ([address]    Malignant neoplasm of splenic flexure ([address]    Acute cholecystitis    HLD (hyperlipidemia)    Essential hypertension    Hypothyroidism    Former smoker    History of prostate cancer    Leukocytosis    History of colon cancer    S/P colostomy ([address]    CAD (coronary artery disease)    Cholangitis    History of MI (myocardial infarction)    Choledocholithiasis    SBO (small bowel obstruction) ([address]    Status post insertion of drug eluting coronary artery stent    History of colostomy reversal    Pain       Past Medical History:   Diagnosis Date    CAD (coronary artery disease)     Cancer ([address]     prostate    Hyperlipidemia     Malignant neoplasm of splenic flexure ([address] 10/21/2019    Thyroid disease     Wears partial dentures     upper       Past Surgical History:   Procedure Laterality Date    CARDIAC CATHETERIZATION  09/21/2020    Dr. Shemar Mason, No LV.   Staged PCI to LAD    CARDIAC CATHETERIZATION  09/25/2020    LAD PCI- Dr Dominic Cazares, LAPAROSCOPIC N/A 6/7/2021    LAPAROSCOPIC  SUBTOTAL CHOLECYSTECTOMY INTRAOPERATIVE CHOLANGIOGRAM performed by Ngozi Gill MD at [address] [place] [address] N/A 6/7/2021    LAPAROSCOPIC CONVERTED TO  OPEN COLOSTOMY REVERSAL WITH PARTIAL TRANSVERSE COLECTOMY performed by Ngozi Gill MD at [facility] COLONOSCOPY  8/28/2020    COLONOSCOPY DIAGNOSTIC performed by Ngozi Gill MD at [place] [address] CT PTC NEW ACCESS  9/22/2020    CT PTC NEW ACCESS 9/22/2020 Fernando Tamayo MD [facility] CT    ERCP N/A 11/6/2020    ERCP STENT INSERTION performed by Daljit Pickard MD at [address] [facility] ERCP N/A 11/6/2020    ERCP STONE REMOVAL performed by Daljit Pickard MD at [address] [facility] ERCP N/A 11/6/2020    ERCP SPHINCTER/PAPILLOTOMY performed by Daljit Pickard MD at [address] [facility] ERCP N/A 12/18/2020    ERCP performed by Daljit Pickard MD at [address] [facility] ERCP N/A 12/18/2020    ERCP DILATION BALLOON performed by Daljit Pickard MD at [address] [facility] ERCP N/A 12/18/2020    ERCP STENT INSERTION performed by Daljit Pickard MD at [address] [place] [address] N/A 10/13/2019    LAPAROTOMY EXPLORATORY, LARGE BOWEL RESECTION, CREATION OF COLOSTOMY performed by Ngozi Gill MD at [facility] N/A 10/22/2019    PORT INSERTION performed by Petra Meehan MD at [address] [facility] N/A 6/7/2021    MEDI PORT REMOVAL performed by Ngozi Gill MD at [facility]         No Known Allergies    The patient has a family history that is negative for severe cardiovascular or respiratory issues, negative for reaction to anesthesia. Time spent reviewing past medical, surgical, social and family history, vitals, nursing assessment and images. No changes from above documented history.     Social History     Socioeconomic History    Marital status:      Spouse name: Not on file    Number of children: Not on file    Years of education: Not on file    Highest education level: Not on file   Occupational History    Not on file   Tobacco Use    Smoking status: Former Smoker     Packs/day: 1.00     Years: 15.00     Pack years: 15.00     Types: Cigarettes     Quit date: 65     Years since quittin.11    Smokeless tobacco: Never Used    Tobacco comment: quit age 45   Vaping Use    Vaping Use: Never used   Substance and Sexual Activity    Alcohol use: Not Currently     Alcohol/week: 14.0 standard drinks     Types: 14 Cans of beer per week     Comment: daily    Drug use: Never    Sexual activity: Not on file   Other Topics Concern    Not on file   Social History Narrative    Drinks 2 cups of coffee daily. Social Determinants of Health     Financial Resource Strain:     Difficulty of Paying Living Expenses:    Food Insecurity:     Worried About Running Out of Food in the Last Year:     [address] in the Last Year:    Transportation Needs:     Lack of Transportation (Medical):  Lack of Transportation (Non-Medical):    Physical Activity:     Days of Exercise per Week:     Minutes of Exercise per Session:    Stress:     Feeling of Stress :    Social Connections:     Frequency of Communication with Friends and Family:     Frequency of Social Gatherings with Friends and Family:     Attends Sabian[employer] Services:     Active Member of Clubs or Organizations:     Attends Club or Organization Meetings:     Marital Status:    Intimate Partner Violence:     Fear of Current or Ex-Partner:     Emotionally Abused:     Physically Abused:     Sexually Abused:        I have reviewed relevant labs from this admission and interpretation is included in my assessment and plan    Review of Systems    A complete 10 system review was performed and are otherwise negative unless mentioned in the above HPI. Specific negatives are listed below but may not include all those reviewed.     General ROS: negative obtundation, AMS  ENT ROS: negative rhinorrhea, epistaxis  Allergy and Immunology ROS: negative itchy/watery eyes or nasal congestion  Hematological and Lymphatic ROS: negative spontaneous bleeding or bruising  Endocrine ROS: negative  lethargy, mood swings, palpitations or polydipsia/polyuria  Respiratory ROS: negative sputum changes, stridor, tachypnea or wheezing  Cardiovascular ROS: negative for - loss of consciousness, murmur or orthopnea  Gastrointestinal ROS: negative for - hematochezia or hematemesis  Genito-Urinary ROS: negative for -  genital discharge or hematuria  Musculoskeletal ROS: negative for - focal weakness, gangrene  Psych/Neuro ROS: negative for - visual or auditory hallucinations, suicidal ideation    Physical exam:   BP (!) 111/58   Pulse 70   Temp 98.1 °F (36.7 °C) (Temporal)   Ht 6' (1.829 m)   Wt 204 lb (92.5 kg)   BMI 27.67 kg/m²   General appearance:  NAD, appears stated age  Head: NCAT, PERRLA, EOMI, red conjunctiva  Neck: supple, no masses, trachea midline  Lungs: Equal chest rise bilateral, no retractions, no wheezing  Heart: Reg rate  Abdomen: soft, nondistended.  Incision well healed  Skin; warm and dry, no cyanosis  Gu: no cva tenderness  Extremities: atraumatic, no focal motor deficits, no open wounds  Psych: No tremor, visual hallucinations      Radiology: n/a    Assessment:  Lillie Bales is a 79 y.o. male with history of choledocholithiasis and chronic cholecystitis, biliary stent in place  Patient Active Problem List   Diagnosis    Moderate protein-calorie malnutrition ([facility]    Malignant neoplasm of splenic flexure (HCC)    Acute cholecystitis    HLD (hyperlipidemia)    Essential hypertension    Hypothyroidism    Former smoker    History of prostate cancer    Leukocytosis    History of colon cancer    S/P colostomy ([address]    CAD (coronary artery disease)    Cholangitis    History of MI (myocardial infarction)    Choledocholithiasis    SBO (small bowel obstruction) ([address]    Status post insertion of drug eluting coronary artery stent    History of colostomy reversal    Pain         Plan:  Proceed with ERCP with stent removal  The procedure, risks, benefits and alternatives were discussed the patient. He agrees to proceed.         Mary Escalona MD  4:17 PM  7/7/2021

## 2021-07-08 ENCOUNTER — TELEPHONE (OUTPATIENT)
Dept: SURGERY | Age: 81
End: 2021-07-08

## 2021-07-08 NOTE — TELEPHONE ENCOUNTER
Prior Authorization Form:      DEMOGRAPHICS:                     Patient Name:  Thaddeus Lesch  Patient :  1940            Insurance:  Payor: MEDICARE / Plan: MEDICARE PART A AND B / Product Type: *No Product type* /   Insurance ID Number:    Payor/Plan Subscr  Sex Relation Sub. Ins. ID Effective Group Num   1. [address] 1940 Male Self [id number] 1/1/15                                    [address] [address]   2.  [address]  1940 Male Self [id number] 1/1/15                                    [address]         DIAGNOSIS & PROCEDURE:                       Procedure/Operation: ERCP with stent removal           CPT Code: 39065    Diagnosis:  Cholecystitis, Biliary stent removal    ICD10 Code: K81.9 + Z46.89    Location:  [address] [place] [address]    Surgeon:  Sue Castrejon INFORMATION:                          Date: 21    Time: TBD              Anesthesia:  MAC/TIVA                                                       Status:  Outpatient        Special Comments:         Electronically signed by Padmini Badillo RN on 2021 at 1:23 PM

## 2021-07-08 NOTE — TELEPHONE ENCOUNTER
Per Dr. Mala Andrew, patient is scheduled for ERCP with stent removal at [facility] on 8/6/21. Surgery scheduled via telephone, surgeon's calendar updated. Dr. Mala Andrew to enter orders. Follow up appointment scheduled. Patient instructed to hold plavix for 7 days prior to procedure.   Electronically signed by Cristin Ponce RN on 7/8/2021 at 1:23 PM

## 2021-08-04 NOTE — PROGRESS NOTES
[address] [facility] PRE-ADMISSION TESTING GENERAL INSTRUCTIONS- [facility]-phone number:[phone]    GENERAL INSTRUCTIONS  [x] Antibacterial Soap shower Night before and/or AM of Surgery  [] Gian wipe instruction sheet and wipes given. [x] Nothing by mouth after midnight, including gum, candy, mints, or water. [x] You may brush your teeth, gargle, but do NOT swallow water. []Hibiclens shower  the night before and the morning of surgery. Do not use             Hibiclens on your face or head. [x]No smoking, chewing tobacco, illegal drugs, or alcohol within 24 hours of your surgery. [x] Jewelry, valuables or body piercing's should not be brought to the hospital. All body and/or tongue piercing's must be removed prior to arriving to hospital.  ALL hair pins must be removed. [x] Do not wear makeup, lotions, powders, deodorant. Nail polish as directed by the nurse. [x] Arrange transportation with a responsible adult  to and from the hospital. If you do not have a responsible adult  to transport you, you will need to make arrangements with a medical transportation company (i.e. [employer]. A Uber/taxi/bus is not appropriate unless you are accompanied by a responsible adult ). Arrange for someone to be with you for the remainder of the day and for 24 hours after your procedure due to having had anesthesia. Who will be your  for transportation? son TESSA   Who will be staying with you for 24 hrs after your procedure? ___wife_______________  [x] Bring insurance card and photo ID.  [] Transfusion Bracelet: Please bring with you to hospital, day of surgery  [] Bring urine specimen day of surgery. Any small container is acceptable. [] Use inhalers the morning of surgery and bring with you to hospital.  [] Bring copy of living will or healthcare power of  papers to be placed in your electronic record.   [] CPAP/BI-PAP: Please bring your machine if you are to spend the night in the hospital.     PARKING INSTRUCTIONS:   [x] Arrival Time:___0600, wear mask   · [x] Parking lot '\"I\"  is located on [facility] (the corner of [facility] and [facility]). To enter, press the button and the gate will lift. A free token will be provided to exit the lot. One car per patient is allowed to park in this lot. All other cars are to park on [address] [place] [address] either in the parking garage or the handicap lot. [] To reach the [facility] lobby from [address] [place] [address], upon entering the hospital, take elevator B to the 3rd floor. EDUCATION INSTRUCTIONS:      [] Knee or hip replacement booklet & exercise pamphlets given. [] Zuly Yin placed in chart. [] Pre-admission Testing educational folder given  [] Incentive Spirometry,coughing & deep breathing exercises reviewed. []Medication information sheet(s)   []Fluoroscopy-Xray used in surgery reviewed with patient. Educational pamphlet placed in chart. []Pain: Post-op pain is normal and to be expected. You will be asked to rate your pain from 0-10(a zero is not acceptable-education is needed). Your post-op pain goal is:  [] Ask your nurse for your pain medication. [] Joint camp offered. [] Joint replacement booklets given. [] Other:___________________________    MEDICATION INSTRUCTIONS:   [x]Bring a complete list of your medications, please write the last time you took the medicine, give this list to the nurse. [x] Take the following medications the morning of surgery with 1-2 ounces of water: see med list  [x] Stop herbal supplements and vitamins 5 days before your surgery. [] DO NOT take any diabetic medicine the morning of surgery. Follow instructions for insulin the day before surgery. [] If you are diabetic and your blood sugar is low or you feel symptomatic, you may drink 1-2 ounces of apple juice or take a glucose tablet.   The morning of your procedure, you may call the pre-op area if you have concerns about your blood sugar [phone]. [] Use your inhalers the morning of surgery. Bring your emergency inhaler with you day of surgery. [x] Follow physician instructions regarding any blood thinners you may be taking. WHAT TO EXPECT:  [x] The day of surgery you will be greeted and checked in by the [facility] [employer].  In addition, you will be registered in the [place] by a Patient Access Representative. Please bring your photo ID and insurance card. A nurse will greet you in accordance to the time you are needed in the pre-op area to prepare you for surgery. Please do not be discouraged if you are not greeted in the order you arrive as there are many variables that are involved in patient preparation. Your patience is greatly appreciated as you wait for your nurse. Please bring in items such as: books, magazines, newspapers, electronics, or any other items  to occupy your time in the waiting area. [x]  Delays may occur with surgery and staff will make a sincere effort to keep you informed of delays. If any delays occur with your procedure, we apologize ahead of time for your inconvenience as we recognize the value of your time.

## 2021-08-05 ENCOUNTER — OFFICE VISIT (OUTPATIENT)
Dept: SURGERY | Age: 81
End: 2021-08-05
Payer: MEDICARE

## 2021-08-05 VITALS
SYSTOLIC BLOOD PRESSURE: 129 MMHG | WEIGHT: 210 LBS | BODY MASS INDEX: 28.44 KG/M2 | OXYGEN SATURATION: 98 % | DIASTOLIC BLOOD PRESSURE: 70 MMHG | HEART RATE: 94 BPM | RESPIRATION RATE: 16 BRPM | TEMPERATURE: 98.2 F | HEIGHT: 72 IN

## 2021-08-05 DIAGNOSIS — Z85.038 HISTORY OF COLON CANCER: Primary | ICD-10-CM

## 2021-08-05 PROCEDURE — 99212 OFFICE O/P EST SF 10 MIN: CPT | Performed by: SURGERY

## 2021-08-05 PROCEDURE — 99024 POSTOP FOLLOW-UP VISIT: CPT | Performed by: SURGERY

## 2021-08-05 NOTE — PROGRESS NOTES
Postop Progress Note    Subjective:  Patient doing well no pain. Tolerating a diet. Good bowel function     Objective:   Physical Exam  HENT:      Head: Normocephalic. Eyes:      Extraocular Movements: Extraocular movements intact. Pupils: Pupils are equal, round, and reactive to light. Cardiovascular:      Rate and Rhythm: Normal rate. Pulmonary:      Effort: Pulmonary effort is normal. No respiratory distress. Abdominal:      General: Abdomen is flat. There is no distension. Tenderness: There is no abdominal tenderness. Hernia: No hernia is present. Comments: Scars well healed    Musculoskeletal:         General: Normal range of motion. Cervical back: Neck supple. Skin:     General: Skin is warm. Neurological:      Mental Status: He is alert. Incision: healing well, no hernia, no swelling, no drainage     Assessment:    6/7   45 MINUTES  LYSIS OF ADHESIONS   LAPAROSCOPIC  SUBTOTAL CHOLECYSTECTOMY  INTRAOPERATIVE CHOLANGIOGRAM   LAPAROSCOPIC CONVERTED TO  OPEN COLOSTOMY REVERSAL WITH PARTIAL TRANSVERSE COLECTOMY   19F ANGELITA DRAIN PLACEMENT IN THE GALLBLADDER FOSSA   MEDI PORT REMOVAL    Last Colonoscopy 8/20/20      Plan:      1. Continue any current medications  2. Wound/Incision: healed well   3. Free of restriction   4. Colonoscopy after follow up with Hem/Onc ( due for 1 year repeat )     Follow up with Hem/Onc   Will need H&P and CEA  every 3-6 months for 2 years then every 6 months for a total of 5 years.  CT chest/abdomen/pelvis  Every 6-12 months for 5 years , Colonoscopy in 1 years then pending findings-  Follow up 1-3 years then every 5 years       Physician Signature: Electronically signed by Claudio Jeffers MD

## 2021-08-05 NOTE — PROGRESS NOTES
Nabila Finney is scheduled for a Colonoscopy with Dr Blayne Dotson on 10/4/2021 @ 9:00am, arrival time 8:00am. Patient has been notified of the appointment and a letter has been given to the patient in clinic. Patient has been told to make sure they have a ride and to park in the [facility] and report through the outpatient entrance of the hospital facing [place] for same day surgery.     Electronically signed by Nahun Ornelas on 8/5/21 at 1:33 PM EDT

## 2021-08-06 ENCOUNTER — APPOINTMENT (OUTPATIENT)
Dept: GENERAL RADIOLOGY | Age: 81
End: 2021-08-06
Attending: SURGERY
Payer: MEDICARE

## 2021-08-06 ENCOUNTER — ANESTHESIA EVENT (OUTPATIENT)
Dept: ENDOSCOPY | Age: 81
End: 2021-08-06
Payer: MEDICARE

## 2021-08-06 ENCOUNTER — HOSPITAL ENCOUNTER (OUTPATIENT)
Age: 81
Setting detail: OUTPATIENT SURGERY
Discharge: HOME | End: 2021-08-06
Attending: SURGERY | Admitting: SURGERY
Payer: MEDICARE

## 2021-08-06 ENCOUNTER — ANESTHESIA (OUTPATIENT)
Dept: ENDOSCOPY | Age: 81
End: 2021-08-06
Payer: MEDICARE

## 2021-08-06 VITALS
SYSTOLIC BLOOD PRESSURE: 141 MMHG | RESPIRATION RATE: 21 BRPM | DIASTOLIC BLOOD PRESSURE: 75 MMHG | OXYGEN SATURATION: 92 %

## 2021-08-06 VITALS
OXYGEN SATURATION: 98 % | HEIGHT: 72 IN | BODY MASS INDEX: 28.44 KG/M2 | RESPIRATION RATE: 19 BRPM | HEART RATE: 58 BPM | WEIGHT: 210 LBS | SYSTOLIC BLOOD PRESSURE: 164 MMHG | TEMPERATURE: 97.5 F | DIASTOLIC BLOOD PRESSURE: 80 MMHG

## 2021-08-06 PROCEDURE — 2720000010 HC SURG SUPPLY STERILE: Performed by: SURGERY

## 2021-08-06 PROCEDURE — 6360000002 HC RX W HCPCS

## 2021-08-06 PROCEDURE — 2709999900 HC NON-CHARGEABLE SUPPLY: Performed by: SURGERY

## 2021-08-06 PROCEDURE — 43275 ERCP REMOVE FORGN BODY DUCT: CPT | Performed by: SURGERY

## 2021-08-06 PROCEDURE — 2580000003 HC RX 258

## 2021-08-06 PROCEDURE — 2580000003 HC RX 258: Performed by: SURGERY

## 2021-08-06 PROCEDURE — C1769 GUIDE WIRE: HCPCS | Performed by: SURGERY

## 2021-08-06 PROCEDURE — 7100000011 HC PHASE II RECOVERY - ADDTL 15 MIN: Performed by: SURGERY

## 2021-08-06 PROCEDURE — 3609018800 HC ERCP DX COLLECTION SPECIMEN BRUSHING/WASHING: Performed by: SURGERY

## 2021-08-06 PROCEDURE — 6360000002 HC RX W HCPCS: Performed by: SURGERY

## 2021-08-06 PROCEDURE — 3700000001 HC ADD 15 MINUTES (ANESTHESIA): Performed by: SURGERY

## 2021-08-06 PROCEDURE — 43264 ERCP REMOVE DUCT CALCULI: CPT | Performed by: SURGERY

## 2021-08-06 PROCEDURE — 3609014300 HC ERCP BALLOON DILATE BILIARY/PANC DUCT/AMPULLA EA: Performed by: SURGERY

## 2021-08-06 PROCEDURE — 3209999900 FLUORO FOR SURGICAL PROCEDURES

## 2021-08-06 PROCEDURE — 3609015000 HC ERCP REMOVE FOREIGN BODY/STENT BILIARY/PANC DUCT: Performed by: SURGERY

## 2021-08-06 PROCEDURE — 3700000000 HC ANESTHESIA ATTENDED CARE: Performed by: SURGERY

## 2021-08-06 PROCEDURE — 2500000003 HC RX 250 WO HCPCS

## 2021-08-06 PROCEDURE — 7100000010 HC PHASE II RECOVERY - FIRST 15 MIN: Performed by: SURGERY

## 2021-08-06 PROCEDURE — 6360000004 HC RX CONTRAST MEDICATION: Performed by: SURGERY

## 2021-08-06 RX ORDER — PROPOFOL 10 MG/ML
INJECTION, EMULSION INTRAVENOUS PRN
Status: DISCONTINUED | OUTPATIENT
Start: 2021-08-06 | End: 2021-08-06 | Stop reason: SDUPTHER

## 2021-08-06 RX ORDER — SODIUM CHLORIDE 9 MG/ML
25 INJECTION, SOLUTION INTRAVENOUS PRN
Status: DISCONTINUED | OUTPATIENT
Start: 2021-08-06 | End: 2021-08-06 | Stop reason: HOSPADM

## 2021-08-06 RX ORDER — SODIUM CHLORIDE 0.9 % (FLUSH) 0.9 %
5-40 SYRINGE (ML) INJECTION PRN
Status: DISCONTINUED | OUTPATIENT
Start: 2021-08-06 | End: 2021-08-06 | Stop reason: HOSPADM

## 2021-08-06 RX ORDER — METOPROLOL TARTRATE 5 MG/5ML
INJECTION INTRAVENOUS PRN
Status: DISCONTINUED | OUTPATIENT
Start: 2021-08-06 | End: 2021-08-06 | Stop reason: SDUPTHER

## 2021-08-06 RX ORDER — SODIUM CHLORIDE 9 MG/ML
INJECTION, SOLUTION INTRAVENOUS CONTINUOUS PRN
Status: DISCONTINUED | OUTPATIENT
Start: 2021-08-06 | End: 2021-08-06 | Stop reason: SDUPTHER

## 2021-08-06 RX ORDER — CIPROFLOXACIN 2 MG/ML
400 INJECTION, SOLUTION INTRAVENOUS ONCE
Status: COMPLETED | OUTPATIENT
Start: 2021-08-06 | End: 2021-08-06

## 2021-08-06 RX ORDER — SODIUM CHLORIDE 0.9 % (FLUSH) 0.9 %
5-40 SYRINGE (ML) INJECTION EVERY 12 HOURS SCHEDULED
Status: DISCONTINUED | OUTPATIENT
Start: 2021-08-06 | End: 2021-08-06 | Stop reason: HOSPADM

## 2021-08-06 RX ADMIN — SODIUM CHLORIDE: 9 INJECTION, SOLUTION INTRAVENOUS at 09:08

## 2021-08-06 RX ADMIN — METOPROLOL TARTRATE 2.5 MG: 5 INJECTION INTRAVENOUS at 09:26

## 2021-08-06 RX ADMIN — PROPOFOL 200 MG: 10 INJECTION, EMULSION INTRAVENOUS at 09:18

## 2021-08-06 RX ADMIN — CIPROFLOXACIN 400 MG: 2 INJECTION INTRAVENOUS at 09:17

## 2021-08-06 RX ADMIN — SODIUM CHLORIDE 25 ML: 9 INJECTION, SOLUTION INTRAVENOUS at 09:04

## 2021-08-06 ASSESSMENT — PULMONARY FUNCTION TESTS
PIF_VALUE: 1

## 2021-08-06 ASSESSMENT — PAIN - FUNCTIONAL ASSESSMENT: PAIN_FUNCTIONAL_ASSESSMENT: 0-10

## 2021-08-06 NOTE — OP NOTE
SURGEON: Shanique Cuevas MD        PREOPERATIVE DIAGNOSIS: Choledocholithiasis      POSTOPERATIVE DIAGNOSES:  1. Choledocholithiasis      OPERATION:  1. ERCP stone extraction  2. ERCP stent removal      ANESTHESIA: LMAC       ESTIMATED BLOOD LOSS: Minimal.         INDICATION: This is a 33 y.o.  female with radiologic evidence of choledocholithiasis and abdominal pain. The patient agreed to undergo the ERCP. The risks, benefits, and alternatives were explained to the patient for the procedure including bleeding, infection, perforation, and pancreatitis. The patient understood and agreed to proceed. DESCRIPTION OF PROCEDURE: The patient was brought to the operating room and she underwent LMAc anesthesia by the anesthesia team. She was then placed in the left lateral decubitus position. The flexible duodenoscope was inserted down  the oropharynx and passed down the esophagus into the stomach and into the  duodenum. The papilla was identified. A metal stent was grasped with forceps and removed without difficulty. The scope was re-introduced to the duodenum. A sphincterotome with a guidewire was used to gain access to the common bile duct. Contrast revealed adequate cannulation. A 9-12mm balloon was used to sweep the duct and moderate debris was removed. Cholangiogram revealed no other abnormality. The scope was then withdrawn. The patient tolerated the procedure well.     Shanique Cuevas MD

## 2021-08-06 NOTE — ANESTHESIA PRE PROCEDURE
Department of Anesthesiology  Preprocedure Note       Name:  Dora Larios   Age:  79 y.o.  :  1940                                          MRN:  [id number]         Date:  2021      Surgeon: Amparo Bill):  Shea Morris MD    Procedure: Procedure(s):  ERCP ENDOSCOPIC RETROGRADE WITH STENT REMOVAL    Medications prior to admission:   Prior to Admission medications    Medication Sig Start Date End Date Taking? Authorizing Provider   metoprolol succinate (TOPROL XL) 50 MG extended release tablet Take 1 tablet by mouth daily 21   Clive Zamorano MD   amLODIPine (NORVASC) 10 MG tablet Take 1 tablet by mouth daily 20   Clive Zamorano MD   levothyroxine (SYNTHROID) 137 MCG tablet Take 1 tablet by mouth Daily 20   Beverley Fraser MD   aspirin 81 MG chewable tablet Take 1 tablet by mouth daily 20   No Valdezi, DO   clopidogrel (PLAVIX) 75 MG tablet Take 1 tablet by mouth daily 20   No Valdezi, DO   atorvastatin (LIPITOR) 40 MG tablet Take 40 mg by mouth nightly     Historical Provider, MD   acetaminophen (TYLENOL) 500 MG tablet Take 500 mg by mouth every 6 hours as needed for Pain    Historical Provider, MD       Current medications:    No current facility-administered medications for this visit. No current outpatient medications on file.      Facility-Administered Medications Ordered in Other Visits   Medication Dose Route Frequency Provider Last Rate Last Admin    sodium chloride flush 0.9 % injection 5-40 mL  5-40 mL Intravenous 2 times per day Shea Morris MD        sodium chloride flush 0.9 % injection 5-40 mL  5-40 mL Intravenous PRN Shea Morris MD        0.9 % sodium chloride infusion  25 mL Intravenous PRN Shea Morris  mL/hr at 21 0904 25 mL at 21 0904    ciprofloxacin (CIPRO) IVPB 400 mg  400 mg Intravenous Once Shea Morris MD           Allergies:  No Known Allergies    Problem List:    Patient Active Problem List Diagnosis Code    Moderate protein-calorie malnutrition (HCC) E44.0    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    Acute cholecystitis K81.0    HLD (hyperlipidemia) E78.5    Essential hypertension I10    Hypothyroidism E03.9    Former smoker Z87.891    History of prostate cancer Z85.46    Leukocytosis D72.829    History of colon cancer Z85.038    S/P colostomy ([facility] Z93.3    CAD (coronary artery disease) I25.10    Cholangitis K83.09    History of MI (myocardial infarction) I25.2    Choledocholithiasis K80.50    SBO (small bowel obstruction) ([address] K56.609    Status post insertion of drug eluting coronary artery stent Z95.5    History of colostomy reversal Z98.890       Past Medical History:        Diagnosis Date    CAD (coronary artery disease)     Cancer ([facility]     prostate    Hyperlipidemia     Malignant neoplasm of splenic flexure ([facility] 10/21/2019    Thyroid disease     Wears partial dentures     upper       Past Surgical History:        Procedure Laterality Date    CARDIAC CATHETERIZATION  09/21/2020    Dr. Amanda Manzo, No LV.   Staged PCI to LAD    CARDIAC CATHETERIZATION  09/25/2020    LAD PCI- Dr Yue Valdovinos, LAPAROSCOPIC N/A 6/7/2021    LAPAROSCOPIC  SUBTOTAL CHOLECYSTECTOMY INTRAOPERATIVE CHOLANGIOGRAM performed by Sherron Radford MD at [address] [place] [address] N/A 6/7/2021    LAPAROSCOPIC CONVERTED TO  OPEN COLOSTOMY REVERSAL WITH PARTIAL TRANSVERSE COLECTOMY performed by Sherron Radford MD at [place] [address] COLONOSCOPY  8/28/2020    COLONOSCOPY DIAGNOSTIC performed by Sherron Radford MD at [address] [facility] NEW ACCESS  9/22/2020    CT PTC NEW ACCESS 9/22/2020 Ondina Cleveland MD [facility] CT    ERCP N/A 11/6/2020    ERCP STENT INSERTION performed by Ann Mcgraw MD at [address] [facility] ERCP N/A 11/6/2020    ERCP STONE REMOVAL performed by Ann Mcgraw MD at [address] [facility] ERCP N/A 11/6/2020    ERCP SPHINCTER/PAPILLOTOMY performed by Leslee Zimmerman MD at [address] ERCP N/A 2020    ERCP performed by Leslee Zimmerman MD at [address] ERCP N/A 2020    ERCP DILATION BALLOON performed by Leslee Zimmerman MD at [address] ERCP N/A 2020    ERCP STENT INSERTION performed by Leslee Zimmerman MD at [address] [facility] N/A 10/13/2019    LAPAROTOMY EXPLORATORY, LARGE BOWEL RESECTION, CREATION OF COLOSTOMY performed by Terence Day MD at [facility] N/A 10/22/2019    PORT INSERTION performed by Gloria Carter MD at [facility] N/A 2021    MEDI PORT REMOVAL performed by Terence Dya MD at [address] [facility] [address] History:    Social History     Tobacco Use    Smoking status: Former Smoker     Packs/day: 1.00     Years: 15.00     Pack years: 15.00     Types: Cigarettes     Quit date:      Years since quittin.6    Smokeless tobacco: Never Used    Tobacco comment: quit age 45   Substance Use Topics    Alcohol use: Yes     Alcohol/week: 14.0 standard drinks     Types: 14 Cans of beer per week     Comment: daily                                Counseling given: Not Answered  Comment: quit age 45      Vital Signs (Current): There were no vitals filed for this visit.                                            BP Readings from Last 3 Encounters:   21 (!) 169/80   21 129/70   21 (!) 111/58       NPO Status:  8                                                                               BMI:   Wt Readings from Last 3 Encounters:   21 210 lb (95.3 kg)   21 210 lb (95.3 kg)   21 204 lb (92.5 kg)     There is no height or weight on file to calculate BMI.    CBC:   Lab Results   Component Value Date    WBC 10.0 2021    RBC 3.77 2021    HGB 11.3 2021    HCT 34.1 2021    MCV 90.5 2021    RDW 12.3 2021     2021       CMP:   Lab Results Component Value Date     06/17/2021    K 4.2 06/17/2021    K 4.2 06/08/2021     06/17/2021    CO2 25 06/17/2021    BUN 16 06/17/2021    CREATININE 0.9 06/17/2021    GFRAA >60 06/17/2021    LABGLOM >60 06/17/2021    GLUCOSE 130 06/17/2021    PROT 7.2 06/17/2021    CALCIUM 8.9 06/17/2021    BILITOT 0.6 06/17/2021    ALKPHOS 135 06/17/2021    AST 28 06/17/2021    ALT 34 06/17/2021       POC Tests: No results for input(s): POCGLU, POCNA, POCK, POCCL, POCBUN, POCHEMO, POCHCT in the last 72 hours. Coags:   Lab Results   Component Value Date    PROTIME 12.2 06/08/2021    INR 1.1 06/08/2021    APTT 27.9 06/08/2021       HCG (If Applicable): No results found for: PREGTESTUR, PREGSERUM, HCG, HCGQUANT     ABGs: No results found for: PHART, PO2ART, AZB7YYW, YVX0IWA, BEART, E8HRPTMD     Type & Screen (If Applicable):  No results found for: LABABO, LABRH    Drug/Infectious Status (If Applicable):  No results found for: HIV, HEPCAB    COVID-19 Screening (If Applicable):   Lab Results   Component Value Date    COVID19 Not Detected 01/18/2021    COVID19 Not Detected 11/27/2020       EKG 4/23/21  NSR    ECHO 9/23/2020  Summary   Normal left ventricle size. Estimated left ventricle ejection fraction 60   %. Borderline concentric left ventricular hypertrophy. Normal right ventricular size and function. No significant valve disease. Mildly dilated aortic root measuring 3.3-3.5 cm throughout. Mild tricuspid regurgitation. Physiologic and/or trace mitral regurgitation is present. Mild pulmonic regurgitation present. Anesthesia Evaluation  Patient summary reviewed and Nursing notes reviewed no history of anesthetic complications:   Airway: Mallampati: III  TM distance: >3 FB   Neck ROM: full  Mouth opening: > = 3 FB Dental:    (+) upper dentures and partials  Comment: Patient denies any loose, cracked or chipped teeth. Has lower partials.     Pulmonary:Negative Pulmonary ROS breath sounds clear to auscultation      Smoker:  former smoker quit 77'                           Cardiovascular:    (+) hypertension:, past MI ( October 2020):, CAD:, CABG/stent ( drug eluting coronary artery stent):, hyperlipidemia      ECG reviewed  Rhythm: regular  Rate: normal  Echocardiogram reviewed         Beta Blocker:  Dose within 24 Hrs      ROS comment: PCI one year ago. Dr. Arnulfo Giraldo. Last seen 6 months ago. No cardiac complaints     Neuro/Psych:   Negative Neuro/Psych ROS              GI/Hepatic/Renal:            ROS comment: Malignant neoplasm of splenic flexure, acute cholecystitis, cholangitis, choledocholithiasis, colostomy. Endo/Other:    (+) hypothyroidism, blood dyscrasia (Aspirin last taken 6/6/21. Plavix last taken 5/30/21): anticoagulation therapy:., malignancy/cancer ( Prostate CA, colon CA). ROS comment: Alcohol use    Colon cancer. Chemo 2 years ago. Abdominal:             Vascular:           ROS comment: Numbness in hands and feet. Other Findings:               Anesthesia Plan      MAC     ASA 4       Induction: intravenous. BIS    Anesthetic plan and risks discussed with patient. Use of blood products discussed with patient whom consented to blood products. Plan discussed with attending and CRNA. DOS STAFF ADDENDUM:    Patient seen and chart reviewed. Physical exam and history updated as indicated. NPO status confirmed. Anesthesia options and plan discussed including risks benefits with patient/legal guardian and family as available. Concerns and questions addressed. Consent verbalized to proceed.   Anesthesia plan, options and intraoperative/postoperative concerns discussed with care team.    Cee Blanc MD, MD  8/6/2021  9:05 AM          Cee Blanc MD   8/6/2021

## 2021-08-06 NOTE — H&P
General Surgery History and Physical  [facility]    Patient's Name/Date of Birth: Bushra Pagan / 1940    Date: August 6, 2021     Surgeon: Dallin Cortés MD    PCP: Delvin Lilly DO     Chief Complaint: CBD stent in place    HPI:   Bushra Pagan is a 79 y.o. male who presents for evaluation of of removal of biliary stent. 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 His chronic cholecystitis was treated with an ERCP with stent placement as he was found to have choledocholithiasis. He is now on the gone cholecystectomy as well as takedown of the ostomy. This was delayed by an MI that required several months of treatment. He is not doing well and presents for his ERCP with stent removal.    Patient Active Problem List   Diagnosis    Moderate protein-calorie malnutrition ([address]    Malignant neoplasm of splenic flexure ([address]    Acute cholecystitis    HLD (hyperlipidemia)    Essential hypertension    Hypothyroidism    Former smoker    History of prostate cancer    Leukocytosis    History of colon cancer    S/P colostomy ([address]    CAD (coronary artery disease)    Cholangitis    History of MI (myocardial infarction)    Choledocholithiasis    SBO (small bowel obstruction) ([address]    Status post insertion of drug eluting coronary artery stent    History of colostomy reversal       Past Medical History:   Diagnosis Date    CAD (coronary artery disease)     Cancer ([address]     prostate    Hyperlipidemia     Malignant neoplasm of splenic flexure ([address] 10/21/2019    Thyroid disease     Wears partial dentures     upper       Past Surgical History:   Procedure Laterality Date    CARDIAC CATHETERIZATION  09/21/2020    Dr. Allie Chandler, No LV.   Staged PCI to LAD    CARDIAC CATHETERIZATION  09/25/2020    LAD PCI- Dr Hector Mckenzie, LAPAROSCOPIC N/A 6/7/2021    LAPAROSCOPIC  SUBTOTAL CHOLECYSTECTOMY INTRAOPERATIVE CHOLANGIOGRAM performed by Jenise Galarza MD at [address] N/A 6/7/2021    LAPAROSCOPIC CONVERTED TO  OPEN COLOSTOMY REVERSAL WITH PARTIAL TRANSVERSE COLECTOMY performed by Jenise Galarza MD at [facility] COLONOSCOPY  8/28/2020    COLONOSCOPY DIAGNOSTIC performed by Jenise Galarza MD at [address] [facility] NEW ACCESS  9/22/2020    CT PTC NEW ACCESS 9/22/2020 Lian Lovell MD [facility] CT    ERCP N/A 11/6/2020    ERCP STENT INSERTION performed by Kim Correia MD at [address] ERCP N/A 11/6/2020    ERCP STONE REMOVAL performed by Kim Correia MD at [address] ERCP N/A 11/6/2020    ERCP SPHINCTER/PAPILLOTOMY performed by Kim Correia MD at [address] ERCP N/A 12/18/2020    ERCP performed by Kim Correia MD at [address] ERCP N/A 12/18/2020    ERCP DILATION BALLOON performed by Kim Correia MD at [address] ERCP N/A 12/18/2020    ERCP STENT INSERTION performed by Kim Correia MD at [address] [place] [address] N/A 10/13/2019    LAPAROTOMY EXPLORATORY, LARGE BOWEL RESECTION, CREATION OF COLOSTOMY performed by Jenise Galarza MD at [facility] N/A 10/22/2019    PORT INSERTION performed by Mario Yu MD at [facility] N/A 6/7/2021    MEDI PORT REMOVAL performed by Jenise Galarza MD at [address] [place] [address]         No Known Allergies    The patient has a family history that is negative for severe cardiovascular or respiratory issues, negative for reaction to anesthesia. Time spent reviewing past medical, surgical, social and family history, vitals, nursing assessment and images. No changes from above documented history.     Social History     Socioeconomic History    Marital status:      Spouse name: Not on file    Number of children: Not on file    Years of education: Not on file    Highest education level: Not on file Occupational History    Not on file   Tobacco Use    Smoking status: Former Smoker     Packs/day: 1.00     Years: 15.00     Pack years: 15.00     Types: Cigarettes     Quit date:      Years since quittin.6    Smokeless tobacco: Never Used    Tobacco comment: quit age 45   Vaping Use    Vaping Use: Never used   Substance and Sexual Activity    Alcohol use: Yes     Alcohol/week: 14.0 standard drinks     Types: 14 Cans of beer per week     Comment: daily    Drug use: Never    Sexual activity: Not on file   Other Topics Concern    Not on file   Social History Narrative    Drinks 2 cups of coffee daily. Social Determinants of Health     Financial Resource Strain:     Difficulty of Paying Living Expenses:    Food Insecurity:     Worried About Running Out of Food in the Last Year:     [address] in the Last Year:    Transportation Needs:     Lack of Transportation (Medical):  Lack of Transportation (Non-Medical):    Physical Activity:     Days of Exercise per Week:     Minutes of Exercise per Session:    Stress:     Feeling of Stress :    Social Connections:     Frequency of Communication with Friends and Family:     Frequency of Social Gatherings with Friends and Family:     Attends Bahai Services:     Active Member of Clubs or Organizations:     Attends Club or Organization Meetings:     Marital Status:    Intimate Partner Violence:     Fear of Current or Ex-Partner:     Emotionally Abused:     Physically Abused:     Sexually Abused:        I have reviewed relevant labs from this admission and interpretation is included in my assessment and plan    Review of Systems    A complete 10 system review was performed and are otherwise negative unless mentioned in the above HPI. Specific negatives are listed below but may not include all those reviewed.     General ROS: negative obtundation, AMS  ENT ROS: negative rhinorrhea, epistaxis  Allergy and Immunology ROS: negative itchy/watery eyes or nasal congestion  Hematological and Lymphatic ROS: negative spontaneous bleeding or bruising  Endocrine ROS: negative  lethargy, mood swings, palpitations or polydipsia/polyuria  Respiratory ROS: negative sputum changes, stridor, tachypnea or wheezing  Cardiovascular ROS: negative for - loss of consciousness, murmur or orthopnea  Gastrointestinal ROS: negative for - hematochezia or hematemesis  Genito-Urinary ROS: negative for -  genital discharge or hematuria  Musculoskeletal ROS: negative for - focal weakness, gangrene  Psych/Neuro ROS: negative for - visual or auditory hallucinations, suicidal ideation    Physical exam:   BP (!) 167/83   Pulse 57   Temp 97.3 °F (36.3 °C) (Temporal)   Resp 16   Ht 6' (1.829 m)   Wt 210 lb (95.3 kg)   SpO2 98%   BMI 28.48 kg/m²   General appearance:  NAD, appears stated age  Head: NCAT, PERRLA, EOMI, red conjunctiva  Neck: supple, no masses, trachea midline  Lungs: Equal chest rise bilateral, no retractions, no wheezing  Heart: Reg rate  Abdomen: soft, nondistended.  Incision well healed  Skin; warm and dry, no cyanosis  Gu: no cva tenderness  Extremities: atraumatic, no focal motor deficits, no open wounds  Psych: No tremor, visual hallucinations      Radiology: n/a    Assessment:  Tamiko Dickson is a 79 y.o. male with history of choledocholithiasis and chronic cholecystitis, biliary stent in place  Patient Active Problem List   Diagnosis    Moderate protein-calorie malnutrition ([facility]    Malignant neoplasm of splenic flexure (HCC)    Acute cholecystitis    HLD (hyperlipidemia)    Essential hypertension    Hypothyroidism    Former smoker    History of prostate cancer    Leukocytosis    History of colon cancer    S/P colostomy ([address]    CAD (coronary artery disease)    Cholangitis    History of MI (myocardial infarction)    Choledocholithiasis    SBO (small bowel obstruction) ([address]    Status post insertion of drug eluting coronary artery stent    History of colostomy reversal         Plan:  Proceed with ERCP with stent removal  The procedure, risks, benefits and alternatives were discussed the patient. He agrees to proceed.         Ernseto Rivers MD  9:55 AM  8/6/2021

## 2021-08-19 NOTE — ANESTHESIA POSTPROCEDURE EVALUATION
Department of Anesthesiology  Postprocedure Note    Patient: Charissa Bae  MRN: [id number]  YOB: 1940  Date of evaluation: 8/19/2021  Time:  9:28 AM     Procedure Summary     Date: 08/06/21 Room / Location: [facility] / [facility]    Anesthesia Start: 0326 Anesthesia Stop: 1290    Procedures:       ERCP ENDOSCOPIC RETROGRADE WITH STENT REMOVAL (N/A )      ERCP DILATION BALLOON (N/A )      ERCP FOREIGN BODY REMOVAL (N/A ) Diagnosis: (COLECISTITIS)    Surgeons: Mary Escalona MD Responsible Provider: Ellen Castellon MD    Anesthesia Type: MAC ASA Status: 4          Anesthesia Type: MAC    Eloina Phase I: Eloina Score: 10    Eloina Phase II: Eloina Score: 10    Last vitals: Reviewed and per EMR flowsheets.        Anesthesia Post Evaluation    Patient location during evaluation: PACU  Patient participation: complete - patient participated  Level of consciousness: awake and alert  Airway patency: patent  Nausea & Vomiting: no nausea and no vomiting  Complications: no  Cardiovascular status: hemodynamically stable and blood pressure returned to baseline  Respiratory status: acceptable  Hydration status: euvolemic

## 2021-09-01 DIAGNOSIS — I10 ESSENTIAL HYPERTENSION: ICD-10-CM

## 2021-09-02 RX ORDER — AMLODIPINE BESYLATE 10 MG/1
10 TABLET ORAL DAILY
Qty: 90 TABLET | Refills: 3 | Status: SHIPPED | OUTPATIENT
Start: 2021-09-02

## 2021-09-14 ENCOUNTER — HOSPITAL ENCOUNTER (OUTPATIENT)
Dept: CT IMAGING | Age: 81
Discharge: HOME | End: 2021-09-16
Payer: MEDICARE

## 2021-09-14 ENCOUNTER — HOSPITAL ENCOUNTER (OUTPATIENT)
Age: 81
Discharge: HOME | End: 2021-09-14
Payer: MEDICARE

## 2021-09-14 DIAGNOSIS — C18.6 MALIGNANT NEOPLASM OF DESCENDING COLON (HCC): ICD-10-CM

## 2021-09-14 LAB
ALBUMIN SERPL-MCNC: 4.3 G/DL (ref 3.5–5.2)
ALP BLD-CCNC: 98 U/L (ref 40–129)
ALT SERPL-CCNC: 15 U/L (ref 0–40)
ANION GAP SERPL CALCULATED.3IONS-SCNC: 8 MMOL/L (ref 7–16)
AST SERPL-CCNC: 19 U/L (ref 0–39)
BILIRUB SERPL-MCNC: 0.5 MG/DL (ref 0–1.2)
BUN BLDV-MCNC: 9 MG/DL (ref 6–23)
CALCIUM SERPL-MCNC: 9.3 MG/DL (ref 8.6–10.2)
CHLORIDE BLD-SCNC: 102 MMOL/L (ref 98–107)
CO2: 27 MMOL/L (ref 22–29)
CREAT SERPL-MCNC: 1 MG/DL (ref 0.7–1.2)
GFR AFRICAN AMERICAN: >60
GFR NON-AFRICAN AMERICAN: >60 ML/MIN/1.73
GLUCOSE BLD-MCNC: 101 MG/DL (ref 74–99)
POTASSIUM SERPL-SCNC: 4.5 MMOL/L (ref 3.5–5)
SODIUM BLD-SCNC: 137 MMOL/L (ref 132–146)
TOTAL PROTEIN: 7.8 G/DL (ref 6.4–8.3)

## 2021-09-14 PROCEDURE — 80053 COMPREHEN METABOLIC PANEL: CPT

## 2021-09-14 PROCEDURE — 36415 COLL VENOUS BLD VENIPUNCTURE: CPT

## 2021-09-14 PROCEDURE — 2580000003 HC RX 258: Performed by: RADIOLOGY

## 2021-09-14 PROCEDURE — 6360000004 HC RX CONTRAST MEDICATION: Performed by: RADIOLOGY

## 2021-09-14 PROCEDURE — 74177 CT ABD & PELVIS W/CONTRAST: CPT

## 2021-09-14 RX ORDER — SODIUM CHLORIDE 0.9 % (FLUSH) 0.9 %
10 SYRINGE (ML) INJECTION ONCE
Status: COMPLETED | OUTPATIENT
Start: 2021-09-14 | End: 2021-09-14

## 2021-09-14 RX ADMIN — IOHEXOL 50 ML: 240 INJECTION, SOLUTION INTRATHECAL; INTRAVASCULAR; INTRAVENOUS; ORAL at 14:44

## 2021-09-14 RX ADMIN — IOPAMIDOL 90 ML: 755 INJECTION, SOLUTION INTRAVENOUS at 14:44

## 2021-09-14 RX ADMIN — Medication 10 ML: at 14:44

## 2021-09-29 NOTE — PROGRESS NOTES
[address] PRE-ADMISSION TESTING ENDOSCOPY INSTRUCTIONS- [facility]-phone number:[phone]    ENDOSCOPY INSTRUCTIONS:   [x] Bowel prep instructions reviewed. [x] Nothing by mouth after midnight, including gum, candy, mints, or water. Please follow your surgeons instructions if you are required to complete a bowel prep. Colonoscopy- no solid food-only clear liquids the day prior). [x] You may brush your teeth, gargle, but do NOT swallow water. [x] Do not wear makeup, lotions, powders, deodorant. Nail polish as directed by the nurse. [x] Arrange transportation with a responsible adult  to and from the hospital. If you do not have a responsible adult  to transport you, you will need to make arrangements with a medical transportation company (i.e. [employer]. A Uber/taxi/bus is not appropriate unless you are accompanied by a responsible adult ). Arrange for someone to be with you for the remainder of the day and for 24 hours after your procedure due to having had anesthesia. Who will be your  for transportation?___Bob_______________   Who will be staying with you for 24 hrs after your procedure?__________________    PARKING INSTRUCTIONS:   [x] Arrival Time:_____0800___________________  · [x] Parking lot  \"I\" OR 1 is located on [facility] (the corner of [facility]). To enter, press the button and the gate will lift. A free token will be provided to exit the lot. One car per patient is allowed to park in this lot. All other cars are to park on [address] [place] [address] either in the parking garage or the handicap lot. [] To reach the [facility] lobby from [address] [place] [address], upon entering the hospital, take elevator B to the 3rd floor. EDUCATION INSTRUCTIONS:  [x] Bring a complete list of your medications, please write the last time you took the medicine, give this list to the nurse.   [x] Take the following medications the morning of surgery with 1-2 ounces of water: metoprolol & amlodipine  [x] Stop herbal supplements and vitamins 5 days before your surgery. [] DO NOT take any diabetic medicine the morning of surgery. Follow instructions for insulin the day before surgery. [] If you are diabetic and your blood sugar is low or you feel symptomatic, you may drink 1-2 ounces of apple juice or take a glucose tablet. The morning of your procedure, you may call the pre-op area if you have concerns about your blood sugar [phone]. [] Use your inhalers the morning of surgery. Bring your emergency inhaler with you day of surgery. [x] Follow physician instructions regarding any blood thinners you may be taking. WHAT TO EXPECT:  [x] The day of your procedure you will be greeted and checked in by the [employer].  In addition, you will be registered in the [facility] by a Patient Access Representative. Please bring your photo ID and insurance card. A nurse will greet you in accordance to the time you are needed in the pre-op area to prepare you for surgery. Please do not be discouraged if you are not greeted in the order you arrive as there are many variables that are involved in patient preparation. Your patience is greatly appreciated as you wait for your nurse. Please bring in items such as: books, magazines, newspapers, electronics, or any other items  to occupy your time in the waiting area. [x]  Delays may occur. Staff will make a sincere effort to keep you informed of delays. If any delays occur with your procedure, we apologize ahead of time for your inconvenience as we recognize the value of your time.

## 2021-10-02 ENCOUNTER — ANESTHESIA EVENT (OUTPATIENT)
Dept: ENDOSCOPY | Age: 81
End: 2021-10-02
Payer: MEDICARE

## 2021-10-04 ENCOUNTER — APPOINTMENT (OUTPATIENT)
Dept: GENERAL RADIOLOGY | Age: 81
End: 2021-10-04
Attending: SURGERY
Payer: MEDICARE

## 2021-10-04 ENCOUNTER — HOSPITAL ENCOUNTER (OUTPATIENT)
Age: 81
Setting detail: OUTPATIENT SURGERY
Discharge: HOME | End: 2021-10-04
Attending: SURGERY | Admitting: SURGERY
Payer: MEDICARE

## 2021-10-04 ENCOUNTER — ANESTHESIA (OUTPATIENT)
Dept: ENDOSCOPY | Age: 81
End: 2021-10-04
Payer: MEDICARE

## 2021-10-04 VITALS
WEIGHT: 216 LBS | OXYGEN SATURATION: 95 % | TEMPERATURE: 97.8 F | RESPIRATION RATE: 18 BRPM | HEART RATE: 73 BPM | HEIGHT: 72 IN | SYSTOLIC BLOOD PRESSURE: 131 MMHG | BODY MASS INDEX: 29.26 KG/M2 | DIASTOLIC BLOOD PRESSURE: 83 MMHG

## 2021-10-04 VITALS
DIASTOLIC BLOOD PRESSURE: 69 MMHG | RESPIRATION RATE: 18 BRPM | SYSTOLIC BLOOD PRESSURE: 116 MMHG | OXYGEN SATURATION: 93 %

## 2021-10-04 PROCEDURE — 7100000011 HC PHASE II RECOVERY - ADDTL 15 MIN: Performed by: SURGERY

## 2021-10-04 PROCEDURE — 2709999900 HC NON-CHARGEABLE SUPPLY: Performed by: SURGERY

## 2021-10-04 PROCEDURE — 3700000001 HC ADD 15 MINUTES (ANESTHESIA): Performed by: SURGERY

## 2021-10-04 PROCEDURE — 74270 X-RAY XM COLON 1CNTRST STD: CPT

## 2021-10-04 PROCEDURE — 7100000010 HC PHASE II RECOVERY - FIRST 15 MIN: Performed by: SURGERY

## 2021-10-04 PROCEDURE — G0105 COLORECTAL SCRN; HI RISK IND: HCPCS | Performed by: SURGERY

## 2021-10-04 PROCEDURE — 6360000002 HC RX W HCPCS: Performed by: NURSE ANESTHETIST, CERTIFIED REGISTERED

## 2021-10-04 PROCEDURE — 2580000003 HC RX 258: Performed by: NURSE ANESTHETIST, CERTIFIED REGISTERED

## 2021-10-04 PROCEDURE — 3700000000 HC ANESTHESIA ATTENDED CARE: Performed by: SURGERY

## 2021-10-04 PROCEDURE — 3609027000 HC COLONOSCOPY: Performed by: SURGERY

## 2021-10-04 PROCEDURE — 2580000003 HC RX 258: Performed by: SURGERY

## 2021-10-04 PROCEDURE — 2500000003 HC RX 250 WO HCPCS: Performed by: NURSE ANESTHETIST, CERTIFIED REGISTERED

## 2021-10-04 RX ORDER — SODIUM CHLORIDE 0.9 % (FLUSH) 0.9 %
10 SYRINGE (ML) INJECTION PRN
Status: DISCONTINUED | OUTPATIENT
Start: 2021-10-04 | End: 2021-10-04 | Stop reason: HOSPADM

## 2021-10-04 RX ORDER — SODIUM CHLORIDE 9 MG/ML
INJECTION, SOLUTION INTRAVENOUS CONTINUOUS PRN
Status: DISCONTINUED | OUTPATIENT
Start: 2021-10-04 | End: 2021-10-04 | Stop reason: SDUPTHER

## 2021-10-04 RX ORDER — PROPOFOL 10 MG/ML
INJECTION, EMULSION INTRAVENOUS PRN
Status: DISCONTINUED | OUTPATIENT
Start: 2021-10-04 | End: 2021-10-04 | Stop reason: SDUPTHER

## 2021-10-04 RX ORDER — LIDOCAINE HYDROCHLORIDE 10 MG/ML
INJECTION, SOLUTION EPIDURAL; INFILTRATION; INTRACAUDAL; PERINEURAL PRN
Status: DISCONTINUED | OUTPATIENT
Start: 2021-10-04 | End: 2021-10-04 | Stop reason: SDUPTHER

## 2021-10-04 RX ORDER — SODIUM CHLORIDE 9 MG/ML
25 INJECTION, SOLUTION INTRAVENOUS PRN
Status: DISCONTINUED | OUTPATIENT
Start: 2021-10-04 | End: 2021-10-04 | Stop reason: HOSPADM

## 2021-10-04 RX ORDER — SODIUM CHLORIDE 9 MG/ML
INJECTION, SOLUTION INTRAVENOUS CONTINUOUS
Status: DISCONTINUED | OUTPATIENT
Start: 2021-10-04 | End: 2021-10-04 | Stop reason: HOSPADM

## 2021-10-04 RX ORDER — SODIUM CHLORIDE 0.9 % (FLUSH) 0.9 %
10 SYRINGE (ML) INJECTION EVERY 12 HOURS SCHEDULED
Status: DISCONTINUED | OUTPATIENT
Start: 2021-10-04 | End: 2021-10-04 | Stop reason: HOSPADM

## 2021-10-04 RX ADMIN — PROPOFOL 50 MG: 10 INJECTION, EMULSION INTRAVENOUS at 09:35

## 2021-10-04 RX ADMIN — LIDOCAINE HYDROCHLORIDE 20 MG: 10 INJECTION, SOLUTION EPIDURAL; INFILTRATION; INTRACAUDAL; PERINEURAL at 09:19

## 2021-10-04 RX ADMIN — PROPOFOL 100 MG: 10 INJECTION, EMULSION INTRAVENOUS at 09:19

## 2021-10-04 RX ADMIN — PROPOFOL 50 MG: 10 INJECTION, EMULSION INTRAVENOUS at 09:28

## 2021-10-04 RX ADMIN — PROPOFOL 50 MG: 10 INJECTION, EMULSION INTRAVENOUS at 09:32

## 2021-10-04 RX ADMIN — PROPOFOL 50 MG: 10 INJECTION, EMULSION INTRAVENOUS at 09:25

## 2021-10-04 RX ADMIN — SODIUM CHLORIDE: 9 INJECTION, SOLUTION INTRAVENOUS at 08:25

## 2021-10-04 RX ADMIN — SODIUM CHLORIDE: 9 INJECTION, SOLUTION INTRAVENOUS at 09:15

## 2021-10-04 ASSESSMENT — ENCOUNTER SYMPTOMS: RESPIRATORY NEGATIVE: 1

## 2021-10-04 ASSESSMENT — PAIN - FUNCTIONAL ASSESSMENT: PAIN_FUNCTIONAL_ASSESSMENT: 0-10

## 2021-10-04 ASSESSMENT — PAIN SCALES - GENERAL
PAINLEVEL_OUTOF10: 0

## 2021-10-04 NOTE — PROGRESS NOTES
Clement Russo, patients wife, called and notified of patient going to call son when ready for ride home.

## 2021-10-04 NOTE — ANESTHESIA POSTPROCEDURE EVALUATION
Department of Anesthesiology  Postprocedure Note    Patient: Oracio Dumas  MRN: [id number]  YOB: 1940  Date of evaluation: 10/4/2021  Time:  10:24 AM     Procedure Summary     Date: 10/04/21 Room / Location: [address] [place] [address] / [facility]    Anesthesia Start: 2572 Anesthesia Stop: 1579    Procedure: COLORECTAL CANCER SCREENING, HIGH RISK (N/A ) Diagnosis: (HISTORY OF COLON CANCER)    Surgeons: Johanny Hardy MD Responsible Provider: Gloria Burt MD    Anesthesia Type: general ASA Status: 3          Anesthesia Type: No value filed. Eloina Phase I: Eloina Score: 10    Eloina Phase II: Eloina Score: 10    Last vitals: Reviewed and per EMR flowsheets.        Anesthesia Post Evaluation    Patient location during evaluation: PACU  Patient participation: complete - patient participated  Level of consciousness: awake  Pain score: 0  Airway patency: patent  Nausea & Vomiting: no nausea  Complications: no  Cardiovascular status: blood pressure returned to baseline  Respiratory status: acceptable  Hydration status: euvolemic

## 2021-10-04 NOTE — ANESTHESIA PRE PROCEDURE
Department of Anesthesiology  Preprocedure Note       Name:  Anton Fung   Age:  79 y.o.  :  1940                                          MRN:  [id number]         Date:  10/4/2021      Surgeon: Nazario Hi):  China Mustafa MD    Procedure: Procedure(s):  COLORECTAL CANCER SCREENING, HIGH RISK    Medications prior to admission:   Prior to Admission medications    Medication Sig Start Date End Date Taking? Authorizing Provider   amLODIPine (NORVASC) 10 MG tablet TAKE 1 TABLET BY MOUTH  DAILY 21   Desiree Sanchez MD   metoprolol succinate (TOPROL XL) 50 MG extended release tablet Take 1 tablet by mouth daily 21   Desiree Sanchez MD   levothyroxine (SYNTHROID) 137 MCG tablet Take 1 tablet by mouth Daily 20   Rachid Paredes MD   aspirin 81 MG chewable tablet Take 1 tablet by mouth daily 20   Hieu Patel DO   clopidogrel (PLAVIX) 75 MG tablet Take 1 tablet by mouth daily 20   Hieu Patel DO   atorvastatin (LIPITOR) 40 MG tablet Take 40 mg by mouth nightly     Historical Provider, MD   acetaminophen (TYLENOL) 500 MG tablet Take 500 mg by mouth every 6 hours as needed for Pain    Historical Provider, MD       Current medications:    No current facility-administered medications for this visit. No current outpatient medications on file.      Facility-Administered Medications Ordered in Other Visits   Medication Dose Route Frequency Provider Last Rate Last Admin    0.9 % sodium chloride infusion   IntraVENous Continuous China Mustafa  mL/hr at 10/04/21 0825 New Bag at 10/04/21 0825    sodium chloride flush 0.9 % injection 10 mL  10 mL IntraVENous 2 times per day China Mustafa MD        sodium chloride flush 0.9 % injection 10 mL  10 mL IntraVENous PRN China Mustafa MD        0.9 % sodium chloride infusion  25 mL IntraVENous PRN China Mustafa MD           Allergies:  No Known Allergies    Problem List:    Patient Active Problem List by Carmen Bae MD at [address] ERCP N/A 2020    ERCP performed by Carmen Bae MD at [address] ERCP N/A 2020    ERCP DILATION BALLOON performed by Carmen Bae MD at [address] ERCP N/A 2020    ERCP STENT INSERTION performed by Carmen Bae MD at [address] ERCP N/A 2021    ERCP ENDOSCOPIC RETROGRADE WITH STENT REMOVAL performed by Carmen Bae MD at [address] ERCP N/A 2021    ERCP DILATION BALLOON performed by Carmen Bae MD at [address] ERCP N/A 2021    ERCP FOREIGN BODY REMOVAL performed by Carmen Bae MD at [address] [facility] N/A 10/13/2019    LAPAROTOMY EXPLORATORY, LARGE BOWEL RESECTION, CREATION OF COLOSTOMY performed by Jessica Boateng MD at [facility] N/A 10/22/2019    PORT INSERTION performed by Sloan Glasgow MD at [facility] N/A 2021    [facility] PORT REMOVAL performed by Jessica Boateng MD at [address] [place] [address] History:    Social History     Tobacco Use    Smoking status: Former Smoker     Packs/day: 1.00     Years: 15.00     Pack years: 15.00     Types: Cigarettes     Quit date:      Years since quittin.7    Smokeless tobacco: Never Used    Tobacco comment: quit age 45   Substance Use Topics    Alcohol use: Yes     Alcohol/week: 2.0 standard drinks     Types: 2 Cans of beer per week     Comment: 2 beers a day                                 Counseling given: Not Answered  Comment: quit age 45      Vital Signs (Current): There were no vitals filed for this visit.                                            BP Readings from Last 3 Encounters:   10/04/21 139/73   21 (!) 164/80   21 (!) 141/75       NPO Status:                                                                                 BMI:   Wt Readings from Last 3 Encounters:   10/04/21 216 lb (98 kg)   21 210 lb (95.3 kg)   08/05/21 210 lb (95.3 kg)     There is no height or weight on file to calculate BMI.    CBC:   Lab Results   Component Value Date    WBC 10.0 06/17/2021    RBC 3.77 06/17/2021    HGB 11.3 06/17/2021    HCT 34.1 06/17/2021    MCV 90.5 06/17/2021    RDW 12.3 06/17/2021     06/17/2021       CMP:   Lab Results   Component Value Date     09/14/2021    K 4.5 09/14/2021    K 4.2 06/08/2021     09/14/2021    CO2 27 09/14/2021    BUN 9 09/14/2021    CREATININE 1.0 09/14/2021    GFRAA >60 09/14/2021    LABGLOM >60 09/14/2021    GLUCOSE 101 09/14/2021    PROT 7.8 09/14/2021    CALCIUM 9.3 09/14/2021    BILITOT 0.5 09/14/2021    ALKPHOS 98 09/14/2021    AST 19 09/14/2021    ALT 15 09/14/2021       POC Tests: No results for input(s): POCGLU, POCNA, POCK, POCCL, POCBUN, POCHEMO, POCHCT in the last 72 hours. Coags:   Lab Results   Component Value Date    PROTIME 12.2 06/08/2021    INR 1.1 06/08/2021    APTT 27.9 06/08/2021       HCG (If Applicable): No results found for: PREGTESTUR, PREGSERUM, HCG, HCGQUANT     ABGs: No results found for: PHART, PO2ART, YRD1EHN, LVU2LKU, BEART, P2JCLXEH     Type & Screen (If Applicable):  No results found for: LABABO, LABRH    Drug/Infectious Status (If Applicable):  No results found for: HIV, HEPCAB    COVID-19 Screening (If Applicable):   Lab Results   Component Value Date    COVID19 Not Detected 01/18/2021    COVID19 Not Detected 11/27/2020           Anesthesia Evaluation  Patient summary reviewed  Airway:         Dental:          Pulmonary:                             ROS comment: Former Smoker. Cardiovascular:    (+) hypertension:, CAD:, hyperlipidemia      ECG reviewed      Echocardiogram reviewed         Beta Blocker:  Dose within 24 Hrs      ROS comment: EKG: Normal Sinus Rhythm 85. ECHO:  Normal left ventricle size. Estimated left ventricle ejection fraction 60 %. Normal right ventricular size and function. No significant valve disease. Mildly dilated aortic root measuring 3.3-3.5 cm throughout. CATH:  1. 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 .   Endo/Other:    (+) hypothyroidism::., .                 Abdominal:           Vascular: negative vascular ROS. Anesthesia Plan      general     ASA 3       Induction: intravenous. BIS    Anesthetic plan and risks discussed with patient. Plan discussed with CRNA. Attending anesthesiologist reviewed and agrees with Adrienne Ortega MD   10/4/2021        Department of Anesthesiology  Preprocedure Note       Name:  Familia Chanel   Age:  79 y.o.  :  1940                                          MRN:  [id number]         Date:  10/4/2021      Surgeon: Amalia Goldberg):  Topher Olsen MD    Procedure: Procedure(s):  COLORECTAL CANCER SCREENING, HIGH RISK    Medications prior to admission:   Prior to Admission medications    Medication Sig Start Date End Date Taking?  Authorizing Provider   amLODIPine (NORVASC) 10 MG tablet TAKE 1 TABLET BY MOUTH  DAILY 21   Irais Donovan MD   metoprolol succinate (TOPROL XL) 50 MG extended release tablet Take 1 tablet by mouth daily 21   Irais Donovan MD   levothyroxine (SYNTHROID) 137 MCG tablet Take 1 tablet by mouth Daily 20   Warnell Eisenmenger, MD   aspirin 81 MG chewable tablet Take 1 tablet by mouth daily 20   Rani Nunez DO   clopidogrel (PLAVIX) 75 MG tablet Take 1 tablet by mouth daily 20   Betty Liu DO   atorvastatin (LIPITOR) 40 MG tablet Take 40 mg by mouth nightly     Historical Provider, MD   acetaminophen (TYLENOL) 500 MG tablet Take 500 mg by mouth every 6 hours as needed for Pain    Historical Provider, MD Current medications:    No current facility-administered medications for this visit. No current outpatient medications on file. Facility-Administered Medications Ordered in Other Visits   Medication Dose Route Frequency Provider Last Rate Last Admin    0.9 % sodium chloride infusion   IntraVENous Continuous Karie Dumont  mL/hr at 10/04/21 0825 New Bag at 10/04/21 0825    sodium chloride flush 0.9 % injection 10 mL  10 mL IntraVENous 2 times per day Karie Dumont MD        sodium chloride flush 0.9 % injection 10 mL  10 mL IntraVENous PRN Karie Dumont MD        0.9 % sodium chloride infusion  25 mL IntraVENous PRN Karie Dumont MD           Allergies:  No Known Allergies    Problem List:    Patient Active Problem List   Diagnosis Code    Mod[facility]cal[facility] E44.0    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    Acute cholecystitis K81.0    HLD (hyperlipidemia) E78.5    Essential hypertension I10    Hypothyroidism E03.9    Former smoker Z87.891    History of prostate cancer Z85.46    Leukocytosis D72.829    History of colon cancer Z80.26    S/P colostomy ([facility] Z93.3    CAD (coronary artery disease) I25.10    Cholangitis K83.09    History of MI (myocardial infarction) I25.2    Choledocholithiasis K80.50    SBO (small bowel obstruction) ([facility] K56.609    Status post insertion of drug eluting coronary artery stent Z95.5    History of colostomy reversal Z98.890       Past Medical History:        Diagnosis Date    CAD (coronary artery disease)     Cancer ([facility]     prostate    Hyperlipidemia     Malignant neoplasm of splenic flexure ([facility] 10/21/2019    Thyroid disease     Wears partial dentures     upper       Past Surgical History:        Procedure Laterality Date    CARDIAC CATHETERIZATION  09/21/2020    Dr. Arnie De La Fuente, No LV.   Staged PCI to LAD    CARDIAC CATHETERIZATION  09/25/2020    LAD PCI- Dr [place] CHOLECYSTECTOMY, LAPAROSCOPIC N/A 6/7/2021    LAPAROSCOPIC  SUBTOTAL CHOLECYSTECTOMY INTRAOPERATIVE CHOLANGIOGRAM performed by Karie Dumont MD at [address] N/A 6/7/2021    LAPAROSCOPIC CONVERTED TO  OPEN COLOSTOMY REVERSAL WITH PARTIAL TRANSVERSE COLECTOMY performed by Karie Dumont MD at [address] COLONOSCOPY  8/28/2020    COLONOS[facility]NOSTIC [facility] [address] NEW ACCESS  9/22/2020    CT PTC NEW ACCESS 9/22/2020 Virginie Christensen MD [facility] CT    ERCP N/A 11/6/2020    ERCP STENT INSERTION performed by Alexsandra Begum MD at [address] ERCP N/A 11/6/2020    ERCP STONE REMOVAL [facility] [address] [facility] ER[facility] N/A 11/6/2020    ERCP SPHINCTER/PAPILLOTOMY [facility] [address] [facility] ER[facility] N/A 12/18/2020    ERCP [facility] [address] ER[facility] N/A 12/18/2020    ERCP DILATION BALLOON [facility] [address] ER[facility] N/A 12/18/2020    ERCP STENT INSERTION performed by Alexsandra Begum MD at [address] ERCP N/A 8/6/2021    ERCP ENDOSCOPIC RETROGRADE WITH STENT REMOVAL performed by Alexsandra Begum MD at [address] [facility] ERCP N/A 8/6/2021    ERCP DILATION BALLOON performed by Alexsandra Begum MD at [address] [facility] ERCP N/A 8/6/2021    ERCP FOREIGN BODY REMOVAL [facility] N/A 10/13/2019    LAPAROTOMY EXPLORATORY, LARGE BOWEL RESECTION, CREATION OF COLOSTOMY performed by Karie Dumont MD at [facility] N/A 10/22/2019    PORT INSERTION performed by Wayne Morton MD at [facility] N/A 6/7/2021    MEDI PORT REMOVAL performed by Karie Dumont MD at [address] [facility] [address] History:    Social History     Tobacco Use    Smoking status: Former Smoker     Packs/day: 1.00     Years: Applicable):  No results found for: LABABO, LABRH    Drug/Infectious Status (If Applicable):  No results found for: HIV, HEPCAB    COVID-19 Screening (If Applicable):   Lab Results   Component Value Date    COVID19 Not Detected 2021    COVID19 Not Detected 2020           Anesthesia Evaluation  Patient summary reviewed  Airway:         Dental:          Pulmonary:                             ROS comment: Former Smoker. Cardiovascular:    (+) hypertension:, CAD:, hyperlipidemia      ECG reviewed      Echocardiogram reviewed         Beta Blocker:  Dose within 24 Hrs      ROS comment: EKG: Normal Sinus Rhythm 85. ECHO:  Normal left ventricle size. Estimated left ventricle ejection fraction 60 %. Normal right ventricular size and function. No significant valve disease. Mildly dilated aortic root measuring 3.3-3.5 cm throughout. CATH:  1. 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 .   Endo/Other:    (+) hypothyroidism::., .                 Abdominal:           Vascular: negative vascular ROS. Anesthesia Plan      general     ASA 3       Induction: intravenous. BIS    Anesthetic plan and risks discussed with patient. Plan discussed with CRNA.     Attending anesthesiologist reviewed and agrees with Harry Lopes MD   10/4/2021        Department of Anesthesiology  Preprocedure Note       Name:  La Bar   Age:  79 y.o.  :  1940                                          MRN:  [id number]         Date:  10/4/2021      Surgeon: Juana Rashid):  Jose De Jesus Araujo MD    Procedure: Procedure(s):  COLORECTAL CANCER SCREENING, HIGH RISK    Medications prior to admission:   Prior to Z85.038    [facility] Z93.[facility]    CAD (coronary artery disease) I25.10    Cholangitis K83.09    History of MI (myocardial infarction) I25.2    Choledocholithiasis K80.50    SBO (small bowel obstruction) ([facility] K56.609    Status post insertion of drug eluting coronary artery stent Z95.5    History of colostomy reversal Z98.890       Past Medical History:        Diagnosis Date    CAD (coronary artery disease)     Cancer [facility]     prostate    Hyperlipidemia     Malignant neoplasm of splenic flexure [facility] 10/21/2019    Thyroid disease     Wears partial dentures     upper       Past Surgical History:        Procedure Laterality Date    CARDIAC CATHETERIZATION  09/21/2020    Dr. Hector Santiago, No LV.   Staged PCI to LAD    CARDIAC CATHETERIZATION  09/25/2020    LAD PCI- Dr Sandra Paniagua, LAPAROSCOPIC N/A 6/7/2021    LAPAROSCOPIC  SUBTOTAL CHOLECYSTECTOMY INTRAOPERATIVE CHOLANGIOGRAM performed by Jose De Jesus Araujo MD at [address] N/A 6/7/2021    LAPAROSCOPIC CONVERTED TO  OPEN COLOSTOMY REVERSAL WITH PARTIAL TRANSVERSE COLECTOMY performed by Jose De Jesus Araujo MD at [facility] COLONOSCOPY  8/28/2020    COLONOSCOPY DIAGNOSTIC performed by Jose De Jesus Araujo MD at [address] [facility] NEW ACCESS  9/22/2020    CT PTC NEW ACCESS 9/22/2020 Betsy Bach MD SE CT    ERCP N/A 11/6/2020    ERCP STENT INSERTION performed by Aden Nino MD at [address] ERCP N/A 11/6/2020    ERCP STONE REMOVAL performed by Aden Nino MD at [address] ERCP N/A 11/6/2020    ERCP SPHINCTER/PAPILLOTOMY performed by Aden Nino MD at [address] ERCP N/A 12/18/2020    ERCP performed by Aden Nino MD at [address] ERCP N/A 12/18/2020    ERCP DILATION BALLOON performed by Aden Nino MD at [address] ERCP N/A 12/18/2020    ERCP STENT INSERTION performed by Aden Nino MD at [address] ERCP N/A 8/6/2021    ERCP ENDOSCOPIC RETROGRADE WITH STENT REMOVAL performed by Dennie Fennel, MD at [address] ERCP N/A 2021    ERCP DILATION BALLOON performed by Dennie Fennel, MD at [address] ERCP N/A 2021    ERCP FOREIGN BODY REMOVAL performed by Dennie Fennel, MD [facility] [address] [facility] N/A 10/13/2019    LAPAROTOMY EXPLORATORY, LARGE BOWEL RESECTION, CREATION OF COLOSTOMY performed by Paulo Lundborg, MD at [facility] N/A 10/22/2019    PORT INSERTION performed by Leeanna Duron MD at [facility] N/A 2021    MEDI PORT REMOVAL performed by Paulo Lundborg, MD at [address] [facility] [address] History:    Social History     Tobacco Use    Smoking status: Former Smoker     Packs/day: 1.00     Years: 15.00     Pack years: 15.00     Types: Cigarettes     Quit date:      Years since quittin.7    Smokeless tobacco: Never Used    Tobacco comment: quit age 45   Substance Use Topics    Alcohol use: Yes     Alcohol/week: 2.0 standard drinks     Types: 2 Cans of beer per week     Comment: 2 beers a day                                 Counseling given: Not Answered  Comment: quit age 45      Vital Signs (Current): There were no vitals filed for this visit.                                            BP Readings from Last 3 Encounters:   10/04/21 139/73   21 (!) 164/80   21 (!) 141/75       NPO Status:                                                                                 BMI:   Wt Readings from Last 3 Encounters:   10/04/21 216 lb (98 kg)   21 210 lb (95.3 kg)   21 210 lb (95.3 kg)     There is no height or weight on file to calculate BMI.    CBC:   Lab Results   Component Value Date    WBC 10.0 2021    RBC 3.77 2021    HGB 11.3 2021    HCT 34.1 2021    MCV 90.5 2021    RDW 12.3 2021     2021       CMP:   Lab Results   Component Value Date    NA 137 09/14/2021    K 4.5 09/14/2021    K 4.2 06/08/2021     09/14/2021    CO2 27 09/14/2021    BUN 9 09/14/2021    CREATININE 1.0 09/14/2021    GFRAA >60 09/14/2021    LABGLOM >60 09/14/2021    GLUCOSE 101 09/14/2021    PROT 7.8 09/14/2021    CALCIUM 9.3 09/14/2021    BILITOT 0.5 09/14/2021    ALKPHOS 98 09/14/2021    AST 19 09/14/2021    ALT 15 09/14/2021       POC Tests: No results for input(s): POCGLU, POCNA, POCK, POCCL, POCBUN, POCHEMO, POCHCT in the last 72 hours. Coags:   Lab Results   Component Value Date    PROTIME 12.2 06/08/2021    INR 1.1 06/08/2021    APTT 27.9 06/08/2021       HCG (If Applicable): No results found for: PREGTESTUR, PREGSERUM, HCG, HCGQUANT     ABGs: No results found for: PHART, PO2ART, SPK4YJZ, ZUY9XJR, BEART, N7IIUGGH     Type & Screen (If Applicable):  No results found for: LABABO, LABRH    Drug/Infectious Status (If Applicable):  No results found for: HIV, HEPCAB    COVID-19 Screening (If Applicable):   Lab Results   Component Value Date    COVID19 Not Detected 01/18/2021    COVID19 Not Detected 11/27/2020           Anesthesia Evaluation  Patient summary reviewed  Airway: Mallampati: I  TM distance: >3 FB   Neck ROM: full  Mouth opening: > = 3 FB Dental:    (+) upper dentures      Pulmonary: breath sounds clear to auscultation  (+) recent URI:                            ROS comment: Former Smoker. Cardiovascular:    (+) hypertension:, CAD:, hyperlipidemia      ECG reviewed  Rhythm: regular  Rate: normal  Echocardiogram reviewed         Beta Blocker:  Dose within 24 Hrs      ROS comment: EKG: Normal Sinus Rhythm 85. ECHO:  Normal left ventricle size. Estimated left ventricle ejection fraction 60 %. Normal right ventricular size and function. No significant valve disease. Mildly dilated aortic root measuring 3.3-3.5 cm throughout. CATH:  1. Discussed with Dr. Lily Medina of general surgery:   a.  Due to ongoing fever and right upper quadrant pain and   critical proximal mid LAD disease he will be referred to IR for percutaneous cholecystostomy under moderate sedation tomorrow or Wednesday   b. Start Plavix after the cholecystostomy.  Per Dr. Yady Ibanez surgery can be performed on dual antiplatelet therapy. c. Return to the Cath Lab on Friday for PCI of the proximal to mid LAD via radial access with rotational atherectomy   2. In the meantime goal blood pressure 385-304 systolic   3. We will continue to follow pending PCI     CATH:  1. 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 .   Endo/Other:    (+) hypothyroidism::., .                 Abdominal:             Vascular: negative vascular ROS. Other Findings:             Anesthesia Plan      general     ASA 3       Induction: intravenous. BIS    Anesthetic plan and risks discussed with patient. Plan discussed with CRNA.     Attending anesthesiologist reviewed and agrees with Pre Eval content and Attending anesthesiologist reviewed and agrees with Evan Nichols MD   10/4/2021

## 2021-10-04 NOTE — PROGRESS NOTES
Admitted to [facility]. Instructions  Reviewed. Pt fully vaccinated. Denies S/S or exposure to Covid.

## 2021-10-04 NOTE — OP NOTE
Operative Note      Patient: Lashell Kasper  YOB: 1940  MRN: [id number]    Date of Procedure: 10/4/2021    Pre-Op Diagnosis: HISTORY OF COLON CANCER    Post-Op Diagnosis: Normal distal colon anus  to the hepatic flexure unable to pass the hepatic flexure secondary to the angulation        Procedure(s):  COLORECTAL CANCER SCREENING, HIGH RISK    Surgeon(s):  Sergey Guerrier MD    Assistant:   * No surgical staff found *    Anesthesia: Monitor Anesthesia Care    Estimated Blood Loss (mL): Minimal    Complications: Incomplete colonoscopy     Specimens:   * No specimens in log *    Implants:  * No implants in log *      Drains:   [REMOVED] Closed/Suction Drain Right RUQ Bulb 19 [place]ian (Removed)       Findings:  Incomplete colonoscopy     Detailed Description of Procedure: In the left side down decubitus position, a digital rectal exam was performed. There were no masses or abnormalities noted. The scope was lubricated and inserted into the anus. The scope was then passed under direct visualization in a retrograde fashion to hepatic flexure at this point the angulation of the colon was very sharp and I was unable to make the turn into the cecum. The prep was satisfactory. The scope was slowly withdraw findings below. Transverse colon : normal     Rectum:normal     The scope was retroflexed at the anal verge. No abnormalities were seen at the anal verge. The scope was then straightened and removed.      THE PATIENT TOLERATED THE PROCEDURE WELL    PLAN:  Will do a Barium enema and discuss with Oncology likely follow up prn as I doubt a complete colonoscopy will be possible considering the angle of the colon     Will Call with barium enema results     Electronically signed by Sergey Guerrier MD on 10/4/2021 at 9:54 AM

## 2021-10-04 NOTE — H&P
[facility]  GENERAL SURGERY     ATTENDINGPHYSICIAN PROGRESS NOTE   I have examined the patient, reviewed the record, and discussed the case with the APN/  Resident. I have reviewed all relevant labs and imaging data. Please refer to the  APN/ resident's note. I agree with the  assessment and plan with the following corrections/ additions. The following summarizes my clinical findings and independent assessment. HPI: Hx obstructing colon cancer s/p left hemicolectomy, chemotherapy and colostomy reversal. Hx complicated by interm MI stent placement and cholecystitis ( IR drain)  choledocholithiasis ( ERCP and stent placement) and ultimately subtotal cholecystectomy at the same time as his reversal     Past Medical History:   Diagnosis Date    CAD (coronary artery disease)     Cancer ([facility] [address]     prostate    Hyperlipidemia     Malignant neoplasm of splenic flexure ([facility] [address] 10/21/2019    Thyroid disease     Wears partial dentures     upper     Past Surgical History:   Procedure Laterality Date    CARDIAC CATHETERIZATION  09/21/2020    Dr. Charles Lynch, No LV.   Staged PCI to LAD    CARDIAC CATHETERIZATION  09/25/2020    LAD PCI- Dr Saravanan Moreira, LAPAROSCOPIC N/A 6/7/2021    LAPAROSCOPIC  SUBTOTAL CHOLECYSTECTOMY INTRAOPERATIVE CHOLANGIOGRAM performed by Mechelle Hernandez MD at [address] [place] [address],[address] N/A 6/7/2021    LAPAROSCOPIC CONVERTED TO  OPEN COLOSTOMY REVERSAL WITH PARTIAL TRANSVERSE COLECTOMY performed by Mechelle Hernandez MD at [place] [address] COLONOSCOPY  8/28/2020    COLONOSCOPY DIAGNOSTIC performed by Mechelle Hernandez MD at [address] [facility] NEW ACCESS  9/22/2020    CT PTC NEW ACCESS 9/22/2020 Kathi Nguyen MD [facility]    ERCP N/A 11/6/2020    ERCP STENT INSERTION performed by Katiuska Arizmendi MD at [address] [facility] ERCP N/A 11/6/2020    ERCP STONE REMOVAL performed by Katiuska Arizmendi MD at [address] [facility] ERCP N/A 11/6/2020    ERCP SPHINCTER/PAPILLOTOMY performed by Osorio Painter MD at [address] ERCP N/A 2020    ERCP performed by Osorio Painter MD at [address] ERCP N/A 2020    ERCP DILATION BALLOON performed by Osorio Painter MD at [address] ERCP N/A 2020    ERCP STENT INSERTION performed by Osorio Painter MD at [address] ERCP N/A 2021    ERCP ENDOSCOPIC RETROGRADE WITH STENT REMOVAL performed by Osorio Painter MD at [address] ERCP N/A 2021    ERCP DILATION BALLOON performed by Osorio Painter MD at [address] ERCP N/A 2021    ERCP FOREIGN BODY REMOVAL performed by Osorio Painter MD at [address] [place] [address] N/A 10/13/2019    LAPAROTOMY EXPLORATORY, LARGE BOWEL RESECTION, CREATION OF COLOSTOMY performed by Maribel Jacobs MD at [facility] N/A 10/22/2019    PORT INSERTION performed by Agustin rFanz MD at [facility] N/A 2021    MEDI PORT REMOVAL performed by Maribel Jacobs MD at [facility]     Socioeconomic History    Marital status:      Spouse name: Not on file    Number of children: Not on file    Years of education: Not on file    Highest education level: Not on file   Occupational History    Not on file   Tobacco Use    Smoking status: Former Smoker     Packs/day: 1.00     Years: 15.00     Pack years: 15.00     Types: Cigarettes     Quit date:      Years since quittin.7    Smokeless tobacco: Never Used    Tobacco comment: quit age 45   Vaping Use    Vaping Use: Never used   Substance and Sexual Activity    Alcohol use: Yes     Alcohol/week: 2.0 standard drinks     Types: 2 Cans of beer per week     Comment: 2 beers a day     Drug use: Never    Sexual activity: Not on file   Other Topics Concern    Not on file   Social History Narrative    Drinks 2 cups of coffee daily.       Social Determinants of Health Financial Resource Strain:     Difficulty of Paying Living Expenses:    Food Insecurity:     Worried About Running Out of Food in the Last Year:     [address] in the Last Year:    Transportation Needs:     Lack of Transportation (Medical):  Lack of Transportation (Non-Medical):    Physical Activity:     Days of Exercise per Week:     Minutes of Exercise per Session:    Stress:     Feeling of Stress :    Social Connections:     Frequency of Communication with Friends and Family:     Frequency of Social Gatherings with Friends and Family:     Attends Synagogue Services:     Active Member of Clubs or Organizations:     Attends Club or Organization Meetings:     Marital Status:    Intimate Partner Violence:     Fear of Current or Ex-Partner:     Emotionally Abused:     Physically Abused:     Sexually Abused:      Current Facility-Administered Medications   Medication Dose Route Frequency Provider Last Rate Last Admin    0.9 % sodium chloride infusion   IntraVENous Continuous Marcia Kirk  mL/hr at 10/04/21 0825 New Bag at 10/04/21 0825    sodium chloride flush 0.9 % injection 10 mL  10 mL IntraVENous 2 times per day Marcia Kirk MD        sodium chloride flush 0.9 % injection 10 mL  10 mL IntraVENous PRN Haley Vogt MD        0.9 % sodium chloride infusion  25 mL IntraVENous PRN Marcia Kirk MD         No Known Allergies      Review of Systems   Constitutional: Negative. Respiratory: Negative. Cardiovascular: Negative. Gastrointestinal:        Slight anal pain from the prep          Physical Exam  HENT:      Head: Normocephalic. Eyes:      Extraocular Movements: Extraocular movements intact. Cardiovascular:      Rate and Rhythm: Normal rate. Pulmonary:      Effort: Pulmonary effort is normal.   Abdominal:      General: Abdomen is flat. There is no distension. Tenderness: There is no abdominal tenderness. Musculoskeletal:         General: Normal range of motion. Cervical back: Neck supple. Skin:     General: Skin is warm. Neurological:      Mental Status: He is alert. Psychiatric:         Mood and Affect: Mood normal.           I reviewed the available laboratory studies and diagnostic imaging and looked at the imaging.       Assessment:  10/13/19  Exploratory laparotomy, left hemicolectomy, mobilization of splenic flexure, transverse colostomy , Oversewing of cecal serosal tear for obstructing colon cancer     8/28/2020  2 polyps - 10cm and 20cm from colostomy ( both in transverse colon), Diversion proctitis     6/7/2021   LYSIS OF ADHESIONS   LAPAROSCOPIC  SUBTOTAL CHOLECYSTECTOMY  INTRAOPERATIVE CHOLANGIOGRAM   LAPAROSCOPIC CONVERTED TO  OPEN COLOSTOMY REVERSAL WITH PARTIAL TRANSVERSE COLECTOMY   19F ANGELITA DRAIN PLACEMENT IN THE GALLBLADDER FOSSA   MEDI PORT REMOVAL        Plan:      Follow up with Hem/Onc   Colonoscopy Follow up 1-3 years then every 5 years pending findings     CT scan no findings metastatic disease     Kathleen Urbina MD

## 2021-10-04 NOTE — PROGRESS NOTES
Discharge instructions provided to patient, written and verbal, patient verbalized understanding. Iv site removed. Transport requested to go to [facility].

## 2022-07-15 NOTE — PROGRESS NOTES
Extubated 4L NC, Positive cuff leak, ETT and orally suctioned, follows commands, No stridor post extubation.      07/15/22 2063   Ventilator Initiate/Discontinue   Ventilator Discontinue Yes Hospital Medicine    This patient is being seen in hospital coverage for hospital service  Chart has been reviewed  Hospital course to date reviewed  Patient is status post ERCP choledocholithiasis    Subjective:    Feels quite comfortable having no pain starting his diet today  Vitals are stable  Labs demonstrate declining liver function studies      Current Facility-Administered Medications:     piperacillin-tazobactam (ZOSYN) 3.375 g in dextrose 5 % 100 mL IVPB extended infusion (mini-bag), 3.375 g, Intravenous, Q8H, Amando Dalton DO, Last Rate: 25 mL/hr at 11/07/20 0844, 3.375 g at 11/07/20 0844    0.9 % sodium chloride infusion admixture, , Intravenous, Q8H, Amando Dalton DO, Stopped at 11/07/20 0843    amLODIPine (NORVASC) tablet 5 mg, 5 mg, Oral, Daily, Uche Loupe Malmer, DO, 5 mg at 11/07/20 2252    aspirin chewable tablet 81 mg, 81 mg, Oral, Daily, Uche Loupe Malmer, DO, 81 mg at 11/07/20 0843    atorvastatin (LIPITOR) tablet 40 mg, 40 mg, Oral, Daily, Uche Loupe Malmer, DO, 40 mg at 11/06/20 2016    clopidogrel (PLAVIX) tablet 75 mg, 75 mg, Oral, Daily, Uche Loupe Malmer, DO, 75 mg at 11/07/20 0844    levothyroxine (SYNTHROID) tablet 137 mcg, 137 mcg, Oral, Daily, Uche Loupe Malmer, DO, 137 mcg at 11/07/20 0544    metoprolol succinate (TOPROL XL) extended release tablet 50 mg, 50 mg, Oral, Daily, Uche Loupe Malmer, DO, 50 mg at 11/07/20 0844    sodium chloride flush 0.9 % injection 10 mL, 10 mL, Intravenous, 2 times per day, Nikki Traore, DO, 10 mL at 11/07/20 0843    sodium chloride flush 0.9 % injection 10 mL, 10 mL, Intravenous, PRN, Uche Loupe Malmer, DO    acetaminophen (TYLENOL) tablet 650 mg, 650 mg, Oral, Q6H PRN, 650 mg at 11/05/20 1532 **OR** acetaminophen (TYLENOL) suppository 650 mg, 650 mg, Rectal, Q6H PRN, Cuhe Magdae Malmer, DO    polyethylene glycol (GLYCOLAX) packet 17 g, 17 g, Oral, Daily PRN, Uche Loenriquetae Malmer, DO    promethazine (PHENERGAN) tablet 12.5 mg, 12.5 mg, Oral, Q6H PRN **OR** ondansetron (ZOFRAN) injection 4 mg, 4 mg, Intravenous, Q6H PRN, Lis Liu DO    Objective:    /67   Pulse 60   Temp 97.2 °F (36.2 °C) (Temporal)   Resp 17   Ht 6' (1.829 m)   Wt 196 lb 3.2 oz (89 kg)   SpO2 96%   BMI 26.61 kg/m²   Head eyes ears are normal  Neck no masses no nodes  Heart:  reg  Lungs:  ctab  Abd: + bs soft nontender colostomy intact  Extrem:  W/o edema  Neurologically there is no sign or symptom    CBC with Differential:    Lab Results   Component Value Date    WBC 7.7 11/07/2020    RBC 4.09 11/07/2020    HGB 12.5 11/07/2020    HCT 36.0 11/07/2020     11/07/2020    MCV 88.0 11/07/2020    MCH 30.6 11/07/2020    MCHC 34.7 11/07/2020    RDW 13.0 11/07/2020    LYMPHOPCT 28.5 11/07/2020    MONOPCT 7.8 11/07/2020    BASOPCT 0.5 11/07/2020    MONOSABS 0.60 11/07/2020    LYMPHSABS 2.19 11/07/2020    EOSABS 0.19 11/07/2020    BASOSABS 0.04 11/07/2020     CMP:    Lab Results   Component Value Date     11/07/2020    K 4.1 11/07/2020    K 4.0 11/03/2020     11/07/2020    CO2 25 11/07/2020    BUN 4 11/07/2020    CREATININE 0.8 11/07/2020    GFRAA >60 11/07/2020    LABGLOM >60 11/07/2020    GLUCOSE 109 11/07/2020    PROT 7.0 11/07/2020    LABALBU 3.8 11/07/2020    CALCIUM 9.3 11/07/2020    BILITOT 1.3 11/07/2020    ALKPHOS 238 11/07/2020    AST 82 11/07/2020     11/07/2020     Warfarin PT/INR:    Lab Results   Component Value Date    INR 1.1 11/04/2020    INR 1.2 09/22/2020    INR 1.0 09/20/2020    PROTIME 12.4 11/04/2020    PROTIME 13.3 (H) 09/22/2020    PROTIME 11.8 09/20/2020       Assessment:    Active Problems:    HLD (hyperlipidemia)    HTN (hypertension)    Hypothyroidism    Former smoker    History of prostate cancer    History of colon cancer    S/P colostomy (HCC)    CAD (coronary artery disease)    Cholangitis    History of MI (myocardial infarction)    Choledocholithiasis  Resolved Problems:    * No resolved hospital problems. *  choledocolithiasis  S/p ercp cbd stone extraction                     Plan:  Cont post ercp care   Diet is being advanced expect discharge shortly          Renan Palacio  9:55 AM  11/7/2020

## 2022-09-06 ENCOUNTER — HOSPITAL ENCOUNTER (OUTPATIENT)
Dept: CT IMAGING | Age: 82
Discharge: HOME | End: 2022-09-08
Payer: MEDICARE

## 2022-09-06 ENCOUNTER — HOSPITAL ENCOUNTER (OUTPATIENT)
Age: 82
Discharge: HOME | End: 2022-09-06
Payer: MEDICARE

## 2022-09-06 DIAGNOSIS — C18.6 MALIGNANT NEOPLASM OF DESCENDING COLON (HCC): ICD-10-CM

## 2022-09-06 LAB
BUN BLDV-MCNC: 8 MG/DL (ref 6–23)
CREAT SERPL-MCNC: 1 MG/DL (ref 0.7–1.2)
GFR AFRICAN AMERICAN: >60
GFR NON-AFRICAN AMERICAN: >60 ML/MIN/1.73

## 2022-09-06 PROCEDURE — 6360000004 HC RX CONTRAST MEDICATION: Performed by: INTERNAL MEDICINE

## 2022-09-06 PROCEDURE — 82565 ASSAY OF CREATININE: CPT

## 2022-09-06 PROCEDURE — 84520 ASSAY OF UREA NITROGEN: CPT

## 2022-09-06 PROCEDURE — 74177 CT ABD & PELVIS W/CONTRAST: CPT

## 2022-09-06 PROCEDURE — 2580000003 HC RX 258: Performed by: INTERNAL MEDICINE

## 2022-09-06 PROCEDURE — 36415 COLL VENOUS BLD VENIPUNCTURE: CPT

## 2022-09-06 RX ORDER — SODIUM CHLORIDE 0.9 % (FLUSH) 0.9 %
10 SYRINGE (ML) INJECTION PRN
Status: DISCONTINUED | OUTPATIENT
Start: 2022-09-06 | End: 2022-09-09 | Stop reason: HOSPADM

## 2022-09-06 RX ADMIN — Medication 10 ML: at 16:24

## 2022-09-06 RX ADMIN — IOPAMIDOL 50 ML: 755 INJECTION, SOLUTION INTRAVENOUS at 16:23

## 2022-09-06 RX ADMIN — IOHEXOL 50 ML: 240 INJECTION, SOLUTION INTRATHECAL; INTRAVASCULAR; INTRAVENOUS; ORAL at 16:24

## 2022-12-13 NOTE — TELEPHONE ENCOUNTER
- Continue PPI MA contacted patient and S/O, Rachel Cooper, to inform that we just need to hear from cardiology in regards to if he can stop his plavix pre-op. Patient informed he has an appointment with Dr Adrian Moreira on 4/23/2021 to discuss. At that point we can determine if we can proceed with scheduling Colostomy reversal/cholecystectomy/mediport removal with Dr Hai Olsno. Irma Hendrickson and Rachel Cooper verbalized understanding.      Electronically signed by Guerita Beach on 4/15/21 at 10:17 AM EDT

## 2023-03-27 ENCOUNTER — TELEPHONE (OUTPATIENT)
Dept: SURGERY | Age: 83
End: 2023-03-27

## 2023-03-27 NOTE — TELEPHONE ENCOUNTER
JAMES called and spoke with Kd from Dr Devon Allen office [facility] regarding referral clarification. Upon speaking with Kd, she had clarified that it wasn't an actual referral, it was a progress note from patients last office visit. Closed referral, and taken out of queue.      Electronically signed by Sigifredo Bauman LPN on 6/57/05 at 1:69 AM EDT

## 2023-09-06 ENCOUNTER — HOSPITAL ENCOUNTER (OUTPATIENT)
Age: 83
Discharge: HOME | End: 2023-09-06
Attending: INTERNAL MEDICINE
Payer: MEDICARE

## 2023-09-06 ENCOUNTER — HOSPITAL ENCOUNTER (OUTPATIENT)
Dept: CT IMAGING | Age: 83
Discharge: HOME | End: 2023-09-08
Attending: INTERNAL MEDICINE
Payer: MEDICARE

## 2023-09-06 DIAGNOSIS — C18.6 MALIGNANT NEOPLASM OF DESCENDING COLON (HCC): ICD-10-CM

## 2023-09-06 LAB
ALBUMIN SERPL-MCNC: 4.5 G/DL (ref 3.5–5.2)
ALP SERPL-CCNC: 84 U/L (ref 40–129)
ALT SERPL-CCNC: 32 U/L (ref 0–40)
ANION GAP SERPL CALCULATED.3IONS-SCNC: 11 MMOL/L (ref 7–16)
AST SERPL-CCNC: 26 U/L (ref 0–39)
BILIRUB SERPL-MCNC: 0.9 MG/DL (ref 0–1.2)
BUN SERPL-MCNC: 11 MG/DL (ref 6–23)
CALCIUM SERPL-MCNC: 9.1 MG/DL (ref 8.6–10.2)
CHLORIDE SERPL-SCNC: 103 MMOL/L (ref 98–107)
CO2 SERPL-SCNC: 23 MMOL/L (ref 22–29)
CREAT SERPL-MCNC: 1 MG/DL (ref 0.7–1.2)
GFR SERPL CREATININE-BSD FRML MDRD: >60 ML/MIN/1.73M2
GLUCOSE SERPL-MCNC: 106 MG/DL (ref 74–99)
POTASSIUM SERPL-SCNC: 4.2 MMOL/L (ref 3.5–5)
PROT SERPL-MCNC: 7.4 G/DL (ref 6.4–8.3)
SODIUM SERPL-SCNC: 137 MMOL/L (ref 132–146)

## 2023-09-06 PROCEDURE — 74177 CT ABD & PELVIS W/CONTRAST: CPT

## 2023-09-06 PROCEDURE — 80053 COMPREHEN METABOLIC PANEL: CPT

## 2023-09-06 PROCEDURE — 6360000004 HC RX CONTRAST MEDICATION: Performed by: RADIOLOGY

## 2023-09-06 PROCEDURE — 36415 COLL VENOUS BLD VENIPUNCTURE: CPT

## 2023-09-06 RX ADMIN — IOPAMIDOL 75 ML: 755 INJECTION, SOLUTION INTRAVENOUS at 14:32

## 2023-09-06 RX ADMIN — IOPAMIDOL 18 ML: 755 INJECTION, SOLUTION INTRAVENOUS at 14:32

## 2023-09-25 LAB
BASOPHILS # BLD: 0.06 K/UL (ref 0–0.2)
BASOPHILS NFR BLD: 1 % (ref 0–2)
EOSINOPHIL # BLD: 0.19 K/UL (ref 0.05–0.5)
EOSINOPHILS RELATIVE PERCENT: 2 % (ref 0–6)
ERYTHROCYTE [DISTWIDTH] IN BLOOD BY AUTOMATED COUNT: 12.5 % (ref 11.5–15)
HCT VFR BLD AUTO: 43.4 % (ref 37–54)
HGB BLD-MCNC: 15 G/DL (ref 12.5–16.5)
IMM GRANULOCYTES # BLD AUTO: 0.03 K/UL (ref 0–0.58)
IMM GRANULOCYTES NFR BLD: 0 % (ref 0–5)
LYMPHOCYTES NFR BLD: 2.86 K/UL (ref 1.5–4)
LYMPHOCYTES RELATIVE PERCENT: 33 % (ref 20–42)
MCH RBC QN AUTO: 30.7 PG (ref 26–35)
MCHC RBC AUTO-ENTMCNC: 34.6 G/DL (ref 32–34.5)
MCV RBC AUTO: 88.9 FL (ref 80–99.9)
MONOCYTES NFR BLD: 0.64 K/UL (ref 0.1–0.95)
MONOCYTES NFR BLD: 7 % (ref 2–12)
NEUTROPHILS NFR BLD: 56 % (ref 43–80)
NEUTS SEG NFR BLD: 4.86 K/UL (ref 1.8–7.3)
PLATELET # BLD AUTO: 260 K/UL (ref 130–450)
PMV BLD AUTO: 10 FL (ref 7–12)
RBC # BLD AUTO: 4.88 M/UL (ref 3.8–5.8)
WBC OTHER # BLD: 8.6 K/UL (ref 4.5–11.5)

## 2024-09-05 ENCOUNTER — HOSPITAL ENCOUNTER (OUTPATIENT)
Age: 84
Discharge: HOME | End: 2024-09-05
Payer: MEDICARE

## 2024-09-05 ENCOUNTER — HOSPITAL ENCOUNTER (OUTPATIENT)
Dept: CT IMAGING | Age: 84
Discharge: HOME | End: 2024-09-07
Attending: INTERNAL MEDICINE
Payer: MEDICARE

## 2024-09-05 DIAGNOSIS — C18.6 MALIGNANT NEOPLASM OF DESCENDING COLON (HCC): ICD-10-CM

## 2024-09-05 LAB
BUN SERPL-MCNC: 16 MG/DL (ref 6–23)
CREAT SERPL-MCNC: 1 MG/DL (ref 0.7–1.2)
GFR, ESTIMATED: 74 ML/MIN/1.73M2

## 2024-09-05 PROCEDURE — 84520 ASSAY OF UREA NITROGEN: CPT

## 2024-09-05 PROCEDURE — 74177 CT ABD & PELVIS W/CONTRAST: CPT

## 2024-09-05 PROCEDURE — 6360000004 HC RX CONTRAST MEDICATION: Performed by: RADIOLOGY

## 2024-09-05 PROCEDURE — 36415 COLL VENOUS BLD VENIPUNCTURE: CPT

## 2024-09-05 PROCEDURE — 82565 ASSAY OF CREATININE: CPT

## 2024-09-05 RX ORDER — IOPAMIDOL 755 MG/ML
75 INJECTION, SOLUTION INTRAVASCULAR
Status: COMPLETED | OUTPATIENT
Start: 2024-09-05 | End: 2024-09-05

## 2024-09-05 RX ORDER — IOPAMIDOL 755 MG/ML
18 INJECTION, SOLUTION INTRAVASCULAR
Status: COMPLETED | OUTPATIENT
Start: 2024-09-05 | End: 2024-09-05

## 2024-09-05 RX ADMIN — IOPAMIDOL 18 ML: 755 INJECTION, SOLUTION INTRAVENOUS at 10:12

## 2024-09-05 RX ADMIN — IOPAMIDOL 75 ML: 755 INJECTION, SOLUTION INTRAVENOUS at 10:12

## 2024-12-31 ENCOUNTER — APPOINTMENT (OUTPATIENT)
Dept: GENERAL RADIOLOGY | Age: 84
DRG: 179 | End: 2024-12-31
Payer: MEDICARE

## 2024-12-31 ENCOUNTER — HOSPITAL ENCOUNTER (INPATIENT)
Age: 84
LOS: 2 days | Discharge: HOME | DRG: 179 | End: 2025-01-02
Attending: STUDENT IN AN ORGANIZED HEALTH CARE EDUCATION/TRAINING PROGRAM | Admitting: INTERNAL MEDICINE
Payer: MEDICARE

## 2024-12-31 DIAGNOSIS — U07.1 COVID-19: ICD-10-CM

## 2024-12-31 DIAGNOSIS — J18.9 PNEUMONIA OF LEFT LUNG DUE TO INFECTIOUS ORGANISM, UNSPECIFIED PART OF LUNG: Primary | ICD-10-CM

## 2024-12-31 LAB
ALBUMIN SERPL-MCNC: 4.1 G/DL (ref 3.5–5.2)
ALP SERPL-CCNC: 118 U/L (ref 40–129)
ALT SERPL-CCNC: 76 U/L (ref 0–40)
ANION GAP SERPL CALCULATED.3IONS-SCNC: 12 MMOL/L (ref 7–16)
AST SERPL-CCNC: 63 U/L (ref 0–39)
B PARAP IS1001 DNA NPH QL NAA+NON-PROBE: NOT DETECTED
B PERT DNA SPEC QL NAA+PROBE: NOT DETECTED
BASOPHILS # BLD: 0.04 K/UL (ref 0–0.2)
BASOPHILS NFR BLD: 0 % (ref 0–2)
BILIRUB SERPL-MCNC: 1.3 MG/DL (ref 0–1.2)
BNP SERPL-MCNC: 94 PG/ML (ref 0–450)
BUN SERPL-MCNC: 15 MG/DL (ref 6–23)
C PNEUM DNA NPH QL NAA+NON-PROBE: NOT DETECTED
CALCIUM SERPL-MCNC: 9 MG/DL (ref 8.6–10.2)
CHLORIDE SERPL-SCNC: 101 MMOL/L (ref 98–107)
CO2 SERPL-SCNC: 25 MMOL/L (ref 22–29)
CREAT SERPL-MCNC: 1.2 MG/DL (ref 0.7–1.2)
EOSINOPHIL # BLD: 0 K/UL (ref 0.05–0.5)
EOSINOPHILS RELATIVE PERCENT: 0 % (ref 0–6)
ERYTHROCYTE [DISTWIDTH] IN BLOOD BY AUTOMATED COUNT: 12.4 % (ref 11.5–15)
FLUAV RNA NPH QL NAA+NON-PROBE: NOT DETECTED
FLUBV RNA NPH QL NAA+NON-PROBE: NOT DETECTED
GFR, ESTIMATED: 63 ML/MIN/1.73M2
GLUCOSE SERPL-MCNC: 165 MG/DL (ref 74–99)
HADV DNA NPH QL NAA+NON-PROBE: NOT DETECTED
HCOV 229E RNA NPH QL NAA+NON-PROBE: NOT DETECTED
HCOV HKU1 RNA NPH QL NAA+NON-PROBE: NOT DETECTED
HCOV NL63 RNA NPH QL NAA+NON-PROBE: NOT DETECTED
HCOV OC43 RNA NPH QL NAA+NON-PROBE: NOT DETECTED
HCT VFR BLD AUTO: 42.3 % (ref 37–54)
HGB BLD-MCNC: 14.4 G/DL (ref 12.5–16.5)
HMPV RNA NPH QL NAA+NON-PROBE: NOT DETECTED
HPIV1 RNA NPH QL NAA+NON-PROBE: NOT DETECTED
HPIV2 RNA NPH QL NAA+NON-PROBE: NOT DETECTED
HPIV3 RNA NPH QL NAA+NON-PROBE: NOT DETECTED
HPIV4 RNA NPH QL NAA+NON-PROBE: NOT DETECTED
IMM GRANULOCYTES # BLD AUTO: 0.16 K/UL (ref 0–0.58)
IMM GRANULOCYTES NFR BLD: 1 % (ref 0–5)
LACTATE BLDV-SCNC: 1.6 MMOL/L (ref 0.5–2.2)
LYMPHOCYTES NFR BLD: 0.92 K/UL (ref 1.5–4)
LYMPHOCYTES RELATIVE PERCENT: 6 % (ref 20–42)
M PNEUMO DNA NPH QL NAA+NON-PROBE: NOT DETECTED
MCH RBC QN AUTO: 31 PG (ref 26–35)
MCHC RBC AUTO-ENTMCNC: 34 G/DL (ref 32–34.5)
MCV RBC AUTO: 91.2 FL (ref 80–99.9)
MONOCYTES NFR BLD: 1.27 K/UL (ref 0.1–0.95)
MONOCYTES NFR BLD: 8 % (ref 2–12)
NEUTROPHILS NFR BLD: 86 % (ref 43–80)
NEUTS SEG NFR BLD: 14.11 K/UL (ref 1.8–7.3)
PLATELET # BLD AUTO: 239 K/UL (ref 130–450)
PMV BLD AUTO: 9.2 FL (ref 7–12)
POTASSIUM SERPL-SCNC: 4 MMOL/L (ref 3.5–5)
PROCALCITONIN SERPL-MCNC: 0.74 NG/ML (ref 0–0.08)
PROT SERPL-MCNC: 7.7 G/DL (ref 6.4–8.3)
RBC # BLD AUTO: 4.64 M/UL (ref 3.8–5.8)
RSV RNA NPH QL NAA+NON-PROBE: NOT DETECTED
RV+EV RNA NPH QL NAA+NON-PROBE: NOT DETECTED
SARS-COV-2 RNA NPH QL NAA+NON-PROBE: DETECTED
SODIUM SERPL-SCNC: 138 MMOL/L (ref 132–146)
SPECIMEN DESCRIPTION: ABNORMAL
TROPONIN I SERPL HS-MCNC: 21 NG/L (ref 0–11)
TROPONIN I SERPL HS-MCNC: 22 NG/L (ref 0–11)
WBC OTHER # BLD: 16.5 K/UL (ref 4.5–11.5)

## 2024-12-31 PROCEDURE — 83880 ASSAY OF NATRIURETIC PEPTIDE: CPT

## 2024-12-31 PROCEDURE — 87040 BLOOD CULTURE FOR BACTERIA: CPT

## 2024-12-31 PROCEDURE — 2500000003 HC RX 250 WO HCPCS

## 2024-12-31 PROCEDURE — 6370000000 HC RX 637 (ALT 250 FOR IP): Performed by: INTERNAL MEDICINE

## 2024-12-31 PROCEDURE — 99285 EMERGENCY DEPT VISIT HI MDM: CPT

## 2024-12-31 PROCEDURE — 83605 ASSAY OF LACTIC ACID: CPT

## 2024-12-31 PROCEDURE — 1200000000 HC SEMI PRIVATE

## 2024-12-31 PROCEDURE — 85025 COMPLETE CBC W/AUTO DIFF WBC: CPT

## 2024-12-31 PROCEDURE — 6370000000 HC RX 637 (ALT 250 FOR IP)

## 2024-12-31 PROCEDURE — 6360000002 HC RX W HCPCS

## 2024-12-31 PROCEDURE — 0202U NFCT DS 22 TRGT SARS-COV-2: CPT

## 2024-12-31 PROCEDURE — 2580000003 HC RX 258: Performed by: INTERNAL MEDICINE

## 2024-12-31 PROCEDURE — 80053 COMPREHEN METABOLIC PANEL: CPT

## 2024-12-31 PROCEDURE — 84145 PROCALCITONIN (PCT): CPT

## 2024-12-31 PROCEDURE — 84484 ASSAY OF TROPONIN QUANT: CPT

## 2024-12-31 PROCEDURE — 93005 ELECTROCARDIOGRAM TRACING: CPT

## 2024-12-31 PROCEDURE — 2580000003 HC RX 258

## 2024-12-31 PROCEDURE — 71045 X-RAY EXAM CHEST 1 VIEW: CPT

## 2024-12-31 RX ORDER — CLOPIDOGREL BISULFATE 75 MG/1
75 TABLET ORAL DAILY
Status: DISCONTINUED | OUTPATIENT
Start: 2025-01-01 | End: 2025-01-02 | Stop reason: HOSPADM

## 2024-12-31 RX ORDER — SODIUM CHLORIDE 9 MG/ML
INJECTION, SOLUTION INTRAVENOUS PRN
Status: DISCONTINUED | OUTPATIENT
Start: 2024-12-31 | End: 2025-01-02 | Stop reason: HOSPADM

## 2024-12-31 RX ORDER — GUAIFENESIN/DEXTROMETHORPHAN 100-10MG/5
10 SYRUP ORAL EVERY 6 HOURS PRN
Status: DISCONTINUED | OUTPATIENT
Start: 2024-12-31 | End: 2025-01-02 | Stop reason: HOSPADM

## 2024-12-31 RX ORDER — SODIUM CHLORIDE 9 MG/ML
INJECTION, SOLUTION INTRAVENOUS CONTINUOUS
Status: ACTIVE | OUTPATIENT
Start: 2024-12-31 | End: 2025-01-01

## 2024-12-31 RX ORDER — ENOXAPARIN SODIUM 100 MG/ML
40 INJECTION SUBCUTANEOUS DAILY
Status: DISCONTINUED | OUTPATIENT
Start: 2025-01-01 | End: 2025-01-02 | Stop reason: HOSPADM

## 2024-12-31 RX ORDER — LEVOTHYROXINE SODIUM 137 UG/1
137 TABLET ORAL DAILY
Status: DISCONTINUED | OUTPATIENT
Start: 2025-01-01 | End: 2025-01-02 | Stop reason: HOSPADM

## 2024-12-31 RX ORDER — ACETAMINOPHEN 325 MG/1
650 TABLET ORAL EVERY 6 HOURS PRN
Status: DISCONTINUED | OUTPATIENT
Start: 2024-12-31 | End: 2025-01-02 | Stop reason: HOSPADM

## 2024-12-31 RX ORDER — DEXAMETHASONE SODIUM PHOSPHATE 10 MG/ML
8 INJECTION INTRAMUSCULAR; INTRAVENOUS ONCE
Status: COMPLETED | OUTPATIENT
Start: 2024-12-31 | End: 2024-12-31

## 2024-12-31 RX ORDER — POTASSIUM CHLORIDE 1500 MG/1
40 TABLET, EXTENDED RELEASE ORAL PRN
Status: DISCONTINUED | OUTPATIENT
Start: 2024-12-31 | End: 2025-01-02 | Stop reason: HOSPADM

## 2024-12-31 RX ORDER — ONDANSETRON 4 MG/1
4 TABLET, ORALLY DISINTEGRATING ORAL EVERY 8 HOURS PRN
Status: DISCONTINUED | OUTPATIENT
Start: 2024-12-31 | End: 2025-01-02 | Stop reason: HOSPADM

## 2024-12-31 RX ORDER — SODIUM CHLORIDE 0.9 % (FLUSH) 0.9 %
5-40 SYRINGE (ML) INJECTION EVERY 12 HOURS SCHEDULED
Status: DISCONTINUED | OUTPATIENT
Start: 2024-12-31 | End: 2025-01-02 | Stop reason: HOSPADM

## 2024-12-31 RX ORDER — ASPIRIN 81 MG/1
81 TABLET, CHEWABLE ORAL DAILY
Status: DISCONTINUED | OUTPATIENT
Start: 2025-01-01 | End: 2025-01-02 | Stop reason: HOSPADM

## 2024-12-31 RX ORDER — SODIUM CHLORIDE 0.9 % (FLUSH) 0.9 %
5-40 SYRINGE (ML) INJECTION PRN
Status: DISCONTINUED | OUTPATIENT
Start: 2024-12-31 | End: 2025-01-02 | Stop reason: HOSPADM

## 2024-12-31 RX ORDER — ACETAMINOPHEN 500 MG
1000 TABLET ORAL ONCE
Status: COMPLETED | OUTPATIENT
Start: 2024-12-31 | End: 2024-12-31

## 2024-12-31 RX ORDER — MAGNESIUM SULFATE IN WATER 40 MG/ML
2000 INJECTION, SOLUTION INTRAVENOUS PRN
Status: DISCONTINUED | OUTPATIENT
Start: 2024-12-31 | End: 2025-01-02 | Stop reason: HOSPADM

## 2024-12-31 RX ORDER — METOPROLOL SUCCINATE 50 MG/1
50 TABLET, EXTENDED RELEASE ORAL DAILY
Status: DISCONTINUED | OUTPATIENT
Start: 2025-01-01 | End: 2025-01-02 | Stop reason: HOSPADM

## 2024-12-31 RX ORDER — 0.9 % SODIUM CHLORIDE 0.9 %
1000 INTRAVENOUS SOLUTION INTRAVENOUS ONCE
Status: COMPLETED | OUTPATIENT
Start: 2024-12-31 | End: 2024-12-31

## 2024-12-31 RX ORDER — ONDANSETRON 2 MG/ML
4 INJECTION INTRAMUSCULAR; INTRAVENOUS EVERY 6 HOURS PRN
Status: DISCONTINUED | OUTPATIENT
Start: 2024-12-31 | End: 2025-01-02 | Stop reason: HOSPADM

## 2024-12-31 RX ORDER — POLYETHYLENE GLYCOL 3350 17 G/17G
17 POWDER, FOR SOLUTION ORAL DAILY PRN
Status: DISCONTINUED | OUTPATIENT
Start: 2024-12-31 | End: 2025-01-02 | Stop reason: HOSPADM

## 2024-12-31 RX ORDER — POTASSIUM CHLORIDE 7.45 MG/ML
10 INJECTION INTRAVENOUS PRN
Status: DISCONTINUED | OUTPATIENT
Start: 2024-12-31 | End: 2025-01-02 | Stop reason: HOSPADM

## 2024-12-31 RX ORDER — ACETAMINOPHEN 650 MG/1
650 SUPPOSITORY RECTAL EVERY 6 HOURS PRN
Status: DISCONTINUED | OUTPATIENT
Start: 2024-12-31 | End: 2025-01-02 | Stop reason: HOSPADM

## 2024-12-31 RX ADMIN — SODIUM CHLORIDE: 9 INJECTION, SOLUTION INTRAVENOUS at 22:44

## 2024-12-31 RX ADMIN — DOXYCYCLINE 100 MG: 100 INJECTION, POWDER, LYOPHILIZED, FOR SOLUTION INTRAVENOUS at 17:12

## 2024-12-31 RX ADMIN — GUAIFENESIN SYRUP AND DEXTROMETHORPHAN 10 ML: 100; 10 SYRUP ORAL at 22:47

## 2024-12-31 RX ADMIN — DEXAMETHASONE SODIUM PHOSPHATE 8 MG: 10 INJECTION INTRAMUSCULAR; INTRAVENOUS at 17:11

## 2024-12-31 RX ADMIN — WATER 2000 MG: 1 INJECTION INTRAMUSCULAR; INTRAVENOUS; SUBCUTANEOUS at 17:11

## 2024-12-31 RX ADMIN — ACETAMINOPHEN 1000 MG: 500 TABLET ORAL at 12:56

## 2024-12-31 RX ADMIN — SODIUM CHLORIDE 1000 ML: 9 INJECTION, SOLUTION INTRAVENOUS at 12:56

## 2024-12-31 ASSESSMENT — PAIN - FUNCTIONAL ASSESSMENT: PAIN_FUNCTIONAL_ASSESSMENT: NONE - DENIES PAIN

## 2024-12-31 NOTE — ED PROVIDER NOTES
Shared NORIS-ED Attending Visit.  CC: No      [facility]  Department of Emergency Medicine   ED  Encounter Note  Admit Date/RoomTime: 2024 11:25 AM  ED Room:     NAME: Frank Salvador  : 1940  MRN: [id number]     Chief Complaint:  Cough (PT REPORTS FEVER, COUGH AND WEAKNESS. )    History of Present Illness       Frank Salvador is a 84 y.o. old male who presents to the emergency department by private vehicle, for fever, chills, nasal congestion, rhinorrhea, cough, and dyspnea, which began 4 day(s) prior to arrival.  Since onset the symptoms have been remaining constant and moderate in severity. The symptoms are associated with chest tightness.  There has been no abdominal pain, decreased appetite, conjunctivitis, watery eyes, dry cough, nausea, vomiting, diarrhea, dizziness, dysuria, urinary frequency, earache, hoarseness, irritability, joint swelling, malaise, neck stiffness, rash, sneezing, wheezing, loss of taste, or loss of smell. The patient has been fully vaccinated against COVID-19. He is ill-appearing. GCS is 15. Febrile and tachycardic.    ROS   Pertinent positives and negatives are stated within HPI, all other systems reviewed and are negative.    Past Medical History:  has a past medical history of CAD (coronary artery disease), Cancer (HCC), Hyperlipidemia, Malignant neoplasm of splenic flexure (HCC), Thyroid disease, and Wears partial dentures.    Surgical History:  has a past surgical history that includes Prostate biopsy; LAPAROTOMY EXPLORATORY (N/A, 10/13/2019); Port Surgery (N/A, 10/22/2019); Colonoscopy (2020); Cardiac catheterization (2020); CT PTC NEW ACCESS (2020); Cardiac catheterization (2020); ERCP (N/A, 2020); ERCP (N/A, 2020); ERCP (N/A, 2020); ERCP (N/A, 2020); ERCP (N/A, 2020); ERCP (N/A, 2020); Cholecystectomy, laparoscopic (N/A, 2021); colectomy (N/A, 2021); Port Surgery (N/A,

## 2025-01-01 ENCOUNTER — APPOINTMENT (OUTPATIENT)
Dept: GENERAL RADIOLOGY | Age: 85
DRG: 179 | End: 2025-01-01
Payer: MEDICARE

## 2025-01-01 LAB
ALBUMIN SERPL-MCNC: 3.5 G/DL (ref 3.5–5.2)
ALP SERPL-CCNC: 99 U/L (ref 40–129)
ALT SERPL-CCNC: 96 U/L (ref 0–40)
ANION GAP SERPL CALCULATED.3IONS-SCNC: 10 MMOL/L (ref 7–16)
AST SERPL-CCNC: 73 U/L (ref 0–39)
BILIRUB SERPL-MCNC: 0.7 MG/DL (ref 0–1.2)
BUN SERPL-MCNC: 17 MG/DL (ref 6–23)
CALCIUM SERPL-MCNC: 9 MG/DL (ref 8.6–10.2)
CHLORIDE SERPL-SCNC: 104 MMOL/L (ref 98–107)
CO2 SERPL-SCNC: 24 MMOL/L (ref 22–29)
CREAT SERPL-MCNC: 0.8 MG/DL (ref 0.7–1.2)
ERYTHROCYTE [DISTWIDTH] IN BLOOD BY AUTOMATED COUNT: 12.2 % (ref 11.5–15)
GFR, ESTIMATED: 87 ML/MIN/1.73M2
GLUCOSE SERPL-MCNC: 203 MG/DL (ref 74–99)
HCT VFR BLD AUTO: 42.4 % (ref 37–54)
HGB BLD-MCNC: 14.5 G/DL (ref 12.5–16.5)
MCH RBC QN AUTO: 31.5 PG (ref 26–35)
MCHC RBC AUTO-ENTMCNC: 34.2 G/DL (ref 32–34.5)
MCV RBC AUTO: 92 FL (ref 80–99.9)
PLATELET # BLD AUTO: 245 K/UL (ref 130–450)
PMV BLD AUTO: 9.5 FL (ref 7–12)
POTASSIUM SERPL-SCNC: 4 MMOL/L (ref 3.5–5)
PROT SERPL-MCNC: 7.3 G/DL (ref 6.4–8.3)
RBC # BLD AUTO: 4.61 M/UL (ref 3.8–5.8)
SODIUM SERPL-SCNC: 138 MMOL/L (ref 132–146)
WBC OTHER # BLD: 18.9 K/UL (ref 4.5–11.5)

## 2025-01-01 PROCEDURE — 36415 COLL VENOUS BLD VENIPUNCTURE: CPT

## 2025-01-01 PROCEDURE — 1200000000 HC SEMI PRIVATE

## 2025-01-01 PROCEDURE — 85027 COMPLETE CBC AUTOMATED: CPT

## 2025-01-01 PROCEDURE — 80053 COMPREHEN METABOLIC PANEL: CPT

## 2025-01-01 PROCEDURE — 6360000002 HC RX W HCPCS: Performed by: INTERNAL MEDICINE

## 2025-01-01 PROCEDURE — 2580000003 HC RX 258: Performed by: INTERNAL MEDICINE

## 2025-01-01 PROCEDURE — 71046 X-RAY EXAM CHEST 2 VIEWS: CPT

## 2025-01-01 PROCEDURE — 6370000000 HC RX 637 (ALT 250 FOR IP): Performed by: INTERNAL MEDICINE

## 2025-01-01 PROCEDURE — 2500000003 HC RX 250 WO HCPCS: Performed by: INTERNAL MEDICINE

## 2025-01-01 RX ADMIN — ASPIRIN 81 MG CHEWABLE TABLET 81 MG: 81 TABLET CHEWABLE at 09:25

## 2025-01-01 RX ADMIN — CLOPIDOGREL BISULFATE 75 MG: 75 TABLET ORAL at 09:25

## 2025-01-01 RX ADMIN — ENOXAPARIN SODIUM 40 MG: 100 INJECTION SUBCUTANEOUS at 09:25

## 2025-01-01 RX ADMIN — LEVOTHYROXINE SODIUM 137 MCG: 0.14 TABLET ORAL at 05:45

## 2025-01-01 RX ADMIN — SODIUM CHLORIDE, PRESERVATIVE FREE 10 ML: 5 INJECTION INTRAVENOUS at 09:24

## 2025-01-01 RX ADMIN — GUAIFENESIN SYRUP AND DEXTROMETHORPHAN 10 ML: 100; 10 SYRUP ORAL at 21:23

## 2025-01-01 RX ADMIN — ACETAMINOPHEN 650 MG: 325 TABLET ORAL at 21:21

## 2025-01-01 RX ADMIN — METOPROLOL SUCCINATE 50 MG: 50 TABLET, EXTENDED RELEASE ORAL at 09:23

## 2025-01-01 RX ADMIN — SODIUM CHLORIDE: 9 INJECTION, SOLUTION INTRAVENOUS at 12:20

## 2025-01-01 ASSESSMENT — PAIN SCALES - GENERAL: PAINLEVEL_OUTOF10: 3

## 2025-01-01 NOTE — PROGRESS NOTES
4 Eyes Skin Assessment     NAME:  Frank Salvador  YOB: 1940  MEDICAL RECORD NUMBER:  [id number]    The patient is being assessed for  Admission    I agree that at least one RN has performed a thorough Head to Toe Skin Assessment on the patient. ALL assessment sites listed below have been assessed.      Areas assessed by both nurses:    Head, Face, Ears, Shoulders, Back, Chest, Arms, Elbows, Hands, Sacrum. Buttock, Coccyx, Ischium, and Legs. Feet and Heels        Does the Patient have a Wound? No noted wound(s)       Mathew Prevention initiated by RN: Yes  Wound Care Orders initiated by RN: No    Pressure Injury (Stage 3,4, Unstageable, DTI, NWPT, and Complex wounds) if present, place Wound referral order by RN under : No    New Ostomies, if present place, Ostomy referral order under : No     Nurse 1 eSignature: Electronically signed by Caleb Peters RN on 1/1/25 at 12:00 AM EST    **SHARE this note so that the co-signing nurse can place an eSignature**    Nurse 2 eSignature: Electronically signed by Nir Deleon RN on 1/1/25 at 12:02 AM EST

## 2025-01-01 NOTE — PROGRESS NOTES
Patient up to restroom and became slightly SOB when coming back to bed. SPO2 did drop slightly to 89% but returned to 95% after returning to be.

## 2025-01-01 NOTE — H&P
[facility]              [address] [place] [address]                           HISTORY & PHYSICAL      PATIENT NAME: RICHMOND BARTLETT              : 1940  MED REC NO: [id number]                        ROOM: 8416  ACCOUNT NO: [id number]                       ADMIT DATE: 2024  PROVIDER: Amando Liu DO      CHIEF COMPLAINT:  Fever, cough, weakness.    HISTORY OF PRESENT ILLNESS:  The patient is an 84-year-old  male who presented to the emergency room complaining of several-day history of head congestion, watery eyes, weakness, fever, cough productive of whitish sputum.  He was seen in the emergency room.  Diagnostic evaluation in the emergency room revealed temperature 102.6, pulse 115, pulse ox on room air 95%.  The patient was Covid positive.  The patient was admitted for further evaluation and treatment.    MEDICATIONS:  Prior to admission:  Tylenol, Norvasc, aspirin, Lipitor, Plavix, Synthroid, Toprol.    PAST MEDICAL HISTORY:  Coronary artery disease, colon cancer, MI, hypothyroid, hyperlipidemia, prostate cancer.    PAST SURGICAL HISTORY:  Colostomy, exploratory laparotomy, large bowel resection, MediPort insertion, prostatectomy, cholecystostomy tube placement, biliary stent placement.    REVIEW OF SYSTEMS:  Remarkable for above-stated chief complaint.    ALLERGIES:  NONE KNOWN.      SOCIAL HISTORY:  The patient quit tobacco.  Admits to occasional alcohol.    PHYSICAL EXAMINATION:  GENERAL:  Physical exam reveals an 84-year-old  male who is alert, cooperative, and a fair historian.  VITAL SIGNS:  On admission, temperature 102.6, pulse 115, respirations 16, blood pressure 145/73.  HEAD:  Normocephalic, atraumatic.   EYES:  Pupils equal and reactive to light.  Extraocular muscles intact.  EARS:  Tympanic membranes intact.  PHARYNX:  Noninjected without exudate.  NECK:  Supple.  No JVD.  No thyromegaly.  No carotid

## 2025-01-02 VITALS
SYSTOLIC BLOOD PRESSURE: 142 MMHG | HEART RATE: 74 BPM | WEIGHT: 235.7 LBS | TEMPERATURE: 97.2 F | DIASTOLIC BLOOD PRESSURE: 82 MMHG | HEIGHT: 72 IN | BODY MASS INDEX: 31.92 KG/M2 | OXYGEN SATURATION: 98 % | RESPIRATION RATE: 18 BRPM

## 2025-01-02 LAB
ALBUMIN SERPL-MCNC: 3.5 G/DL (ref 3.5–5.2)
ALP SERPL-CCNC: 104 U/L (ref 40–129)
ALT SERPL-CCNC: 106 U/L (ref 0–40)
ANION GAP SERPL CALCULATED.3IONS-SCNC: 9 MMOL/L (ref 7–16)
AST SERPL-CCNC: 65 U/L (ref 0–39)
BILIRUB SERPL-MCNC: 0.4 MG/DL (ref 0–1.2)
BUN SERPL-MCNC: 15 MG/DL (ref 6–23)
CALCIUM SERPL-MCNC: 8.9 MG/DL (ref 8.6–10.2)
CHLORIDE SERPL-SCNC: 104 MMOL/L (ref 98–107)
CO2 SERPL-SCNC: 23 MMOL/L (ref 22–29)
CREAT SERPL-MCNC: 0.8 MG/DL (ref 0.7–1.2)
ERYTHROCYTE [DISTWIDTH] IN BLOOD BY AUTOMATED COUNT: 12.2 % (ref 11.5–15)
GFR, ESTIMATED: 87 ML/MIN/1.73M2
GLUCOSE SERPL-MCNC: 153 MG/DL (ref 74–99)
HCT VFR BLD AUTO: 38.1 % (ref 37–54)
HGB BLD-MCNC: 13.2 G/DL (ref 12.5–16.5)
MCH RBC QN AUTO: 31.1 PG (ref 26–35)
MCHC RBC AUTO-ENTMCNC: 34.6 G/DL (ref 32–34.5)
MCV RBC AUTO: 89.6 FL (ref 80–99.9)
PLATELET # BLD AUTO: 264 K/UL (ref 130–450)
PMV BLD AUTO: 9.3 FL (ref 7–12)
POTASSIUM SERPL-SCNC: 4.2 MMOL/L (ref 3.5–5)
PROT SERPL-MCNC: 7 G/DL (ref 6.4–8.3)
RBC # BLD AUTO: 4.25 M/UL (ref 3.8–5.8)
SODIUM SERPL-SCNC: 136 MMOL/L (ref 132–146)
WBC OTHER # BLD: 17.7 K/UL (ref 4.5–11.5)

## 2025-01-02 PROCEDURE — 85027 COMPLETE CBC AUTOMATED: CPT

## 2025-01-02 PROCEDURE — 6370000000 HC RX 637 (ALT 250 FOR IP): Performed by: INTERNAL MEDICINE

## 2025-01-02 PROCEDURE — 2500000003 HC RX 250 WO HCPCS: Performed by: INTERNAL MEDICINE

## 2025-01-02 PROCEDURE — 36415 COLL VENOUS BLD VENIPUNCTURE: CPT

## 2025-01-02 PROCEDURE — 80053 COMPREHEN METABOLIC PANEL: CPT

## 2025-01-02 RX ORDER — GUAIFENESIN/DEXTROMETHORPHAN 100-10MG/5
10 SYRUP ORAL EVERY 6 HOURS PRN
COMMUNITY
Start: 2025-01-02 | End: 2025-01-12

## 2025-01-02 RX ADMIN — SODIUM CHLORIDE, PRESERVATIVE FREE 10 ML: 5 INJECTION INTRAVENOUS at 08:50

## 2025-01-02 RX ADMIN — METOPROLOL SUCCINATE 50 MG: 50 TABLET, EXTENDED RELEASE ORAL at 08:49

## 2025-01-02 RX ADMIN — CLOPIDOGREL BISULFATE 75 MG: 75 TABLET ORAL at 08:49

## 2025-01-02 RX ADMIN — ACETAMINOPHEN 650 MG: 325 TABLET ORAL at 05:40

## 2025-01-02 RX ADMIN — LEVOTHYROXINE SODIUM 137 MCG: 0.14 TABLET ORAL at 05:40

## 2025-01-02 RX ADMIN — ASPIRIN 81 MG CHEWABLE TABLET 81 MG: 81 TABLET CHEWABLE at 08:49

## 2025-01-02 NOTE — PROGRESS NOTES
Patient received the Sacrament of the Anointing of the Sick by Father Darrian Nicholson on 01/01/2025.    If additional support is requested or needed please reach out to Spiritual Health ([phone]).    Chap. Josué Peres MDIV, BCC

## 2025-01-02 NOTE — PROGRESS NOTES
[facility]    Subjective:  pt alert conversive on rm air      Current Facility-Administered Medications:     aspirin chewable tablet 81 mg, 81 mg, Oral, Daily, Amando Liu DO, 81 mg at 01/01/25 0925    clopidogrel (PLAVIX) tablet 75 mg, 75 mg, Oral, Daily, Amando Liu DO, 75 mg at 01/01/25 0925    levothyroxine (SYNTHROID) tablet 137 mcg, 137 mcg, Oral, Daily, Amando Liu DO, 137 mcg at 01/02/25 0540    metoprolol succinate (TOPROL XL) extended release tablet 50 mg, 50 mg, Oral, Daily, Amando Liu DO, 50 mg at 01/01/25 0923    sodium chloride flush 0.9 % injection 5-40 mL, 5-40 mL, IntraVENous, 2 times per day, Amando Liu DO, 10 mL at 01/01/25 0924    sodium chloride flush 0.9 % injection 5-40 mL, 5-40 mL, IntraVENous, PRN, Amando Liu DO    0.9 % sodium chloride infusion, , IntraVENous, PRN, Amando Liu DO    potassium chloride (KLOR-CON M) extended release tablet 40 mEq, 40 mEq, Oral, PRN **OR** potassium bicarb-citric acid (EFFER-K) effervescent tablet 40 mEq, 40 mEq, Oral, PRN **OR** potassium chloride 10 mEq/100 mL IVPB (Peripheral Line), 10 mEq, IntraVENous, PRN, Amando Liu DO    magnesium sulfate 2000 mg in 50 mL IVPB premix, 2,000 mg, IntraVENous, PRN, Amando Liu DO    enoxaparin (LOVENOX) injection 40 mg, 40 mg, SubCUTAneous, Daily, Amando Liu DO, 40 mg at 01/01/25 0925    ondansetron (ZOFRAN-ODT) disintegrating tablet 4 mg, 4 mg, Oral, Q8H PRN **OR** ondansetron (ZOFRAN) injection 4 mg, 4 mg, IntraVENous, Q6H PRN, Amando Liu,     polyethylene glycol (GLYCOLAX) packet 17 g, 17 g, Oral, Daily PRN, Amando Liu DO    acetaminophen (TYLENOL) tablet 650 mg, 650 mg, Oral, Q6H PRN, 650 mg at 01/02/25 0540 **OR** acetaminophen (TYLENOL) suppository 650 mg, 650 mg, Rectal, Q6H PRN, Amando Liu DO    guaiFENesin-dextromethorphan (ROBITUSSIN DM) 100-10 MG/5ML syrup 10 mL, 10 mL, Oral, Q6H PRN, Amando Liu,

## 2025-01-02 NOTE — CARE COORDINATION
Patient Representative Name:       The Patient and/or Patient Representative Agree with the Discharge Plan     SIMRAN Cerda  Case Management Department  Ph: [phone]

## 2025-01-02 NOTE — ACP (ADVANCE CARE PLANNING)
Advance Care Planning   The patient has the following advanced directives on file:  Advance Directives       Power of  Living Will ACP-Advance Directive ACP-Power of     Not on File Not on File Not on File Not on File            The patient has appointed the following active healthcare agents:  Primary:  Tammi Salvador - wife  [phone]      SIMRAN Cerda  1/2/2025

## 2025-01-03 LAB
EKG ATRIAL RATE: 109 BPM
EKG P AXIS: 43 DEGREES
EKG P-R INTERVAL: 166 MS
EKG Q-T INTERVAL: 326 MS
EKG QRS DURATION: 82 MS
EKG QTC CALCULATION (BAZETT): 439 MS
EKG R AXIS: 25 DEGREES
EKG T AXIS: 31 DEGREES
EKG VENTRICULAR RATE: 109 BPM

## 2025-01-03 PROCEDURE — 93010 ELECTROCARDIOGRAM REPORT: CPT | Performed by: INTERNAL MEDICINE

## 2025-01-05 LAB
MICROORGANISM SPEC CULT: NORMAL
MICROORGANISM SPEC CULT: NORMAL
SERVICE CMNT-IMP: NORMAL
SERVICE CMNT-IMP: NORMAL
SPECIMEN DESCRIPTION: NORMAL
SPECIMEN DESCRIPTION: NORMAL
